# Patient Record
Sex: FEMALE | Race: BLACK OR AFRICAN AMERICAN | NOT HISPANIC OR LATINO | URBAN - METROPOLITAN AREA
[De-identification: names, ages, dates, MRNs, and addresses within clinical notes are randomized per-mention and may not be internally consistent; named-entity substitution may affect disease eponyms.]

---

## 2017-03-19 ENCOUNTER — EMERGENCY (EMERGENCY)
Facility: HOSPITAL | Age: 72
LOS: 1 days | Discharge: HOME | End: 2017-03-19
Attending: EMERGENCY MEDICINE | Admitting: EMERGENCY MEDICINE
Payer: MEDICARE

## 2017-03-19 VITALS
HEART RATE: 77 BPM | SYSTOLIC BLOOD PRESSURE: 145 MMHG | OXYGEN SATURATION: 100 % | DIASTOLIC BLOOD PRESSURE: 66 MMHG | RESPIRATION RATE: 18 BRPM

## 2017-03-19 VITALS
DIASTOLIC BLOOD PRESSURE: 84 MMHG | RESPIRATION RATE: 20 BRPM | OXYGEN SATURATION: 96 % | TEMPERATURE: 98 F | SYSTOLIC BLOOD PRESSURE: 146 MMHG | HEART RATE: 78 BPM

## 2017-03-19 DIAGNOSIS — Z90.89 ACQUIRED ABSENCE OF OTHER ORGANS: Chronic | ICD-10-CM

## 2017-03-19 LAB
ALBUMIN SERPL ELPH-MCNC: 3.2 G/DL — LOW (ref 3.3–5)
ALP SERPL-CCNC: 110 U/L — SIGNIFICANT CHANGE UP (ref 40–120)
ALT FLD-CCNC: 16 U/L — SIGNIFICANT CHANGE UP (ref 4–33)
APTT BLD: 29.5 SEC — SIGNIFICANT CHANGE UP (ref 27.5–37.4)
AST SERPL-CCNC: 21 U/L — SIGNIFICANT CHANGE UP (ref 4–32)
BASE EXCESS BLDV CALC-SCNC: -3.4 MMOL/L — SIGNIFICANT CHANGE UP
BASOPHILS # BLD AUTO: 0.01 K/UL — SIGNIFICANT CHANGE UP (ref 0–0.2)
BASOPHILS NFR BLD AUTO: 0.1 % — SIGNIFICANT CHANGE UP (ref 0–2)
BILIRUB SERPL-MCNC: < 0.2 MG/DL — LOW (ref 0.2–1.2)
BLOOD GAS VENOUS - CREATININE: 2.14 MG/DL — HIGH (ref 0.5–1.3)
BUN SERPL-MCNC: 28 MG/DL — HIGH (ref 7–23)
CALCIUM SERPL-MCNC: 8.3 MG/DL — LOW (ref 8.4–10.5)
CHLORIDE BLDV-SCNC: 109 MMOL/L — HIGH (ref 96–108)
CHLORIDE SERPL-SCNC: 106 MMOL/L — SIGNIFICANT CHANGE UP (ref 98–107)
CK MB BLD-MCNC: 2.1 — SIGNIFICANT CHANGE UP (ref 0–2.5)
CK MB BLD-MCNC: 6.61 NG/ML — HIGH (ref 1–4.7)
CK SERPL-CCNC: 309 U/L — HIGH (ref 25–170)
CO2 SERPL-SCNC: 19 MMOL/L — LOW (ref 22–31)
CREAT SERPL-MCNC: 2.16 MG/DL — HIGH (ref 0.5–1.3)
EOSINOPHIL # BLD AUTO: 0.04 K/UL — SIGNIFICANT CHANGE UP (ref 0–0.5)
EOSINOPHIL NFR BLD AUTO: 0.5 % — SIGNIFICANT CHANGE UP (ref 0–6)
GAS PNL BLDV: 138 MMOL/L — SIGNIFICANT CHANGE UP (ref 136–146)
GLUCOSE BLDV-MCNC: 322 — HIGH (ref 70–99)
GLUCOSE SERPL-MCNC: 333 MG/DL — HIGH (ref 70–99)
HCO3 BLDV-SCNC: 20 MMOL/L — SIGNIFICANT CHANGE UP (ref 20–27)
HCT VFR BLD CALC: 32 % — LOW (ref 34.5–45)
HCT VFR BLDV CALC: 29.6 % — LOW (ref 34.5–45)
HGB BLD-MCNC: 9.9 G/DL — LOW (ref 11.5–15.5)
HGB BLDV-MCNC: 9.6 G/DL — LOW (ref 11.5–15.5)
IMM GRANULOCYTES NFR BLD AUTO: 0.4 % — SIGNIFICANT CHANGE UP (ref 0–1.5)
INR BLD: 0.96 — SIGNIFICANT CHANGE UP (ref 0.88–1.17)
LACTATE BLDV-MCNC: 1 MMOL/L — SIGNIFICANT CHANGE UP (ref 0.5–2)
LYMPHOCYTES # BLD AUTO: 2.93 K/UL — SIGNIFICANT CHANGE UP (ref 1–3.3)
LYMPHOCYTES # BLD AUTO: 40 % — SIGNIFICANT CHANGE UP (ref 13–44)
MCHC RBC-ENTMCNC: 23.7 PG — LOW (ref 27–34)
MCHC RBC-ENTMCNC: 30.9 % — LOW (ref 32–36)
MCV RBC AUTO: 76.6 FL — LOW (ref 80–100)
MONOCYTES # BLD AUTO: 0.56 K/UL — SIGNIFICANT CHANGE UP (ref 0–0.9)
MONOCYTES NFR BLD AUTO: 7.7 % — SIGNIFICANT CHANGE UP (ref 2–14)
NEUTROPHILS # BLD AUTO: 3.75 K/UL — SIGNIFICANT CHANGE UP (ref 1.8–7.4)
NEUTROPHILS NFR BLD AUTO: 51.3 % — SIGNIFICANT CHANGE UP (ref 43–77)
NT-PROBNP SERPL-SCNC: 825.8 PG/ML — SIGNIFICANT CHANGE UP
PCO2 BLDV: 49 MMHG — SIGNIFICANT CHANGE UP (ref 41–51)
PH BLDV: 7.28 PH — LOW (ref 7.32–7.43)
PLATELET # BLD AUTO: 226 K/UL — SIGNIFICANT CHANGE UP (ref 150–400)
PMV BLD: 9.6 FL — SIGNIFICANT CHANGE UP (ref 7–13)
PO2 BLDV: 28 MMHG — LOW (ref 35–40)
POTASSIUM BLDV-SCNC: 4.7 MMOL/L — HIGH (ref 3.4–4.5)
POTASSIUM SERPL-MCNC: 4.9 MMOL/L — SIGNIFICANT CHANGE UP (ref 3.5–5.3)
POTASSIUM SERPL-SCNC: 4.9 MMOL/L — SIGNIFICANT CHANGE UP (ref 3.5–5.3)
PROT SERPL-MCNC: 7 G/DL — SIGNIFICANT CHANGE UP (ref 6–8.3)
PROTHROM AB SERPL-ACNC: 10.8 SEC — SIGNIFICANT CHANGE UP (ref 9.8–13.1)
RBC # BLD: 4.18 M/UL — SIGNIFICANT CHANGE UP (ref 3.8–5.2)
RBC # FLD: 14.7 % — HIGH (ref 10.3–14.5)
SAO2 % BLDV: 44.6 % — LOW (ref 60–85)
SODIUM SERPL-SCNC: 141 MMOL/L — SIGNIFICANT CHANGE UP (ref 135–145)
TROPONIN T SERPL-MCNC: < 0.06 NG/ML — SIGNIFICANT CHANGE UP (ref 0–0.06)
WBC # BLD: 7.32 K/UL — SIGNIFICANT CHANGE UP (ref 3.8–10.5)
WBC # FLD AUTO: 7.32 K/UL — SIGNIFICANT CHANGE UP (ref 3.8–10.5)

## 2017-03-19 PROCEDURE — 71010: CPT | Mod: 26

## 2017-03-19 PROCEDURE — 99284 EMERGENCY DEPT VISIT MOD MDM: CPT | Mod: GC

## 2017-03-19 RX ORDER — ALBUTEROL 90 UG/1
2 AEROSOL, METERED ORAL
Qty: 1 | Refills: 0 | OUTPATIENT
Start: 2017-03-19 | End: 2017-04-18

## 2017-03-19 RX ORDER — FUROSEMIDE 40 MG
20 TABLET ORAL ONCE
Qty: 0 | Refills: 0 | Status: COMPLETED | OUTPATIENT
Start: 2017-03-19 | End: 2017-03-19

## 2017-03-19 RX ORDER — FUROSEMIDE 40 MG
1 TABLET ORAL
Qty: 7 | Refills: 0 | OUTPATIENT
Start: 2017-03-19 | End: 2017-03-26

## 2017-03-19 RX ADMIN — Medication 20 MILLIGRAM(S): at 16:19

## 2017-03-19 NOTE — ED PROVIDER NOTE - PMH
Asthma    CVA (cerebral vascular accident)    DM (diabetes mellitus)    HLD (hyperlipidemia)    HTN (hypertension)    Neuropathy    SLE (systemic lupus erythematosus)

## 2017-03-19 NOTE — ED PROVIDER NOTE - OBJECTIVE STATEMENT
Patient is a 70 y/o F PMH HTN, HLD, DM2, CKD, asthma who presents with shortness of breath. Patient states that yesterday, she developed cough, shortness of breath and wheezing that continued throughout the night. States her albuterol inhaler  so she didn't use it. Reports associated chest tightness/pain. Denies associated fevers, chills. States she was sweating last night. No palpitations, abdominal pain, nausea, vomiting, diarrhea, constipation, or dysuria. States she was recently prescribed lasix, but stopped taking it two weeks ago because she started to have leg swelling. In the last two weeks, leg swelling progressed. Denies sick contacts or recent travel. Found an albuterol inhaler this morning; after using it, wheezing resolved.

## 2017-03-19 NOTE — ED PROVIDER NOTE - MEDICAL DECISION MAKING DETAILS
70 y/o F PMH HTN, HLD, DM2, CKD, asthma presents with SOB and wheezing x 1 day, resolved with albuterol. Will check labs, including pro-BNP, CXR.

## 2017-03-19 NOTE — ED ADULT TRIAGE NOTE - CHIEF COMPLAINT QUOTE
Pt. c/o wheezing , chest pain inspiration  and fluid retention over several days. Pt with b/l lower leg edema noted. Pt was given asa 162mg by EMS.

## 2017-03-19 NOTE — ED PROVIDER NOTE - PROGRESS NOTE DETAILS
Patient clinically improved. Spoke with patient at bedside - will give lasix 20 IV here, will discharge home with a one week supply of lasix 40 qd. Patient agreeable, to follow up with PMD this week.

## 2017-03-19 NOTE — ED PROVIDER NOTE - PHYSICAL EXAMINATION
Attending Note: 72 y/o female presents to the ED with c/o SOB, cough, wheezing x 24 hours (throughout the night). PMH HTN, HLD, DM2, CKD, asthma. Her albuterol inhaler was  so she didn't use it. +Associated chest tightness/pain with cough. Denies fever/chills, N/V. Sweating last PM. No: palpitations, abdominal pain, diarrhea, constipation, or dysuria/hematuria. Recently prescribed Lasix, stopped taking x 2 weeks ago because she started to have leg swelling. Last two weeks, leg swelling progressed. Denies sick contacts or recent travel. Found an albuterol inhaler this morning; after using it, wheezing resolved with improvement.

## 2017-04-11 ENCOUNTER — INPATIENT (INPATIENT)
Facility: HOSPITAL | Age: 72
LOS: 2 days | Discharge: HOME HEALTH | End: 2017-04-14
Attending: INTERNAL MEDICINE | Admitting: INTERNAL MEDICINE
Payer: MEDICARE

## 2017-04-11 VITALS
WEIGHT: 240.08 LBS | TEMPERATURE: 98 F | DIASTOLIC BLOOD PRESSURE: 98 MMHG | HEART RATE: 80 BPM | HEIGHT: 67 IN | SYSTOLIC BLOOD PRESSURE: 168 MMHG | OXYGEN SATURATION: 100 % | RESPIRATION RATE: 17 BRPM

## 2017-04-11 DIAGNOSIS — Z90.89 ACQUIRED ABSENCE OF OTHER ORGANS: Chronic | ICD-10-CM

## 2017-04-11 PROCEDURE — 71010: CPT | Mod: 26

## 2017-04-11 PROCEDURE — 99285 EMERGENCY DEPT VISIT HI MDM: CPT

## 2017-04-11 RX ORDER — IPRATROPIUM/ALBUTEROL SULFATE 18-103MCG
3 AEROSOL WITH ADAPTER (GRAM) INHALATION ONCE
Qty: 0 | Refills: 0 | Status: DISCONTINUED | OUTPATIENT
Start: 2017-04-11 | End: 2017-04-11

## 2017-04-11 RX ORDER — FUROSEMIDE 40 MG
40 TABLET ORAL ONCE
Qty: 0 | Refills: 0 | Status: COMPLETED | OUTPATIENT
Start: 2017-04-11 | End: 2017-04-11

## 2017-04-11 RX ORDER — ALBUTEROL 90 UG/1
2.5 AEROSOL, METERED ORAL ONCE
Qty: 0 | Refills: 0 | Status: COMPLETED | OUTPATIENT
Start: 2017-04-11 | End: 2017-04-11

## 2017-04-11 RX ORDER — IPRATROPIUM/ALBUTEROL SULFATE 18-103MCG
3 AEROSOL WITH ADAPTER (GRAM) INHALATION ONCE
Qty: 0 | Refills: 0 | Status: COMPLETED | OUTPATIENT
Start: 2017-04-11 | End: 2017-04-11

## 2017-04-11 RX ADMIN — Medication 40 MILLIGRAM(S): at 23:14

## 2017-04-11 RX ADMIN — ALBUTEROL 2.5 MILLIGRAM(S): 90 AEROSOL, METERED ORAL at 23:15

## 2017-04-11 NOTE — ED PROVIDER NOTE - OBJECTIVE STATEMENT
Pertinent PMH/PSH/FHx/SHx and Review of Systems contained within:  Patient with history of asthma, CVA, HTN, HLD, DM presents to the ED for shortness of breath, worsening over the last 2-3 days.  Also describes increasing edema in both lower extremities.  Denies any history of CHF.  Denies any chest pain or palpitations.  No recent cough, cold, or sick contacts.  Nonsmoker.  No calf pain or redness.    No fever/chills, No photophobia/eye pain/changes in vision, No ear pain/sore throat/dysphagia, No chest pain/palpitations, no stridor, No abdominal pain, No N/V/D, no dysuria/frequency/discharge, No neck/back pain, no rash, no changes in neurological status/function.

## 2017-04-11 NOTE — ED PROVIDER NOTE - MEDICAL DECISION MAKING DETAILS
Patient with shortness of breath.  VSS.  Labs, CXR reviewed.  Patient given lasix and neb treatments with improvement.  Suspect multiple causes of shortness of breath.  Patient is to be admitted to the hospital and the case was discussed with the admitting physician.  Any changes in plan, additional imaging/labs, and further work up will be at the discretion of the admitting physician.  Patient remained stable in my care. Patient with shortness of breath.  VSS.  Labs, CXR reviewed.  Bedside US without pericardial effusion.  Patient given lasix and neb treatments with improvement.  Suspect multiple causes of shortness of breath.  Patient is to be admitted to the hospital and the case was discussed with the admitting physician.  Any changes in plan, additional imaging/labs, and further work up will be at the discretion of the admitting physician.  Patient remained stable in my care.

## 2017-04-11 NOTE — ED ADULT NURSE NOTE - PMH
<<----- Click to add NO pertinent Past Medical History Asthma    CVA (cerebral vascular accident)    DM (diabetes mellitus)

## 2017-04-11 NOTE — ED ADULT NURSE NOTE - OBJECTIVE STATEMENT
Pt Presents to the ED with c/o SOB and increasing swelling to bilateral lower ext extending up to thighs + 2 pedal edema pt is a poor historian, unsure if she has a cardiac hx

## 2017-04-12 DIAGNOSIS — N18.3 CHRONIC KIDNEY DISEASE, STAGE 3 (MODERATE): ICD-10-CM

## 2017-04-12 DIAGNOSIS — J45.21 MILD INTERMITTENT ASTHMA WITH (ACUTE) EXACERBATION: ICD-10-CM

## 2017-04-12 DIAGNOSIS — E11.22 TYPE 2 DIABETES MELLITUS WITH DIABETIC CHRONIC KIDNEY DISEASE: ICD-10-CM

## 2017-04-12 DIAGNOSIS — Z29.9 ENCOUNTER FOR PROPHYLACTIC MEASURES, UNSPECIFIED: ICD-10-CM

## 2017-04-12 DIAGNOSIS — I50.9 HEART FAILURE, UNSPECIFIED: ICD-10-CM

## 2017-04-12 LAB
ALBUMIN SERPL ELPH-MCNC: 3.1 G/DL — LOW (ref 3.3–5)
ALP SERPL-CCNC: 162 U/L — HIGH (ref 40–120)
ALT FLD-CCNC: 40 U/L — SIGNIFICANT CHANGE UP (ref 12–78)
ANION GAP SERPL CALC-SCNC: 10 MMOL/L — SIGNIFICANT CHANGE UP (ref 5–17)
ANION GAP SERPL CALC-SCNC: 9 MMOL/L — SIGNIFICANT CHANGE UP (ref 5–17)
APTT BLD: 32.7 SEC — SIGNIFICANT CHANGE UP (ref 27.5–37.4)
AST SERPL-CCNC: 43 U/L — HIGH (ref 15–37)
BASOPHILS # BLD AUTO: 0.1 K/UL — SIGNIFICANT CHANGE UP (ref 0–0.2)
BASOPHILS NFR BLD AUTO: 0.9 % — SIGNIFICANT CHANGE UP (ref 0–2)
BILIRUB SERPL-MCNC: 0.2 MG/DL — SIGNIFICANT CHANGE UP (ref 0.2–1.2)
BUN SERPL-MCNC: 26 MG/DL — HIGH (ref 7–23)
BUN SERPL-MCNC: 29 MG/DL — HIGH (ref 7–23)
CALCIUM SERPL-MCNC: 8.3 MG/DL — LOW (ref 8.5–10.1)
CALCIUM SERPL-MCNC: 8.3 MG/DL — LOW (ref 8.5–10.1)
CHLORIDE SERPL-SCNC: 110 MMOL/L — HIGH (ref 96–108)
CHLORIDE SERPL-SCNC: 111 MMOL/L — HIGH (ref 96–108)
CHOLEST SERPL-MCNC: 121 MG/DL — SIGNIFICANT CHANGE UP (ref 10–199)
CK MB BLD-MCNC: 1.2 % — SIGNIFICANT CHANGE UP (ref 0–3.5)
CK MB BLD-MCNC: 1.7 % — SIGNIFICANT CHANGE UP (ref 0–3.5)
CK MB CFR SERPL CALC: 3.4 NG/ML — SIGNIFICANT CHANGE UP (ref 0.5–3.6)
CK MB CFR SERPL CALC: 6.2 NG/ML — HIGH (ref 0.5–3.6)
CK SERPL-CCNC: 274 U/L — HIGH (ref 26–192)
CK SERPL-CCNC: 369 U/L — HIGH (ref 26–192)
CO2 SERPL-SCNC: 21 MMOL/L — LOW (ref 22–31)
CO2 SERPL-SCNC: 22 MMOL/L — SIGNIFICANT CHANGE UP (ref 22–31)
CREAT SERPL-MCNC: 1.97 MG/DL — HIGH (ref 0.5–1.3)
CREAT SERPL-MCNC: 2.09 MG/DL — HIGH (ref 0.5–1.3)
EOSINOPHIL # BLD AUTO: 0.1 K/UL — SIGNIFICANT CHANGE UP (ref 0–0.5)
EOSINOPHIL NFR BLD AUTO: 1.3 % — SIGNIFICANT CHANGE UP (ref 0–6)
GLUCOSE SERPL-MCNC: 75 MG/DL — SIGNIFICANT CHANGE UP (ref 70–99)
GLUCOSE SERPL-MCNC: 86 MG/DL — SIGNIFICANT CHANGE UP (ref 70–99)
HCT VFR BLD CALC: 30.3 % — LOW (ref 34.5–45)
HCT VFR BLD CALC: 33.2 % — LOW (ref 34.5–45)
HDLC SERPL-MCNC: 58 MG/DL — SIGNIFICANT CHANGE UP (ref 40–125)
HGB BLD-MCNC: 10 G/DL — LOW (ref 11.5–15.5)
HGB BLD-MCNC: 10.2 G/DL — LOW (ref 11.5–15.5)
INR BLD: 0.94 RATIO — SIGNIFICANT CHANGE UP (ref 0.88–1.16)
LIPID PNL WITH DIRECT LDL SERPL: 46 MG/DL — SIGNIFICANT CHANGE UP
LYMPHOCYTES # BLD AUTO: 3.6 K/UL — HIGH (ref 1–3.3)
LYMPHOCYTES # BLD AUTO: 38.3 % — SIGNIFICANT CHANGE UP (ref 13–44)
MCHC RBC-ENTMCNC: 22.8 PG — LOW (ref 27–34)
MCHC RBC-ENTMCNC: 24.7 PG — LOW (ref 27–34)
MCHC RBC-ENTMCNC: 30.6 GM/DL — LOW (ref 32–36)
MCHC RBC-ENTMCNC: 33.2 GM/DL — SIGNIFICANT CHANGE UP (ref 32–36)
MCV RBC AUTO: 74.3 FL — LOW (ref 80–100)
MCV RBC AUTO: 74.6 FL — LOW (ref 80–100)
MONOCYTES # BLD AUTO: 0.8 K/UL — SIGNIFICANT CHANGE UP (ref 0–0.9)
MONOCYTES NFR BLD AUTO: 8.9 % — SIGNIFICANT CHANGE UP (ref 2–14)
NEUTROPHILS # BLD AUTO: 4.7 K/UL — SIGNIFICANT CHANGE UP (ref 1.8–7.4)
NEUTROPHILS NFR BLD AUTO: 50.6 % — SIGNIFICANT CHANGE UP (ref 43–77)
NT-PROBNP SERPL-SCNC: 915 PG/ML — HIGH (ref 0–125)
PLATELET # BLD AUTO: 222 K/UL — SIGNIFICANT CHANGE UP (ref 150–400)
PLATELET # BLD AUTO: 236 K/UL — SIGNIFICANT CHANGE UP (ref 150–400)
POTASSIUM SERPL-MCNC: 4.7 MMOL/L — SIGNIFICANT CHANGE UP (ref 3.5–5.3)
POTASSIUM SERPL-MCNC: 5.2 MMOL/L — SIGNIFICANT CHANGE UP (ref 3.5–5.3)
POTASSIUM SERPL-SCNC: 4.7 MMOL/L — SIGNIFICANT CHANGE UP (ref 3.5–5.3)
POTASSIUM SERPL-SCNC: 5.2 MMOL/L — SIGNIFICANT CHANGE UP (ref 3.5–5.3)
PROT SERPL-MCNC: 8.1 GM/DL — SIGNIFICANT CHANGE UP (ref 6–8.3)
PROTHROM AB SERPL-ACNC: 10.2 SEC — SIGNIFICANT CHANGE UP (ref 9.8–12.7)
RBC # BLD: 4.06 M/UL — SIGNIFICANT CHANGE UP (ref 3.8–5.2)
RBC # BLD: 4.47 M/UL — SIGNIFICANT CHANGE UP (ref 3.8–5.2)
RBC # FLD: 13 % — SIGNIFICANT CHANGE UP (ref 11–15)
RBC # FLD: 13.5 % — SIGNIFICANT CHANGE UP (ref 11–15)
SODIUM SERPL-SCNC: 141 MMOL/L — SIGNIFICANT CHANGE UP (ref 135–145)
SODIUM SERPL-SCNC: 142 MMOL/L — SIGNIFICANT CHANGE UP (ref 135–145)
TOTAL CHOLESTEROL/HDL RATIO MEASUREMENT: 2.1 RATIO — LOW (ref 3.3–7.1)
TRIGL SERPL-MCNC: 87 MG/DL — SIGNIFICANT CHANGE UP (ref 10–149)
TROPONIN I SERPL-MCNC: <.015 NG/ML — SIGNIFICANT CHANGE UP (ref 0.01–0.04)
TROPONIN I SERPL-MCNC: <.015 NG/ML — SIGNIFICANT CHANGE UP (ref 0.01–0.04)
WBC # BLD: 8.5 K/UL — SIGNIFICANT CHANGE UP (ref 3.8–10.5)
WBC # BLD: 9.3 K/UL — SIGNIFICANT CHANGE UP (ref 3.8–10.5)
WBC # FLD AUTO: 8.5 K/UL — SIGNIFICANT CHANGE UP (ref 3.8–10.5)
WBC # FLD AUTO: 9.3 K/UL — SIGNIFICANT CHANGE UP (ref 3.8–10.5)

## 2017-04-12 PROCEDURE — 99223 1ST HOSP IP/OBS HIGH 75: CPT | Mod: AI

## 2017-04-12 RX ORDER — DEXTROSE 50 % IN WATER 50 %
25 SYRINGE (ML) INTRAVENOUS ONCE
Qty: 0 | Refills: 0 | Status: DISCONTINUED | OUTPATIENT
Start: 2017-04-12 | End: 2017-04-14

## 2017-04-12 RX ORDER — DEXTROSE 50 % IN WATER 50 %
1 SYRINGE (ML) INTRAVENOUS ONCE
Qty: 0 | Refills: 0 | Status: DISCONTINUED | OUTPATIENT
Start: 2017-04-12 | End: 2017-04-14

## 2017-04-12 RX ORDER — IPRATROPIUM/ALBUTEROL SULFATE 18-103MCG
3 AEROSOL WITH ADAPTER (GRAM) INHALATION EVERY 6 HOURS
Qty: 0 | Refills: 0 | Status: DISCONTINUED | OUTPATIENT
Start: 2017-04-12 | End: 2017-04-14

## 2017-04-12 RX ORDER — INSULIN LISPRO 100/ML
VIAL (ML) SUBCUTANEOUS
Qty: 0 | Refills: 0 | Status: DISCONTINUED | OUTPATIENT
Start: 2017-04-12 | End: 2017-04-14

## 2017-04-12 RX ORDER — DEXTROSE 50 % IN WATER 50 %
12.5 SYRINGE (ML) INTRAVENOUS ONCE
Qty: 0 | Refills: 0 | Status: DISCONTINUED | OUTPATIENT
Start: 2017-04-12 | End: 2017-04-14

## 2017-04-12 RX ORDER — GLUCAGON INJECTION, SOLUTION 0.5 MG/.1ML
1 INJECTION, SOLUTION SUBCUTANEOUS ONCE
Qty: 0 | Refills: 0 | Status: DISCONTINUED | OUTPATIENT
Start: 2017-04-12 | End: 2017-04-14

## 2017-04-12 RX ORDER — HEPARIN SODIUM 5000 [USP'U]/ML
5000 INJECTION INTRAVENOUS; SUBCUTANEOUS EVERY 12 HOURS
Qty: 0 | Refills: 0 | Status: DISCONTINUED | OUTPATIENT
Start: 2017-04-12 | End: 2017-04-14

## 2017-04-12 RX ORDER — HYDRALAZINE HCL 50 MG
25 TABLET ORAL
Qty: 0 | Refills: 0 | Status: DISCONTINUED | OUTPATIENT
Start: 2017-04-12 | End: 2017-04-14

## 2017-04-12 RX ORDER — ATORVASTATIN CALCIUM 80 MG/1
40 TABLET, FILM COATED ORAL AT BEDTIME
Qty: 0 | Refills: 0 | Status: DISCONTINUED | OUTPATIENT
Start: 2017-04-12 | End: 2017-04-14

## 2017-04-12 RX ORDER — FUROSEMIDE 40 MG
40 TABLET ORAL DAILY
Qty: 0 | Refills: 0 | Status: DISCONTINUED | OUTPATIENT
Start: 2017-04-12 | End: 2017-04-14

## 2017-04-12 RX ORDER — ACETAMINOPHEN 500 MG
650 TABLET ORAL ONCE
Qty: 0 | Refills: 0 | Status: COMPLETED | OUTPATIENT
Start: 2017-04-12 | End: 2017-04-12

## 2017-04-12 RX ORDER — AMLODIPINE BESYLATE 2.5 MG/1
10 TABLET ORAL DAILY
Qty: 0 | Refills: 0 | Status: DISCONTINUED | OUTPATIENT
Start: 2017-04-12 | End: 2017-04-14

## 2017-04-12 RX ORDER — MONTELUKAST 4 MG/1
10 TABLET, CHEWABLE ORAL DAILY
Qty: 0 | Refills: 0 | Status: DISCONTINUED | OUTPATIENT
Start: 2017-04-12 | End: 2017-04-14

## 2017-04-12 RX ORDER — SODIUM CHLORIDE 9 MG/ML
1000 INJECTION, SOLUTION INTRAVENOUS
Qty: 0 | Refills: 0 | Status: DISCONTINUED | OUTPATIENT
Start: 2017-04-12 | End: 2017-04-14

## 2017-04-12 RX ADMIN — Medication 25 MILLIGRAM(S): at 18:13

## 2017-04-12 RX ADMIN — Medication 650 MILLIGRAM(S): at 23:50

## 2017-04-12 RX ADMIN — HEPARIN SODIUM 5000 UNIT(S): 5000 INJECTION INTRAVENOUS; SUBCUTANEOUS at 18:13

## 2017-04-12 RX ADMIN — AMLODIPINE BESYLATE 10 MILLIGRAM(S): 2.5 TABLET ORAL at 15:43

## 2017-04-12 RX ADMIN — Medication 3 MILLILITER(S): at 00:10

## 2017-04-12 RX ADMIN — Medication 3 MILLILITER(S): at 11:07

## 2017-04-12 RX ADMIN — ATORVASTATIN CALCIUM 40 MILLIGRAM(S): 80 TABLET, FILM COATED ORAL at 22:59

## 2017-04-12 RX ADMIN — Medication 40 MILLIGRAM(S): at 09:55

## 2017-04-12 RX ADMIN — Medication 3 MILLILITER(S): at 18:00

## 2017-04-12 RX ADMIN — Medication 2: at 15:49

## 2017-04-12 RX ADMIN — MONTELUKAST 10 MILLIGRAM(S): 4 TABLET, CHEWABLE ORAL at 11:39

## 2017-04-12 NOTE — PROGRESS NOTE ADULT - ASSESSMENT
Pt. is a 70y/o female w/pmhx of mild asthma, CVA, HTN, HLD, DM-2 presents to the ED for shortness of breath, worsening over the last 2-3 days.  Also describes increasing edema in both lower extremities over the last month, was seen at [facility] ED and PMD and started on "water pill" states had only 8 pills which finished, pmd also told her to restrict salt which she sates is trying to do.  normally able to walk ~1/2 mile, now about half of that as get sob, requires 3 pillows at night, no cp, palpitations n/v/d/c, no recent travels or sick cotnacts   Pt. here with acute on chronic heart failure (unspecified - awaiting TTE )

## 2017-04-12 NOTE — PROGRESS NOTE ADULT - SUBJECTIVE AND OBJECTIVE BOX
Patient is a 71y old  Female who presents with a chief complaint of sob/leg swelling (12 Apr 2017 07:28)      INTERVAL HPI/OVERNIGHT EVENTS:  feels a little better, sob improved, edema improving    MEDICATIONS  (STANDING):  heparin  Injectable 5000Unit(s) SubCutaneous every 12 hours  insulin lispro (HumaLOG) corrective regimen sliding scale  SubCutaneous three times a day before meals  dextrose 5%. 1000milliLiter(s) IV Continuous <Continuous>  dextrose 50% Injectable 12.5Gram(s) IV Push once  dextrose 50% Injectable 25Gram(s) IV Push once  dextrose 50% Injectable 25Gram(s) IV Push once  atorvastatin 40milliGRAM(s) Oral at bedtime  ALBUTerol/ipratropium for Nebulization 3milliLiter(s) Nebulizer every 6 hours  montelukast 10milliGRAM(s) Oral daily  furosemide   Injectable 40milliGRAM(s) IV Push daily  amLODIPine   Tablet 10milliGRAM(s) Oral daily  hydrALAZINE 25milliGRAM(s) Oral two times a day    MEDICATIONS  (PRN):  dextrose Gel 1Dose(s) Oral once PRN Blood Glucose LESS THAN 70 milliGRAM(s)/deciliter  glucagon  Injectable 1milliGRAM(s) IntraMuscular once PRN Glucose LESS THAN 70 milligrams/deciliter      Allergies    No Known Allergies    Intolerances        REVIEW OF SYSTEMS:  CONSTITUTIONAL: No fever, weight loss, or fatigue  EYES: No eye pain, visual disturbances, or discharge  ENMT:  No difficulty hearing, tinnitus, vertigo; No sinus or throat pain  NECK: No pain or stiffness  BREASTS: No pain, masses, or nipple discharge  RESPIRATORY: +sob but improving  CARDIOVASCULAR: No chest pain, palpitations, dizziness,+leg swelling , pitting b/l  GASTROINTESTINAL: No abdominal or epigastric pain. No nausea, vomiting, or hematemesis; No diarrhea or constipation. No melena or hematochezia.  GENITOURINARY: No dysuria, frequency, hematuria, or incontinence  NEUROLOGICAL: No headaches, memory loss, loss of strength, numbness, or tremors  SKIN: No itching, burning, rashes, or lesions   LYMPH NODES: No enlarged glands  ENDOCRINE: No heat or cold intolerance; No hair loss  MUSCULOSKELETAL: No joint pain or swelling; No muscle, back, or extremity pain  PSYCHIATRIC: No depression, anxiety, mood swings, or difficulty sleeping  HEME/LYMPH: No easy bruising, or bleeding gums  ALLERGY AND IMMUNOLOGIC: No hives or eczema    Vital Signs Last 24 Hrs  T(C): 36.7, Max: 37.2 (04-12 @ 11:00)  T(F): 98, Max: 99 (04-12 @ 11:00)  HR: 95 (76 - 98)  BP: 150/80 (150/80 - 168/98)  BP(mean): --  RR: 17 (16 - 22)  SpO2: 93% (93% - 100%)    PHYSICAL EXAM:  GENERAL: NAD,obese  HEAD:  Atraumatic, Normocephalic  EYES: EOMI, PERRLA, conjunctiva and sclera clear  ENMT: No tonsillar erythema, exudates, or enlargement;   NECK: Supple, No JVD, Normal thyroid  NERVOUS SYSTEM:  Alert & Oriented X3, Good concentration; Motor Strength 5/5 B/L upper and lower extremities; DTRs 2+ intact and symmetric  CHEST/LUNG: Clear to percussion bilaterally; No rales, rhonchi, wheezing, or rubs  HEART: Regular rate and rhythm; No murmurs, rubs, or gallops  ABDOMEN: Soft, Nontender, Nondistended; Bowel sounds present  EXTREMITIES:  2+ Peripheral Pulses, No clubbing, cyanosis, or edema  LYMPH: No lymphadenopathy noted  SKIN: No rashes or lesions    LABS:                        10.0   8.5   )-----------( 222      ( 12 Apr 2017 10:03 )             30.3     04-12    142  |  111<H>  |  26<H>  ----------------------------<  86  4.7   |  22  |  1.97<H>    Ca    8.3<L>      12 Apr 2017 10:03    TPro  8.1  /  Alb  3.1<L>  /  TBili  0.2  /  DBili  x   /  AST  43<H>  /  ALT  40  /  AlkPhos  162<H>  04-12    PT/INR - ( 12 Apr 2017 00:08 )   PT: 10.2 sec;   INR: 0.94 ratio         PTT - ( 12 Apr 2017 00:08 )  PTT:32.7 sec    CAPILLARY BLOOD GLUCOSE  173 (12 Apr 2017 15:49)  81 (12 Apr 2017 08:29)      RADIOLOGY & ADDITIONAL TESTS:    Imaging Personally Reviewed:  [x ] YES  [ ] NO    Consultant(s) Notes Reviewed:  [x ] YES  [ ] NO    Care Discussed with Consultants/Other Providers [x ] YES  [ ] NO

## 2017-04-12 NOTE — H&P ADULT. - HISTORY OF PRESENT ILLNESS
Pt. is a 70y/o female w/pmhx of mild asthma, CVA, HTN, HLD, DM-2 presents to the ED for shortness of breath, worsening over the last 2-3 days.  Also describes increasing edema in both lower extremities over the last month, was seen at [facility] ED and PMD and started on "water pill" states had only 8 pills which finished, pmd also told her to restrict salt which she sates is trying to do.  normally able to walk ~1/2 mile, now about half of that as get sob, requires 3 pillows at night, no cp, palpitations n/v/d/c, no recent travels or sick cotnacts

## 2017-04-12 NOTE — H&P ADULT. - NEUROLOGICAL DETAILS
sensation intact/responds to pain/responds to verbal commands/deep reflexes intact/cranial nerves intact/alert and oriented x 3

## 2017-04-13 LAB
ALBUMIN SERPL ELPH-MCNC: 2.7 G/DL — LOW (ref 3.3–5)
ALP SERPL-CCNC: 125 U/L — HIGH (ref 40–120)
ALT FLD-CCNC: 27 U/L — SIGNIFICANT CHANGE UP (ref 12–78)
ANION GAP SERPL CALC-SCNC: 10 MMOL/L — SIGNIFICANT CHANGE UP (ref 5–17)
ANISOCYTOSIS BLD QL: SLIGHT — SIGNIFICANT CHANGE UP
APPEARANCE UR: CLEAR — SIGNIFICANT CHANGE UP
AST SERPL-CCNC: 29 U/L — SIGNIFICANT CHANGE UP (ref 15–37)
BILIRUB SERPL-MCNC: 0.4 MG/DL — SIGNIFICANT CHANGE UP (ref 0.2–1.2)
BILIRUB UR-MCNC: NEGATIVE — SIGNIFICANT CHANGE UP
BUN SERPL-MCNC: 22 MG/DL — SIGNIFICANT CHANGE UP (ref 7–23)
CALCIUM SERPL-MCNC: 8.2 MG/DL — LOW (ref 8.5–10.1)
CHLORIDE SERPL-SCNC: 108 MMOL/L — SIGNIFICANT CHANGE UP (ref 96–108)
CK SERPL-CCNC: 425 U/L — HIGH (ref 26–192)
CO2 SERPL-SCNC: 23 MMOL/L — SIGNIFICANT CHANGE UP (ref 22–31)
COLOR SPEC: YELLOW — SIGNIFICANT CHANGE UP
CREAT SERPL-MCNC: 2.08 MG/DL — HIGH (ref 0.5–1.3)
DIFF PNL FLD: ABNORMAL
EOSINOPHIL NFR BLD AUTO: 2 % — SIGNIFICANT CHANGE UP (ref 0–6)
GLUCOSE SERPL-MCNC: 153 MG/DL — HIGH (ref 70–99)
GLUCOSE UR QL: NEGATIVE MG/DL — SIGNIFICANT CHANGE UP
HBA1C BLD-MCNC: 11.3 % — HIGH (ref 4–5.6)
HCT VFR BLD CALC: 31.6 % — LOW (ref 34.5–45)
HGB BLD-MCNC: 10.2 G/DL — LOW (ref 11.5–15.5)
KETONES UR-MCNC: NEGATIVE — SIGNIFICANT CHANGE UP
LEUKOCYTE ESTERASE UR-ACNC: ABNORMAL
LYMPHOCYTES # BLD AUTO: 50 % — HIGH (ref 13–44)
MCHC RBC-ENTMCNC: 24 PG — LOW (ref 27–34)
MCHC RBC-ENTMCNC: 32.3 GM/DL — SIGNIFICANT CHANGE UP (ref 32–36)
MCV RBC AUTO: 74.2 FL — LOW (ref 80–100)
MICROCYTES BLD QL: SLIGHT — SIGNIFICANT CHANGE UP
MONOCYTES NFR BLD AUTO: 5 % — SIGNIFICANT CHANGE UP (ref 2–14)
NEUTROPHILS NFR BLD AUTO: 43 % — SIGNIFICANT CHANGE UP (ref 43–77)
NITRITE UR-MCNC: NEGATIVE — SIGNIFICANT CHANGE UP
PH UR: 7 — SIGNIFICANT CHANGE UP (ref 4.8–8)
PLAT MORPH BLD: NORMAL — SIGNIFICANT CHANGE UP
PLATELET # BLD AUTO: 243 K/UL — SIGNIFICANT CHANGE UP (ref 150–400)
POTASSIUM SERPL-MCNC: 4.4 MMOL/L — SIGNIFICANT CHANGE UP (ref 3.5–5.3)
POTASSIUM SERPL-SCNC: 4.4 MMOL/L — SIGNIFICANT CHANGE UP (ref 3.5–5.3)
PROT SERPL-MCNC: 7.2 GM/DL — SIGNIFICANT CHANGE UP (ref 6–8.3)
PROT UR-MCNC: 500 MG/DL
RBC # BLD: 4.26 M/UL — SIGNIFICANT CHANGE UP (ref 3.8–5.2)
RBC # FLD: 13 % — SIGNIFICANT CHANGE UP (ref 11–15)
RBC BLD AUTO: ABNORMAL
SODIUM SERPL-SCNC: 141 MMOL/L — SIGNIFICANT CHANGE UP (ref 135–145)
SP GR SPEC: 1 — LOW (ref 1.01–1.02)
UROBILINOGEN FLD QL: NEGATIVE MG/DL — SIGNIFICANT CHANGE UP
WBC # BLD: 8.1 K/UL — SIGNIFICANT CHANGE UP (ref 3.8–10.5)
WBC # FLD AUTO: 8.1 K/UL — SIGNIFICANT CHANGE UP (ref 3.8–10.5)

## 2017-04-13 PROCEDURE — 93970 EXTREMITY STUDY: CPT | Mod: 26

## 2017-04-13 PROCEDURE — 99233 SBSQ HOSP IP/OBS HIGH 50: CPT

## 2017-04-13 PROCEDURE — 99223 1ST HOSP IP/OBS HIGH 75: CPT

## 2017-04-13 RX ORDER — ACETAMINOPHEN 500 MG
650 TABLET ORAL ONCE
Qty: 0 | Refills: 0 | Status: DISCONTINUED | OUTPATIENT
Start: 2017-04-13 | End: 2017-04-14

## 2017-04-13 RX ORDER — TRAMADOL HYDROCHLORIDE 50 MG/1
25 TABLET ORAL EVERY 6 HOURS
Qty: 0 | Refills: 0 | Status: DISCONTINUED | OUTPATIENT
Start: 2017-04-13 | End: 2017-04-14

## 2017-04-13 RX ADMIN — HEPARIN SODIUM 5000 UNIT(S): 5000 INJECTION INTRAVENOUS; SUBCUTANEOUS at 19:56

## 2017-04-13 RX ADMIN — Medication 25 MILLIGRAM(S): at 19:56

## 2017-04-13 RX ADMIN — Medication 4: at 11:26

## 2017-04-13 RX ADMIN — Medication 25 MILLIGRAM(S): at 05:28

## 2017-04-13 RX ADMIN — Medication 4: at 16:34

## 2017-04-13 RX ADMIN — AMLODIPINE BESYLATE 10 MILLIGRAM(S): 2.5 TABLET ORAL at 05:28

## 2017-04-13 RX ADMIN — ATORVASTATIN CALCIUM 40 MILLIGRAM(S): 80 TABLET, FILM COATED ORAL at 21:48

## 2017-04-13 RX ADMIN — MONTELUKAST 10 MILLIGRAM(S): 4 TABLET, CHEWABLE ORAL at 11:27

## 2017-04-13 RX ADMIN — Medication 650 MILLIGRAM(S): at 00:50

## 2017-04-13 RX ADMIN — Medication 2: at 07:48

## 2017-04-13 RX ADMIN — Medication 40 MILLIGRAM(S): at 05:28

## 2017-04-13 RX ADMIN — Medication 3 MILLILITER(S): at 05:36

## 2017-04-13 RX ADMIN — HEPARIN SODIUM 5000 UNIT(S): 5000 INJECTION INTRAVENOUS; SUBCUTANEOUS at 05:28

## 2017-04-13 RX ADMIN — Medication 3 MILLILITER(S): at 00:11

## 2017-04-13 RX ADMIN — Medication 3 MILLILITER(S): at 11:14

## 2017-04-13 NOTE — DIETITIAN INITIAL EVALUATION ADULT. - ETIOLOGY
excess calorie intake, inconsistent meal patterns not optimal to BS control, questionable diet choices

## 2017-04-13 NOTE — PROGRESS NOTE ADULT - PROBLEM SELECTOR PLAN 2
DOLORES on CKD 3 in the setting of chronic celebrex use at home, and advil pm, pt. advised to stopped these medication
DOLORES vs CKD 3 (speak with pmd re baseline serum cr)

## 2017-04-13 NOTE — PROGRESS NOTE ADULT - ASSESSMENT
Pt. is a 70y/o female w/pmhx of mild asthma, CVA, HTN, HLD, DM-2 presents to the ED for shortness of breath, worsening over the last 2-3 days.  Also describes increasing edema in both lower extremities over the last month, was seen at [facility] ED and PMD and started on "water pill" states had only 8 pills which finished, pmd also told her to restrict salt which she sates is trying to do.  normally able to walk ~1/2 mile, now about half of that as get sob, requires 3 pillows at night, no cp, palpitations n/v/d/c, no recent travels or sick cotnacts   Pt. here with acute on chronic diastolic heart failure

## 2017-04-13 NOTE — PROGRESS NOTE ADULT - PROBLEM SELECTOR PROBLEM 4
Type 2 diabetes mellitus with stage 3 chronic kidney disease, without long-term current use of insulin
Type 2 diabetes mellitus with stage 3 chronic kidney disease, without long-term current use of insulin

## 2017-04-13 NOTE — PHYSICAL THERAPY INITIAL EVALUATION ADULT - GAIT DEVIATIONS NOTED, PT EVAL
decreased weight-shifting ability/decreased heidi/decreased velocity of limb motion/decreased step length

## 2017-04-13 NOTE — PHYSICAL THERAPY INITIAL EVALUATION ADULT - ADDITIONAL COMMENTS
Pt lives c daughter in private home c 4 steps to enter and a flight inside to bedroom (R rail up). Pt is I c straight cane for ambulation and ADL's and has an aide from 10-4 everyday to help c meal prep and household chores. Patient ambulates predominantly c straight cane except uses rollator for recreational walking in neighborhood. Reports she does not use rollator for community ambulation due to difficulty navigating obstacles.

## 2017-04-13 NOTE — CONSULT NOTE ADULT - SUBJECTIVE AND OBJECTIVE BOX
Chief Complaint:  Patient is a 71y old  Female who presents with a chief complaint of sob/leg swelling (2017 07:28)      HPI:   Pt. is a 70y/o female w/pmhx of mild asthma, CVA, HTN, HLD, DM-2 presents to the ED for shortness of breath, worsening over the last 2-3 days.  Also describes increasing edema in both lower extremities over the last month, was seen at [facility] ED and PMD and started on "water pill" states had only 8 pills which finished, pmd also told her to restrict salt which she sates is trying to do.  normally able to walk ~1/2 mile, now about half of that as get sob, requires 3 pillows at night, no cp, palpitations n/v/d/c, no recent travels or sick cotnacts (2017 07:28)    Allergies:        Allergies:  	No Known Allergies:     Home Medications:   * Outpatient Medication Status not yet specified      Past Medical History:  Asthma    CVA (cerebral vascular accident)    DM (diabetes mellitus).    Past Surgical History:  No significant past surgical history.    Family History:  No pertinent family history in first degree relatives.    Social History:  · Marital Status	Single	  · Lives With	children	    Substance Use History:  · Substance Use	never used	    Alcohol Use History:  · Have you ever consumed alcohol	never	    Tobacco Usage:  · Tobacco Usage: Former smoker	  · Tobacco Type: cigarettes	  · Longest Period Tobacco-Free: quite 30 years ago less than 5pack yr	    Review of Systems:  General:  No wt loss, fevers, chills, night sweats  Eyes:  Good vision, no reported pain  ENT:  No sore throat, pain, runny nose, dysphagia  CV:  No pain, palpitations hypo/hypertension  Resp:  No cough, tachypnea, wheezing; Positive SOB  GI:  No pain, nausea, vomiting, diarrhea, constipation  :  No pain, bleeding, incontinence, nocturia  Muscle:  No pain, weakness  Breast:  No pain, abscess, mass, discharge  Neuro:  No weakness, tingling, memory problems  Psych:  No fatigue, insomnia, mood problems, depression  Endocrine:  No polyuria, polydypsia, cold/heat intolerance  Heme:  No petechiae, ecchymosis, easy bruisability  Skin:  No rash; Positive Edema      Physical Exam:  Vital Signs:  Vital Signs Last 24 Hrs  T(C): 37.2, Max: 38.3 (04-12 @ 23:11)  T(F): 98.9, Max: 100.9 ( @ 23:11)  HR: 100 (90 - 102)  BP: 155/87 (150/64 - 171/78)  RR: 18 (17 - 18)  SpO2: 96% (93% - 98%)  I & Os for 24h ending 2017 07:00  =============================================  IN: 720 ml / OUT: 0 ml / NET: 720 ml    I & Os for current day (as of 2017 14:31)  =============================================  IN: 80 ml / OUT: 0 ml / NET: 80 ml      General:  Appears stated age, well-groomed, well-nourished, no distress  HEENT:  NC/AT, patent nares w/ pink mucosa, OP clear w/o lesions, EOMI, conjunctivae clear, no thyromegaly, no JVD  Chest:  Full & symmetric excursion, no increased effort, breath sounds clear  Cardiovascular:  Regular rhythm, S1, S2, no murmur/rub/S3/S4, no carotid/femoral/abdominal bruit, radial/pedal pulses 2+,   Abdomen:  Soft, non-tender, non-distended, normoactive bowel sounds  Extremities: edema  Skin:  No rash/erythema. Skin is warm/dry  Musculoskeletal:  Full ROM in all joints w/o swelling/tenderness/effusion  Neuro/Psych:  Alert, oriented    Laboratory:                            10.2   8.1   )-----------( 243      ( 2017 07:52 )             31.6     04-13    141  |  108  |  22  ----------------------------<  153<H>  4.4   |  23  |  2.08<H>    Ca    8.2<L>      2017 07:52    TPro  7.2  /  Alb  2.7<L>  /  TBili  0.4  /  DBili  x   /  AST  29  /  ALT  27  /  AlkPhos  125<H>  04-13      CARDIAC MARKERS ( 2017 07:52 )  x     / x     / 425 U/L / x     / x      CARDIAC MARKERS ( 2017 10:03 )  <.015 ng/mL / x     / 274 U/L / x     / 3.4 ng/mL  CARDIAC MARKERS ( 2017 00:08 )  <.015 ng/mL / x     / 369 U/L / x     / 6.2 ng/mL      CAPILLARY BLOOD GLUCOSE  207 (2017 11:25)  152 (2017 07:44)  130 (2017 22:58)  173 (2017 15:49)    LIVER FUNCTIONS - ( 2017 07:52 )  Alb: 2.7 g/dL / Pro: 7.2 gm/dL / ALK PHOS: 125 U/L / ALT: 27 U/L / AST: 29 U/L / GGT: x           PT/INR - ( 2017 00:08 )   PT: 10.2 sec;   INR: 0.94 ratio         PTT - ( 2017 00:08 )  PTT:32.7 sec  Urinalysis Basic - ( 2017 04:17 )    Color: Yellow / Appearance: Clear / S.005 / pH: x  Gluc: x / Ketone: Negative  / Bili: Negative / Urobili: Negative mg/dL   Blood: x / Protein: 500 mg/dL / Nitrite: Negative   Leuk Esterase: Trace / RBC: 6-10 /HPF / WBC 0-2   Sq Epi: x / Non Sq Epi: Occasional / Bacteria: Occasional    Imaging:  ECG:    EXAM:  CHEST SINGLE VIEW                            PROCEDURE DATE:  2017        INTERPRETATION:  PROCEDURE: AP view of the chest.    CLINICAL INFORMATION: Shortness of breath    There is no prior radiograph available for comparison at the time of this   report.    FINDINGS:        There are no lung consolidations. The cardiac and mediastinal contours   are prominent, which may be due to magnification from AP technique and   shallow inspiration. The osseous structures are intact.    IMPRESSION:    No lung consolidations.        EXAM:  TTE WO CON COMPLETE W DOPPL    PROCEDURE DATE:  2017     Summary:   1. Left ventricular ejection fraction, by visual estimation, is 55 to   60%.   2. Normal left ventricular size and wall thicknesses, with normal   systolic and diastolic function.   3. Spectral Doppler shows impaired relaxation pattern of left   ventricular myocardial filling (Grade I diastolic dysfunction).   4. Normal right ventricular size and function.   5. The left atrium is normal in size.   6. The right atrium is normal in size.   7. Mild mitral valve regurgitation.   8. Structurally normal mitral valve, with normal leaflet excursion.   9. Structurally normal tricuspid valve, with normal leaflet excursion.  10. Mild aortic valve stenosis.  11. Structurally normal pulmonic valve, with normal leaflet excursion.  12. Peak transaortic gradient equals 19.0 mmHg, mean transaortic gradient   equals 12.0 mmHg, the calculated aortic valve area equals 2.14 cm² by the   continuity equation consistent with mild aortic stenosis.      Assessment:Pt. is a 70y/o female w/pmhx of mild asthma, CVA, HTN, HLD, DM-2 presents to the ED for shortness of breath, worsening over the last 2-3 days.  Also describes increasing edema in both lower extremities over the last month, was seen at [facility] ED and PMD and started on "water pill" states had only 8 pills which finished, pmd also told her to restrict salt which she sates is trying to do.  normally able to walk ~1/2 mile, now about half of that as get sob, requires 3 pillows at night, no cp, palpitations n/v/d/c, no recent travels or sick cotnacts       Plan:   Tele monitoring.    Con't with:  MEDICATIONS  (STANDING):  heparin  Injectable 5000Unit(s) SubCutaneous every 12 hours  insulin lispro (HumaLOG) corrective regimen sliding scale  SubCutaneous three times a day before meals  atorvastatin 40milliGRAM(s) Oral at bedtime  ALBUTerol/ipratropium for Nebulization 3milliLiter(s) Nebulizer every 6 hours  montelukast 10milliGRAM(s) Oral daily  furosemide   Injectable 40milliGRAM(s) IV Push daily  amLODIPine   Tablet 10milliGRAM(s) Oral daily  hydrALAZINE 25milliGRAM(s) Oral two times a day    supportive care.    Chris Valdes MD, FACC, FASGARY, FASNC, FACP  Director, Heart Failure Services  [facility]  , Department of Cardiology  [facility] Chief Complaint:  Patient is a 71y old  Female who presents with a chief complaint of sob/leg swelling (2017 07:28)      HPI:   Pt. is a 72y/o female w/pmhx of mild asthma, CVA, HTN, HLD, DM-2 presents to the ED for shortness of breath, worsening over the last 2-3 days.  Also describes increasing edema in both lower extremities over the last month, was seen at [facility] ED and PMD and started on "water pill" states had only 8 pills which finished, pmd also told her to restrict salt which she sates is trying to do.  normally able to walk ~1/2 mile, now about half of that as get sob, requires 3 pillows at night, no cp, palpitations n/v/d/c, no recent travels or sick cotnacts (2017 07:28)    Allergies:        Allergies:  	No Known Allergies:     Home Medications:   * Outpatient Medication Status not yet specified      Past Medical History:  Asthma    CVA (cerebral vascular accident)    DM (diabetes mellitus).    Past Surgical History:  No significant past surgical history.    Family History:  No pertinent family history in first degree relatives.    Social History:  · Marital Status	Single	  · Lives With	children	    Substance Use History:  · Substance Use	never used	    Alcohol Use History:  · Have you ever consumed alcohol	never	    Tobacco Usage:  · Tobacco Usage: Former smoker	  · Tobacco Type: cigarettes	  · Longest Period Tobacco-Free: quite 30 years ago less than 5pack yr	    Review of Systems:  General:  No wt loss, fevers, chills, night sweats  Eyes:  Good vision, no reported pain  ENT:  No sore throat, pain, runny nose, dysphagia  CV:  No pain, palpitations hypo/hypertension  Resp:  No cough, tachypnea, wheezing; Positive SOB  GI:  No pain, nausea, vomiting, diarrhea, constipation  :  No pain, bleeding, incontinence, nocturia  Muscle:  No pain, weakness  Breast:  No pain, abscess, mass, discharge  Neuro:  No weakness, tingling, memory problems  Psych:  No fatigue, insomnia, mood problems, depression  Endocrine:  No polyuria, polydypsia, cold/heat intolerance  Heme:  No petechiae, ecchymosis, easy bruisability  Skin:  No rash; Positive Edema      Physical Exam:  Vital Signs:  Vital Signs Last 24 Hrs  T(C): 37.2, Max: 38.3 (04-12 @ 23:11)  T(F): 98.9, Max: 100.9 ( @ 23:11)  HR: 100 (90 - 102)  BP: 155/87 (150/64 - 171/78)  RR: 18 (17 - 18)  SpO2: 96% (93% - 98%)  I & Os for 24h ending 2017 07:00  =============================================  IN: 720 ml / OUT: 0 ml / NET: 720 ml    I & Os for current day (as of 2017 14:31)  =============================================  IN: 80 ml / OUT: 0 ml / NET: 80 ml      General:  Appears stated age, well-groomed, well-nourished, no distress  HEENT:  NC/AT, patent nares w/ pink mucosa, OP clear w/o lesions, EOMI, conjunctivae clear, no thyromegaly, no JVD  Chest:  Full & symmetric excursion, no increased effort, breath sounds clear  Cardiovascular:  Regular rhythm, S1, S2, no murmur/rub/S3/S4, no carotid/femoral/abdominal bruit, radial/pedal pulses 2+,   Abdomen:  Soft, non-tender, non-distended, normoactive bowel sounds  Extremities: edema  Skin:  No rash/erythema. Skin is warm/dry  Musculoskeletal:  Full ROM in all joints w/o swelling/tenderness/effusion  Neuro/Psych:  Alert, oriented    Laboratory:                            10.2   8.1   )-----------( 243      ( 2017 07:52 )             31.6     04-13    141  |  108  |  22  ----------------------------<  153<H>  4.4   |  23  |  2.08<H>    Ca    8.2<L>      2017 07:52    TPro  7.2  /  Alb  2.7<L>  /  TBili  0.4  /  DBili  x   /  AST  29  /  ALT  27  /  AlkPhos  125<H>  04-13      CARDIAC MARKERS ( 2017 07:52 )  x     / x     / 425 U/L / x     / x      CARDIAC MARKERS ( 2017 10:03 )  <.015 ng/mL / x     / 274 U/L / x     / 3.4 ng/mL  CARDIAC MARKERS ( 2017 00:08 )  <.015 ng/mL / x     / 369 U/L / x     / 6.2 ng/mL    Serum Pro-Brain Natriuretic Peptide: 915 pg/mL (17 @ 00:08)    CAPILLARY BLOOD GLUCOSE  207 (2017 11:25)  152 (2017 07:44)  130 (2017 22:58)  173 (2017 15:49)    LIVER FUNCTIONS - ( 2017 07:52 )  Alb: 2.7 g/dL / Pro: 7.2 gm/dL / ALK PHOS: 125 U/L / ALT: 27 U/L / AST: 29 U/L / GGT: x           PT/INR - ( 2017 00:08 )   PT: 10.2 sec;   INR: 0.94 ratio         PTT - ( 2017 00:08 )  PTT:32.7 sec  Urinalysis Basic - ( 2017 04:17 )    Color: Yellow / Appearance: Clear / S.005 / pH: x  Gluc: x / Ketone: Negative  / Bili: Negative / Urobili: Negative mg/dL   Blood: x / Protein: 500 mg/dL / Nitrite: Negative   Leuk Esterase: Trace / RBC: 6-10 /HPF / WBC 0-2   Sq Epi: x / Non Sq Epi: Occasional / Bacteria: Occasional    Imaging:  ECG:    EXAM:  CHEST SINGLE VIEW                            PROCEDURE DATE:  2017        INTERPRETATION:  PROCEDURE: AP view of the chest.    CLINICAL INFORMATION: Shortness of breath    There is no prior radiograph available for comparison at the time of this   report.    FINDINGS:        There are no lung consolidations. The cardiac and mediastinal contours   are prominent, which may be due to magnification from AP technique and   shallow inspiration. The osseous structures are intact.    IMPRESSION:    No lung consolidations.        EXAM:  TTE WO CON COMPLETE W DOPPL    PROCEDURE DATE:  2017     Summary:   1. Left ventricular ejection fraction, by visual estimation, is 55 to   60%.   2. Normal left ventricular size and wall thicknesses, with normal   systolic and diastolic function.   3. Spectral Doppler shows impaired relaxation pattern of left   ventricular myocardial filling (Grade I diastolic dysfunction).   4. Normal right ventricular size and function.   5. The left atrium is normal in size.   6. The right atrium is normal in size.   7. Mild mitral valve regurgitation.   8. Structurally normal mitral valve, with normal leaflet excursion.   9. Structurally normal tricuspid valve, with normal leaflet excursion.  10. Mild aortic valve stenosis.  11. Structurally normal pulmonic valve, with normal leaflet excursion.  12. Peak transaortic gradient equals 19.0 mmHg, mean transaortic gradient   equals 12.0 mmHg, the calculated aortic valve area equals 2.14 cm² by the   continuity equation consistent with mild aortic stenosis.      Assessment:Pt. is a 72y/o female w/pmhx of mild asthma, CVA, HTN, HLD, DM-2 presents to the ED for shortness of breath, worsening over the last 2-3 days.  Also describes increasing edema in both lower extremities over the last month, was seen at [facility] ED and PMD and started on "water pill" states had only 8 pills which finished, pmd also told her to restrict salt which she sates is trying to do.  normally able to walk ~1/2 mile, now about half of that as get sob, requires 3 pillows at night, no cp, palpitations n/v/d/c, no recent travels or sick cotnacts       Plan:   Tele monitoring.    Con't with:  MEDICATIONS  (STANDING):  heparin  Injectable 5000Unit(s) SubCutaneous every 12 hours  insulin lispro (HumaLOG) corrective regimen sliding scale  SubCutaneous three times a day before meals  atorvastatin 40milliGRAM(s) Oral at bedtime  ALBUTerol/ipratropium for Nebulization 3milliLiter(s) Nebulizer every 6 hours  montelukast 10milliGRAM(s) Oral daily  furosemide   Injectable 40milliGRAM(s) IV Push daily  amLODIPine   Tablet 10milliGRAM(s) Oral daily  hydrALAZINE 25milliGRAM(s) Oral two times a day    supportive care.    Chris Valdes MD, FACC, FASE, FASNC, FACP  Director, Heart Failure Services  [facility]  , Department of Cardiology  [facility] Chief Complaint:  Patient is a 71y old  Female who presents with a chief complaint of sob/leg swelling (2017 07:28)      HPI:   Pt. is a 72y/o female w/pmhx of mild asthma, CVA, HTN, HLD, DM-2 presents to the ED for shortness of breath, worsening over the last 2-3 days.  Also describes increasing edema in both lower extremities over the last month, was seen at [facility] ED and PMD and started on "water pill" states had only 8 pills which finished, pmd also told her to restrict salt which she sates is trying to do.  normally able to walk ~1/2 mile, now about half of that as get sob, requires 3 pillows at night, no cp, palpitations n/v/d/c, no recent travels or sick cotnacts (2017 07:28)    Allergies:        Allergies:  	No Known Allergies:     Home Medications:   * Outpatient Medication Status not yet specified      Past Medical History:  Asthma    CVA (cerebral vascular accident)    DM (diabetes mellitus).    Past Surgical History:  No significant past surgical history.    Family History:  No pertinent family history in first degree relatives.    Social History:  · Marital Status	Single	  · Lives With	children	    Substance Use History:  · Substance Use	never used	    Alcohol Use History:  · Have you ever consumed alcohol	never	    Tobacco Usage:  · Tobacco Usage: Former smoker	  · Tobacco Type: cigarettes	  · Longest Period Tobacco-Free: quite 30 years ago less than 5pack yr	    Review of Systems:  General:  No wt loss, fevers, chills, night sweats  Eyes:  Good vision, no reported pain  ENT:  No sore throat, pain, runny nose, dysphagia  CV:  No pain, palpitations hypo/hypertension  Resp:  No cough, tachypnea, wheezing; Positive SOB  GI:  No pain, nausea, vomiting, diarrhea, constipation  :  No pain, bleeding, incontinence, nocturia  Muscle:  No pain, weakness  Breast:  No pain, abscess, mass, discharge  Neuro:  No weakness, tingling, memory problems  Psych:  No fatigue, insomnia, mood problems, depression  Endocrine:  No polyuria, polydypsia, cold/heat intolerance  Heme:  No petechiae, ecchymosis, easy bruisability  Skin:  No rash; Positive Edema      Physical Exam:  Vital Signs:  Vital Signs Last 24 Hrs  T(C): 37.2, Max: 38.3 (04-12 @ 23:11)  T(F): 98.9, Max: 100.9 ( @ 23:11)  HR: 100 (90 - 102)  BP: 155/87 (150/64 - 171/78)  RR: 18 (17 - 18)  SpO2: 96% (93% - 98%)  I & Os for 24h ending 2017 07:00  =============================================  IN: 720 ml / OUT: 0 ml / NET: 720 ml    I & Os for current day (as of 2017 14:31)  =============================================  IN: 80 ml / OUT: 0 ml / NET: 80 ml    Tele: SR  General:  Appears stated age, well-groomed, well-nourished, no distress  HEENT:  NC/AT, patent nares w/ pink mucosa, OP clear w/o lesions, EOMI, conjunctivae clear, no thyromegaly, no JVD  Chest:  Full & symmetric excursion, no increased effort, breath sounds clear  Cardiovascular:  Regular rhythm, S1, S2, no murmur/rub/S3/S4, no carotid/femoral/abdominal bruit, radial/pedal pulses 2+,   Abdomen:  Soft, non-tender, non-distended, normoactive bowel sounds  Extremities: edema  Skin:  No rash/erythema. Skin is warm/dry  Musculoskeletal:  Full ROM in all joints w/o swelling/tenderness/effusion  Neuro/Psych:  Alert, oriented    Laboratory:                            10.2   8.1   )-----------( 243      ( 2017 07:52 )             31.6     04-13    141  |  108  |  22  ----------------------------<  153<H>  4.4   |  23  |  2.08<H>    Ca    8.2<L>      2017 07:52    TPro  7.2  /  Alb  2.7<L>  /  TBili  0.4  /  DBili  x   /  AST  29  /  ALT  27  /  AlkPhos  125<H>  04-13      CARDIAC MARKERS ( 2017 07:52 )  x     / x     / 425 U/L / x     / x      CARDIAC MARKERS ( 2017 10:03 )  <.015 ng/mL / x     / 274 U/L / x     / 3.4 ng/mL  CARDIAC MARKERS ( 2017 00:08 )  <.015 ng/mL / x     / 369 U/L / x     / 6.2 ng/mL    Serum Pro-Brain Natriuretic Peptide: 915 pg/mL (17 @ 00:08)    CAPILLARY BLOOD GLUCOSE  207 (2017 11:25)  152 (2017 07:44)  130 (2017 22:58)  173 (2017 15:49)    LIVER FUNCTIONS - ( 2017 07:52 )  Alb: 2.7 g/dL / Pro: 7.2 gm/dL / ALK PHOS: 125 U/L / ALT: 27 U/L / AST: 29 U/L / GGT: x           PT/INR - ( 2017 00:08 )   PT: 10.2 sec;   INR: 0.94 ratio         PTT - ( 2017 00:08 )  PTT:32.7 sec  Urinalysis Basic - ( 2017 04:17 )    Color: Yellow / Appearance: Clear / S.005 / pH: x  Gluc: x / Ketone: Negative  / Bili: Negative / Urobili: Negative mg/dL   Blood: x / Protein: 500 mg/dL / Nitrite: Negative   Leuk Esterase: Trace / RBC: 6-10 /HPF / WBC 0-2   Sq Epi: x / Non Sq Epi: Occasional / Bacteria: Occasional    Imaging:  ECG: SR, old IWMI, age?, nonspecific ST T abnls.    EXAM:  CHEST SINGLE VIEW                            PROCEDURE DATE:  2017        INTERPRETATION:  PROCEDURE: AP view of the chest.    CLINICAL INFORMATION: Shortness of breath    There is no prior radiograph available for comparison at the time of this   report.    FINDINGS:        There are no lung consolidations. The cardiac and mediastinal contours   are prominent, which may be due to magnification from AP technique and   shallow inspiration. The osseous structures are intact.    IMPRESSION:    No lung consolidations.        EXAM:  TTE WO CON COMPLETE W DOPPL    PROCEDURE DATE:  2017     Summary:   1. Left ventricular ejection fraction, by visual estimation, is 55 to   60%.   2. Normal left ventricular size and wall thicknesses, with normal   systolic.   3. Spectral Doppler shows impaired relaxation pattern of left   ventricular myocardial filling (Grade I diastolic dysfunction).   4. Normal right ventricular size and function.   5. The left atrium is normal in size.   6. The right atrium is normal in size.   7. Mild mitral valve regurgitation.   8. Structurally normal mitral valve, with normal leaflet excursion.   9. Structurally normal tricuspid valve, with normal leaflet excursion.  10. Mild aortic valve stenosis.  11. Structurally normal pulmonic valve, with normal leaflet excursion.  12. Peak transaortic gradient equals 19.0 mmHg, mean transaortic gradient   equals 12.0 mmHg, the calculated aortic valve area equals 2.14 cm² by the   continuity equation consistent with mild aortic stenosis.      Assessment:Pt. is a 72y/o female w/pmhx of mild asthma, CVA, HTN, HLD, DM-2 presents to the ED for shortness of breath, worsening over the last 2-3 days.  Also describes increasing edema in both lower extremities over the last month, was seen at [facility] ED and PMD and started on "water pill" states had only 8 pills which finished, pmd also told her to restrict salt which she sates is trying to do.  normally able to walk ~1/2 mile, now about half of that as get sob, requires 3 pillows at night, no cp, palpitations n/v/d/c, no recent travels or sick cotnacts       Plan:   Tele monitoring.    Con't with:  MEDICATIONS  (STANDING):  heparin  Injectable 5000Unit(s) SubCutaneous every 12 hours  insulin lispro (HumaLOG) corrective regimen sliding scale  SubCutaneous three times a day before meals  atorvastatin 40milliGRAM(s) Oral at bedtime  ALBUTerol/ipratropium for Nebulization 3milliLiter(s) Nebulizer every 6 hours  montelukast 10milliGRAM(s) Oral daily  furosemide   Injectable 40milliGRAM(s) IV Push daily  amLODIPine   Tablet 10milliGRAM(s) Oral daily  hydrALAZINE 25milliGRAM(s) Oral two times a day    supportive care.    Chris Valdes MD, FACC, FASE, FASNC, FACP  Director, Heart Failure Services  [facility]  , Department of Cardiology  [facility] Chief Complaint:  Patient is a 71y old  Female who presents with a chief complaint of sob/leg swelling (2017 07:28)      HPI:   Pt. is a 72y/o female w/pmhx of mild asthma, CVA, HTN, HLD, DM-2 presents to the ED for shortness of breath, worsening over the last 2-3 days.  Also describes increasing edema in both lower extremities over the last month, was seen at [facility] ED and PMD and started on "water pill" states had only 8 pills which finished, pmd also told her to restrict salt which she sates is trying to do.  normally able to walk ~1/2 mile, now about half of that as get sob, requires 3 pillows at night, no cp, palpitations n/v/d/c, no recent travels or sick cotnacts (2017 07:28) Diuresing and feeling better now. No prior reported cardiac procedures per the patient.    Allergies:        Allergies:  	No Known Allergies:     Home Medications:   * Outpatient Medication Status not yet specified      Past Medical History:  Asthma    CVA (cerebral vascular accident)    DM (diabetes mellitus).    Past Surgical History:  No significant past surgical history.    Family History:  No pertinent family history in first degree relatives.    Social History:  · Marital Status	Single	  · Lives With	children	    Substance Use History:  · Substance Use	never used	    Alcohol Use History:  · Have you ever consumed alcohol	never	    Tobacco Usage:  · Tobacco Usage: Former smoker	  · Tobacco Type: cigarettes	  · Longest Period Tobacco-Free: quite 30 years ago less than 5pack yr	    Review of Systems:  General:  No wt loss, fevers, chills, night sweats  Eyes:  Good vision, no reported pain  ENT:  No sore throat, pain, runny nose, dysphagia  CV:  No pain, palpitations hypo/hypertension  Resp:  No cough, tachypnea, wheezing; Positive SOB  GI:  No pain, nausea, vomiting, diarrhea, constipation  :  No pain, bleeding, incontinence, nocturia  Muscle:  No pain, weakness  Breast:  No pain, abscess, mass, discharge  Neuro:  No weakness, tingling, memory problems  Psych:  No fatigue, insomnia, mood problems, depression  Endocrine:  No polyuria, polydypsia, cold/heat intolerance  Heme:  No petechiae, ecchymosis, easy bruisability  Skin:  No rash; Positive Edema      Physical Exam:  Vital Signs:  Vital Signs Last 24 Hrs  T(C): 37.2, Max: 38.3 ( @ 23:11)  T(F): 98.9, Max: 100.9 ( @ 23:11)  HR: 100 (90 - 102)  BP: 155/87 (150/64 - 171/78)  RR: 18 (17 - 18)  SpO2: 96% (93% - 98%)  I & Os for 24h ending 2017 07:00  =============================================  IN: 720 ml / OUT: 0 ml / NET: 720 ml    I & Os for current day (as of 2017 14:31)  =============================================  IN: 80 ml / OUT: 0 ml / NET: 80 ml    Tele: SR  General:  Appears stated age, well-groomed, well-nourished, no distress  HEENT:  NC/AT, patent nares w/ pink mucosa, OP clear w/o lesions, EOMI, conjunctivae clear, no thyromegaly, no JVD  Chest:  Full & symmetric excursion, no increased effort, breath sounds clear  Cardiovascular:  Regular rhythm, S1, S2, no murmur/rub/S3/S4, no carotid/femoral/abdominal bruit, radial pulses 1+,   Abdomen:  Soft, non-tender, non-distended, normoactive bowel sounds  Extremities: trace b/l edema  Skin:  No rash/erythema. Skin is warm/dry  Musculoskeletal:  Full ROM in all joints w/o swelling/tenderness/effusion  Neuro/Psych:  Alert, oriented    Laboratory:                            10.2   8.1   )-----------( 243      ( 2017 07:52 )             31.6     04-13    141  |  108  |  22  ----------------------------<  153<H>  4.4   |  23  |  2.08<H>    Ca    8.2<L>      2017 07:52    TPro  7.2  /  Alb  2.7<L>  /  TBili  0.4  /  DBili  x   /  AST  29  /  ALT  27  /  AlkPhos  125<H>  04-13      CARDIAC MARKERS ( 2017 07:52 )  x     / x     / 425 U/L / x     / x      CARDIAC MARKERS ( 2017 10:03 )  <.015 ng/mL / x     / 274 U/L / x     / 3.4 ng/mL  CARDIAC MARKERS ( 2017 00:08 )  <.015 ng/mL / x     / 369 U/L / x     / 6.2 ng/mL    Serum Pro-Brain Natriuretic Peptide: 915 pg/mL (17 @ 00:08)    CAPILLARY BLOOD GLUCOSE  207 (2017 11:25)  152 (2017 07:44)  130 (2017 22:58)  173 (2017 15:49)    LIVER FUNCTIONS - ( 2017 07:52 )  Alb: 2.7 g/dL / Pro: 7.2 gm/dL / ALK PHOS: 125 U/L / ALT: 27 U/L / AST: 29 U/L / GGT: x           PT/INR - ( 2017 00:08 )   PT: 10.2 sec;   INR: 0.94 ratio         PTT - ( 2017 00:08 )  PTT:32.7 sec  Urinalysis Basic - ( 2017 04:17 )    Color: Yellow / Appearance: Clear / S.005 / pH: x  Gluc: x / Ketone: Negative  / Bili: Negative / Urobili: Negative mg/dL   Blood: x / Protein: 500 mg/dL / Nitrite: Negative   Leuk Esterase: Trace / RBC: 6-10 /HPF / WBC 0-2   Sq Epi: x / Non Sq Epi: Occasional / Bacteria: Occasional    Imaging:  ECG: SR, old IWMI, age?, nonspecific ST T abnls.    EXAM:  CHEST SINGLE VIEW                            PROCEDURE DATE:  2017        INTERPRETATION:  PROCEDURE: AP view of the chest.    CLINICAL INFORMATION: Shortness of breath    There is no prior radiograph available for comparison at the time of this   report.    FINDINGS:        There are no lung consolidations. The cardiac and mediastinal contours   are prominent, which may be due to magnification from AP technique and   shallow inspiration. The osseous structures are intact.    IMPRESSION:    No lung consolidations.        EXAM:  TTE WO CON COMPLETE W DOPPL    PROCEDURE DATE:  2017     Summary:   1. Left ventricular ejection fraction, by visual estimation, is 55 to   60%.   2. Normal left ventricular size and wall thicknesses, with normal   systolic.   3. Spectral Doppler shows impaired relaxation pattern of left   ventricular myocardial filling (Grade I diastolic dysfunction).   4. Normal right ventricular size and function.   5. The left atrium is normal in size.   6. The right atrium is normal in size.   7. Mild mitral valve regurgitation.   8. Structurally normal mitral valve, with normal leaflet excursion.   9. Structurally normal tricuspid valve, with normal leaflet excursion.  10. Mild aortic valve stenosis.  11. Structurally normal pulmonic valve, with normal leaflet excursion.  12. Peak transaortic gradient equals 19.0 mmHg, mean transaortic gradient   equals 12.0 mmHg, the calculated aortic valve area equals 2.14 cm² by the   continuity equation consistent with mild aortic stenosis.      Assessment:Pt. is a 72y/o female w/pmhx of mild asthma, CVA, HTN, HLD, DM-2 presents to the ED for shortness of breath, worsening over the last 2-3 days.  Also describes increasing edema in both lower extremities over the last month, was seen at [facility] ED and PMD and started on "water pill" states had only 8 pills which finished, pmd also told her to restrict salt which she sates is trying to do.  normally able to walk ~1/2 mile, now about half of that as get sob, requires 3 pillows at night, no cp, palpitations n/v/d/c, no recent travels or sick cotnacts. Feeling better.       Plan:   Tele monitoring.    Con't with:  MEDICATIONS  (STANDING):  heparin  Injectable 5000Unit(s) SubCutaneous every 12 hours  insulin lispro (HumaLOG) corrective regimen sliding scale  SubCutaneous three times a day before meals  atorvastatin 40milliGRAM(s) Oral at bedtime  ALBUTerol/ipratropium for Nebulization 3milliLiter(s) Nebulizer every 6 hours  montelukast 10milliGRAM(s) Oral daily  furosemide   Injectable 40milliGRAM(s) IV Push daily  amLODIPine   Tablet 10milliGRAM(s) Oral daily  hydrALAZINE 25milliGRAM(s) Oral two times a day    supportive care. fluid/salt restrict.    Chris Valdes MD, FACC, SHUKRI, MARY CARMEN, FACP  Director, Heart Failure Services  [facility]  , Department of Cardiology  [facility]

## 2017-04-13 NOTE — DIETITIAN INITIAL EVALUATION ADULT. - NS AS NUTRI INTERV ED CONTENT
Nutrition relationship to health/disease/Purpose of the nutrition education/Recommended modifications/Priority modifications

## 2017-04-13 NOTE — PROGRESS NOTE ADULT - PROBLEM SELECTOR PLAN 1
continue with diuresis,  appreciate cards input , fluid and salt restriction
continue with diuresis, fu TTE, cards recs

## 2017-04-13 NOTE — PROGRESS NOTE ADULT - SUBJECTIVE AND OBJECTIVE BOX
Patient is a 71y old  Female who presents with a chief complaint of sob/leg swelling (2017 07:28)      INTERVAL HPI/OVERNIGHT EVENTS:  feeling better but some residual leg pain    MEDICATIONS  (STANDING):  heparin  Injectable 5000Unit(s) SubCutaneous every 12 hours  insulin lispro (HumaLOG) corrective regimen sliding scale  SubCutaneous three times a day before meals  dextrose 5%. 1000milliLiter(s) IV Continuous <Continuous>  dextrose 50% Injectable 12.5Gram(s) IV Push once  dextrose 50% Injectable 25Gram(s) IV Push once  dextrose 50% Injectable 25Gram(s) IV Push once  atorvastatin 40milliGRAM(s) Oral at bedtime  ALBUTerol/ipratropium for Nebulization 3milliLiter(s) Nebulizer every 6 hours  montelukast 10milliGRAM(s) Oral daily  furosemide   Injectable 40milliGRAM(s) IV Push daily  amLODIPine   Tablet 10milliGRAM(s) Oral daily  hydrALAZINE 25milliGRAM(s) Oral two times a day    MEDICATIONS  (PRN):  dextrose Gel 1Dose(s) Oral once PRN Blood Glucose LESS THAN 70 milliGRAM(s)/deciliter  glucagon  Injectable 1milliGRAM(s) IntraMuscular once PRN Glucose LESS THAN 70 milligrams/deciliter  traMADol 25milliGRAM(s) Oral every 6 hours PRN Moderate Pain (4 - 6)      Allergies    No Known Allergies    Intolerances        REVIEW OF SYSTEMS:  CONSTITUTIONAL: No fever, weight loss, or fatigue  EYES: No eye pain, visual disturbances, or discharge  ENMT:  No difficulty hearing, tinnitus, vertigo; No sinus or throat pain  NECK: No pain or stiffness  BREASTS: No pain, masses, or nipple discharge  RESPIRATORY: sob improved  CARDIOVASCULAR: No chest pain, palpitations, dizzinessl eg swelling improving  GASTROINTESTINAL: No abdominal or epigastric pain. No nausea, vomiting, or hematemesis; No diarrhea or constipation. No melena or hematochezia.  GENITOURINARY: No dysuria, frequency, hematuria, or incontinence  NEUROLOGICAL: No headaches, memory loss, loss of strength, numbness, or tremors  SKIN: No itching, burning, rashes, or lesions   LYMPH NODES: No enlarged glands  ENDOCRINE: No heat or cold intolerance; No hair loss  MUSCULOSKELETAL: No joint pain or swelling; No muscle, back, still with complaint of leg swelling  PSYCHIATRIC: No depression, anxiety, mood swings, or difficulty sleeping  HEME/LYMPH: No easy bruising, or bleeding gums  ALLERGY AND IMMUNOLOGIC: No hives or eczema    Vital Signs Last 24 Hrs  T(C): 37.1, Max: 38.3 ( @ 23:11)  T(F): 98.8, Max: 100.9 ( @ 23:11)  HR: 94 (90 - 102)  BP: 165/87 (150/64 - 171/78)  BP(mean): --  RR: 18 (18 - 18)  SpO2: 98% (95% - 98%)    PHYSICAL EXAM:  GENERAL: NAD, mildly confused, room was changed , "am i in a different hospital"  HEAD:  Atraumatic, Normocephalic  EYES: EOMI, PERRLA, conjunctiva and sclera clear  ENMT: No tonsillar erythema, exudates, or enlargement; Moist mucous membranes,   NECK: Supple, No JVD, Normal thyroid  NERVOUS SYSTEM:  Alert   Motor Strength 5/5 B/L upper and lower extremities; DTRs 2+ intact and symmetric  CHEST/LUNG: Clear to percussion bilaterally; No rales, rhonchi, wheezing, or rubs  HEART: Regular rate and rhythm; No murmurs, rubs, or gallops  ABDOMEN: Soft, Nontender, Nondistended; Bowel sounds present  EXTREMITIES:  2+ Peripheral Pulses, No clubbing, cyanosis, trace pitting edema  LYMPH: No lymphadenopathy noted  SKIN: No rashes or lesions    LABS:                        10.2   8.1   )-----------( 243      ( 2017 07:52 )             31.6         141  |  108  |  22  ----------------------------<  153<H>  4.4   |  23  |  2.08<H>    Ca    8.2<L>      2017 07:52    TPro  7.2  /  Alb  2.7<L>  /  TBili  0.4  /  DBili  x   /  AST  29  /  ALT  27  /  AlkPhos  125<H>      PT/INR - ( 2017 00:08 )   PT: 10.2 sec;   INR: 0.94 ratio         PTT - ( 2017 00:08 )  PTT:32.7 sec  Urinalysis Basic - ( 2017 04:17 )    Color: Yellow / Appearance: Clear / S.005 / pH: x  Gluc: x / Ketone: Negative  / Bili: Negative / Urobili: Negative mg/dL   Blood: x / Protein: 500 mg/dL / Nitrite: Negative   Leuk Esterase: Trace / RBC: 6-10 /HPF / WBC 0-2   Sq Epi: x / Non Sq Epi: Occasional / Bacteria: Occasional      CAPILLARY BLOOD GLUCOSE  207 (2017 11:25)  152 (2017 07:44)  130 (2017 22:58)      RADIOLOGY & ADDITIONAL TESTS:    Imaging Personally Reviewed:  [x ] YES  [ ] NO    Consultant(s) Notes Reviewed:  [ x] YES  [ ] NO    Care Discussed with Consultants/Other Providers [ x] YES  [ ] NO

## 2017-04-13 NOTE — PROGRESS NOTE ADULT - PROBLEM SELECTOR PROBLEM 1
Acute on chronic congestive heart failure, unspecified congestive heart failure type
Acute on chronic congestive heart failure, unspecified congestive heart failure type

## 2017-04-13 NOTE — PHYSICAL THERAPY INITIAL EVALUATION ADULT - TINETTI BALANCE TEST, REHAB EVAL
Sitting Balance 1/1 , Rises From Chair 1/2, Attempts to rise 2/2 , Immediate Standing Balance 1/2,  Standing Balance 2/2, Nudged 2/2, Eyes Closed 1/1,  Turning 360 Deg 0/2, Sitting down 1/2, Balance Score 11/16

## 2017-04-14 VITALS — WEIGHT: 107.14 LBS

## 2017-04-14 LAB
ANION GAP SERPL CALC-SCNC: 10 MMOL/L — SIGNIFICANT CHANGE UP (ref 5–17)
BASOPHILS # BLD AUTO: 0.1 K/UL — SIGNIFICANT CHANGE UP (ref 0–0.2)
BASOPHILS NFR BLD AUTO: 1.1 % — SIGNIFICANT CHANGE UP (ref 0–2)
BUN SERPL-MCNC: 19 MG/DL — SIGNIFICANT CHANGE UP (ref 7–23)
CALCIUM SERPL-MCNC: 8.2 MG/DL — LOW (ref 8.5–10.1)
CHLORIDE SERPL-SCNC: 108 MMOL/L — SIGNIFICANT CHANGE UP (ref 96–108)
CO2 SERPL-SCNC: 23 MMOL/L — SIGNIFICANT CHANGE UP (ref 22–31)
CREAT SERPL-MCNC: 1.97 MG/DL — HIGH (ref 0.5–1.3)
CULTURE RESULTS: SIGNIFICANT CHANGE UP
EOSINOPHIL # BLD AUTO: 0.1 K/UL — SIGNIFICANT CHANGE UP (ref 0–0.5)
EOSINOPHIL NFR BLD AUTO: 1 % — SIGNIFICANT CHANGE UP (ref 0–6)
FERRITIN SERPL-MCNC: 66 NG/ML — SIGNIFICANT CHANGE UP (ref 15–150)
GLUCOSE SERPL-MCNC: 198 MG/DL — HIGH (ref 70–99)
HCT VFR BLD CALC: 32.7 % — LOW (ref 34.5–45)
HGB BLD-MCNC: 10.6 G/DL — LOW (ref 11.5–15.5)
IRON SATN MFR SERPL: 28 % — SIGNIFICANT CHANGE UP (ref 14–50)
IRON SATN MFR SERPL: 74 UG/DL — SIGNIFICANT CHANGE UP (ref 30–160)
LYMPHOCYTES # BLD AUTO: 3.4 K/UL — HIGH (ref 1–3.3)
LYMPHOCYTES # BLD AUTO: 44.2 % — HIGH (ref 13–44)
MCHC RBC-ENTMCNC: 24.3 PG — LOW (ref 27–34)
MCHC RBC-ENTMCNC: 32.5 GM/DL — SIGNIFICANT CHANGE UP (ref 32–36)
MCV RBC AUTO: 74.8 FL — LOW (ref 80–100)
MONOCYTES # BLD AUTO: 0.8 K/UL — SIGNIFICANT CHANGE UP (ref 0–0.9)
MONOCYTES NFR BLD AUTO: 10.2 % — SIGNIFICANT CHANGE UP (ref 2–14)
NEUTROPHILS # BLD AUTO: 3.4 K/UL — SIGNIFICANT CHANGE UP (ref 1.8–7.4)
NEUTROPHILS NFR BLD AUTO: 43.6 % — SIGNIFICANT CHANGE UP (ref 43–77)
PLATELET # BLD AUTO: 223 K/UL — SIGNIFICANT CHANGE UP (ref 150–400)
POTASSIUM SERPL-MCNC: 4.3 MMOL/L — SIGNIFICANT CHANGE UP (ref 3.5–5.3)
POTASSIUM SERPL-SCNC: 4.3 MMOL/L — SIGNIFICANT CHANGE UP (ref 3.5–5.3)
RBC # BLD: 4.37 M/UL — SIGNIFICANT CHANGE UP (ref 3.8–5.2)
RBC # FLD: 13 % — SIGNIFICANT CHANGE UP (ref 11–15)
SODIUM SERPL-SCNC: 141 MMOL/L — SIGNIFICANT CHANGE UP (ref 135–145)
SPECIMEN SOURCE: SIGNIFICANT CHANGE UP
TIBC SERPL-MCNC: 266 UG/DL — SIGNIFICANT CHANGE UP (ref 220–430)
UIBC SERPL-MCNC: 192 UG/DL — SIGNIFICANT CHANGE UP (ref 110–370)
WBC # BLD: 7.8 K/UL — SIGNIFICANT CHANGE UP (ref 3.8–10.5)
WBC # FLD AUTO: 7.8 K/UL — SIGNIFICANT CHANGE UP (ref 3.8–10.5)

## 2017-04-14 PROCEDURE — 99239 HOSP IP/OBS DSCHRG MGMT >30: CPT

## 2017-04-14 RX ORDER — ACETAMINOPHEN 500 MG
1 TABLET ORAL
Qty: 45 | Refills: 0 | OUTPATIENT
Start: 2017-04-14 | End: 2017-04-29

## 2017-04-14 RX ORDER — AMLODIPINE BESYLATE 2.5 MG/1
1 TABLET ORAL
Qty: 0 | Refills: 0 | COMMUNITY
Start: 2017-04-14

## 2017-04-14 RX ORDER — TRAMADOL HYDROCHLORIDE 50 MG/1
25 TABLET ORAL ONCE
Qty: 0 | Refills: 0 | Status: DISCONTINUED | OUTPATIENT
Start: 2017-04-14 | End: 2017-04-14

## 2017-04-14 RX ORDER — HYDRALAZINE HCL 50 MG
1 TABLET ORAL
Qty: 60 | Refills: 0 | OUTPATIENT
Start: 2017-04-14 | End: 2017-05-14

## 2017-04-14 RX ADMIN — Medication 40 MILLIGRAM(S): at 05:46

## 2017-04-14 RX ADMIN — MONTELUKAST 10 MILLIGRAM(S): 4 TABLET, CHEWABLE ORAL at 11:33

## 2017-04-14 RX ADMIN — TRAMADOL HYDROCHLORIDE 25 MILLIGRAM(S): 50 TABLET ORAL at 11:33

## 2017-04-14 RX ADMIN — TRAMADOL HYDROCHLORIDE 25 MILLIGRAM(S): 50 TABLET ORAL at 13:53

## 2017-04-14 RX ADMIN — Medication 3 MILLILITER(S): at 00:11

## 2017-04-14 RX ADMIN — TRAMADOL HYDROCHLORIDE 25 MILLIGRAM(S): 50 TABLET ORAL at 12:10

## 2017-04-14 RX ADMIN — AMLODIPINE BESYLATE 10 MILLIGRAM(S): 2.5 TABLET ORAL at 05:46

## 2017-04-14 RX ADMIN — Medication 4: at 11:32

## 2017-04-14 RX ADMIN — TRAMADOL HYDROCHLORIDE 25 MILLIGRAM(S): 50 TABLET ORAL at 01:17

## 2017-04-14 RX ADMIN — TRAMADOL HYDROCHLORIDE 25 MILLIGRAM(S): 50 TABLET ORAL at 14:00

## 2017-04-14 RX ADMIN — HEPARIN SODIUM 5000 UNIT(S): 5000 INJECTION INTRAVENOUS; SUBCUTANEOUS at 05:46

## 2017-04-14 RX ADMIN — Medication 3 MILLILITER(S): at 06:45

## 2017-04-14 RX ADMIN — TRAMADOL HYDROCHLORIDE 25 MILLIGRAM(S): 50 TABLET ORAL at 01:45

## 2017-04-14 RX ADMIN — Medication 2: at 08:14

## 2017-04-14 RX ADMIN — Medication 3 MILLILITER(S): at 11:38

## 2017-04-14 RX ADMIN — Medication 25 MILLIGRAM(S): at 05:46

## 2017-04-14 NOTE — DISCHARGE NOTE ADULT - MEDICATION SUMMARY - MEDICATIONS TO STOP TAKING
I will STOP taking the medications listed below when I get home from the hospital:    CeleBREX 200 mg oral capsule  -- 1 cap(s) by mouth 2 times a day

## 2017-04-14 NOTE — DISCHARGE NOTE ADULT - HOSPITAL COURSE
Pt. is a 72y/o female w/pmhx of mild asthma, CVA, HTN, HLD, DM-2 presents to the ED for shortness of breath, worsening over the last 2-3 days.  Also describes increasing edema in both lower extremities over the last month, was seen at [facility] ED and PMD and started on "water pill" states had only 8 pills which finished, pmd also told her to restrict salt which she sates is trying to do.  normally able to walk ~1/2 mile, now about half of that as get sob, requires 3 pillows at night, no cp, palpitations n/v/d/c, no recent travels or sick contacts    Pt. admitted with acute on chronic diastolic heart failure , asthma exacerbation. c/o chronic leg pain even after diuresis. Also found to have uncontrolled diabetes with a hemoglobin A1C of 11. Pt. recently started on insulin.  Shortness of breath improved with treatment. continued to complain of some leg pain but able to ambulate. Describes pain as "sore" . LE dopplers neg for DVT. May have element of PAD and instructed to have outpatient vascular work up.  Had TTE     EXAM:  TTE WO CON COMPLETE W DOPPL         PROCEDURE DATE:  04/12/2017   Summary:   1. Left ventricular ejection fraction, by visual estimation, is 55 to   60%.   2. Normal left ventricular size and wall thicknesses, with normal   systolic and diastolic function.   3. Spectral Doppler shows impaired relaxation pattern of left   ventricular myocardial filling (Grade I diastolic dysfunction).   4. Normal right ventricular size and function.   5. The left atrium is normal in size.   6. The right atrium is normal in size.   7. Mild mitral valve regurgitation.   8. Structurally normal mitral valve, with normal leaflet excursion.   9. Structurally normal tricuspid valve, with normal leaflet excursion.  10. Mild aortic valve stenosis.  11. Structurally normal pulmonic valve, with normal leaflet excursion.  12. Peak transaortic gradient equals 19.0 mmHg, mean transaortic gradient   equals 12.0 mmHg, the calculated aortic valve area equals 2.14 cm² by the   continuity equation consistent with mild aortic stenosis.    Pt. stable for discharge and aid is being reinstated by social work

## 2017-04-14 NOTE — DISCHARGE NOTE ADULT - CARE PLAN
Principal Discharge DX:	Acute on chronic congestive heart failure, unspecified congestive heart failure type  Goal:	avoid fluid overload  Instructions for follow-up, activity and diet:	continue with lasix, low salt diet, follow-up with your primary care doctor and cardiologist, consider adding coreg to medication regimen  Secondary Diagnosis:	Mild intermittent asthma with acute exacerbation  Instructions for follow-up, activity and diet:	continue with singulair, inhaler  Secondary Diagnosis:	Stage 3 chronic kidney disease  Instructions for follow-up, activity and diet:	monitor kidney function with doctor  stop taking celebrex and NSAIDS such as advil, aleve, motrin  take tylenol instead for pain  Secondary Diagnosis:	Type 2 diabetes mellitus with stage 3 chronic kidney disease, without long-term current use of insulin  Instructions for follow-up, activity and diet:	your diabetes is uncontrolled, continue with insulin and finger stick checks 3 x per day  Secondary Diagnosis:	Pain in both lower extremities  Instructions for follow-up, activity and diet:	you had no clots on ultrasound, follow-up with your doctor for further vascular work-up as outpatient to see if you have poor circulation in your legs  consider trial of gabapentin for possible neuropathy - discuss with your primary care doctor  Secondary Diagnosis:	Unsteady gait  Instructions for follow-up, activity and diet:	outpatient PT recommended

## 2017-04-14 NOTE — DISCHARGE NOTE ADULT - SECONDARY DIAGNOSIS.
Mild intermittent asthma with acute exacerbation Stage 3 chronic kidney disease Type 2 diabetes mellitus with stage 3 chronic kidney disease, without long-term current use of insulin Pain in both lower extremities Unsteady gait

## 2017-04-14 NOTE — DISCHARGE NOTE ADULT - MEDICATION SUMMARY - MEDICATIONS TO CHANGE
I will SWITCH the dose or number of times a day I take the medications listed below when I get home from the hospital:    Apresoline  -- 25 milligram(s) by mouth once a day

## 2017-04-14 NOTE — DISCHARGE NOTE ADULT - PATIENT PORTAL LINK FT
“You can access the Follow[facility] Patient Portal, offered by [facility], by registering with the following website: [email or web address]”

## 2017-04-14 NOTE — DISCHARGE NOTE ADULT - PLAN OF CARE
avoid fluid overload continue with lasix, low salt diet, follow-up with your primary care doctor and cardiologist, consider adding coreg to medication regimen continue with singulair, inhaler monitor kidney function with doctor  stop taking celebrex and NSAIDS such as advil, aleve, motrin  take tylenol instead for pain your diabetes is uncontrolled, continue with insulin and finger stick checks 3 x per day you had no clots on ultrasound, follow-up with your doctor for further vascular work-up as outpatient to see if you have poor circulation in your legs  consider trial of gabapentin for possible neuropathy - discuss with your primary care doctor outpatient PT recommended

## 2017-04-14 NOTE — DISCHARGE NOTE ADULT - MEDICATION SUMMARY - MEDICATIONS TO TAKE
I will START or STAY ON the medications listed below when I get home from the hospital:    acetaminophen 325 mg oral capsule  -- 1 cap(s) by mouth 3 times a day, As Needed -for moderate pain  -- Indication: For leg pain    NovoLIN 70/30 subcutaneous suspension  --  40 units subcutaneous in am  -- Indication: For DM (diabetes mellitus)    Lipitor 10 mg oral tablet  -- 1 tab(s) by mouth once a day  -- Indication: For hyperlipidemia    amLODIPine 10 mg oral tablet  -- 1 tab(s) by mouth once a day  -- Indication: For hypertension    Lasix 40 mg oral tablet  -- 1 tab(s) by mouth once a day  -- Indication: For Chronic diastolic heart failure    Singulair 10 mg oral tablet  -- 1 tab(s) by mouth once a day  -- Indication: For Mild intermittent asthma with acute exacerbation    potassium chloride 20 mEq oral tablet, extended release  -- 1 milliequivalent(s) by mouth once a day  -- Indication: For Supplement while on lasix    hydrALAZINE 25 mg oral tablet  -- 1 tab(s) by mouth 2 times a day  -- Indication: For hypertension I will START or STAY ON the medications listed below when I get home from the hospital:    acetaminophen 325 mg oral capsule  -- 1 cap(s) by mouth 3 times a day, As Needed -for moderate pain  -- Indication: For Pain in both lower extremities    NovoLIN 70/30 subcutaneous suspension  --  40 units subcutaneous in am  -- Indication: For DM (diabetes mellitus)    Lipitor 10 mg oral tablet  -- 1 tab(s) by mouth once a day  -- Indication: For hyperlipidemia    amLODIPine 10 mg oral tablet  -- 1 tab(s) by mouth once a day  -- Indication: For hypertension    Lasix 40 mg oral tablet  -- 1 tab(s) by mouth once a day  -- Indication: For Acute on chronic congestive heart failure, unspecified congestive heart failure type    Singulair 10 mg oral tablet  -- 1 tab(s) by mouth once a day  -- Indication: For Asthma    potassium chloride 20 mEq oral tablet, extended release  -- 1 milliequivalent(s) by mouth once a day  -- Indication: For Supplement with lasix    hydrALAZINE 25 mg oral tablet  -- 1 tab(s) by mouth 2 times a day  -- Indication: For hypertension

## 2017-04-18 DIAGNOSIS — I13.0 HYPERTENSIVE HEART AND CHRONIC KIDNEY DISEASE WITH HEART FAILURE AND STAGE 1 THROUGH STAGE 4 CHRONIC KIDNEY DISEASE, OR UNSPECIFIED CHRONIC KIDNEY DISEASE: ICD-10-CM

## 2017-04-18 DIAGNOSIS — I08.0 RHEUMATIC DISORDERS OF BOTH MITRAL AND AORTIC VALVES: ICD-10-CM

## 2017-04-18 DIAGNOSIS — I50.33 ACUTE ON CHRONIC DIASTOLIC (CONGESTIVE) HEART FAILURE: ICD-10-CM

## 2017-04-18 DIAGNOSIS — E11.65 TYPE 2 DIABETES MELLITUS WITH HYPERGLYCEMIA: ICD-10-CM

## 2017-04-18 DIAGNOSIS — J45.21 MILD INTERMITTENT ASTHMA WITH (ACUTE) EXACERBATION: ICD-10-CM

## 2017-04-18 DIAGNOSIS — Z86.73 PERSONAL HISTORY OF TRANSIENT ISCHEMIC ATTACK (TIA), AND CEREBRAL INFARCTION WITHOUT RESIDUAL DEFICITS: ICD-10-CM

## 2017-04-18 DIAGNOSIS — N18.3 CHRONIC KIDNEY DISEASE, STAGE 3 (MODERATE): ICD-10-CM

## 2017-04-18 DIAGNOSIS — R26.81 UNSTEADINESS ON FEET: ICD-10-CM

## 2017-04-18 DIAGNOSIS — E78.5 HYPERLIPIDEMIA, UNSPECIFIED: ICD-10-CM

## 2017-04-18 DIAGNOSIS — Z87.891 PERSONAL HISTORY OF NICOTINE DEPENDENCE: ICD-10-CM

## 2017-04-26 ENCOUNTER — APPOINTMENT (OUTPATIENT)
Dept: HOME HEALTH SERVICES | Facility: HOME HEALTH | Age: 72
End: 2017-04-26

## 2017-04-26 VITALS
WEIGHT: 210 LBS | OXYGEN SATURATION: 96 % | BODY MASS INDEX: 32.96 KG/M2 | SYSTOLIC BLOOD PRESSURE: 130 MMHG | DIASTOLIC BLOOD PRESSURE: 65 MMHG | HEART RATE: 80 BPM | RESPIRATION RATE: 16 BRPM | HEIGHT: 67 IN

## 2017-04-26 DIAGNOSIS — Z86.73 PERSONAL HISTORY OF TRANSIENT ISCHEMIC ATTACK (TIA), AND CEREBRAL INFARCTION W/OUT RESIDUAL DEFICITS: ICD-10-CM

## 2017-04-26 RX ORDER — FUROSEMIDE 40 MG/1
40 TABLET ORAL DAILY
Qty: 30 | Refills: 4 | Status: COMPLETED | COMMUNITY
Start: 2017-04-26 | End: 2017-04-26

## 2017-04-26 RX ORDER — AMLODIPINE BESYLATE 10 MG/1
10 TABLET ORAL DAILY
Qty: 30 | Refills: 5 | Status: COMPLETED | COMMUNITY
Start: 2017-04-26 | End: 2017-04-26

## 2017-04-26 RX ORDER — POTASSIUM CHLORIDE 1500 MG/1
20 TABLET, EXTENDED RELEASE ORAL
Refills: 0 | Status: COMPLETED | COMMUNITY
Start: 2017-04-26 | End: 2017-04-26

## 2017-04-26 RX ORDER — CELECOXIB 200 MG/1
200 CAPSULE ORAL
Refills: 0 | Status: COMPLETED | COMMUNITY
Start: 2017-04-26 | End: 2017-04-26

## 2017-04-26 RX ORDER — FAMOTIDINE 20 MG/1
20 TABLET, FILM COATED ORAL
Qty: 30 | Refills: 5 | Status: COMPLETED | COMMUNITY
Start: 2017-04-26 | End: 2017-04-26

## 2017-05-12 ENCOUNTER — OTHER (OUTPATIENT)
Age: 72
End: 2017-05-12

## 2017-05-12 LAB
25(OH)D3 SERPL-MCNC: 9.8 NG/ML
ALBUMIN SERPL ELPH-MCNC: 3.4 G/DL
ALP BLD-CCNC: 101 U/L
ALT SERPL-CCNC: 22 U/L
ANION GAP SERPL CALC-SCNC: 18 MMOL/L
AST SERPL-CCNC: 28 U/L
BASOPHILS # BLD AUTO: 0 K/UL
BASOPHILS NFR BLD AUTO: 0 %
BILIRUB SERPL-MCNC: 0.2 MG/DL
BUN SERPL-MCNC: 28 MG/DL
CALCIUM SERPL-MCNC: 8.7 MG/DL
CHLORIDE SERPL-SCNC: 102 MMOL/L
CHOLEST SERPL-MCNC: 175 MG/DL
CHOLEST/HDLC SERPL: 3 RATIO
CO2 SERPL-SCNC: 20 MMOL/L
CREAT SERPL-MCNC: 2.09 MG/DL
EOSINOPHIL # BLD AUTO: 0.03 K/UL
EOSINOPHIL NFR BLD AUTO: 0.4 %
HBA1C MFR BLD HPLC: 9.6 %
HCT VFR BLD CALC: 36.4 %
HDLC SERPL-MCNC: 52 MG/DL
HGB BLD-MCNC: 10.8 G/DL
IMM GRANULOCYTES NFR BLD AUTO: 0.1 %
LDLC SERPL CALC-MCNC: 86 MG/DL
LYMPHOCYTES # BLD AUTO: 2.91 K/UL
LYMPHOCYTES NFR BLD AUTO: 41.8 %
MAN DIFF?: NORMAL
MCHC RBC-ENTMCNC: 22.8 PG
MCHC RBC-ENTMCNC: 29.7 GM/DL
MCV RBC AUTO: 76.8 FL
MONOCYTES # BLD AUTO: 0.51 K/UL
MONOCYTES NFR BLD AUTO: 7.3 %
NEUTROPHILS # BLD AUTO: 3.5 K/UL
NEUTROPHILS NFR BLD AUTO: 50.4 %
PLATELET # BLD AUTO: 246 K/UL
POTASSIUM SERPL-SCNC: 5 MMOL/L
PROT SERPL-MCNC: 7.1 G/DL
RBC # BLD: 4.74 M/UL
RBC # FLD: 15.3 %
SODIUM SERPL-SCNC: 140 MMOL/L
TRIGL SERPL-MCNC: 186 MG/DL
TSH SERPL-ACNC: 1.82 UIU/ML
WBC # FLD AUTO: 6.96 K/UL

## 2017-05-16 ENCOUNTER — APPOINTMENT (OUTPATIENT)
Dept: HOME HEALTH SERVICES | Facility: HOME HEALTH | Age: 72
End: 2017-05-16

## 2017-05-23 ENCOUNTER — APPOINTMENT (OUTPATIENT)
Dept: HOME HEALTH SERVICES | Facility: HOME HEALTH | Age: 72
End: 2017-05-23

## 2017-05-24 ENCOUNTER — APPOINTMENT (OUTPATIENT)
Dept: HOME HEALTH SERVICES | Facility: HOME HEALTH | Age: 72
End: 2017-05-24

## 2017-05-24 VITALS
SYSTOLIC BLOOD PRESSURE: 158 MMHG | HEART RATE: 78 BPM | TEMPERATURE: 98.3 F | RESPIRATION RATE: 16 BRPM | OXYGEN SATURATION: 98 % | DIASTOLIC BLOOD PRESSURE: 72 MMHG

## 2017-06-02 ENCOUNTER — APPOINTMENT (OUTPATIENT)
Dept: HOME HEALTH SERVICES | Facility: HOME HEALTH | Age: 72
End: 2017-06-02

## 2017-06-02 ENCOUNTER — CLINICAL ADVICE (OUTPATIENT)
Age: 72
End: 2017-06-02

## 2017-06-02 VITALS
SYSTOLIC BLOOD PRESSURE: 180 MMHG | OXYGEN SATURATION: 98 % | DIASTOLIC BLOOD PRESSURE: 80 MMHG | RESPIRATION RATE: 16 BRPM | HEART RATE: 100 BPM

## 2017-06-02 RX ORDER — BLOOD-GLUCOSE METER
W/DEVICE KIT MISCELLANEOUS
Qty: 1 | Refills: 0 | Status: COMPLETED | COMMUNITY
Start: 2017-05-23

## 2017-06-02 RX ORDER — FUROSEMIDE 20 MG/1
20 TABLET ORAL
Qty: 90 | Refills: 0 | Status: COMPLETED | COMMUNITY
Start: 2016-12-28

## 2017-06-02 RX ORDER — ALBUTEROL SULFATE 90 UG/1
108 (90 BASE) AEROSOL, METERED RESPIRATORY (INHALATION)
Qty: 18 | Refills: 0 | Status: COMPLETED | COMMUNITY
Start: 2017-03-20

## 2017-06-02 RX ORDER — HYDRALAZINE HYDROCHLORIDE 25 MG/1
25 TABLET ORAL
Qty: 180 | Refills: 0 | Status: COMPLETED | COMMUNITY
Start: 2017-03-20

## 2017-06-02 RX ORDER — INSULIN ASPART 100 [IU]/ML
(70-30) 100 INJECTION, SUSPENSION SUBCUTANEOUS
Qty: 10 | Refills: 0 | Status: COMPLETED | COMMUNITY
Start: 2017-01-27

## 2017-06-02 RX ORDER — PEN NEEDLE, DIABETIC 31 G X1/4"
31G X 6 MM NEEDLE, DISPOSABLE MISCELLANEOUS
Qty: 100 | Refills: 0 | Status: COMPLETED | COMMUNITY
Start: 2017-05-24

## 2017-06-02 RX ORDER — PEN NEEDLE, DIABETIC 30 GX3/16"
30G X 5 MM NEEDLE, DISPOSABLE MISCELLANEOUS
Qty: 1 | Refills: 3 | Status: COMPLETED | COMMUNITY
Start: 2017-04-27 | End: 2017-06-02

## 2017-06-06 ENCOUNTER — OTHER (OUTPATIENT)
Age: 72
End: 2017-06-06

## 2017-06-21 DIAGNOSIS — Z87.891 PERSONAL HISTORY OF NICOTINE DEPENDENCE: ICD-10-CM

## 2017-06-23 ENCOUNTER — APPOINTMENT (OUTPATIENT)
Dept: HOME HEALTH SERVICES | Facility: HOME HEALTH | Age: 72
End: 2017-06-23

## 2017-06-23 VITALS
RESPIRATION RATE: 16 BRPM | HEART RATE: 88 BPM | SYSTOLIC BLOOD PRESSURE: 190 MMHG | DIASTOLIC BLOOD PRESSURE: 80 MMHG | OXYGEN SATURATION: 98 %

## 2017-06-23 PROBLEM — Z87.891 FORMER SMOKER: Status: ACTIVE | Noted: 2017-06-23

## 2017-07-06 ENCOUNTER — APPOINTMENT (OUTPATIENT)
Dept: HOME HEALTH SERVICES | Facility: HOME HEALTH | Age: 72
End: 2017-07-06

## 2017-07-06 ENCOUNTER — INPATIENT (INPATIENT)
Facility: HOSPITAL | Age: 72
LOS: 4 days | Discharge: HOME HEALTH | End: 2017-07-11
Attending: INTERNAL MEDICINE | Admitting: INTERNAL MEDICINE
Payer: MEDICARE

## 2017-07-06 VITALS
DIASTOLIC BLOOD PRESSURE: 109 MMHG | WEIGHT: 205.03 LBS | TEMPERATURE: 99 F | HEIGHT: 67 IN | HEART RATE: 98 BPM | RESPIRATION RATE: 18 BRPM | SYSTOLIC BLOOD PRESSURE: 179 MMHG

## 2017-07-06 DIAGNOSIS — Z90.89 ACQUIRED ABSENCE OF OTHER ORGANS: Chronic | ICD-10-CM

## 2017-07-06 DIAGNOSIS — R07.9 CHEST PAIN, UNSPECIFIED: ICD-10-CM

## 2017-07-06 DIAGNOSIS — J45.909 UNSPECIFIED ASTHMA, UNCOMPLICATED: ICD-10-CM

## 2017-07-06 DIAGNOSIS — E11.49 TYPE 2 DIABETES MELLITUS WITH OTHER DIABETIC NEUROLOGICAL COMPLICATION: ICD-10-CM

## 2017-07-06 DIAGNOSIS — G45.9 TRANSIENT CEREBRAL ISCHEMIC ATTACK, UNSPECIFIED: ICD-10-CM

## 2017-07-06 LAB
ALBUMIN SERPL ELPH-MCNC: 2.2 G/DL — LOW (ref 3.3–5)
ALP SERPL-CCNC: 130 U/L — HIGH (ref 40–120)
ALT FLD-CCNC: 21 U/L — SIGNIFICANT CHANGE UP (ref 12–78)
ANION GAP SERPL CALC-SCNC: 8 MMOL/L — SIGNIFICANT CHANGE UP (ref 5–17)
APPEARANCE UR: CLEAR — SIGNIFICANT CHANGE UP
APTT BLD: 26.5 SEC — LOW (ref 27.5–37.4)
AST SERPL-CCNC: 17 U/L — SIGNIFICANT CHANGE UP (ref 15–37)
BASOPHILS # BLD AUTO: 0.1 K/UL — SIGNIFICANT CHANGE UP (ref 0–0.2)
BASOPHILS NFR BLD AUTO: 1 % — SIGNIFICANT CHANGE UP (ref 0–2)
BILIRUB SERPL-MCNC: 0.2 MG/DL — SIGNIFICANT CHANGE UP (ref 0.2–1.2)
BILIRUB UR-MCNC: NEGATIVE — SIGNIFICANT CHANGE UP
BLD GP AB SCN SERPL QL: SIGNIFICANT CHANGE UP
BUN SERPL-MCNC: 18 MG/DL — SIGNIFICANT CHANGE UP (ref 7–23)
CALCIUM SERPL-MCNC: 8.3 MG/DL — LOW (ref 8.5–10.1)
CHLORIDE SERPL-SCNC: 106 MMOL/L — SIGNIFICANT CHANGE UP (ref 96–108)
CK MB BLD-MCNC: 1.1 % — SIGNIFICANT CHANGE UP (ref 0–3.5)
CK MB CFR SERPL CALC: 1.5 NG/ML — SIGNIFICANT CHANGE UP (ref 0.5–3.6)
CK SERPL-CCNC: 142 U/L — SIGNIFICANT CHANGE UP (ref 26–192)
CO2 SERPL-SCNC: 25 MMOL/L — SIGNIFICANT CHANGE UP (ref 22–31)
COLOR SPEC: YELLOW — SIGNIFICANT CHANGE UP
CREAT SERPL-MCNC: 1.88 MG/DL — HIGH (ref 0.5–1.3)
DIFF PNL FLD: ABNORMAL
EOSINOPHIL # BLD AUTO: 0 K/UL — SIGNIFICANT CHANGE UP (ref 0–0.5)
EOSINOPHIL NFR BLD AUTO: 0.4 % — SIGNIFICANT CHANGE UP (ref 0–6)
EPI CELLS # UR: ABNORMAL
GLUCOSE SERPL-MCNC: 266 MG/DL — HIGH (ref 70–99)
GLUCOSE UR QL: 250 MG/DL
HCT VFR BLD CALC: 38.5 % — SIGNIFICANT CHANGE UP (ref 34.5–45)
HGB BLD-MCNC: 12.6 G/DL — SIGNIFICANT CHANGE UP (ref 11.5–15.5)
INR BLD: 0.93 RATIO — SIGNIFICANT CHANGE UP (ref 0.88–1.16)
KETONES UR-MCNC: NEGATIVE — SIGNIFICANT CHANGE UP
LACTATE SERPL-SCNC: 0.9 MMOL/L — SIGNIFICANT CHANGE UP (ref 0.7–2)
LEUKOCYTE ESTERASE UR-ACNC: NEGATIVE — SIGNIFICANT CHANGE UP
LYMPHOCYTES # BLD AUTO: 4.2 K/UL — HIGH (ref 1–3.3)
LYMPHOCYTES # BLD AUTO: 40.6 % — SIGNIFICANT CHANGE UP (ref 13–44)
MCHC RBC-ENTMCNC: 23.9 PG — LOW (ref 27–34)
MCHC RBC-ENTMCNC: 32.8 GM/DL — SIGNIFICANT CHANGE UP (ref 32–36)
MCV RBC AUTO: 72.7 FL — LOW (ref 80–100)
MONOCYTES # BLD AUTO: 0.8 K/UL — SIGNIFICANT CHANGE UP (ref 0–0.9)
MONOCYTES NFR BLD AUTO: 7.3 % — SIGNIFICANT CHANGE UP (ref 2–14)
NEUTROPHILS # BLD AUTO: 5.3 K/UL — SIGNIFICANT CHANGE UP (ref 1.8–7.4)
NEUTROPHILS NFR BLD AUTO: 50.7 % — SIGNIFICANT CHANGE UP (ref 43–77)
NITRITE UR-MCNC: NEGATIVE — SIGNIFICANT CHANGE UP
PH UR: 7 — SIGNIFICANT CHANGE UP (ref 5–8)
PLATELET # BLD AUTO: 211 K/UL — SIGNIFICANT CHANGE UP (ref 150–400)
POTASSIUM SERPL-MCNC: 3.7 MMOL/L — SIGNIFICANT CHANGE UP (ref 3.5–5.3)
POTASSIUM SERPL-SCNC: 3.7 MMOL/L — SIGNIFICANT CHANGE UP (ref 3.5–5.3)
PROT SERPL-MCNC: 6.6 GM/DL — SIGNIFICANT CHANGE UP (ref 6–8.3)
PROT UR-MCNC: 500 MG/DL
PROTHROM AB SERPL-ACNC: 10.1 SEC — SIGNIFICANT CHANGE UP (ref 9.8–12.7)
RBC # BLD: 5.29 M/UL — HIGH (ref 3.8–5.2)
RBC # FLD: 14.4 % — SIGNIFICANT CHANGE UP (ref 11–15)
RBC CASTS # UR COMP ASSIST: ABNORMAL /HPF (ref 0–4)
SODIUM SERPL-SCNC: 139 MMOL/L — SIGNIFICANT CHANGE UP (ref 135–145)
SP GR SPEC: 1.01 — SIGNIFICANT CHANGE UP (ref 1.01–1.02)
TROPONIN I SERPL-MCNC: 0.02 NG/ML — SIGNIFICANT CHANGE UP (ref 0.01–0.04)
TROPONIN I SERPL-MCNC: 0.02 NG/ML — SIGNIFICANT CHANGE UP (ref 0.01–0.04)
UROBILINOGEN FLD QL: NEGATIVE MG/DL — SIGNIFICANT CHANGE UP
WBC # BLD: 10.4 K/UL — SIGNIFICANT CHANGE UP (ref 3.8–10.5)
WBC # FLD AUTO: 10.4 K/UL — SIGNIFICANT CHANGE UP (ref 3.8–10.5)

## 2017-07-06 PROCEDURE — 93010 ELECTROCARDIOGRAM REPORT: CPT

## 2017-07-06 PROCEDURE — 99285 EMERGENCY DEPT VISIT HI MDM: CPT | Mod: 25

## 2017-07-06 PROCEDURE — 70450 CT HEAD/BRAIN W/O DYE: CPT | Mod: 26

## 2017-07-06 PROCEDURE — 71010: CPT | Mod: 26

## 2017-07-06 RX ORDER — DEXTROSE 50 % IN WATER 50 %
1 SYRINGE (ML) INTRAVENOUS ONCE
Qty: 0 | Refills: 0 | Status: DISCONTINUED | OUTPATIENT
Start: 2017-07-06 | End: 2017-07-11

## 2017-07-06 RX ORDER — ASPIRIN/CALCIUM CARB/MAGNESIUM 324 MG
325 TABLET ORAL ONCE
Qty: 0 | Refills: 0 | Status: COMPLETED | OUTPATIENT
Start: 2017-07-06 | End: 2017-07-06

## 2017-07-06 RX ORDER — AMLODIPINE BESYLATE 2.5 MG/1
10 TABLET ORAL DAILY
Qty: 0 | Refills: 0 | Status: DISCONTINUED | OUTPATIENT
Start: 2017-07-06 | End: 2017-07-11

## 2017-07-06 RX ORDER — ASPIRIN/CALCIUM CARB/MAGNESIUM 324 MG
325 TABLET ORAL DAILY
Qty: 0 | Refills: 0 | Status: DISCONTINUED | OUTPATIENT
Start: 2017-07-06 | End: 2017-07-11

## 2017-07-06 RX ORDER — DEXTROSE 50 % IN WATER 50 %
25 SYRINGE (ML) INTRAVENOUS ONCE
Qty: 0 | Refills: 0 | Status: DISCONTINUED | OUTPATIENT
Start: 2017-07-06 | End: 2017-07-11

## 2017-07-06 RX ORDER — HYDRALAZINE HCL 50 MG
25 TABLET ORAL
Qty: 0 | Refills: 0 | Status: DISCONTINUED | OUTPATIENT
Start: 2017-07-06 | End: 2017-07-11

## 2017-07-06 RX ORDER — MONTELUKAST 4 MG/1
10 TABLET, CHEWABLE ORAL DAILY
Qty: 0 | Refills: 0 | Status: DISCONTINUED | OUTPATIENT
Start: 2017-07-06 | End: 2017-07-11

## 2017-07-06 RX ORDER — GLUCAGON INJECTION, SOLUTION 0.5 MG/.1ML
1 INJECTION, SOLUTION SUBCUTANEOUS ONCE
Qty: 0 | Refills: 0 | Status: DISCONTINUED | OUTPATIENT
Start: 2017-07-06 | End: 2017-07-11

## 2017-07-06 RX ORDER — INSULIN LISPRO 100/ML
VIAL (ML) SUBCUTANEOUS
Qty: 0 | Refills: 0 | Status: DISCONTINUED | OUTPATIENT
Start: 2017-07-06 | End: 2017-07-11

## 2017-07-06 RX ORDER — HYDRALAZINE HCL 50 MG
5 TABLET ORAL ONCE
Qty: 0 | Refills: 0 | Status: COMPLETED | OUTPATIENT
Start: 2017-07-06 | End: 2017-07-06

## 2017-07-06 RX ORDER — ATORVASTATIN CALCIUM 80 MG/1
10 TABLET, FILM COATED ORAL AT BEDTIME
Qty: 0 | Refills: 0 | Status: DISCONTINUED | OUTPATIENT
Start: 2017-07-06 | End: 2017-07-11

## 2017-07-06 RX ORDER — INSULIN LISPRO 100/ML
VIAL (ML) SUBCUTANEOUS AT BEDTIME
Qty: 0 | Refills: 0 | Status: DISCONTINUED | OUTPATIENT
Start: 2017-07-06 | End: 2017-07-11

## 2017-07-06 RX ORDER — INSULIN GLARGINE 100 [IU]/ML
10 INJECTION, SOLUTION SUBCUTANEOUS AT BEDTIME
Qty: 0 | Refills: 0 | Status: DISCONTINUED | OUTPATIENT
Start: 2017-07-06 | End: 2017-07-07

## 2017-07-06 RX ORDER — DIPHENHYDRAMINE HCL 50 MG
25 CAPSULE ORAL ONCE
Qty: 0 | Refills: 0 | Status: COMPLETED | OUTPATIENT
Start: 2017-07-06 | End: 2017-07-06

## 2017-07-06 RX ORDER — HYDRALAZINE HCL 50 MG
25 TABLET ORAL ONCE
Qty: 0 | Refills: 0 | Status: COMPLETED | OUTPATIENT
Start: 2017-07-06 | End: 2017-07-06

## 2017-07-06 RX ORDER — SODIUM CHLORIDE 9 MG/ML
1000 INJECTION, SOLUTION INTRAVENOUS
Qty: 0 | Refills: 0 | Status: DISCONTINUED | OUTPATIENT
Start: 2017-07-06 | End: 2017-07-11

## 2017-07-06 RX ORDER — ACETAMINOPHEN 500 MG
650 TABLET ORAL ONCE
Qty: 0 | Refills: 0 | Status: COMPLETED | OUTPATIENT
Start: 2017-07-06 | End: 2017-07-06

## 2017-07-06 RX ORDER — ENOXAPARIN SODIUM 100 MG/ML
40 INJECTION SUBCUTANEOUS DAILY
Qty: 0 | Refills: 0 | Status: DISCONTINUED | OUTPATIENT
Start: 2017-07-06 | End: 2017-07-11

## 2017-07-06 RX ORDER — DEXTROSE 50 % IN WATER 50 %
12.5 SYRINGE (ML) INTRAVENOUS ONCE
Qty: 0 | Refills: 0 | Status: DISCONTINUED | OUTPATIENT
Start: 2017-07-06 | End: 2017-07-11

## 2017-07-06 RX ADMIN — Medication 25 MILLIGRAM(S): at 06:50

## 2017-07-06 RX ADMIN — Medication 650 MILLIGRAM(S): at 11:45

## 2017-07-06 RX ADMIN — Medication 25 MILLIGRAM(S): at 22:49

## 2017-07-06 RX ADMIN — Medication 325 MILLIGRAM(S): at 08:29

## 2017-07-06 RX ADMIN — ATORVASTATIN CALCIUM 10 MILLIGRAM(S): 80 TABLET, FILM COATED ORAL at 22:49

## 2017-07-06 RX ADMIN — INSULIN GLARGINE 10 UNIT(S): 100 INJECTION, SOLUTION SUBCUTANEOUS at 23:01

## 2017-07-06 RX ADMIN — AMLODIPINE BESYLATE 10 MILLIGRAM(S): 2.5 TABLET ORAL at 22:49

## 2017-07-06 RX ADMIN — Medication 25 MILLIGRAM(S): at 23:56

## 2017-07-06 RX ADMIN — Medication 650 MILLIGRAM(S): at 10:35

## 2017-07-06 RX ADMIN — Medication 5 MILLIGRAM(S): at 10:35

## 2017-07-06 NOTE — H&P ADULT - HISTORY OF PRESENT ILLNESS
71yoF; with pmh signif for CVA, HTN, DM; now p/w generalized weakness and confusion. patient is normally AAOx3, but for 24 hours increased confusion and generalized weakness. would not get out of bed all day. c/o headache--frontal headache, non-radiating, not associated with nausea/vomiting.  denies f/c/s. denies neck pain. c/o chest pain--sscp, non-radiating, non-exertional, non-pleuritic. denies associated sob. denies abd pain. denies numbness/tingling. denies focal weakness. Numbness of right hand with weakness and slurring of speech from yesterday witnessed by grandchildren,seen 71yoF; with pmh signif for CVA, HTN, DM; now p/w generalized weakness and confusion. patient is normally AAOx3, but for 24 hours increased confusion and generalized weakness. would not get out of bed all day. c/o headache--frontal headache, non-radiating, not associated with nausea/vomiting.  denies f/c/s. denies neck pain. c/o chest pain--sscp, non-radiating, non-exertional, non-pleuritic. denies associated sob. denies abd pain. denies numbness/tingling. denies focal weakness. Numbness of right hand with weakness and slurring of speech from yesterday witnessed by grandchildren.  Speech is back to baseline

## 2017-07-06 NOTE — H&P ADULT - NSHPPHYSICALEXAM_GEN_ALL_CORE
Constitutional: NAD, well-groomed, well-developed  HEENT: PERRLA, EOMI, Normal Hearing, MMM  Neck: No LAD, No JVD  Back: Normal spine flexure, No CVA tenderness  Respiratory: CTAB/L   Cardiovascular: S1 and S2, RRR, no M/G/R  Gastrointestinal: BS+, soft, NT/ND  Extremities: No peripheral edema  Vascular: 2+ peripheral pulses  Neurological: A/O x 3, no focal deficits  Skin: No rashes

## 2017-07-06 NOTE — H&P ADULT - NSHPLABSRESULTS_GEN_ALL_CORE
12.6   10.4  )-----------( 211      ( 2017 05:10 )             38.5         139  |  106  |  18  ----------------------------<  266<H>  3.7   |  25  |  1.88<H>    Ca    8.3<L>      2017 05:10    TPro  6.6  /  Alb  2.2<L>  /  TBili  0.2  /  DBili  x   /  AST  17  /  ALT  21  /  AlkPhos  130<H>      PT/INR - ( 2017 05:10 )   PT: 10.1 sec;   INR: 0.93 ratio         PTT - ( 2017 05:10 )  PTT:26.5 sec  Urinalysis Basic - ( 2017 10:27 )    Color: Yellow / Appearance: Clear / S.010 / pH: x  Gluc: x / Ketone: Negative  / Bili: Negative / Urobili: Negative mg/dL   Blood: x / Protein: 500 mg/dL / Nitrite: Negative   Leuk Esterase: Negative / RBC: 11-25 /HPF / WBC x   Sq Epi: x / Non Sq Epi: Moderate / Bacteria: x        MICROBIOLOGY:  RECENT CULTURES:

## 2017-07-06 NOTE — ED ADULT NURSE NOTE - OBJECTIVE STATEMENT
numbness of right hand with weakness and slurring of speech from yesterday witnessed by grandchildren,seen and examined by ,moves all extremeties and no slurred speech

## 2017-07-06 NOTE — ED PROVIDER NOTE - PHYSICAL EXAMINATION
Gen: Alert, NAD  Head: NC, AT, PERRL, EOMI, normal lids/conjunctiva  Neck: +supple, no tenderness/meningismus/JVD, +Trachea midline  Pulm: Bilateral BS, normal resp effort, no wheeze/stridor/retractions  CV: RRR, no M/R/G, +dist pulses  Abd: soft, NT/ND, +BS, no hepatosplenomegaly  Mskel: no edema/erythema/cyanosis  Skin: no rash  Neuro: AAOx2, no sensory/motor deficits, CN 2-12 intact

## 2017-07-06 NOTE — CONSULT NOTE ADULT - SUBJECTIVE AND OBJECTIVE BOX
Historian:     Patient.  ER notes also reviewed.  HPI:  DILLON DELANEY.  71yoF; with pmh signif for CVA, HTN, DM; now p/w generalized weakness and confusion. patient is normally AAOx3, but for 24 hours increased confusion and generalized weakness. would not get out of bed all day. c/o headache--frontal headache, non-radiating, not associated with nausea/vomiting.  denies f/c/s. denies neck pain. c/o chest pain--sscp, non-radiating, non-exertional, non-pleuritic. denies associated sob. denies abd pain. denies numbness/tingling. denies focal weakness. Numbness of right hand with weakness and slurring of speech from yesterday witnessed by grandchildren,seen (2017 19:06)    PAST MEDICAL & SURGICAL HISTORY: DM (diabetes mellitus); Asthma; CVA (cerebral vascular accident); No significant past surgical history    MEDICATIONS  (STANDING):  enoxaparin Injectable 40 milliGRAM(s) SubCutaneous daily  atorvastatin 10 milliGRAM(s) Oral at bedtime  amLODIPine   Tablet 10 milliGRAM(s) Oral daily  montelukast 10 milliGRAM(s) Oral daily  hydrALAZINE 25 milliGRAM(s) Oral two times a day  insulin glargine Injectable (LANTUS) 10 Unit(s) SubCutaneous at bedtime  insulin lispro (HumaLOG) corrective regimen sliding scale   SubCutaneous three times a day before meals  insulin lispro (HumaLOG) corrective regimen sliding scale   SubCutaneous at bedtime  dextrose 5%. 1000 milliLiter(s) (50 mL/Hr) IV Continuous <Continuous>  dextrose 50% Injectable 12.5 Gram(s) IV Push once  dextrose 50% Injectable 25 Gram(s) IV Push once  dextrose 50% Injectable 25 Gram(s) IV Push once  aspirin 325 milliGRAM(s) Oral daily    MEDICATIONS  (PRN):  dextrose Gel 1 Dose(s) Oral once PRN Blood Glucose LESS THAN 70 milliGRAM(s)/deciliter  glucagon  Injectable 1 milliGRAM(s) IntraMuscular once PRN Glucose LESS THAN 70 milligrams/deciliter      Allergies: codeine (Swelling); penicillins (Swelling)  Intolerances  FAMILY HISTORY:  No pertinent family history in first degree relatives    Vital Signs Last 24 Hrs  T(C): 36.7 (2017 21:26), Max: 37.4 (2017 03:50)  T(F): 98 (2017 21:26), Max: 99.3 (2017 03:50)  HR: 82 (2017 21:26) (82 - 98)  BP: 189/102 (2017 21:26) (176/95 - 190/98)  BP(mean): --  RR: 17 (2017 21:26) (17 - 19)  SpO2: 97% (2017 21:26) (97% - 98%)    NEUROLOGICAL EXAM:  HENT:  Normocephalic head; atraumatic head.  Neck supple.  ENT: normal looking.  Mental State:    Alert.  Fully oriented to person, place and date.  Coherent.  Speech clear and intact.  Cooperative.  Responds appropriately.    Cranial Nerves:  II-XII:   Pupils round and reactive to light and accommodation.  Extraocular movements full.  Visual fields full (no homonymous hemianopsia).  Visual acuity wnl.  Facial symmetry intact.  Tongue midline.  Motor Functions:  Moves all extremities.  No pronator drift of UE.  Claps hand well.  Hand  intact bilaterally.  Ambulatory.    Sensory Functions:   Intact to touch and pinprick to face and extremities.    Reflexes:  Deep tendon reflexes normoactive to biceps, knees and ankles.  Babinski absent (present).  Cerebellar Testing:    Finger to nose intact.  Nystagmus absent.  Neurovascular: Carotid auscultation full without bruits.      LABS:                        12.6   10.4  )-----------( 211      ( 2017 05:10 )             38.5     -    139  |  106  |  18  ----------------------------<  266<H>  3.7   |  25  |  1.88<H>    Ca    8.3<L>      2017 05:10    TPro  6.6  /  Alb  2.2<L>  /  TBili  0.2  /  DBili  x   /  AST  17  /  ALT  21  /  AlkPhos  130<H>  07-    PT/INR - ( 2017 05:10 )   PT: 10.1 sec;   INR: 0.93 ratio         PTT - ( 2017 05:10 )  PTT:26.5 sec    Urinalysis Basic - ( 2017 10:27 )    Color: Yellow / Appearance: Clear / S.010 / pH: x  Gluc: x / Ketone: Negative  / Bili: Negative / Urobili: Negative mg/dL   Blood: x / Protein: 500 mg/dL / Nitrite: Negative   Leuk Esterase: Negative / RBC: 11-25 /HPF / WBC x   Sq Epi: x / Non Sq Epi: Moderate / Bacteria: x      RADIOLOGY & ADDITIONAL STUDIES:    National Institutes of Health Stroke Scale Score:     Time/Priority:  STAT    Additional Instructions:  Type score here ___ ( @ 04:35)  Comprehensive Metabolic Panel: STAT ( @ 04:35)  Complete Blood Count + Automated Diff: STAT ( @ 04:35)  Troponin I, Serum: STAT ( @ 04:35)  Lactate, Blood: STAT ( @ 04:35)  Creatine Kinase, Serum: STAT ( @ 04:35)  CKMB Mass Assay: STAT ( @ 04:35)  Activated Partial Thromboplastin Time:  Start Date:2017. STAT ( @ 04:35)  Prothrombin Time and INR, Plasma:  Start Date:2017. STAT ( @ 04:35)  Urinalysis: STAT  Cancel Reason: System Cancel ( @ 04:35)  Type + Screen: STAT ( @ 04:35)  ABO Rh Confirmatory Specimen: STAT  Addl Info: Conditional: ABO Rh Confirmatory Specimen ( @ 04:35)  Xray Chest 1 View AP/PA.: Urgent   Indication: r/o infiltrate  Transport: Portable  Exam Completed  Provider's Contact #: [phone] ( @ 04:35)  CT Head No Cont: Urgent   Indication: ams x24hrs  Transport: Stretcher-Crib  Exam Completed  Provider's Contact #: [phone] ( @ 04:35)  Vital Signs:     Frequency:  every 1 hour ( @ 04:35)  Pulse Oximetry:   Frequency: <Continuous>    Additional Instructions:  Notify Provider if oxygen saturation is LESS THAN 92% ( @ 04:35)  Cardiac Monitor Bedside:     Time/Priority:  STAT ( @ 04:35)  12 Lead ECG:   Provider's Contact #: [phone] (07-06 @ 04:35)  Dysphagia Screening:     Time/Priority:  STAT ( @ 04:35)  Assess Neurological Status:     Type of Neuro Check:  Stroke    Frequency:  every 1 hour    Additional Instructions:  Perform stroke neurological check ( 04:35)  Provider to RN:       Assess Level of Consciousness, using Jyothi Coma Scale ( 04:35)  Education:     Other: Stroke education    Additional Instructions: Distribute Stroke education material and provide stroke education ( 04:35)  Blood Glucose Point Of Care Testing:     Frequency:  once ( 04:35)  Activity - Bedrest ( 04:35)  Fall Risk Protocol:     Time/Priority:  STAT    Additional Instructions:  Follow fall risk protocol ( 04:35)  Aspiration Precautions:     Time/Priority:  Routine ( @ 04:35)  Seizure Precautions:     Time/Priority:  Routine ( 04:35)  Diet, NPO ( 04:35)  Urinalysis + Microscopic Examination: STAT ( 04:35)  Antibody Screen: 05:01 ( @ 05:10)  hydrALAZINE  Oral Tab/Cap - Peds: [Known as APRESOLINE Oral Tab/Cap - Peds]  25 milliGRAM(s), Oral, Once, Stop After 1 Doses  Indication: htn  Administration Instructions: This is a Look-alike/Sound-alike Medication  Provider's Contact #: [phone] ( @ 06:14)  (Floorstock):   Qty Removed: 1 each ( @ 06:50)  aspirin:   325 milliGRAM(s), Oral, Once, Stop After 1 Doses  Provider's Contact #: [phone] ( @ 07:22)  hydrALAZINE Injectable: [Known as APRESOLINE Injectable]  5 milliGRAM(s), IV Push, Once, Stop After 1 Doses  Administration Instructions: This is a Look-alike/Sound-alike Medication  Provider's Contact #: [phone] ( @ 10:01)  acetaminophen   Tablet.: [Ordered as TYLENOL.]  650 milliGRAM(s), Oral, once, PRN for Moderate Pain (4 - 6), Stop After 1 Doses  Administration Instructions: MAX DAILY DOSE:  ADULT = 4,000 mG/Day  Provider's Contact #: [phone] ( @ 10:12)  (Floorstock):   Qty Removed: 1 each ( @ 11:36)  (Floorstock):   Qty Removed: 1 each ( @ 14:53)  Admit from ED ( @ 17:22)  Admit to Inpatient Level of Care:     Service:  Telemetry    Physician:  anne ( @ 19:22)  enoxaparin Injectable: [Ordered as LOVENOX]  40 milliGRAM(s), SubCutaneous, daily  Provider's Contact #: [phone] ( @ 19:22)  Troponin I, Serum: Repeat From: 2017 19:19 To: 2017 03:19, Every 8 hr(s) ( @ 19:22)  Troponin I, Serum: 19:19 ( @ 19:22)  TTE Echo Doppler w/o Cont:   Transport: Stretcher-Crib  Monitor: w/o Monitor  Provider's Contact #: [phone] ( @ 19:22)  US Duplex Carotid Arteries Complete, Bilateral: Routine   Indication: tia  Transport: Stretcher-Crib  Provider's Contact #: [phone] ( @ 19:22)  atorvastatin: [Known as LIPITOR]  10 milliGRAM(s), Oral, at bedtime  Provider's Contact #: [phone] ( @ 19:24)  amLODIPine   Tablet: [Known as NORVASC]  10 milliGRAM(s), Oral, daily  Administration Instructions: This is a Look-alike/Sound-alike Medication  Provider's Contact #: [phone] ( @ 19:24)  montelukast: [Known as SINGULAIR]  10 milliGRAM(s), Oral, daily  Provider's Contact #: [phone] ( @ 19:24)  hydrALAZINE: [Known as APRESOLINE]  25 milliGRAM(s), Oral, two times a day  Administration Instructions: This is a Look-alike/Sound-alike Medication  Provider's Contact #: [phone] ( @ 19:24)  Hemoglobin A1C, Whole Blood: AM Sched. Collection: 2017 05:00 ( @ 19:39)  Blood Glucose Point Of Care Testing:     Frequency:  Before meals and at bedtime    Additional Instructions:  Before meals and at bedtime ( @ 19:39)  Blood Glucose Point Of Care Testing:     Frequency:  every 15 minutes    Additional Instructions:  After carbohydrate administration for hypoglycemia, repeat every 15 minute(s) until blood glucose is GREATER THAN or EQUAL  milliGRAM(s)/deciLiter twice consecutively ( @ 19:39)  Notify Provider For:     Additional Instructions:  Blood glucose LESS THAN 70 milliGRAM(s)/deciLiter or GREATER THAN 400 milliGRAM(s)/deciLiter ( @ 19:39)  Education:     Diabetes    Other: Diet, Exercise    Additional Instructions: Diet, Exercise, Diabetes ( @ 19:39)  insulin glargine Injectable (LANTUS):   10 Unit(s), SubCutaneous, at bedtime  Administration Instructions: NOT to be mixed with other insulins  Dispose unused medication in BLACK bin.  Provider's Contact #: [phone] ( @ 19:39)  insulin lispro (HumaLOG) corrective regimen sliding scale:       1 Unit(s) if Glucose 151 - 200      2 Unit(s) if Glucose 201 - 250      3 Unit(s) if Glucose 251 - 300      4 Unit(s) if Glucose 301 - 350      5 Unit(s) if Glucose 351 - 400      6 Unit(s) if Glucose Greater Than 400 + Contact MD  SubCutaneous, three times a day before meals  Special Instructions: Give correctional scale insulin REGARDLESS of PO status NOTIFY Provider for blood glucose LESS THAN 70 milliGRAM(s)/deciLiter or above 400 milliGRAM(s)/deciLiter.  Administration Instructions: *Per Sliding Scale*  Dispose unused medication in BLACK bin.  This is a Look-alike/Sound-alike Medication  Provider's Contact #: [phone] ( @ 19:39)  insulin lispro (HumaLOG) corrective regimen sliding scale:       0 Unit(s) if Glucose 0 - 250      1 Unit(s) if Glucose 251 - 300      2 Unit(s) if Glucose 301 - 350      3 Unit(s) if Glucose 351 - 400      4 Unit(s) if Glucose Greater Than 400 + Contact Provider  SubCutaneous, at bedtime  Special Instructions: Give correctional scale insulin REGARDLESS of PO status NOTIFY Provider for blood glucose LESS THAN 70 milliGRAM(s)/deciLiter or above 400 milliGRAM(s)/deciLiter.  Administration Instructions: Dispose unused medication in BLACK bin.  This is a Look-alike/Sound-alike Medication  Provider's Contact #: [phone] ( @ 19:39)  dextrose 5%.: Solution, 1000 milliLiter(s) infuse at 50 mL/Hr  Special Instructions: Conditional Order: HYPOGLYCEMIA PROTOCOL  Provider's Contact #: [phone] ( @ 19:39)  Administer Carbohydrates:     Additional Instructions:  HYPOGLYCEMIA PROTOCOL ( 19:39)  dextrose Gel: [Known as GLUTOSE]  1 Dose(s), Oral, once, PRN for Blood Glucose LESS THAN 70 milliGRAM(s)/deciliter, Stop After 1 Doses  Special Instructions: Conditional Order: HYPOGLYCEMIA PROTOCOL  Provider's Contact #: [phone] ( @ 19:39)  dextrose 50% Injectable:   12.5 Gram(s), IV Push, once, Stop After 1 Doses  Special Instructions: Conditional Order: HYPOGLYCEMIA PROTOCOL  Provider's Contact #: [phone] ( @ 19:39)  dextrose 50% Injectable:   25 Gram(s), IV Push, once, Stop After 1 Doses  Special Instructions: Conditional Order: HYPOGLYCEMIA PROTOCOL  Provider's Contact #: [phone] ( @ 19:39)  dextrose 50% Injectable:   25 Gram(s), IV Push, once, Stop After 1 Doses  Special Instructions: Conditional Order: HYPOGLYCEMIA PROTOCOL  Provider's Contact #: [phone] ( @ 19:39)  glucagon  Injectable:   1 milliGRAM(s), IntraMuscular, once, PRN for Glucose LESS THAN 70 milligrams/deciliter, Stop After 1 Doses  Special Instructions: Conditional Order: HYPOGLYCEMIA PROTOCOL  Provider's Contact #: [phone] ( @ 19:39)  Provider to RN:       UNRESPONSIVE patient and Blood Glucose LESS THAN 70 milliGRAM(s)/deciLiter call Rapid Response.  HYPOGLYCEMIA PROTOCOL (07-06 @ 19:39)  Diet, Consistent Carbohydrate/No Snacks ( @ 19:39)  aspirin:   325 milliGRAM(s), Oral, daily  Provider's Contact #: [phone] ( @ 19:39)  Consult- PT Evaluate and Treat:   *Reason for Consult - Must select at least one choice*     Short Term Rehab  Weight Bearing Restrictions: No ( @ 19:39)  Admit to Inpatient Level of Care:     Service:  TELEMETRY    Physician:  Hal Smith    Additional Instructions:  Diagnosis: Chest pain; Acute metabolic encephalopathy  Isolation: None  Special Consideration: None ( @ 20:42)  Remote Telemetry/EKG EXCEPT Off Unit Tests:     Time/Priority:  Routine ( @ 20:48)    Assessment & Opinion:  Probable Metabolic Encephalopathy.    Recommendations:  Brain MRI.  Carotid doppler.  Echocardiogram.  EEG.   DVT prophylaxis as ordered.  Medications: Historian:     Patient.  ER notes also reviewed.  HPI:  DILLON DELANEY.  71yoF; with pmh signif for CVA, HTN, DM; now p/w generalized weakness and confusion. patient is normally AAOx3, but for 24 hours increased confusion and generalized weakness. would not get out of bed all day. c/o headache--frontal headache, non-radiating, not associated with nausea/vomiting.  denies f/c/s. denies neck pain. c/o chest pain--sscp, non-radiating, non-exertional, non-pleuritic. denies associated sob. denies abd pain. denies numbness/tingling. denies focal weakness. Numbness of right hand with weakness and slurring of speech from yesterday witnessed by grandchildren,seen (2017 19:06)    PAST MEDICAL & SURGICAL HISTORY: DM (diabetes mellitus); Asthma; CVA (cerebral vascular accident); No significant past surgical history    MEDICATIONS  (STANDING):  enoxaparin Injectable 40 milliGRAM(s) SubCutaneous daily  atorvastatin 10 milliGRAM(s) Oral at bedtime  amLODIPine   Tablet 10 milliGRAM(s) Oral daily  montelukast 10 milliGRAM(s) Oral daily  hydrALAZINE 25 milliGRAM(s) Oral two times a day  insulin glargine Injectable (LANTUS) 10 Unit(s) SubCutaneous at bedtime  insulin lispro (HumaLOG) corrective regimen sliding scale   SubCutaneous three times a day before meals  insulin lispro (HumaLOG) corrective regimen sliding scale   SubCutaneous at bedtime  dextrose 5%. 1000 milliLiter(s) (50 mL/Hr) IV Continuous <Continuous>  dextrose 50% Injectable 12.5 Gram(s) IV Push once  dextrose 50% Injectable 25 Gram(s) IV Push once  dextrose 50% Injectable 25 Gram(s) IV Push once  aspirin 325 milliGRAM(s) Oral daily    MEDICATIONS  (PRN):  dextrose Gel 1 Dose(s) Oral once PRN Blood Glucose LESS THAN 70 milliGRAM(s)/deciliter  glucagon  Injectable 1 milliGRAM(s) IntraMuscular once PRN Glucose LESS THAN 70 milligrams/deciliter      Allergies: codeine (Swelling); penicillins (Swelling)  Intolerances  FAMILY HISTORY:  No pertinent family history in first degree relatives    Vital Signs Last 24 Hrs  T(C): 36.7 (2017 21:26), Max: 37.4 (2017 03:50)  T(F): 98 (2017 21:26), Max: 99.3 (2017 03:50)  HR: 82 (2017 21:26) (82 - 98)  BP: 189/102 (2017 21:26) (176/95 - 190/98)  BP(mean): --  RR: 17 (2017 21:26) (17 - 19)  SpO2: 97% (2017 21:26) (97% - 98%)    NEUROLOGICAL EXAM:  HENT:  Normocephalic head; atraumatic head.  Neck supple.  ENT: normal looking.  Mental State:    Alert.  Fully oriented to person, place and date.  Coherent.  Speech clear and intact.  Cooperative.  Responds appropriately.    Cranial Nerves:  II-XII:   Pupils round and reactive to light and accommodation.  Extraocular movements full.  Visual fields full (no homonymous hemianopsia).  Visual acuity wnl.  Facial symmetry intact.  Tongue midline.  Motor Functions:  Moves all extremities.  No pronator drift of UE.  Claps hand well.  Hand  intact bilaterally.  Ambulatory.    Sensory Functions:   Intact to touch and pinprick to face and extremities.    Reflexes:  Deep tendon reflexes normoactive to biceps, knees and ankles.  Babinski absent (present).  Cerebellar Testing:    Finger to nose intact.  Nystagmus absent.  Neurovascular: Carotid auscultation full without bruits.      LABS:                        12.6   10.4  )-----------( 211      ( 2017 05:10 )             38.5     -    139  |  106  |  18  ----------------------------<  266<H>  3.7   |  25  |  1.88<H>    Ca    8.3<L>      2017 05:10    TPro  6.6  /  Alb  2.2<L>  /  TBili  0.2  /  DBili  x   /  AST  17  /  ALT  21  /  AlkPhos  130<H>  07-    PT/INR - ( 2017 05:10 )   PT: 10.1 sec;   INR: 0.93 ratio         PTT - ( 2017 05:10 )  PTT:26.5 sec    Urinalysis Basic - ( 2017 10:27 )    Color: Yellow / Appearance: Clear / S.010 / pH: x  Gluc: x / Ketone: Negative  / Bili: Negative / Urobili: Negative mg/dL   Blood: x / Protein: 500 mg/dL / Nitrite: Negative   Leuk Esterase: Negative / RBC: 11-25 /HPF / WBC x   Sq Epi: x / Non Sq Epi: Moderate / Bacteria: x      RADIOLOGY & ADDITIONAL STUDIES:    National Institutes of Health Stroke Scale Score:     Time/Priority:  STAT    Additional Instructions:  Type score here ___ ( @ 04:35)  Comprehensive Metabolic Panel: STAT ( @ 04:35)  Complete Blood Count + Automated Diff: STAT ( @ 04:35)  Troponin I, Serum: STAT ( @ 04:35)  Lactate, Blood: STAT ( @ 04:35)  Creatine Kinase, Serum: STAT ( @ 04:35)  CKMB Mass Assay: STAT ( @ 04:35)  Activated Partial Thromboplastin Time:  Start Date:2017. STAT ( @ 04:35)  Prothrombin Time and INR, Plasma:  Start Date:2017. STAT ( @ 04:35)  Urinalysis: STAT  Cancel Reason: System Cancel ( @ 04:35)  Type + Screen: STAT ( @ 04:35)  ABO Rh Confirmatory Specimen: STAT  Addl Info: Conditional: ABO Rh Confirmatory Specimen ( @ 04:35)  Xray Chest 1 View AP/PA.: Urgent   Indication: r/o infiltrate  Transport: Portable  Exam Completed  Provider's Contact #: [phone] ( @ 04:35)  CT Head No Cont: Urgent   Indication: ams x24hrs  Transport: Stretcher-Crib  Exam Completed  Provider's Contact #: [phone] ( @ 04:35)  Vital Signs:     Frequency:  every 1 hour ( @ 04:35)  Pulse Oximetry:   Frequency: <Continuous>    Additional Instructions:  Notify Provider if oxygen saturation is LESS THAN 92% ( @ 04:35)  Cardiac Monitor Bedside:     Time/Priority:  STAT ( @ 04:35)  12 Lead ECG:   Provider's Contact #: [phone] (07-06 @ 04:35)  Dysphagia Screening:     Time/Priority:  STAT ( @ 04:35)  Assess Neurological Status:     Type of Neuro Check:  Stroke    Frequency:  every 1 hour    Additional Instructions:  Perform stroke neurological check ( 04:35)  Provider to RN:       Assess Level of Consciousness, using Jyothi Coma Scale ( 04:35)  Education:     Other: Stroke education    Additional Instructions: Distribute Stroke education material and provide stroke education ( 04:35)  Blood Glucose Point Of Care Testing:     Frequency:  once ( 04:35)  Activity - Bedrest ( 04:35)  Fall Risk Protocol:     Time/Priority:  STAT    Additional Instructions:  Follow fall risk protocol ( 04:35)  Aspiration Precautions:     Time/Priority:  Routine ( @ 04:35)  Seizure Precautions:     Time/Priority:  Routine ( 04:35)  Diet, NPO ( 04:35)  Urinalysis + Microscopic Examination: STAT ( 04:35)  Antibody Screen: 05:01 ( @ 05:10)  hydrALAZINE  Oral Tab/Cap - Peds: [Known as APRESOLINE Oral Tab/Cap - Peds]  25 milliGRAM(s), Oral, Once, Stop After 1 Doses  Indication: htn  Administration Instructions: This is a Look-alike/Sound-alike Medication  Provider's Contact #: [phone] ( @ 06:14)  (Floorstock):   Qty Removed: 1 each ( @ 06:50)  aspirin:   325 milliGRAM(s), Oral, Once, Stop After 1 Doses  Provider's Contact #: [phone] ( @ 07:22)  hydrALAZINE Injectable: [Known as APRESOLINE Injectable]  5 milliGRAM(s), IV Push, Once, Stop After 1 Doses  Administration Instructions: This is a Look-alike/Sound-alike Medication  Provider's Contact #: [phone] ( @ 10:01)  acetaminophen   Tablet.: [Ordered as TYLENOL.]  650 milliGRAM(s), Oral, once, PRN for Moderate Pain (4 - 6), Stop After 1 Doses  Administration Instructions: MAX DAILY DOSE:  ADULT = 4,000 mG/Day  Provider's Contact #: [phone] ( @ 10:12)  (Floorstock):   Qty Removed: 1 each ( @ 11:36)  (Floorstock):   Qty Removed: 1 each ( @ 14:53)  Admit from ED ( @ 17:22)  Admit to Inpatient Level of Care:     Service:  Telemetry    Physician:  anne ( @ 19:22)  enoxaparin Injectable: [Ordered as LOVENOX]  40 milliGRAM(s), SubCutaneous, daily  Provider's Contact #: [phone] ( @ 19:22)  Troponin I, Serum: Repeat From: 2017 19:19 To: 2017 03:19, Every 8 hr(s) ( @ 19:22)  Troponin I, Serum: 19:19 ( @ 19:22)  TTE Echo Doppler w/o Cont:   Transport: Stretcher-Crib  Monitor: w/o Monitor  Provider's Contact #: [phone] ( @ 19:22)  US Duplex Carotid Arteries Complete, Bilateral: Routine   Indication: tia  Transport: Stretcher-Crib  Provider's Contact #: [phone] ( @ 19:22)  atorvastatin: [Known as LIPITOR]  10 milliGRAM(s), Oral, at bedtime  Provider's Contact #: [phone] ( @ 19:24)  amLODIPine   Tablet: [Known as NORVASC]  10 milliGRAM(s), Oral, daily  Administration Instructions: This is a Look-alike/Sound-alike Medication  Provider's Contact #: [phone] ( @ 19:24)  montelukast: [Known as SINGULAIR]  10 milliGRAM(s), Oral, daily  Provider's Contact #: [phone] ( @ 19:24)  hydrALAZINE: [Known as APRESOLINE]  25 milliGRAM(s), Oral, two times a day  Administration Instructions: This is a Look-alike/Sound-alike Medication  Provider's Contact #: [phone] ( @ 19:24)  Hemoglobin A1C, Whole Blood: AM Sched. Collection: 2017 05:00 ( @ 19:39)  Blood Glucose Point Of Care Testing:     Frequency:  Before meals and at bedtime    Additional Instructions:  Before meals and at bedtime ( @ 19:39)  Blood Glucose Point Of Care Testing:     Frequency:  every 15 minutes    Additional Instructions:  After carbohydrate administration for hypoglycemia, repeat every 15 minute(s) until blood glucose is GREATER THAN or EQUAL  milliGRAM(s)/deciLiter twice consecutively ( @ 19:39)  Notify Provider For:     Additional Instructions:  Blood glucose LESS THAN 70 milliGRAM(s)/deciLiter or GREATER THAN 400 milliGRAM(s)/deciLiter ( @ 19:39)  Education:     Diabetes    Other: Diet, Exercise    Additional Instructions: Diet, Exercise, Diabetes ( @ 19:39)  insulin glargine Injectable (LANTUS):   10 Unit(s), SubCutaneous, at bedtime  Administration Instructions: NOT to be mixed with other insulins  Dispose unused medication in BLACK bin.  Provider's Contact #: [phone] ( @ 19:39)  insulin lispro (HumaLOG) corrective regimen sliding scale:       1 Unit(s) if Glucose 151 - 200      2 Unit(s) if Glucose 201 - 250      3 Unit(s) if Glucose 251 - 300      4 Unit(s) if Glucose 301 - 350      5 Unit(s) if Glucose 351 - 400      6 Unit(s) if Glucose Greater Than 400 + Contact MD  SubCutaneous, three times a day before meals  Special Instructions: Give correctional scale insulin REGARDLESS of PO status NOTIFY Provider for blood glucose LESS THAN 70 milliGRAM(s)/deciLiter or above 400 milliGRAM(s)/deciLiter.  Administration Instructions: *Per Sliding Scale*  Dispose unused medication in BLACK bin.  This is a Look-alike/Sound-alike Medication  Provider's Contact #: [phone] ( @ 19:39)  insulin lispro (HumaLOG) corrective regimen sliding scale:       0 Unit(s) if Glucose 0 - 250      1 Unit(s) if Glucose 251 - 300      2 Unit(s) if Glucose 301 - 350      3 Unit(s) if Glucose 351 - 400      4 Unit(s) if Glucose Greater Than 400 + Contact Provider  SubCutaneous, at bedtime  Special Instructions: Give correctional scale insulin REGARDLESS of PO status NOTIFY Provider for blood glucose LESS THAN 70 milliGRAM(s)/deciLiter or above 400 milliGRAM(s)/deciLiter.  Administration Instructions: Dispose unused medication in BLACK bin.  This is a Look-alike/Sound-alike Medication  Provider's Contact #: [phone] ( @ 19:39)  dextrose 5%.: Solution, 1000 milliLiter(s) infuse at 50 mL/Hr  Special Instructions: Conditional Order: HYPOGLYCEMIA PROTOCOL  Provider's Contact #: [phone] ( @ 19:39)  Administer Carbohydrates:     Additional Instructions:  HYPOGLYCEMIA PROTOCOL ( 19:39)  dextrose Gel: [Known as GLUTOSE]  1 Dose(s), Oral, once, PRN for Blood Glucose LESS THAN 70 milliGRAM(s)/deciliter, Stop After 1 Doses  Special Instructions: Conditional Order: HYPOGLYCEMIA PROTOCOL  Provider's Contact #: [phone] ( @ 19:39)  dextrose 50% Injectable:   12.5 Gram(s), IV Push, once, Stop After 1 Doses  Special Instructions: Conditional Order: HYPOGLYCEMIA PROTOCOL  Provider's Contact #: [phone] ( @ 19:39)  dextrose 50% Injectable:   25 Gram(s), IV Push, once, Stop After 1 Doses  Special Instructions: Conditional Order: HYPOGLYCEMIA PROTOCOL  Provider's Contact #: [phone] ( @ 19:39)  dextrose 50% Injectable:   25 Gram(s), IV Push, once, Stop After 1 Doses  Special Instructions: Conditional Order: HYPOGLYCEMIA PROTOCOL  Provider's Contact #: [phone] ( @ 19:39)  glucagon  Injectable:   1 milliGRAM(s), IntraMuscular, once, PRN for Glucose LESS THAN 70 milligrams/deciliter, Stop After 1 Doses  Special Instructions: Conditional Order: HYPOGLYCEMIA PROTOCOL  Provider's Contact #: [phone] ( @ 19:39)  Provider to RN:       UNRESPONSIVE patient and Blood Glucose LESS THAN 70 milliGRAM(s)/deciLiter call Rapid Response.  HYPOGLYCEMIA PROTOCOL (07-06 @ 19:39)  Diet, Consistent Carbohydrate/No Snacks ( @ 19:39)  aspirin:   325 milliGRAM(s), Oral, daily  Provider's Contact #: [phone] ( @ 19:39)  Consult- PT Evaluate and Treat:   *Reason for Consult - Must select at least one choice*     Short Term Rehab  Weight Bearing Restrictions: No ( @ 19:39)  Admit to Inpatient Level of Care:     Service:  TELEMETRY    Physician:  Hal Smith    Additional Instructions:  Diagnosis: Chest pain; Acute metabolic encephalopathy  Isolation: None  Special Consideration: None ( @ 20:42)  Remote Telemetry/EKG EXCEPT Off Unit Tests:     Time/Priority:  Routine ( @ 20:48)    Assessment & Opinion:  Probable Metabolic Encephalopathy.    Recommendations:  Elective Brain MRI.  EEG.   DVT prophylaxis as ordered.  Medications:  Continue all meds as ordered.

## 2017-07-06 NOTE — ED ADULT NURSE REASSESSMENT NOTE - NS ED NURSE REASSESS COMMENT FT1
Pt A&Ox3 c/o slight headache with improvement after Tylenol administration. Pt denies any chest pain, respiration even and unlabored. Pt's BP continue to be monitored. Pt awaiting further evaluation

## 2017-07-06 NOTE — ED PROVIDER NOTE - OBJECTIVE STATEMENT
71yoF; with pmh signif for CVA, HTN, DM; now p/w generalized weakness and confusion. patient is normally AAOx3, but for 24 hours increased confusion and generalized weakness. would not get out of bed all day. c/o headache--frontal headache, non-radiating, not associated with nausea/vomiting.  denies f/c/s. denies neck pain. c/o chest pain--sscp, non-radiating, non-exertional, non-pleuritic. denies associated sob. denies abd pain. denies numbness/tingling. denies focal weakness.

## 2017-07-06 NOTE — ED PROVIDER NOTE - PROGRESS NOTE DETAILS
Pt is alert and oriented x person only Pt's blood pressure 179/98 HR 85 + foul smell of urine. Pt is signed out to me for admission now.

## 2017-07-07 LAB
HBA1C BLD-MCNC: 11.5 % — HIGH (ref 4–5.6)
TROPONIN I SERPL-MCNC: 0.03 NG/ML — SIGNIFICANT CHANGE UP (ref 0.01–0.04)

## 2017-07-07 PROCEDURE — 93880 EXTRACRANIAL BILAT STUDY: CPT | Mod: 26

## 2017-07-07 RX ORDER — INSULIN GLARGINE 100 [IU]/ML
10 INJECTION, SOLUTION SUBCUTANEOUS ONCE
Qty: 0 | Refills: 0 | Status: COMPLETED | OUTPATIENT
Start: 2017-07-07 | End: 2017-07-07

## 2017-07-07 RX ORDER — INSULIN GLARGINE 100 [IU]/ML
20 INJECTION, SOLUTION SUBCUTANEOUS AT BEDTIME
Qty: 0 | Refills: 0 | Status: DISCONTINUED | OUTPATIENT
Start: 2017-07-07 | End: 2017-07-11

## 2017-07-07 RX ADMIN — INSULIN GLARGINE 20 UNIT(S): 100 INJECTION, SOLUTION SUBCUTANEOUS at 23:35

## 2017-07-07 RX ADMIN — Medication 2: at 23:36

## 2017-07-07 RX ADMIN — Medication 3: at 16:59

## 2017-07-07 RX ADMIN — Medication 25 MILLIGRAM(S): at 17:00

## 2017-07-07 RX ADMIN — Medication 6: at 11:27

## 2017-07-07 RX ADMIN — INSULIN GLARGINE 10 UNIT(S): 100 INJECTION, SOLUTION SUBCUTANEOUS at 13:21

## 2017-07-07 RX ADMIN — ATORVASTATIN CALCIUM 10 MILLIGRAM(S): 80 TABLET, FILM COATED ORAL at 22:33

## 2017-07-07 RX ADMIN — Medication 25 MILLIGRAM(S): at 06:15

## 2017-07-07 RX ADMIN — Medication 325 MILLIGRAM(S): at 11:27

## 2017-07-07 RX ADMIN — AMLODIPINE BESYLATE 10 MILLIGRAM(S): 2.5 TABLET ORAL at 06:15

## 2017-07-07 NOTE — PHYSICAL THERAPY INITIAL EVALUATION ADULT - MODIFIED CLINICAL TEST OF SENSORY INTEGRATION IN BALANCE TEST
Barthel Index: Feeding Score _10__, Bathing Score _5__, Grooming Score __5_, Dressing Score __10_, Bowels Score _10_, Bladder Score _10__, Toilet Score _10__, Transfers Score _15__, Mobility Score _15__, Stairs Score _10__,     Total Score 100___

## 2017-07-07 NOTE — PROGRESS NOTE ADULT - SUBJECTIVE AND OBJECTIVE BOX
Patient is a 71y old  Female who presents with a chief complaint of     INTERVAL HPI/OVERNIGHT EVENTS:    MEDICATIONS  (STANDING):  enoxaparin Injectable 40 milliGRAM(s) SubCutaneous daily  atorvastatin 10 milliGRAM(s) Oral at bedtime  amLODIPine   Tablet 10 milliGRAM(s) Oral daily  montelukast 10 milliGRAM(s) Oral daily  hydrALAZINE 25 milliGRAM(s) Oral two times a day  insulin glargine Injectable (LANTUS) 10 Unit(s) SubCutaneous at bedtime  insulin lispro (HumaLOG) corrective regimen sliding scale   SubCutaneous three times a day before meals  insulin lispro (HumaLOG) corrective regimen sliding scale   SubCutaneous at bedtime  dextrose 5%. 1000 milliLiter(s) (50 mL/Hr) IV Continuous <Continuous>  dextrose 50% Injectable 12.5 Gram(s) IV Push once  dextrose 50% Injectable 25 Gram(s) IV Push once  dextrose 50% Injectable 25 Gram(s) IV Push once  aspirin 325 milliGRAM(s) Oral daily    MEDICATIONS  (PRN):  dextrose Gel 1 Dose(s) Oral once PRN Blood Glucose LESS THAN 70 milliGRAM(s)/deciliter  glucagon  Injectable 1 milliGRAM(s) IntraMuscular once PRN Glucose LESS THAN 70 milligrams/deciliter        REVIEW OF SYSTEMS:  CONSTITUTIONAL: No fever, weight loss, or fatigue  EYES: No eye pain, visual disturbances, or discharge  ENMT:  No difficulty hearing, tinnitus, vertigo; No sinus or throat pain  NECK: No pain or stiffness  RESPIRATORY: No cough, wheezing, chills or hemoptysis; No shortness of breath  CARDIOVASCULAR: No chest pain, palpitations, dizziness, or leg swelling  GASTROINTESTINAL: No abdominal or epigastric pain. No nausea, vomiting, or hematemesis; No diarrhea or constipation. No melena or hematochezia.  GENITOURINARY: No dysuria, frequency, hematuria, or incontinence  NEUROLOGICAL: No headaches, memory loss, loss of strength, numbness, or tremors  SKIN: No itching, burning, rashes, or lesions      Vital Signs Last 24 Hrs  T(C): 36.6 (2017 05:48), Max: 37.1 (2017 21:59)  T(F): 97.8 (2017 05:48), Max: 98.8 (2017 21:59)  HR: 129 (2017 10:56) (81 - 129)  BP: 150/70 (2017 10:56) (150/70 - 199/94)  BP(mean): --  RR: 18 (2017 05:48) (16 - 19)  SpO2: 97% (2017 10:56) (97% - 100%)    PHYSICAL EXAM:  GENERAL: NAD, well-groomed, well-developed  HEAD:  Atraumatic, Normocephalic  EYES: EOMI, PERRLA, conjunctiva and sclera clear  ENMT: No tonsillar erythema, exudates, or enlargement; Moist mucous membranes   NECK: Supple, No JVD   NERVOUS SYSTEM:  Alert & Oriented X3, Good concentration; Motor Strength 5/5 B/L upper and lower extremities; DTRs 2+ intact and symmetric  CHEST/LUNG: Clear to percussion bilaterally; No rales, rhonchi, wheezing, or rubs  HEART: Regular rate and rhythm; No murmurs, rubs, or gallops  ABDOMEN: Soft, Nontender, Nondistended; Bowel sounds present  EXTREMITIES:  2+ Peripheral Pulses, No clubbing, cyanosis, or edema  LYMPH: No lymphadenopathy noted  SKIN: No rashes or lesions    LABS:                        12.6   10.4  )-----------( 211      ( 2017 05:10 )             38.5     -    139  |  106  |  18  ----------------------------<  266<H>  3.7   |  25  |  1.88<H>    Ca    8.3<L>      2017 05:10    TPro  6.6  /  Alb  2.2<L>  /  TBili  0.2  /  DBili  x   /  AST  17  /  ALT  21  /  AlkPhos  130<H>  -    PT/INR - ( 2017 05:10 )   PT: 10.1 sec;   INR: 0.93 ratio         PTT - ( 2017 05:10 )  PTT:26.5 sec  Urinalysis Basic - ( 2017 10:27 )    Color: Yellow / Appearance: Clear / S.010 / pH: x  Gluc: x / Ketone: Negative  / Bili: Negative / Urobili: Negative mg/dL   Blood: x / Protein: 500 mg/dL / Nitrite: Negative   Leuk Esterase: Negative / RBC: 11-25 /HPF / WBC x   Sq Epi: x / Non Sq Epi: Moderate / Bacteria: x      CAPILLARY BLOOD GLUCOSE  244 (2017 22:56)          RADIOLOGY & ADDITIONAL TESTS:    Imaging Personally Reviewed:  [ ] YES  [ ] NO    Consultant(s) Notes Reviewed:  [ ] YES  [ ] NO    Care Discussed with Consultants/Other Providers [ ] YES  [ ] NO

## 2017-07-07 NOTE — PROGRESS NOTE ADULT - SUBJECTIVE AND OBJECTIVE BOX
INTERVAL HPI/OVERNIGHT EVENTS:  Subjective Complaints: No complaints.  Patient siting up by bedside chair with no focal neuro deficits.  Speech clear. In no acute distress.     NEUROLOGICAL EXAM (Pertinent):  Vital Signs Last 24 Hrs  T(C): 36.6 (2017 05:48), Max: 37.1 (2017 21:59)  T(F): 97.8 (2017 05:48), Max: 98.8 (2017 21:59)  HR: 129 (2017 10:56) (81 - 129)  BP: 150/70 (2017 10:56) (150/70 - 199/94)  BP(mean): --  RR: 18 (2017 05:48) (16 - 19)  SpO2: 97% (2017 10:56) (97% - 100%)    MEDICATIONS  (STANDING):  enoxaparin Injectable 40 milliGRAM(s) SubCutaneous daily  atorvastatin 10 milliGRAM(s) Oral at bedtime  amLODIPine   Tablet 10 milliGRAM(s) Oral daily  montelukast 10 milliGRAM(s) Oral daily  hydrALAZINE 25 milliGRAM(s) Oral two times a day  insulin lispro (HumaLOG) corrective regimen sliding scale   SubCutaneous three times a day before meals  insulin lispro (HumaLOG) corrective regimen sliding scale   SubCutaneous at bedtime  dextrose 5%. 1000 milliLiter(s) (50 mL/Hr) IV Continuous <Continuous>  dextrose 50% Injectable 12.5 Gram(s) IV Push once  dextrose 50% Injectable 25 Gram(s) IV Push once  dextrose 50% Injectable 25 Gram(s) IV Push once  aspirin 325 milliGRAM(s) Oral daily  insulin glargine Injectable (LANTUS) 10 Unit(s) SubCutaneous once  insulin glargine Injectable (LANTUS) 20 Unit(s) SubCutaneous at bedtime    MEDICATIONS  (PRN):  dextrose Gel 1 Dose(s) Oral once PRN Blood Glucose LESS THAN 70 milliGRAM(s)/deciliter  glucagon  Injectable 1 milliGRAM(s) IntraMuscular once PRN Glucose LESS THAN 70 milligrams/deciliter    LABS:                        12.6   10.4  )-----------( 211      ( 2017 05:10 )             38.5     07-06    139  |  106  |  18  ----------------------------<  266<H>  3.7   |  25  |  1.88<H>    Ca    8.3<L>      2017 05:10    TPro  6.6  /  Alb  2.2<L>  /  TBili  0.2  /  DBili  x   /  AST  17  /  ALT  21  /  AlkPhos  130<H>  -    PT/INR - ( 2017 05:10 )   PT: 10.1 sec;   INR: 0.93 ratio         PTT - ( 2017 05:10 )  PTT:26.5 sec  Urinalysis Basic - ( 2017 10:27 )    Color: Yellow / Appearance: Clear / S.010 / pH: x  Gluc: x / Ketone: Negative  / Bili: Negative / Urobili: Negative mg/dL   Blood: x / Protein: 500 mg/dL / Nitrite: Negative   Leuk Esterase: Negative / RBC: 11-25 /HPF / WBC x   Sq Epi: x / Non Sq Epi: Moderate / Bacteria: x    ASSESSMENT & OPINION:   By history, status post Altered Mental State.  Possible Metabolic Encephalopathy.  CErebral Ischemia.  Non-focal neuro examination.  RECOMMENDATIONS:  Await EEG, carotid and echocardiogram,  pending discharge.

## 2017-07-08 RX ORDER — ACETAMINOPHEN 500 MG
650 TABLET ORAL EVERY 6 HOURS
Qty: 0 | Refills: 0 | Status: DISCONTINUED | OUTPATIENT
Start: 2017-07-08 | End: 2017-07-11

## 2017-07-08 RX ADMIN — Medication 25 MILLIGRAM(S): at 17:11

## 2017-07-08 RX ADMIN — Medication 325 MILLIGRAM(S): at 11:35

## 2017-07-08 RX ADMIN — Medication 2: at 08:55

## 2017-07-08 RX ADMIN — Medication 650 MILLIGRAM(S): at 17:11

## 2017-07-08 RX ADMIN — Medication 650 MILLIGRAM(S): at 22:32

## 2017-07-08 RX ADMIN — Medication 25 MILLIGRAM(S): at 06:02

## 2017-07-08 RX ADMIN — ATORVASTATIN CALCIUM 10 MILLIGRAM(S): 80 TABLET, FILM COATED ORAL at 22:29

## 2017-07-08 RX ADMIN — Medication 3: at 11:34

## 2017-07-08 RX ADMIN — Medication 650 MILLIGRAM(S): at 18:00

## 2017-07-08 RX ADMIN — INSULIN GLARGINE 20 UNIT(S): 100 INJECTION, SOLUTION SUBCUTANEOUS at 23:15

## 2017-07-08 RX ADMIN — Medication 1: at 16:26

## 2017-07-08 RX ADMIN — Medication 650 MILLIGRAM(S): at 12:17

## 2017-07-08 RX ADMIN — Medication 650 MILLIGRAM(S): at 23:15

## 2017-07-08 RX ADMIN — MONTELUKAST 10 MILLIGRAM(S): 4 TABLET, CHEWABLE ORAL at 11:35

## 2017-07-08 RX ADMIN — Medication 650 MILLIGRAM(S): at 11:35

## 2017-07-08 RX ADMIN — AMLODIPINE BESYLATE 10 MILLIGRAM(S): 2.5 TABLET ORAL at 06:02

## 2017-07-08 NOTE — PROGRESS NOTE ADULT - SUBJECTIVE AND OBJECTIVE BOX
Chief Complaints:  Patient is a 71y old  Female who presents with a chief complaint of     Interval History:    ROS: Unchanged    Physical Exam:    In NAD:    Vital Signs Last 24 Hrs  T(C): 36.6 (07-08-17 @ 17:39), Max: 37.1 (07-08-17 @ 05:57)  T(F): 97.8 (07-08-17 @ 17:39), Max: 98.8 (07-08-17 @ 05:57)  HR: 84 (07-08-17 @ 17:39) (84 - 88)  BP: 154/82 (07-08-17 @ 17:39) (154/82 - 179/85)  BP(mean): --  RR: 17 (07-08-17 @ 17:39) (17 - 17)  SpO2: 97% (07-08-17 @ 17:39) (94% - 97%)    Eyes: Anicteric.  Neck: Supple No jugular venous distention  Chest: Good air entry bilaterally  CVS: S1 and S2 regular  Abdomen: Soft BS + in all quadrant, no tenderness, no rebound tenderness and guarding.  Extremity: No edema cyanosis or clubbing.  CNS: awake and alert.    MEDICATIONS  (STANDING):  enoxaparin Injectable 40 milliGRAM(s) SubCutaneous daily  atorvastatin 10 milliGRAM(s) Oral at bedtime  amLODIPine   Tablet 10 milliGRAM(s) Oral daily  montelukast 10 milliGRAM(s) Oral daily  hydrALAZINE 25 milliGRAM(s) Oral two times a day  insulin lispro (HumaLOG) corrective regimen sliding scale   SubCutaneous three times a day before meals  insulin lispro (HumaLOG) corrective regimen sliding scale   SubCutaneous at bedtime  dextrose 5%. 1000 milliLiter(s) IV Continuous <Continuous>  dextrose 50% Injectable 12.5 Gram(s) IV Push once  dextrose 50% Injectable 25 Gram(s) IV Push once  dextrose 50% Injectable 25 Gram(s) IV Push once  aspirin 325 milliGRAM(s) Oral daily  insulin glargine Injectable (LANTUS) 20 Unit(s) SubCutaneous at bedtime            CAPILLARY BLOOD GLUCOSE  164 (08 Jul 2017 16:25)    CARDIAC MARKERS ( 07 Jul 2017 10:02 )  .026 ng/mL / x     / x     / x     / x

## 2017-07-08 NOTE — DIETITIAN INITIAL EVALUATION ADULT. - OTHER INFO
Pt seen today due to HgbA1c = 11.5. Pt lives @ home c her daughter whom does the food shopping/cooking. Takes oral meds and insulin along with diet for diabetes. Pt does not know what a HgbA1c is. Denies food allergies. Last BM on Wednesday, 7/7. Checks FS qod due to increased numbness in her fingers. Consuming 100 % of meal. Pt reports she feels confused.

## 2017-07-08 NOTE — DIETITIAN INITIAL EVALUATION ADULT. - NS AS NUTRI INTERV ED CONTENT
Type 2 BM nutrition Therapy/Purpose of the nutrition education/Survival information Purpose of the nutrition education/Type 2 DM nutrition Therapy: Dash/TLC/Survival information

## 2017-07-09 RX ADMIN — Medication 2: at 11:43

## 2017-07-09 RX ADMIN — Medication 650 MILLIGRAM(S): at 06:29

## 2017-07-09 RX ADMIN — ATORVASTATIN CALCIUM 10 MILLIGRAM(S): 80 TABLET, FILM COATED ORAL at 23:39

## 2017-07-09 RX ADMIN — Medication 325 MILLIGRAM(S): at 11:43

## 2017-07-09 RX ADMIN — INSULIN GLARGINE 20 UNIT(S): 100 INJECTION, SOLUTION SUBCUTANEOUS at 23:39

## 2017-07-09 RX ADMIN — AMLODIPINE BESYLATE 10 MILLIGRAM(S): 2.5 TABLET ORAL at 06:28

## 2017-07-09 RX ADMIN — Medication 650 MILLIGRAM(S): at 08:34

## 2017-07-09 RX ADMIN — Medication 1: at 17:13

## 2017-07-09 RX ADMIN — Medication 650 MILLIGRAM(S): at 23:40

## 2017-07-09 RX ADMIN — Medication 25 MILLIGRAM(S): at 06:29

## 2017-07-09 RX ADMIN — Medication 25 MILLIGRAM(S): at 17:13

## 2017-07-09 NOTE — PROGRESS NOTE ADULT - SUBJECTIVE AND OBJECTIVE BOX
Chief Complaints:  Patient is a 71y old  Female who presents with a chief complaint of     Interval History: Patient continues to improve. No new findings    ROS: Unchanged    Physical Exam:    In NAD:    Vital Signs Last 24 Hrs  T(C): 36.4 (07-09-17 @ 17:09), Max: 37.1 (07-09-17 @ 06:19)  T(F): 97.6 (07-09-17 @ 17:09), Max: 98.8 (07-09-17 @ 06:19)  HR: 96 (07-09-17 @ 17:09) (87 - 96)  BP: 155/88 (07-09-17 @ 17:09) (155/80 - 176/87)  BP(mean): --  RR: 16 (07-09-17 @ 17:09) (16 - 16)  SpO2: 97% (07-09-17 @ 17:09) (97% - 98%)    Eyes: Anicteric.  Neck: Supple No jugular venous distention  Chest: Good air entry bilaterally  CVS: S1 and S2 regular  Abdomen: Soft BS + in all quadrant, no tenderness, no rebound tenderness and guarding.  Extremity: No edema cyanosis or clubbing.  CNS: awake and alert.    MEDICATIONS  (STANDING):  enoxaparin Injectable 40 milliGRAM(s) SubCutaneous daily  atorvastatin 10 milliGRAM(s) Oral at bedtime  amLODIPine   Tablet 10 milliGRAM(s) Oral daily  montelukast 10 milliGRAM(s) Oral daily  hydrALAZINE 25 milliGRAM(s) Oral two times a day  insulin lispro (HumaLOG) corrective regimen sliding scale   SubCutaneous three times a day before meals  insulin lispro (HumaLOG) corrective regimen sliding scale   SubCutaneous at bedtime  dextrose 5%. 1000 milliLiter(s) IV Continuous <Continuous>  dextrose 50% Injectable 12.5 Gram(s) IV Push once  dextrose 50% Injectable 25 Gram(s) IV Push once  dextrose 50% Injectable 25 Gram(s) IV Push once  aspirin 325 milliGRAM(s) Oral daily  insulin glargine Injectable (LANTUS) 20 Unit(s) SubCutaneous at bedtime            CAPILLARY BLOOD GLUCOSE  209 (09 Jul 2017 11:35)

## 2017-07-09 NOTE — PROGRESS NOTE ADULT - SUBJECTIVE AND OBJECTIVE BOX
INTERVAL HPI/OVERNIGHT EVENTS:  Subjective Complaints:  Offers no complaints.  no acute distress.      RECENT RADIOLOGY & ADDITIONAL TESTS:  Echo LVEF of 60-65 percent.  Carotids no stenosis.    NEUROLOGICAL EXAM (Pertinent):  Vital Signs Last 24 Hrs  T(C): 36.4 (09 Jul 2017 17:09), Max: 37.1 (09 Jul 2017 06:19)  T(F): 97.6 (09 Jul 2017 17:09), Max: 98.8 (09 Jul 2017 06:19)  HR: 96 (09 Jul 2017 17:09) (87 - 96)  BP: 155/88 (09 Jul 2017 17:09) (155/80 - 176/87)  BP(mean): --  RR: 16 (09 Jul 2017 17:09) (16 - 16)  SpO2: 97% (09 Jul 2017 17:09) (97% - 98%)    MEDICATIONS  (STANDING):  enoxaparin Injectable 40 milliGRAM(s) SubCutaneous daily  atorvastatin 10 milliGRAM(s) Oral at bedtime  amLODIPine   Tablet 10 milliGRAM(s) Oral daily  montelukast 10 milliGRAM(s) Oral daily  hydrALAZINE 25 milliGRAM(s) Oral two times a day  insulin lispro (HumaLOG) corrective regimen sliding scale   SubCutaneous three times a day before meals  insulin lispro (HumaLOG) corrective regimen sliding scale   SubCutaneous at bedtime  dextrose 5%. 1000 milliLiter(s) (50 mL/Hr) IV Continuous <Continuous>  dextrose 50% Injectable 12.5 Gram(s) IV Push once  dextrose 50% Injectable 25 Gram(s) IV Push once  dextrose 50% Injectable 25 Gram(s) IV Push once  aspirin 325 milliGRAM(s) Oral daily  insulin glargine Injectable (LANTUS) 20 Unit(s) SubCutaneous at bedtime    MEDICATIONS  (PRN):  dextrose Gel 1 Dose(s) Oral once PRN Blood Glucose LESS THAN 70 milliGRAM(s)/deciliter  glucagon  Injectable 1 milliGRAM(s) IntraMuscular once PRN Glucose LESS THAN 70 milligrams/deciliter  acetaminophen   Tablet. 650 milliGRAM(s) Oral every 6 hours PRN Mild Pain (1 - 3)    LABS:    ASSESSMENT & OPINION:   By history, status post Altered Mental State.  Possible Metabolic Encephalopathy.  CErebral Ischemia.  Non-focal neuro examination.  RECOMMENDATIONS:  Awaiting EEG.

## 2017-07-10 RX ADMIN — INSULIN GLARGINE 20 UNIT(S): 100 INJECTION, SOLUTION SUBCUTANEOUS at 22:10

## 2017-07-10 RX ADMIN — Medication 25 MILLIGRAM(S): at 17:47

## 2017-07-10 RX ADMIN — AMLODIPINE BESYLATE 10 MILLIGRAM(S): 2.5 TABLET ORAL at 06:36

## 2017-07-10 RX ADMIN — Medication 650 MILLIGRAM(S): at 22:09

## 2017-07-10 RX ADMIN — Medication 325 MILLIGRAM(S): at 12:27

## 2017-07-10 RX ADMIN — Medication 650 MILLIGRAM(S): at 00:15

## 2017-07-10 RX ADMIN — Medication 2: at 12:26

## 2017-07-10 RX ADMIN — Medication 25 MILLIGRAM(S): at 06:37

## 2017-07-10 NOTE — PROGRESS NOTE ADULT - SUBJECTIVE AND OBJECTIVE BOX
Patient is a 71y old  Female who presents with a chief complaint of     INTERVAL HPI/OVERNIGHT EVENTS:    clinically stable    MEDICATIONS  (STANDING):  enoxaparin Injectable 40 milliGRAM(s) SubCutaneous daily  atorvastatin 10 milliGRAM(s) Oral at bedtime  amLODIPine   Tablet 10 milliGRAM(s) Oral daily  montelukast 10 milliGRAM(s) Oral daily  hydrALAZINE 25 milliGRAM(s) Oral two times a day  insulin lispro (HumaLOG) corrective regimen sliding scale   SubCutaneous three times a day before meals  insulin lispro (HumaLOG) corrective regimen sliding scale   SubCutaneous at bedtime  dextrose 5%. 1000 milliLiter(s) (50 mL/Hr) IV Continuous <Continuous>  dextrose 50% Injectable 12.5 Gram(s) IV Push once  dextrose 50% Injectable 25 Gram(s) IV Push once  dextrose 50% Injectable 25 Gram(s) IV Push once  aspirin 325 milliGRAM(s) Oral daily  insulin glargine Injectable (LANTUS) 20 Unit(s) SubCutaneous at bedtime    MEDICATIONS  (PRN):  dextrose Gel 1 Dose(s) Oral once PRN Blood Glucose LESS THAN 70 milliGRAM(s)/deciliter  glucagon  Injectable 1 milliGRAM(s) IntraMuscular once PRN Glucose LESS THAN 70 milligrams/deciliter  acetaminophen   Tablet. 650 milliGRAM(s) Oral every 6 hours PRN Mild Pain (1 - 3)        REVIEW OF SYSTEMS:  CONSTITUTIONAL: No fever, weight loss, or fatigue  EYES: No eye pain, visual disturbances, or discharge  ENMT:  No difficulty hearing, tinnitus, vertigo; No sinus or throat pain  NECK: No pain or stiffness  RESPIRATORY: No cough, wheezing, chills or hemoptysis; No shortness of breath  CARDIOVASCULAR: No chest pain, palpitations, dizziness, or leg swelling  GASTROINTESTINAL: No abdominal or epigastric pain. No nausea, vomiting, or hematemesis; No diarrhea or constipation. No melena or hematochezia.  GENITOURINARY: No dysuria, frequency, hematuria, or incontinence  NEUROLOGICAL: No headaches, memory loss, loss of strength, numbness, or tremors  SKIN: No itching, burning, rashes, or lesions      Vital Signs Last 24 Hrs  T(C): 37 (10 Jul 2017 23:42), Max: 37.1 (10 Jul 2017 11:42)  T(F): 98.6 (10 Jul 2017 23:42), Max: 98.8 (10 Jul 2017 11:42)  HR: 82 (10 Jul 2017 23:42) (82 - 102)  BP: 151/69 (10 Jul 2017 23:42) (146/82 - 161/78)  BP(mean): --  RR: 17 (10 Jul 2017 23:42) (16 - 17)  SpO2: 96% (10 Jul 2017 23:42) (96% - 98%)    PHYSICAL EXAM:  GENERAL: NAD, well-groomed, well-developed  HEAD:  Atraumatic, Normocephalic  EYES: EOMI, PERRLA, conjunctiva and sclera clear  ENMT: No tonsillar erythema, exudates, or enlargement; Moist mucous membranes   NECK: Supple, No JVD   NERVOUS SYSTEM:  Alert & Oriented X3, Good concentration; Motor Strength 5/5 B/L upper and lower extremities; DTRs 2+ intact and symmetric  CHEST/LUNG: Clear to percussion bilaterally; No rales, rhonchi, wheezing, or rubs  HEART: Regular rate and rhythm; No murmurs, rubs, or gallops  ABDOMEN: Soft, Nontender, Nondistended; Bowel sounds present  EXTREMITIES:  2+ Peripheral Pulses, No clubbing, cyanosis, or edema  LYMPH: No lymphadenopathy noted  SKIN: No rashes or lesions    LABS:              CAPILLARY BLOOD GLUCOSE  247 (10 Jul 2017 22:07)  133 (10 Jul 2017 16:37)  213 (10 Jul 2017 11:08)  109 (10 Jul 2017 08:04)          RADIOLOGY & ADDITIONAL TESTS:    Imaging Personally Reviewed:  [ ] YES  [ ] NO    Consultant(s) Notes Reviewed:  [ ] YES  [ ] NO    Care Discussed with Consultants/Other Providers [ ] YES  [ ] NO

## 2017-07-10 NOTE — PROGRESS NOTE ADULT - SUBJECTIVE AND OBJECTIVE BOX
Patient is a 71y old  Female who presents with a chief complaint of     INTERVAL HPI/OVERNIGHT EVENTS:  no new symptoms  MEDICATIONS  (STANDING):  enoxaparin Injectable 40 milliGRAM(s) SubCutaneous daily  atorvastatin 10 milliGRAM(s) Oral at bedtime  amLODIPine   Tablet 10 milliGRAM(s) Oral daily  montelukast 10 milliGRAM(s) Oral daily  hydrALAZINE 25 milliGRAM(s) Oral two times a day  insulin lispro (HumaLOG) corrective regimen sliding scale   SubCutaneous three times a day before meals  insulin lispro (HumaLOG) corrective regimen sliding scale   SubCutaneous at bedtime  dextrose 5%. 1000 milliLiter(s) (50 mL/Hr) IV Continuous <Continuous>  dextrose 50% Injectable 12.5 Gram(s) IV Push once  dextrose 50% Injectable 25 Gram(s) IV Push once  dextrose 50% Injectable 25 Gram(s) IV Push once  aspirin 325 milliGRAM(s) Oral daily  insulin glargine Injectable (LANTUS) 20 Unit(s) SubCutaneous at bedtime    MEDICATIONS  (PRN):  dextrose Gel 1 Dose(s) Oral once PRN Blood Glucose LESS THAN 70 milliGRAM(s)/deciliter  glucagon  Injectable 1 milliGRAM(s) IntraMuscular once PRN Glucose LESS THAN 70 milligrams/deciliter  acetaminophen   Tablet. 650 milliGRAM(s) Oral every 6 hours PRN Mild Pain (1 - 3)        REVIEW OF SYSTEMS:  CONSTITUTIONAL: No fever, weight loss, or fatigue  EYES: No eye pain, visual disturbances, or discharge  ENMT:  No difficulty hearing, tinnitus, vertigo; No sinus or throat pain  NECK: No pain or stiffness  RESPIRATORY: No cough, wheezing, chills or hemoptysis; No shortness of breath  CARDIOVASCULAR: No chest pain, palpitations, dizziness, or leg swelling  GASTROINTESTINAL: No abdominal or epigastric pain. No nausea, vomiting, or hematemesis; No diarrhea or constipation. No melena or hematochezia.  GENITOURINARY: No dysuria, frequency, hematuria, or incontinence  NEUROLOGICAL: No headaches, memory loss, loss of strength, numbness, or tremors  SKIN: No itching, burning, rashes, or lesions      Vital Signs Last 24 Hrs  T(C): 37 (10 Jul 2017 23:42), Max: 37.1 (10 Jul 2017 11:42)  T(F): 98.6 (10 Jul 2017 23:42), Max: 98.8 (10 Jul 2017 11:42)  HR: 82 (10 Jul 2017 23:42) (82 - 102)  BP: 151/69 (10 Jul 2017 23:42) (146/82 - 161/78)  BP(mean): --  RR: 17 (10 Jul 2017 23:42) (16 - 17)  SpO2: 96% (10 Jul 2017 23:42) (96% - 98%)    PHYSICAL EXAM:  GENERAL: NAD, well-groomed, well-developed  HEAD:  Atraumatic, Normocephalic  EYES: EOMI, PERRLA, conjunctiva and sclera clear  ENMT: No tonsillar erythema, exudates, or enlargement; Moist mucous membranes   NECK: Supple, No JVD   NERVOUS SYSTEM:  Alert & Oriented X3, Good concentration; Motor Strength 5/5 B/L upper and lower extremities; DTRs 2+ intact and symmetric  CHEST/LUNG: Clear to percussion bilaterally; No rales, rhonchi, wheezing, or rubs  HEART: Regular rate and rhythm; No murmurs, rubs, or gallops  ABDOMEN: Soft, Nontender, Nondistended; Bowel sounds present  EXTREMITIES:  2+ Peripheral Pulses, No clubbing, cyanosis, or edema  LYMPH: No lymphadenopathy noted  SKIN: No rashes or lesions    LABS:              CAPILLARY BLOOD GLUCOSE  247 (10 Jul 2017 22:07)  133 (10 Jul 2017 16:37)  213 (10 Jul 2017 11:08)  109 (10 Jul 2017 08:04)          RADIOLOGY & ADDITIONAL TESTS:    Imaging Personally Reviewed:  [ ] YES  [ ] NO    Consultant(s) Notes Reviewed:  [ ] YES  [ ] NO    Care Discussed with Consultants/Other Providers [ ] YES  [ ] NO

## 2017-07-11 ENCOUNTER — APPOINTMENT (OUTPATIENT)
Dept: HOME HEALTH SERVICES | Facility: HOME HEALTH | Age: 72
End: 2017-07-11

## 2017-07-11 ENCOUNTER — OTHER (OUTPATIENT)
Age: 72
End: 2017-07-11

## 2017-07-11 VITALS — WEIGHT: 200.62 LBS

## 2017-07-11 RX ORDER — ASPIRIN/CALCIUM CARB/MAGNESIUM 324 MG
1 TABLET ORAL
Qty: 0 | Refills: 0 | COMMUNITY
Start: 2017-07-11

## 2017-07-11 RX ADMIN — Medication 2: at 12:46

## 2017-07-11 RX ADMIN — ENOXAPARIN SODIUM 40 MILLIGRAM(S): 100 INJECTION SUBCUTANEOUS at 11:39

## 2017-07-11 RX ADMIN — Medication 25 MILLIGRAM(S): at 05:44

## 2017-07-11 RX ADMIN — Medication 1: at 08:00

## 2017-07-11 RX ADMIN — Medication 650 MILLIGRAM(S): at 08:30

## 2017-07-11 RX ADMIN — Medication 650 MILLIGRAM(S): at 09:30

## 2017-07-11 RX ADMIN — Medication 325 MILLIGRAM(S): at 11:38

## 2017-07-11 RX ADMIN — AMLODIPINE BESYLATE 10 MILLIGRAM(S): 2.5 TABLET ORAL at 05:44

## 2017-07-11 RX ADMIN — Medication 650 MILLIGRAM(S): at 00:55

## 2017-07-11 NOTE — DISCHARGE NOTE ADULT - MEDICATION SUMMARY - MEDICATIONS TO TAKE
I will START or STAY ON the medications listed below when I get home from the hospital:    acetaminophen 325 mg oral capsule  -- 1 cap(s) by mouth 3 times a day, As Needed -for moderate pain  -- Indication: For pain    aspirin 325 mg oral tablet  -- 1 tab(s) by mouth once a day  -- Indication: For Transient cerebral ischemia, unspecified type    NovoLIN 70/30 subcutaneous suspension  --  40 units subcutaneous in am  -- Indication: For Type 2 diabetes mellitus with other neurologic complication, with long-term current use of insulin    Lipitor 10 mg oral tablet  -- 1 tab(s) by mouth once a day  -- Indication: For hyperlipidemia    amLODIPine 10 mg oral tablet  -- 1 tab(s) by mouth once a day  -- Indication: For hypertension    Lasix 40 mg oral tablet  -- 1 tab(s) by mouth once a day  -- Indication: For hypertension    Singulair 10 mg oral tablet  -- 1 tab(s) by mouth once a day  -- Indication: For Uncomplicated asthma, unspecified asthma severity    potassium chloride 20 mEq oral tablet, extended release  -- 1 milliequivalent(s) by mouth once a day  -- Indication: For supplement    hydrALAZINE 25 mg oral tablet  -- 1 tab(s) by mouth 2 times a day  -- Indication: For hypertension I will START or STAY ON the medications listed below when I get home from the hospital:    acetaminophen 325 mg oral capsule  -- 1 cap(s) by mouth 3 times a day, As Needed -for moderate pain  -- Indication: For pain    aspirin 325 mg oral tablet  -- 1 tab(s) by mouth once a day  -- Indication: For Cardiac supplement    NovoLIN 70/30 subcutaneous suspension  --  40 units subcutaneous in am  -- Indication: For diabetes    Lipitor 10 mg oral tablet  -- 1 tab(s) by mouth once a day  -- Indication: For Cholesterol    amLODIPine 10 mg oral tablet  -- 1 tab(s) by mouth once a day  -- Indication: For htn    Lasix 40 mg oral tablet  -- 1 tab(s) by mouth once a day  -- Indication: For diuretics    Singulair 10 mg oral tablet  -- 1 tab(s) by mouth once a day  -- Indication: For Asthma    potassium chloride 20 mEq oral tablet, extended release  -- 1 milliequivalent(s) by mouth once a day  -- Indication: For supplement    hydrALAZINE 25 mg oral tablet  -- 1 tab(s) by mouth 2 times a day  -- Indication: For htn

## 2017-07-11 NOTE — DISCHARGE NOTE ADULT - SECONDARY DIAGNOSIS.
Chest pain, unspecified type Uncomplicated asthma, unspecified asthma severity Type 2 diabetes mellitus with other neurologic complication, with long-term current use of insulin

## 2017-07-11 NOTE — DISCHARGE NOTE ADULT - CARE PLAN
Principal Discharge DX:	Transient cerebral ischemia, unspecified type  Goal:	resolved  Instructions for follow-up, activity and diet:	continue therapy for secondary prevention  Secondary Diagnosis:	Chest pain, unspecified type  Instructions for follow-up, activity and diet:	resolved  Secondary Diagnosis:	Uncomplicated asthma, unspecified asthma severity  Instructions for follow-up, activity and diet:	stable  Secondary Diagnosis:	Type 2 diabetes mellitus with other neurologic complication, with long-term current use of insulin  Instructions for follow-up, activity and diet:	stable, continue meds

## 2017-07-11 NOTE — DISCHARGE NOTE ADULT - HOSPITAL COURSE
71yoF; with pmh signif for CVA, HTN, DM; now presented with generalized weakness and confusion. patient is normally AAOx3, but for 24 hours increased confusion and generalized weakness. would not get out of bed all day. c/o headache--frontal headache, non-radiating, not associated with nausea/vomiting.  denies f/c/s. denies neck pain. c/o chest pain--sscp, non-radiating, non-exertional, non-pleuritic. denies associated sob. denies abd pain. denies numbness/tingling. denies focal weakness. Numbness of right hand with weakness and slurring of speech from yesterday witnessed by grandchildren.  Speech is back to baseline  Work up negative  clinically stable for dc home with home care

## 2017-07-11 NOTE — DISCHARGE NOTE ADULT - PATIENT PORTAL LINK FT
“You can access the [facility] Patient Portal, offered by [facility] [place], by registering with the following website: [email or web address]”

## 2017-07-11 NOTE — DISCHARGE NOTE ADULT - CARE PROVIDER_API CALL
Hal Smith (GENNA), Internal Medicine  [address] [place] [address]  Phone: [phone]  Fax: [phone]    Renaldo Erickson), Neurology  [address] [place] [address]  Phone: [phone]  Fax: [phone]

## 2017-07-11 NOTE — DISCHARGE NOTE ADULT - NS AS DC STROKE ED MATERIALS
Stroke Education Booklet/Need for Followup After Discharge/Stroke Warning Signs and Symptoms/Prescribed Medications/Risk Factors for Stroke/Call 911 for Stroke

## 2017-07-12 ENCOUNTER — APPOINTMENT (OUTPATIENT)
Dept: HOME HEALTH SERVICES | Facility: HOME HEALTH | Age: 72
End: 2017-07-12

## 2017-07-12 VITALS
SYSTOLIC BLOOD PRESSURE: 120 MMHG | TEMPERATURE: 98.2 F | RESPIRATION RATE: 18 BRPM | OXYGEN SATURATION: 99 % | DIASTOLIC BLOOD PRESSURE: 70 MMHG | HEART RATE: 90 BPM

## 2017-07-12 RX ORDER — LABETALOL HYDROCHLORIDE 200 MG/1
200 TABLET, FILM COATED ORAL TWICE DAILY
Qty: 60 | Refills: 5 | Status: DISCONTINUED | COMMUNITY
Start: 2017-04-26 | End: 2017-07-12

## 2017-07-13 DIAGNOSIS — G45.9 TRANSIENT CEREBRAL ISCHEMIC ATTACK, UNSPECIFIED: ICD-10-CM

## 2017-07-13 DIAGNOSIS — G93.41 METABOLIC ENCEPHALOPATHY: ICD-10-CM

## 2017-07-13 DIAGNOSIS — E11.49 TYPE 2 DIABETES MELLITUS WITH OTHER DIABETIC NEUROLOGICAL COMPLICATION: ICD-10-CM

## 2017-07-13 DIAGNOSIS — I50.9 HEART FAILURE, UNSPECIFIED: ICD-10-CM

## 2017-07-13 DIAGNOSIS — Z88.0 ALLERGY STATUS TO PENICILLIN: ICD-10-CM

## 2017-07-13 DIAGNOSIS — Z86.73 PERSONAL HISTORY OF TRANSIENT ISCHEMIC ATTACK (TIA), AND CEREBRAL INFARCTION WITHOUT RESIDUAL DEFICITS: ICD-10-CM

## 2017-07-13 DIAGNOSIS — R51 HEADACHE: ICD-10-CM

## 2017-07-13 DIAGNOSIS — R07.9 CHEST PAIN, UNSPECIFIED: ICD-10-CM

## 2017-07-13 DIAGNOSIS — J45.909 UNSPECIFIED ASTHMA, UNCOMPLICATED: ICD-10-CM

## 2017-07-13 DIAGNOSIS — I11.0 HYPERTENSIVE HEART DISEASE WITH HEART FAILURE: ICD-10-CM

## 2017-07-13 DIAGNOSIS — Z88.5 ALLERGY STATUS TO NARCOTIC AGENT: ICD-10-CM

## 2017-07-13 RX ORDER — ACETAMINOPHEN 500 MG/1
500 TABLET, COATED ORAL
Qty: 60 | Refills: 0 | Status: ACTIVE | COMMUNITY
Start: 2017-07-12

## 2017-07-19 ENCOUNTER — APPOINTMENT (OUTPATIENT)
Dept: HOME HEALTH SERVICES | Facility: HOME HEALTH | Age: 72
End: 2017-07-19

## 2017-07-19 VITALS
OXYGEN SATURATION: 98 % | HEART RATE: 98 BPM | DIASTOLIC BLOOD PRESSURE: 80 MMHG | SYSTOLIC BLOOD PRESSURE: 170 MMHG | RESPIRATION RATE: 16 BRPM

## 2017-07-19 DIAGNOSIS — E55.9 VITAMIN D DEFICIENCY, UNSPECIFIED: ICD-10-CM

## 2017-07-19 RX ORDER — POTASSIUM CHLORIDE 1500 MG/1
20 TABLET, FILM COATED, EXTENDED RELEASE ORAL DAILY
Qty: 30 | Refills: 0 | Status: DISCONTINUED | COMMUNITY
Start: 2017-07-12 | End: 2017-07-19

## 2017-07-19 RX ORDER — MONTELUKAST 10 MG/1
10 TABLET, FILM COATED ORAL
Refills: 0 | Status: DISCONTINUED | COMMUNITY
Start: 2017-04-26 | End: 2017-07-19

## 2017-07-19 RX ORDER — HYDRALAZINE HYDROCHLORIDE 25 MG/1
25 TABLET ORAL TWICE DAILY
Qty: 60 | Refills: 0 | Status: COMPLETED | COMMUNITY
Start: 2017-07-12 | End: 2017-07-19

## 2017-08-23 ENCOUNTER — APPOINTMENT (OUTPATIENT)
Dept: HOME HEALTH SERVICES | Facility: HOME HEALTH | Age: 72
End: 2017-08-23

## 2017-09-06 RX ORDER — HYDRALAZINE HCL 50 MG
25 TABLET ORAL
Qty: 0 | Refills: 0 | COMMUNITY

## 2017-09-06 RX ORDER — CELECOXIB 200 MG/1
1 CAPSULE ORAL
Qty: 0 | Refills: 0 | COMMUNITY

## 2017-09-06 RX ORDER — AMLODIPINE BESYLATE 2.5 MG/1
1 TABLET ORAL
Qty: 0 | Refills: 0 | COMMUNITY

## 2017-09-13 ENCOUNTER — APPOINTMENT (OUTPATIENT)
Dept: HOME HEALTH SERVICES | Facility: HOME HEALTH | Age: 72
End: 2017-09-13

## 2017-09-19 PROBLEM — J45.909 UNSPECIFIED ASTHMA, UNCOMPLICATED: Chronic | Status: ACTIVE | Noted: 2017-04-11

## 2017-09-19 PROBLEM — E11.9 TYPE 2 DIABETES MELLITUS WITHOUT COMPLICATIONS: Chronic | Status: ACTIVE | Noted: 2017-04-11

## 2017-09-19 PROBLEM — I63.9 CEREBRAL INFARCTION, UNSPECIFIED: Chronic | Status: ACTIVE | Noted: 2017-04-11

## 2017-09-22 ENCOUNTER — OTHER (OUTPATIENT)
Age: 72
End: 2017-09-22

## 2017-09-25 ENCOUNTER — APPOINTMENT (OUTPATIENT)
Dept: HOME HEALTH SERVICES | Facility: HOME HEALTH | Age: 72
End: 2017-09-25
Payer: MEDICARE

## 2017-09-25 VITALS
HEART RATE: 91 BPM | OXYGEN SATURATION: 98 % | RESPIRATION RATE: 16 BRPM | DIASTOLIC BLOOD PRESSURE: 80 MMHG | SYSTOLIC BLOOD PRESSURE: 160 MMHG

## 2017-09-25 DIAGNOSIS — R39.81 FUNCTIONAL URINARY INCONTINENCE: ICD-10-CM

## 2017-09-25 PROCEDURE — 90686 IIV4 VACC NO PRSV 0.5 ML IM: CPT

## 2017-09-25 PROCEDURE — G0008: CPT

## 2017-09-25 PROCEDURE — G0009: CPT | Mod: 59

## 2017-09-25 PROCEDURE — 90670 PCV13 VACCINE IM: CPT

## 2017-09-25 PROCEDURE — 99350 HOME/RES VST EST HIGH MDM 60: CPT | Mod: 25

## 2017-09-26 PROBLEM — R39.81 URINARY INCONTINENCE, FUNCTIONAL: Status: ACTIVE | Noted: 2017-09-25

## 2017-09-28 ENCOUNTER — OTHER (OUTPATIENT)
Age: 72
End: 2017-09-28

## 2017-11-11 ENCOUNTER — OTHER (OUTPATIENT)
Age: 72
End: 2017-11-11

## 2017-12-11 ENCOUNTER — APPOINTMENT (OUTPATIENT)
Dept: HOME HEALTH SERVICES | Facility: HOME HEALTH | Age: 72
End: 2017-12-11
Payer: MEDICARE

## 2017-12-11 VITALS
DIASTOLIC BLOOD PRESSURE: 100 MMHG | OXYGEN SATURATION: 97 % | RESPIRATION RATE: 16 BRPM | HEART RATE: 105 BPM | SYSTOLIC BLOOD PRESSURE: 160 MMHG

## 2017-12-11 DIAGNOSIS — M99.81 OTHER BIOMECHANICAL LESIONS OF CERVICAL REGION: ICD-10-CM

## 2017-12-11 DIAGNOSIS — E11.9 TYPE 2 DIABETES MELLITUS W/OUT COMPLICATIONS: ICD-10-CM

## 2017-12-11 PROCEDURE — 99349 HOME/RES VST EST MOD MDM 40: CPT

## 2017-12-12 PROBLEM — M99.81 NEURAL FORAMINAL STENOSIS OF CERVICAL SPINE: Status: ACTIVE | Noted: 2017-12-12

## 2017-12-14 ENCOUNTER — CLINICAL ADVICE (OUTPATIENT)
Age: 72
End: 2017-12-14

## 2017-12-14 LAB
ALBUMIN SERPL ELPH-MCNC: 2.5 G/DL
ALP BLD-CCNC: 108 U/L
ALT SERPL-CCNC: 9 U/L
ANION GAP SERPL CALC-SCNC: 15 MMOL/L
AST SERPL-CCNC: 14 U/L
BASOPHILS # BLD AUTO: 0.02 K/UL
BASOPHILS NFR BLD AUTO: 0.2 %
BILIRUB SERPL-MCNC: 0.2 MG/DL
BUN SERPL-MCNC: 16 MG/DL
CALCIUM SERPL-MCNC: 8.6 MG/DL
CHLORIDE SERPL-SCNC: 102 MMOL/L
CHOLEST SERPL-MCNC: 350 MG/DL
CHOLEST/HDLC SERPL: 7.4 RATIO
CO2 SERPL-SCNC: 21 MMOL/L
CREAT SERPL-MCNC: 2.4 MG/DL
EOSINOPHIL # BLD AUTO: 0.07 K/UL
EOSINOPHIL NFR BLD AUTO: 0.9 %
HBA1C MFR BLD HPLC: 13.2 %
HCT VFR BLD CALC: 36 %
HDLC SERPL-MCNC: 47 MG/DL
HGB BLD-MCNC: 11.1 G/DL
IMM GRANULOCYTES NFR BLD AUTO: 0.1 %
LDLC SERPL CALC-MCNC: 240 MG/DL
LYMPHOCYTES # BLD AUTO: 3.54 K/UL
LYMPHOCYTES NFR BLD AUTO: 43.9 %
MAN DIFF?: NORMAL
MCHC RBC-ENTMCNC: 23.6 PG
MCHC RBC-ENTMCNC: 30.8 GM/DL
MCV RBC AUTO: 76.4 FL
MONOCYTES # BLD AUTO: 0.74 K/UL
MONOCYTES NFR BLD AUTO: 9.2 %
NEUTROPHILS # BLD AUTO: 3.69 K/UL
NEUTROPHILS NFR BLD AUTO: 45.7 %
PLATELET # BLD AUTO: 269 K/UL
POTASSIUM SERPL-SCNC: 3.8 MMOL/L
PROT SERPL-MCNC: 6.1 G/DL
RBC # BLD: 4.71 M/UL
RBC # FLD: 14.3 %
SODIUM SERPL-SCNC: 138 MMOL/L
TRIGL SERPL-MCNC: 317 MG/DL
WBC # FLD AUTO: 8.07 K/UL

## 2018-01-22 ENCOUNTER — INPATIENT (INPATIENT)
Facility: HOSPITAL | Age: 73
LOS: 8 days | Discharge: HOME HEALTH | End: 2018-01-31
Attending: INTERNAL MEDICINE | Admitting: INTERNAL MEDICINE
Payer: MEDICARE

## 2018-01-22 ENCOUNTER — APPOINTMENT (OUTPATIENT)
Dept: HOME HEALTH SERVICES | Facility: HOME HEALTH | Age: 73
End: 2018-01-22

## 2018-01-22 VITALS
DIASTOLIC BLOOD PRESSURE: 86 MMHG | SYSTOLIC BLOOD PRESSURE: 165 MMHG | TEMPERATURE: 98 F | HEART RATE: 83 BPM | WEIGHT: 197.09 LBS | HEIGHT: 67 IN

## 2018-01-22 DIAGNOSIS — G62.9 POLYNEUROPATHY, UNSPECIFIED: ICD-10-CM

## 2018-01-22 DIAGNOSIS — Z90.89 ACQUIRED ABSENCE OF OTHER ORGANS: Chronic | ICD-10-CM

## 2018-01-22 DIAGNOSIS — I10 ESSENTIAL (PRIMARY) HYPERTENSION: ICD-10-CM

## 2018-01-22 DIAGNOSIS — J45.41 MODERATE PERSISTENT ASTHMA WITH (ACUTE) EXACERBATION: ICD-10-CM

## 2018-01-22 DIAGNOSIS — E11.9 TYPE 2 DIABETES MELLITUS WITHOUT COMPLICATIONS: ICD-10-CM

## 2018-01-22 DIAGNOSIS — R73.9 HYPERGLYCEMIA, UNSPECIFIED: ICD-10-CM

## 2018-01-22 LAB
ALBUMIN SERPL ELPH-MCNC: 2.1 G/DL — LOW (ref 3.3–5)
ALP SERPL-CCNC: 131 U/L — HIGH (ref 40–120)
ALT FLD-CCNC: 14 U/L — SIGNIFICANT CHANGE UP (ref 12–78)
AMYLASE P1 CFR SERPL: 70 U/L — SIGNIFICANT CHANGE UP (ref 25–115)
ANION GAP SERPL CALC-SCNC: 10 MMOL/L — SIGNIFICANT CHANGE UP (ref 5–17)
AST SERPL-CCNC: 11 U/L — LOW (ref 15–37)
BASOPHILS # BLD AUTO: 0.1 K/UL — SIGNIFICANT CHANGE UP (ref 0–0.2)
BASOPHILS NFR BLD AUTO: 0.6 % — SIGNIFICANT CHANGE UP (ref 0–2)
BILIRUB SERPL-MCNC: 0.2 MG/DL — SIGNIFICANT CHANGE UP (ref 0.2–1.2)
BUN SERPL-MCNC: 17 MG/DL — SIGNIFICANT CHANGE UP (ref 7–23)
CALCIUM SERPL-MCNC: 8.3 MG/DL — LOW (ref 8.5–10.1)
CHLORIDE SERPL-SCNC: 101 MMOL/L — SIGNIFICANT CHANGE UP (ref 96–108)
CK MB BLD-MCNC: 1.5 % — SIGNIFICANT CHANGE UP (ref 0–3.5)
CK MB CFR SERPL CALC: 2.3 NG/ML — SIGNIFICANT CHANGE UP (ref 0.5–3.6)
CK SERPL-CCNC: 152 U/L — SIGNIFICANT CHANGE UP (ref 26–192)
CO2 SERPL-SCNC: 25 MMOL/L — SIGNIFICANT CHANGE UP (ref 22–31)
CREAT SERPL-MCNC: 2.74 MG/DL — HIGH (ref 0.5–1.3)
EOSINOPHIL # BLD AUTO: 0 K/UL — SIGNIFICANT CHANGE UP (ref 0–0.5)
EOSINOPHIL NFR BLD AUTO: 0.5 % — SIGNIFICANT CHANGE UP (ref 0–6)
FLUAV SPEC QL CULT: NEGATIVE — SIGNIFICANT CHANGE UP
FLUBV AG SPEC QL IA: NEGATIVE — SIGNIFICANT CHANGE UP
GLUCOSE BLDC GLUCOMTR-MCNC: 411 MG/DL — HIGH (ref 70–99)
GLUCOSE BLDC GLUCOMTR-MCNC: 439 MG/DL — HIGH (ref 70–99)
GLUCOSE BLDC GLUCOMTR-MCNC: 447 MG/DL — HIGH (ref 70–99)
GLUCOSE BLDC GLUCOMTR-MCNC: 502 MG/DL — CRITICAL HIGH (ref 70–99)
GLUCOSE BLDC GLUCOMTR-MCNC: 506 MG/DL — CRITICAL HIGH (ref 70–99)
GLUCOSE BLDC GLUCOMTR-MCNC: 513 MG/DL — CRITICAL HIGH (ref 70–99)
GLUCOSE BLDC GLUCOMTR-MCNC: 518 MG/DL — CRITICAL HIGH (ref 70–99)
GLUCOSE BLDC GLUCOMTR-MCNC: 530 MG/DL — CRITICAL HIGH (ref 70–99)
GLUCOSE SERPL-MCNC: 429 MG/DL — HIGH (ref 70–99)
HCT VFR BLD CALC: 36.7 % — SIGNIFICANT CHANGE UP (ref 34.5–45)
HGB BLD-MCNC: 11.6 G/DL — SIGNIFICANT CHANGE UP (ref 11.5–15.5)
LACTATE SERPL-SCNC: 1.3 MMOL/L — SIGNIFICANT CHANGE UP (ref 0.7–2)
LIDOCAIN IGE QN: 235 U/L — SIGNIFICANT CHANGE UP (ref 73–393)
LYMPHOCYTES # BLD AUTO: 2.6 K/UL — SIGNIFICANT CHANGE UP (ref 1–3.3)
LYMPHOCYTES # BLD AUTO: 26 % — SIGNIFICANT CHANGE UP (ref 13–44)
MCHC RBC-ENTMCNC: 24.5 PG — LOW (ref 27–34)
MCHC RBC-ENTMCNC: 31.8 GM/DL — LOW (ref 32–36)
MCV RBC AUTO: 77.2 FL — LOW (ref 80–100)
MONOCYTES # BLD AUTO: 0.6 K/UL — SIGNIFICANT CHANGE UP (ref 0–0.9)
MONOCYTES NFR BLD AUTO: 6.2 % — SIGNIFICANT CHANGE UP (ref 2–14)
NEUTROPHILS # BLD AUTO: 6.8 K/UL — SIGNIFICANT CHANGE UP (ref 1.8–7.4)
NEUTROPHILS NFR BLD AUTO: 66.8 % — SIGNIFICANT CHANGE UP (ref 43–77)
PLATELET # BLD AUTO: 270 K/UL — SIGNIFICANT CHANGE UP (ref 150–400)
POTASSIUM SERPL-MCNC: 4.7 MMOL/L — SIGNIFICANT CHANGE UP (ref 3.5–5.3)
POTASSIUM SERPL-SCNC: 4.7 MMOL/L — SIGNIFICANT CHANGE UP (ref 3.5–5.3)
PROT SERPL-MCNC: 7.4 GM/DL — SIGNIFICANT CHANGE UP (ref 6–8.3)
RBC # BLD: 4.75 M/UL — SIGNIFICANT CHANGE UP (ref 3.8–5.2)
RBC # FLD: 12.7 % — SIGNIFICANT CHANGE UP (ref 11–15)
SODIUM SERPL-SCNC: 136 MMOL/L — SIGNIFICANT CHANGE UP (ref 135–145)
TROPONIN I SERPL-MCNC: 0.06 NG/ML — HIGH (ref 0.01–0.04)
WBC # BLD: 10.1 K/UL — SIGNIFICANT CHANGE UP (ref 3.8–10.5)
WBC # FLD AUTO: 10.1 K/UL — SIGNIFICANT CHANGE UP (ref 3.8–10.5)

## 2018-01-22 PROCEDURE — 99223 1ST HOSP IP/OBS HIGH 75: CPT

## 2018-01-22 PROCEDURE — 71045 X-RAY EXAM CHEST 1 VIEW: CPT | Mod: 26

## 2018-01-22 PROCEDURE — 93010 ELECTROCARDIOGRAM REPORT: CPT

## 2018-01-22 PROCEDURE — 99285 EMERGENCY DEPT VISIT HI MDM: CPT

## 2018-01-22 RX ORDER — ASPIRIN/CALCIUM CARB/MAGNESIUM 324 MG
325 TABLET ORAL DAILY
Qty: 0 | Refills: 0 | Status: DISCONTINUED | OUTPATIENT
Start: 2018-01-22 | End: 2018-01-31

## 2018-01-22 RX ORDER — ASPIRIN/CALCIUM CARB/MAGNESIUM 324 MG
325 TABLET ORAL ONCE
Qty: 0 | Refills: 0 | Status: COMPLETED | OUTPATIENT
Start: 2018-01-22 | End: 2018-01-22

## 2018-01-22 RX ORDER — DEXTROSE 50 % IN WATER 50 %
1 SYRINGE (ML) INTRAVENOUS ONCE
Qty: 0 | Refills: 0 | Status: DISCONTINUED | OUTPATIENT
Start: 2018-01-22 | End: 2018-01-31

## 2018-01-22 RX ORDER — GABAPENTIN 400 MG/1
200 CAPSULE ORAL
Qty: 0 | Refills: 0 | Status: DISCONTINUED | OUTPATIENT
Start: 2018-01-22 | End: 2018-01-25

## 2018-01-22 RX ORDER — DEXTROSE 50 % IN WATER 50 %
25 SYRINGE (ML) INTRAVENOUS ONCE
Qty: 0 | Refills: 0 | Status: DISCONTINUED | OUTPATIENT
Start: 2018-01-22 | End: 2018-01-31

## 2018-01-22 RX ORDER — ALBUTEROL 90 UG/1
1 AEROSOL, METERED ORAL EVERY 4 HOURS
Qty: 0 | Refills: 0 | Status: COMPLETED | OUTPATIENT
Start: 2018-01-22 | End: 2018-12-21

## 2018-01-22 RX ORDER — INSULIN LISPRO 100/ML
VIAL (ML) SUBCUTANEOUS AT BEDTIME
Qty: 0 | Refills: 0 | Status: DISCONTINUED | OUTPATIENT
Start: 2018-01-22 | End: 2018-01-31

## 2018-01-22 RX ORDER — SODIUM CHLORIDE 9 MG/ML
1000 INJECTION, SOLUTION INTRAVENOUS
Qty: 0 | Refills: 0 | Status: DISCONTINUED | OUTPATIENT
Start: 2018-01-22 | End: 2018-01-31

## 2018-01-22 RX ORDER — GLUCAGON INJECTION, SOLUTION 0.5 MG/.1ML
1 INJECTION, SOLUTION SUBCUTANEOUS ONCE
Qty: 0 | Refills: 0 | Status: DISCONTINUED | OUTPATIENT
Start: 2018-01-22 | End: 2018-01-31

## 2018-01-22 RX ORDER — LABETALOL HCL 100 MG
200 TABLET ORAL
Qty: 0 | Refills: 0 | Status: DISCONTINUED | OUTPATIENT
Start: 2018-01-22 | End: 2018-01-31

## 2018-01-22 RX ORDER — INSULIN HUMAN 100 [IU]/ML
6 INJECTION, SOLUTION SUBCUTANEOUS ONCE
Qty: 0 | Refills: 0 | Status: COMPLETED | OUTPATIENT
Start: 2018-01-22 | End: 2018-01-22

## 2018-01-22 RX ORDER — IPRATROPIUM/ALBUTEROL SULFATE 18-103MCG
3 AEROSOL WITH ADAPTER (GRAM) INHALATION ONCE
Qty: 0 | Refills: 0 | Status: COMPLETED | OUTPATIENT
Start: 2018-01-22 | End: 2018-01-22

## 2018-01-22 RX ORDER — INSULIN HUMAN 100 [IU]/ML
10 INJECTION, SOLUTION SUBCUTANEOUS ONCE
Qty: 0 | Refills: 0 | Status: COMPLETED | OUTPATIENT
Start: 2018-01-22 | End: 2018-01-22

## 2018-01-22 RX ORDER — SENNA PLUS 8.6 MG/1
2 TABLET ORAL AT BEDTIME
Qty: 0 | Refills: 0 | Status: DISCONTINUED | OUTPATIENT
Start: 2018-01-22 | End: 2018-01-31

## 2018-01-22 RX ORDER — IPRATROPIUM/ALBUTEROL SULFATE 18-103MCG
3 AEROSOL WITH ADAPTER (GRAM) INHALATION EVERY 6 HOURS
Qty: 0 | Refills: 0 | Status: DISCONTINUED | OUTPATIENT
Start: 2018-01-22 | End: 2018-01-28

## 2018-01-22 RX ORDER — INSULIN LISPRO 100/ML
VIAL (ML) SUBCUTANEOUS
Qty: 0 | Refills: 0 | Status: DISCONTINUED | OUTPATIENT
Start: 2018-01-22 | End: 2018-01-31

## 2018-01-22 RX ORDER — TIOTROPIUM BROMIDE 18 UG/1
1 CAPSULE ORAL; RESPIRATORY (INHALATION) DAILY
Qty: 0 | Refills: 0 | Status: COMPLETED | OUTPATIENT
Start: 2018-01-22 | End: 2018-12-21

## 2018-01-22 RX ORDER — POLYETHYLENE GLYCOL 3350 17 G/17G
17 POWDER, FOR SOLUTION ORAL DAILY
Qty: 0 | Refills: 0 | Status: DISCONTINUED | OUTPATIENT
Start: 2018-01-22 | End: 2018-01-31

## 2018-01-22 RX ORDER — IPRATROPIUM/ALBUTEROL SULFATE 18-103MCG
3 AEROSOL WITH ADAPTER (GRAM) INHALATION
Qty: 0 | Refills: 0 | Status: COMPLETED | OUTPATIENT
Start: 2018-01-22 | End: 2018-01-22

## 2018-01-22 RX ORDER — INSULIN GLARGINE 100 [IU]/ML
20 INJECTION, SOLUTION SUBCUTANEOUS AT BEDTIME
Qty: 0 | Refills: 0 | Status: DISCONTINUED | OUTPATIENT
Start: 2018-01-22 | End: 2018-01-24

## 2018-01-22 RX ORDER — HYDRALAZINE HCL 50 MG
25 TABLET ORAL
Qty: 0 | Refills: 0 | Status: DISCONTINUED | OUTPATIENT
Start: 2018-01-22 | End: 2018-01-24

## 2018-01-22 RX ORDER — LIDOCAINE 4 G/100G
1 CREAM TOPICAL DAILY
Qty: 0 | Refills: 0 | Status: DISCONTINUED | OUTPATIENT
Start: 2018-01-22 | End: 2018-01-31

## 2018-01-22 RX ORDER — AMLODIPINE BESYLATE 2.5 MG/1
10 TABLET ORAL DAILY
Qty: 0 | Refills: 0 | Status: DISCONTINUED | OUTPATIENT
Start: 2018-01-22 | End: 2018-01-31

## 2018-01-22 RX ORDER — LANOLIN ALCOHOL/MO/W.PET/CERES
3 CREAM (GRAM) TOPICAL ONCE
Qty: 0 | Refills: 0 | Status: COMPLETED | OUTPATIENT
Start: 2018-01-22 | End: 2018-01-22

## 2018-01-22 RX ORDER — MONTELUKAST 4 MG/1
10 TABLET, CHEWABLE ORAL DAILY
Qty: 0 | Refills: 0 | Status: DISCONTINUED | OUTPATIENT
Start: 2018-01-22 | End: 2018-01-31

## 2018-01-22 RX ORDER — DOCUSATE SODIUM 100 MG
100 CAPSULE ORAL THREE TIMES A DAY
Qty: 0 | Refills: 0 | Status: DISCONTINUED | OUTPATIENT
Start: 2018-01-22 | End: 2018-01-31

## 2018-01-22 RX ORDER — HEPARIN SODIUM 5000 [USP'U]/ML
5000 INJECTION INTRAVENOUS; SUBCUTANEOUS EVERY 12 HOURS
Qty: 0 | Refills: 0 | Status: DISCONTINUED | OUTPATIENT
Start: 2018-01-22 | End: 2018-01-31

## 2018-01-22 RX ORDER — DEXTROSE 50 % IN WATER 50 %
12.5 SYRINGE (ML) INTRAVENOUS ONCE
Qty: 0 | Refills: 0 | Status: DISCONTINUED | OUTPATIENT
Start: 2018-01-22 | End: 2018-01-31

## 2018-01-22 RX ORDER — ATORVASTATIN CALCIUM 80 MG/1
10 TABLET, FILM COATED ORAL AT BEDTIME
Qty: 0 | Refills: 0 | Status: DISCONTINUED | OUTPATIENT
Start: 2018-01-22 | End: 2018-01-31

## 2018-01-22 RX ORDER — SODIUM CHLORIDE 9 MG/ML
500 INJECTION INTRAMUSCULAR; INTRAVENOUS; SUBCUTANEOUS ONCE
Qty: 0 | Refills: 0 | Status: COMPLETED | OUTPATIENT
Start: 2018-01-22 | End: 2018-01-22

## 2018-01-22 RX ORDER — FUROSEMIDE 40 MG
40 TABLET ORAL DAILY
Qty: 0 | Refills: 0 | Status: DISCONTINUED | OUTPATIENT
Start: 2018-01-22 | End: 2018-01-23

## 2018-01-22 RX ORDER — ACETAMINOPHEN 500 MG
650 TABLET ORAL EVERY 6 HOURS
Qty: 0 | Refills: 0 | Status: DISCONTINUED | OUTPATIENT
Start: 2018-01-22 | End: 2018-01-31

## 2018-01-22 RX ADMIN — INSULIN HUMAN 6 UNIT(S): 100 INJECTION, SOLUTION SUBCUTANEOUS at 17:27

## 2018-01-22 RX ADMIN — INSULIN HUMAN 10 UNIT(S): 100 INJECTION, SOLUTION SUBCUTANEOUS at 20:50

## 2018-01-22 RX ADMIN — Medication 325 MILLIGRAM(S): at 17:23

## 2018-01-22 RX ADMIN — Medication 3 MILLILITER(S): at 13:40

## 2018-01-22 RX ADMIN — ATORVASTATIN CALCIUM 10 MILLIGRAM(S): 80 TABLET, FILM COATED ORAL at 23:24

## 2018-01-22 RX ADMIN — Medication 8: at 23:28

## 2018-01-22 RX ADMIN — INSULIN HUMAN 6 UNIT(S): 100 INJECTION, SOLUTION SUBCUTANEOUS at 18:44

## 2018-01-22 RX ADMIN — Medication 125 MILLIGRAM(S): at 14:29

## 2018-01-22 RX ADMIN — Medication 40 MILLIGRAM(S): at 23:24

## 2018-01-22 RX ADMIN — Medication 3 MILLILITER(S): at 14:50

## 2018-01-22 RX ADMIN — SODIUM CHLORIDE 1000 MILLILITER(S): 9 INJECTION INTRAMUSCULAR; INTRAVENOUS; SUBCUTANEOUS at 14:28

## 2018-01-22 RX ADMIN — Medication 3 MILLILITER(S): at 14:18

## 2018-01-22 RX ADMIN — Medication 3 MILLILITER(S): at 22:00

## 2018-01-22 NOTE — ED PROVIDER NOTE - PMH
Asthma    Asthma    CVA (cerebral vascular accident)    CVA (cerebral vascular accident)    Dementia    DM (diabetes mellitus)    DM (diabetes mellitus)    HLD (hyperlipidemia)    HTN (hypertension)    Neuropathy    SLE (systemic lupus erythematosus)

## 2018-01-22 NOTE — H&P ADULT - ASSESSMENT
72F WITH C/P     IMPROVE VTE Individual Risk Assessment          RISK                                                          Points  [  ] Previous VTE                                                3  [  ] Thrombophilia                                             2  [  ] Lower limb paralysis                                   2        (unable to hold up >15 seconds)    [  ] Current Cancer                                             2         (within 6 months)  [  X] Immobilization > 24 hrs                              1  [  ] ICU/CCU stay > 24 hours                             1  [  X] Age > 60                                                         1    IMPROVE VTE Score: 2

## 2018-01-22 NOTE — H&P ADULT - HISTORY OF PRESENT ILLNESS
71 yo female with history of cad, former smoker, lupus presents with intermittent chest pain for three days. +cough, productive. Patient denies recent travel, abdominal pain, nausea, vomiting. Unable to provide further information. 71 yo female with history of cad, former smoker, lupus presents with intermittent chest pain for three days. +cough, productive. Patient denies recent travel, abdominal pain, nausea, vomiting. Unable to provide further information. patient complain of short of breath with wheezing .patien t cough is productive of clear sputum

## 2018-01-22 NOTE — H&P ADULT - NSHPPHYSICALEXAM_GEN_ALL_CORE
GENERAL: NAD well-developed  HEAD:  Atraumatic, Normocephalic  EYES: EOMI, PERRLA, conjunctiva and sclera clear  ENMT: No tonsillar erythema, exudates, or enlargement; Moist mucous membranes, Good dentition, No lesions  NECK: Supple, No JVD, Normal thyroid  NERVOUS SYSTEM:  Alert & Oriented X3, Good concentration; Motor Strength 5/5 B/L upper and lower extremities; DTRs 2+ intact and symmetric  CHEST/LUNG: Clear to percussion bilaterally; No rales, rhonchi, wheezing, or rubs  HEART: Regular rate and rhythm; No murmurs, rubs, or gallops  ABDOMEN: Soft, Nontender, Nondistended; Bowel sounds present  EXTREMITIES:  2+ Peripheral Pulses, No clubbing, cyanosis, or edema  LYMPH: No lymphadenopathy   SKIN: No rashes or lesions GENERAL: NAD well-developed  HEAD:  Atraumatic, Normocephalic  EYES: EOMI, PERRLA, conjunctiva and sclera clear  ENMT: No tonsillar erythema, exudates, or enlargement; Moist mucous membranes, Good dentition, No lesions  NECK: Supple, No JVD, Normal thyroid  NERVOUS SYSTEM:  Alert & Oriented X3, Good concentration; Motor Strength 5/5 B/L upper and lower extremities; DTRs 2+ intact and symmetric  CHEST/LUNG: diffuse expiratory wheezing,   HEART: Regular rate and rhythm; No murmurs, rubs, or gallops  ABDOMEN: Soft, Nontender, Nondistended; Bowel sounds present  EXTREMITIES:  2+ Peripheral Pulses, No clubbing, cyanosis, or edema  LYMPH: No lymphadenopathy   SKIN: No rashes or lesions

## 2018-01-22 NOTE — ED PROVIDER NOTE - OBJECTIVE STATEMENT
71 yo female with history of cad, former smoker, lupus presents with intermittent chest pain for three days. +cough, productive. Patient denies recent travel, abdominal pain, nausea, vomiting. Unable to provide further information.

## 2018-01-22 NOTE — H&P ADULT - NSHPLABSRESULTS_GEN_ALL_CORE
11.6   10.1  )-----------( 270      ( 22 Jan 2018 14:49 )             36.7   01-22    136  |  101  |  17  ----------------------------<  429<H>  4.7   |  25  |  2.74<H>    Ca    8.3<L>      22 Jan 2018 14:49    TPro  7.4  /  Alb  2.1<L>  /  TBili  0.2  /  DBili  x   /  AST  11<L>  /  ALT  14  /  AlkPhos  131<H>  01-22  < from: Xray Chest 1 View AP/PA. (01.22.18 @ 13:49) >    IMPRESSION: No evidence for focal infiltrate or lobar consolidation.

## 2018-01-22 NOTE — ED PROVIDER NOTE - CARE PLAN
Principal Discharge DX:	Chest pain  Secondary Diagnosis:	Wheezing Principal Discharge DX:	Chest pain  Secondary Diagnosis:	Wheezing  Secondary Diagnosis:	Hyperglycemia

## 2018-01-22 NOTE — ED PROVIDER NOTE - MEDICAL DECISION MAKING DETAILS
Patient with chest pain for three days, wheezing Patient with chest pain for three days, wheezing, hyperglycemia

## 2018-01-23 DIAGNOSIS — N17.9 ACUTE KIDNEY FAILURE, UNSPECIFIED: ICD-10-CM

## 2018-01-23 LAB
GLUCOSE BLDC GLUCOMTR-MCNC: 390 MG/DL — HIGH (ref 70–99)
HBA1C BLD-MCNC: 13.6 % — HIGH (ref 4–5.6)
TROPONIN I SERPL-MCNC: 0.02 NG/ML — SIGNIFICANT CHANGE UP (ref 0.01–0.04)

## 2018-01-23 PROCEDURE — 76775 US EXAM ABDO BACK WALL LIM: CPT | Mod: 26

## 2018-01-23 PROCEDURE — 99233 SBSQ HOSP IP/OBS HIGH 50: CPT

## 2018-01-23 RX ORDER — INSULIN LISPRO 100/ML
7 VIAL (ML) SUBCUTANEOUS
Qty: 0 | Refills: 0 | Status: DISCONTINUED | OUTPATIENT
Start: 2018-01-23 | End: 2018-01-27

## 2018-01-23 RX ORDER — BUDESONIDE AND FORMOTEROL FUMARATE DIHYDRATE 160; 4.5 UG/1; UG/1
2 AEROSOL RESPIRATORY (INHALATION)
Qty: 0 | Refills: 0 | Status: DISCONTINUED | OUTPATIENT
Start: 2018-01-23 | End: 2018-01-31

## 2018-01-23 RX ORDER — INSULIN LISPRO 100/ML
7 VIAL (ML) SUBCUTANEOUS
Qty: 0 | Refills: 0 | Status: DISCONTINUED | OUTPATIENT
Start: 2018-01-23 | End: 2018-01-31

## 2018-01-23 RX ORDER — SODIUM CHLORIDE 9 MG/ML
1000 INJECTION, SOLUTION INTRAVENOUS
Qty: 0 | Refills: 0 | Status: COMPLETED | OUTPATIENT
Start: 2018-01-23 | End: 2018-01-23

## 2018-01-23 RX ADMIN — Medication 4: at 21:23

## 2018-01-23 RX ADMIN — AMLODIPINE BESYLATE 10 MILLIGRAM(S): 2.5 TABLET ORAL at 05:46

## 2018-01-23 RX ADMIN — INSULIN GLARGINE 20 UNIT(S): 100 INJECTION, SOLUTION SUBCUTANEOUS at 21:23

## 2018-01-23 RX ADMIN — Medication 325 MILLIGRAM(S): at 14:49

## 2018-01-23 RX ADMIN — BUDESONIDE AND FORMOTEROL FUMARATE DIHYDRATE 2 PUFF(S): 160; 4.5 AEROSOL RESPIRATORY (INHALATION) at 19:07

## 2018-01-23 RX ADMIN — Medication 40 MILLIGRAM(S): at 05:46

## 2018-01-23 RX ADMIN — Medication 3 MILLIGRAM(S): at 00:05

## 2018-01-23 RX ADMIN — Medication 7 UNIT(S): at 19:08

## 2018-01-23 RX ADMIN — Medication 100 MILLIGRAM(S): at 13:12

## 2018-01-23 RX ADMIN — Medication 3 MILLILITER(S): at 17:28

## 2018-01-23 RX ADMIN — Medication: at 13:15

## 2018-01-23 RX ADMIN — Medication 10: at 19:08

## 2018-01-23 RX ADMIN — GABAPENTIN 200 MILLIGRAM(S): 400 CAPSULE ORAL at 19:05

## 2018-01-23 RX ADMIN — Medication 650 MILLIGRAM(S): at 10:01

## 2018-01-23 RX ADMIN — Medication 25 MILLIGRAM(S): at 19:05

## 2018-01-23 RX ADMIN — Medication 3 MILLILITER(S): at 06:01

## 2018-01-23 RX ADMIN — HEPARIN SODIUM 5000 UNIT(S): 5000 INJECTION INTRAVENOUS; SUBCUTANEOUS at 05:55

## 2018-01-23 RX ADMIN — Medication 40 MILLIGRAM(S): at 19:09

## 2018-01-23 RX ADMIN — Medication 40 MILLIGRAM(S): at 21:22

## 2018-01-23 RX ADMIN — Medication 7 UNIT(S): at 19:07

## 2018-01-23 RX ADMIN — HEPARIN SODIUM 5000 UNIT(S): 5000 INJECTION INTRAVENOUS; SUBCUTANEOUS at 19:06

## 2018-01-23 RX ADMIN — Medication 200 MILLIGRAM(S): at 05:47

## 2018-01-23 RX ADMIN — GABAPENTIN 200 MILLIGRAM(S): 400 CAPSULE ORAL at 05:46

## 2018-01-23 RX ADMIN — Medication 10: at 09:53

## 2018-01-23 RX ADMIN — Medication 3 MILLILITER(S): at 11:28

## 2018-01-23 RX ADMIN — Medication 3 MILLILITER(S): at 00:56

## 2018-01-23 RX ADMIN — MONTELUKAST 10 MILLIGRAM(S): 4 TABLET, CHEWABLE ORAL at 21:22

## 2018-01-23 RX ADMIN — Medication 200 MILLIGRAM(S): at 19:56

## 2018-01-23 RX ADMIN — INSULIN GLARGINE 20 UNIT(S): 100 INJECTION, SOLUTION SUBCUTANEOUS at 00:05

## 2018-01-23 RX ADMIN — SODIUM CHLORIDE 75 MILLILITER(S): 9 INJECTION, SOLUTION INTRAVENOUS at 21:00

## 2018-01-23 RX ADMIN — Medication 25 MILLIGRAM(S): at 05:46

## 2018-01-23 RX ADMIN — ATORVASTATIN CALCIUM 10 MILLIGRAM(S): 80 TABLET, FILM COATED ORAL at 21:22

## 2018-01-23 NOTE — PROGRESS NOTE ADULT - SUBJECTIVE AND OBJECTIVE BOX
Patient is a 72y old  Female who presents with a chief complaint of chest pain (22 Jan 2018 18:02)      INTERVAL HPI/OVERNIGHT EVENTS: no acute events pt reports breathing improved. tolerating po     MEDICATIONS  (STANDING):  ALBUTerol    90 MICROgram(s) HFA Inhaler 1 Puff(s) Inhalation every 4 hours  ALBUTerol/ipratropium for Nebulization 3 milliLiter(s) Nebulizer every 6 hours  amLODIPine   Tablet 10 milliGRAM(s) Oral daily  aspirin 325 milliGRAM(s) Oral daily  atorvastatin 10 milliGRAM(s) Oral at bedtime  buDESOnide 160 MICROgram(s)/formoterol 4.5 MICROgram(s) Inhaler 2 Puff(s) Inhalation two times a day  dextrose 5%. 1000 milliLiter(s) (50 mL/Hr) IV Continuous <Continuous>  dextrose 50% Injectable 12.5 Gram(s) IV Push once  dextrose 50% Injectable 25 Gram(s) IV Push once  dextrose 50% Injectable 25 Gram(s) IV Push once  docusate sodium 100 milliGRAM(s) Oral three times a day  gabapentin 200 milliGRAM(s) Oral two times a day  heparin  Injectable 5000 Unit(s) SubCutaneous every 12 hours  hydrALAZINE 25 milliGRAM(s) Oral two times a day  insulin glargine Injectable (LANTUS) 20 Unit(s) SubCutaneous at bedtime  insulin lispro (HumaLOG) corrective regimen sliding scale   SubCutaneous three times a day before meals  insulin lispro (HumaLOG) corrective regimen sliding scale   SubCutaneous at bedtime  insulin lispro Injectable (HumaLOG) 7 Unit(s) SubCutaneous before breakfast  insulin lispro Injectable (HumaLOG) 7 Unit(s) SubCutaneous before lunch  insulin lispro Injectable (HumaLOG) 7 Unit(s) SubCutaneous before dinner  labetalol 200 milliGRAM(s) Oral two times a day  levoFLOXacin IVPB 500 milliGRAM(s) IV Intermittent every 24 hours  lidocaine   Patch 1 Patch Transdermal daily  methylPREDNISolone sodium succinate Injectable 40 milliGRAM(s) IV Push every 8 hours  montelukast 10 milliGRAM(s) Oral daily  polyethylene glycol 3350 17 Gram(s) Oral daily  senna 2 Tablet(s) Oral at bedtime  sodium chloride 0.45%. 1000 milliLiter(s) (75 mL/Hr) IV Continuous <Continuous>  tiotropium 18 MICROgram(s) Capsule 1 Capsule(s) Inhalation daily    MEDICATIONS  (PRN):  acetaminophen   Tablet 650 milliGRAM(s) Oral every 6 hours PRN mild pain  dextrose Gel 1 Dose(s) Oral once PRN Blood Glucose LESS THAN 70 milliGRAM(s)/deciliter  glucagon  Injectable 1 milliGRAM(s) IntraMuscular once PRN Glucose LESS THAN 70 milligrams/deciliter      Allergies    codeine (Swelling)  penicillin (Anaphylaxis)  penicillins (Swelling)    Intolerances        REVIEW OF SYSTEMS:  no fever, cp, n/v/d    Vital Signs Last 24 Hrs  T(C): 36.5 (23 Jan 2018 10:40), Max: 37 (22 Jan 2018 22:48)  T(F): 97.7 (23 Jan 2018 10:40), Max: 98.6 (22 Jan 2018 22:48)  HR: 83 (23 Jan 2018 10:40) (74 - 89)  BP: 152/80 (23 Jan 2018 10:40) (152/80 - 175/89)  BP(mean): --  RR: 20 (23 Jan 2018 10:40) (20 - 189)  SpO2: 96% (23 Jan 2018 10:40) (95% - 97%)    PHYSICAL EXAM:  GENERAL: NAD, well-groomed, well-developed  HEAD:  Atraumatic, Normocephalic  EYES: EOMI, PERRLA, conjunctiva and sclera clear  ENMT: No tonsillar erythema, exudates, or enlargement; Moist mucous membranes, Good dentition, No lesions  NECK: Supple, No JVD, Normal thyroid  NERVOUS SYSTEM:  Alert & Oriented X3, Good concentration; Motor Strength 5/5 B/L upper and lower extremities; DTRs 2+ intact and symmetric  CHEST/LUNG: Clear to percussion bilaterally; No rales, rhonchi, wheezing, or rubs  HEART: Regular rate and rhythm; No murmurs, rubs, or gallops  ABDOMEN: Soft, Nontender, Nondistended; Bowel sounds present  EXTREMITIES:  2+ Peripheral Pulses, No clubbing, cyanosis, or edema  LYMPH: No lymphadenopathy noted  SKIN: No rashes or lesions    LABS:                        11.6   10.1  )-----------( 270      ( 22 Jan 2018 14:49 )             36.7     01-22    136  |  101  |  17  ----------------------------<  429<H>  4.7   |  25  |  2.74<H>    Ca    8.3<L>      22 Jan 2018 14:49    TPro  7.4  /  Alb  2.1<L>  /  TBili  0.2  /  DBili  x   /  AST  11<L>  /  ALT  14  /  AlkPhos  131<H>  01-22        CAPILLARY BLOOD GLUCOSE      POCT Blood Glucose.: 310 mg/dL (23 Jan 2018 13:08)  POCT Blood Glucose.: 390 mg/dL (23 Jan 2018 08:53)  POCT Blood Glucose.: 411 mg/dL (22 Jan 2018 23:19)  POCT Blood Glucose.: 439 mg/dL (22 Jan 2018 21:48)  POCT Blood Glucose.: 506 mg/dL (22 Jan 2018 20:16)  POCT Blood Glucose.: 530 mg/dL (22 Jan 2018 20:15)  POCT Blood Glucose.: 518 mg/dL (22 Jan 2018 18:17)  POCT Blood Glucose.: 513 mg/dL (22 Jan 2018 18:15)  POCT Blood Glucose.: 447 mg/dL (22 Jan 2018 16:58)  POCT Blood Glucose.: 502 mg/dL (22 Jan 2018 16:54)      RADIOLOGY & ADDITIONAL TESTS:      Consultant(s) Notes Reviewed:  [ x] YES  [ ] NO

## 2018-01-24 DIAGNOSIS — E09.9 DRUG OR CHEMICAL INDUCED DIABETES MELLITUS WITHOUT COMPLICATIONS: ICD-10-CM

## 2018-01-24 DIAGNOSIS — J44.1 CHRONIC OBSTRUCTIVE PULMONARY DISEASE WITH (ACUTE) EXACERBATION: ICD-10-CM

## 2018-01-24 LAB
ANION GAP SERPL CALC-SCNC: 11 MMOL/L — SIGNIFICANT CHANGE UP (ref 5–17)
BUN SERPL-MCNC: 33 MG/DL — HIGH (ref 7–23)
C3 SERPL-MCNC: 134 MG/DL — SIGNIFICANT CHANGE UP (ref 80–180)
C4 SERPL-MCNC: 43 MG/DL — SIGNIFICANT CHANGE UP (ref 10–45)
CALCIUM SERPL-MCNC: 8 MG/DL — LOW (ref 8.5–10.1)
CHLORIDE SERPL-SCNC: 100 MMOL/L — SIGNIFICANT CHANGE UP (ref 96–108)
CO2 SERPL-SCNC: 21 MMOL/L — LOW (ref 22–31)
CREAT SERPL-MCNC: 3.14 MG/DL — HIGH (ref 0.5–1.3)
GLUCOSE SERPL-MCNC: 399 MG/DL — HIGH (ref 70–99)
HCT VFR BLD CALC: 33.3 % — LOW (ref 34.5–45)
HGB BLD-MCNC: 10.7 G/DL — LOW (ref 11.5–15.5)
MCHC RBC-ENTMCNC: 23.5 PG — LOW (ref 27–34)
MCHC RBC-ENTMCNC: 32.1 GM/DL — SIGNIFICANT CHANGE UP (ref 32–36)
MCV RBC AUTO: 73 FL — LOW (ref 80–100)
NRBC # BLD: 0 /100 WBCS — SIGNIFICANT CHANGE UP (ref 0–0)
PLATELET # BLD AUTO: 291 K/UL — SIGNIFICANT CHANGE UP (ref 150–400)
POTASSIUM SERPL-MCNC: 4.6 MMOL/L — SIGNIFICANT CHANGE UP (ref 3.5–5.3)
POTASSIUM SERPL-SCNC: 4.6 MMOL/L — SIGNIFICANT CHANGE UP (ref 3.5–5.3)
RBC # BLD: 4.56 M/UL — SIGNIFICANT CHANGE UP (ref 3.8–5.2)
RBC # FLD: 14.2 % — SIGNIFICANT CHANGE UP (ref 10.3–14.5)
RHEUMATOID FACT SERPL-ACNC: <7 IU/ML — SIGNIFICANT CHANGE UP (ref 0–13.9)
SODIUM SERPL-SCNC: 132 MMOL/L — LOW (ref 135–145)
WBC # BLD: 14.77 K/UL — HIGH (ref 3.8–10.5)
WBC # FLD AUTO: 14.77 K/UL — HIGH (ref 3.8–10.5)

## 2018-01-24 PROCEDURE — 99233 SBSQ HOSP IP/OBS HIGH 50: CPT

## 2018-01-24 RX ORDER — HYDRALAZINE HCL 50 MG
25 TABLET ORAL THREE TIMES A DAY
Qty: 0 | Refills: 0 | Status: DISCONTINUED | OUTPATIENT
Start: 2018-01-24 | End: 2018-01-31

## 2018-01-24 RX ORDER — HUMAN INSULIN 100 [IU]/ML
5 INJECTION, SUSPENSION SUBCUTANEOUS AT BEDTIME
Qty: 0 | Refills: 0 | Status: DISCONTINUED | OUTPATIENT
Start: 2018-01-24 | End: 2018-01-31

## 2018-01-24 RX ORDER — HUMAN INSULIN 100 [IU]/ML
12 INJECTION, SUSPENSION SUBCUTANEOUS
Qty: 0 | Refills: 0 | Status: DISCONTINUED | OUTPATIENT
Start: 2018-01-24 | End: 2018-01-31

## 2018-01-24 RX ORDER — INSULIN GLARGINE 100 [IU]/ML
25 INJECTION, SOLUTION SUBCUTANEOUS AT BEDTIME
Qty: 0 | Refills: 0 | Status: DISCONTINUED | OUTPATIENT
Start: 2018-01-24 | End: 2018-01-31

## 2018-01-24 RX ORDER — TAMSULOSIN HYDROCHLORIDE 0.4 MG/1
0.4 CAPSULE ORAL AT BEDTIME
Qty: 0 | Refills: 0 | Status: DISCONTINUED | OUTPATIENT
Start: 2018-01-24 | End: 2018-01-31

## 2018-01-24 RX ADMIN — Medication 3 MILLILITER(S): at 00:13

## 2018-01-24 RX ADMIN — GABAPENTIN 200 MILLIGRAM(S): 400 CAPSULE ORAL at 05:29

## 2018-01-24 RX ADMIN — Medication 10: at 09:09

## 2018-01-24 RX ADMIN — Medication 325 MILLIGRAM(S): at 12:51

## 2018-01-24 RX ADMIN — Medication 7 UNIT(S): at 17:29

## 2018-01-24 RX ADMIN — Medication 200 MILLIGRAM(S): at 05:30

## 2018-01-24 RX ADMIN — HUMAN INSULIN 5 UNIT(S): 100 INJECTION, SUSPENSION SUBCUTANEOUS at 23:27

## 2018-01-24 RX ADMIN — Medication 7 UNIT(S): at 09:10

## 2018-01-24 RX ADMIN — AMLODIPINE BESYLATE 10 MILLIGRAM(S): 2.5 TABLET ORAL at 05:29

## 2018-01-24 RX ADMIN — BUDESONIDE AND FORMOTEROL FUMARATE DIHYDRATE 2 PUFF(S): 160; 4.5 AEROSOL RESPIRATORY (INHALATION) at 07:16

## 2018-01-24 RX ADMIN — MONTELUKAST 10 MILLIGRAM(S): 4 TABLET, CHEWABLE ORAL at 22:40

## 2018-01-24 RX ADMIN — Medication 650 MILLIGRAM(S): at 07:11

## 2018-01-24 RX ADMIN — Medication 25 MILLIGRAM(S): at 12:52

## 2018-01-24 RX ADMIN — GABAPENTIN 200 MILLIGRAM(S): 400 CAPSULE ORAL at 17:28

## 2018-01-24 RX ADMIN — HEPARIN SODIUM 5000 UNIT(S): 5000 INJECTION INTRAVENOUS; SUBCUTANEOUS at 05:30

## 2018-01-24 RX ADMIN — Medication 40 MILLIGRAM(S): at 05:30

## 2018-01-24 RX ADMIN — HEPARIN SODIUM 5000 UNIT(S): 5000 INJECTION INTRAVENOUS; SUBCUTANEOUS at 17:29

## 2018-01-24 RX ADMIN — Medication 7 UNIT(S): at 12:52

## 2018-01-24 RX ADMIN — Medication 8: at 23:28

## 2018-01-24 RX ADMIN — Medication 650 MILLIGRAM(S): at 00:00

## 2018-01-24 RX ADMIN — Medication 100 MILLIGRAM(S): at 12:51

## 2018-01-24 RX ADMIN — Medication 3 MILLILITER(S): at 17:00

## 2018-01-24 RX ADMIN — ATORVASTATIN CALCIUM 10 MILLIGRAM(S): 80 TABLET, FILM COATED ORAL at 22:39

## 2018-01-24 RX ADMIN — Medication 40 MILLIGRAM(S): at 12:54

## 2018-01-24 RX ADMIN — Medication 25 MILLIGRAM(S): at 05:29

## 2018-01-24 RX ADMIN — Medication 10: at 17:28

## 2018-01-24 RX ADMIN — TAMSULOSIN HYDROCHLORIDE 0.4 MILLIGRAM(S): 0.4 CAPSULE ORAL at 22:40

## 2018-01-24 RX ADMIN — Medication 25 MILLIGRAM(S): at 22:40

## 2018-01-24 RX ADMIN — Medication 3 MILLILITER(S): at 06:12

## 2018-01-24 RX ADMIN — INSULIN GLARGINE 25 UNIT(S): 100 INJECTION, SOLUTION SUBCUTANEOUS at 22:58

## 2018-01-24 RX ADMIN — Medication 200 MILLIGRAM(S): at 17:30

## 2018-01-24 RX ADMIN — Medication 3 MILLILITER(S): at 11:14

## 2018-01-24 RX ADMIN — Medication 10: at 12:53

## 2018-01-24 NOTE — PROGRESS NOTE ADULT - SUBJECTIVE AND OBJECTIVE BOX
Patient is a 72y old  Female who presents with a chief complaint of chest pain (22 Jan 2018 18:02)      Interval History: finger sticks are increased   steroid induced hyperglycemia   on Lantus 30 units and prandial lispro 7 units and Lispro sliding scale coverage with meals   on no Metformin - contraindicated as Creatinine is increased   on Prednisone 40 mg and PO intake fair     MEDICATIONS  (STANDING):  ALBUTerol    90 MICROgram(s) HFA Inhaler 1 Puff(s) Inhalation every 4 hours  ALBUTerol/ipratropium for Nebulization 3 milliLiter(s) Nebulizer every 6 hours  amLODIPine   Tablet 10 milliGRAM(s) Oral daily  aspirin 325 milliGRAM(s) Oral daily  atorvastatin 10 milliGRAM(s) Oral at bedtime  buDESOnide 160 MICROgram(s)/formoterol 4.5 MICROgram(s) Inhaler 2 Puff(s) Inhalation two times a day  dextrose 5%. 1000 milliLiter(s) (50 mL/Hr) IV Continuous <Continuous>  dextrose 50% Injectable 12.5 Gram(s) IV Push once  dextrose 50% Injectable 25 Gram(s) IV Push once  dextrose 50% Injectable 25 Gram(s) IV Push once  docusate sodium 100 milliGRAM(s) Oral three times a day  gabapentin 200 milliGRAM(s) Oral two times a day  heparin  Injectable 5000 Unit(s) SubCutaneous every 12 hours  hydrALAZINE 25 milliGRAM(s) Oral three times a day  insulin glargine Injectable (LANTUS) 25 Unit(s) SubCutaneous at bedtime  insulin lispro (HumaLOG) corrective regimen sliding scale   SubCutaneous three times a day before meals  insulin lispro (HumaLOG) corrective regimen sliding scale   SubCutaneous at bedtime  insulin lispro Injectable (HumaLOG) 7 Unit(s) SubCutaneous before breakfast  insulin lispro Injectable (HumaLOG) 7 Unit(s) SubCutaneous before lunch  insulin lispro Injectable (HumaLOG) 7 Unit(s) SubCutaneous before dinner  insulin NPH human recombinant 12 Unit(s) SubCutaneous before breakfast  insulin NPH human recombinant 5 Unit(s) SubCutaneous at bedtime  labetalol 200 milliGRAM(s) Oral two times a day  levoFLOXacin IVPB 500 milliGRAM(s) IV Intermittent every 24 hours  lidocaine   Patch 1 Patch Transdermal daily  montelukast 10 milliGRAM(s) Oral daily  polyethylene glycol 3350 17 Gram(s) Oral daily  predniSONE   Tablet 40 milliGRAM(s) Oral daily  senna 2 Tablet(s) Oral at bedtime  tamsulosin 0.4 milliGRAM(s) Oral at bedtime  tiotropium 18 MICROgram(s) Capsule 1 Capsule(s) Inhalation daily    MEDICATIONS  (PRN):  acetaminophen   Tablet 650 milliGRAM(s) Oral every 6 hours PRN mild pain  dextrose Gel 1 Dose(s) Oral once PRN Blood Glucose LESS THAN 70 milliGRAM(s)/deciliter  glucagon  Injectable 1 milliGRAM(s) IntraMuscular once PRN Glucose LESS THAN 70 milligrams/deciliter      Allergies    codeine (Swelling)  penicillin (Anaphylaxis)  penicillins (Swelling)    Intolerances        REVIEW OF SYSTEMS:  CONSTITUTIONAL:   EYES: No eye pain, visual disturbances, or discharge  ENMT:  No difficulty hearing, tinnitus, vertigo; No sinus or throat pain  NECK: No pain or stiffness  RESPIRATORY: No cough, wheezing, chills or hemoptysis; No shortness of breath  CARDIOVASCULAR: No chest pain, palpitations, dizziness, or leg swelling  GASTROINTESTINAL: No abdominal or epigastric pain. No nausea, vomiting, or hematemesis; No diarrhea or constipation. No melena or hematochezia.  GENITOURINARY: No dysuria, frequency, hematuria, or incontinence  NEUROLOGICAL: No headaches, memory loss, loss of strength, numbness, or tremors  SKIN: No itching, burning, rashes, or lesions   LYMPH NODES: No enlarged glands  ENDOCRINE: No heat or cold intolerance; No hair loss  MUSCULOSKELETAL: No joint pain or swelling; No muscle, back, or extremity pain  PSYCHIATRIC: No depression, anxiety, mood swings, or difficulty sleeping  HEME/LYMPH: No easy bruising, or bleeding gums  ALLERY AND IMMUNOLOGIC: No hives or eczema    Vital Signs Last 24 Hrs  T(C): 36.2 (24 Jan 2018 17:16), Max: 36.9 (24 Jan 2018 00:08)  T(F): 97.2 (24 Jan 2018 17:16), Max: 98.5 (24 Jan 2018 00:08)  HR: 74 (24 Jan 2018 17:16) (74 - 99)  BP: 152/74 (24 Jan 2018 17:16) (140/68 - 167/84)  BP(mean): --  RR: 18 (24 Jan 2018 17:16) (18 - 18)  SpO2: 96% (24 Jan 2018 17:16) (94% - 97%)    PHYSICAL EXAM:  GENERAL:   HEAD:  Atraumatic, Normocephalic  EYES: EOMI, PERRLA, conjunctiva and sclera clear  ENMT: No tonsillar erythema, exudates, or enlargement; Moist mucous membranes, Good dentition, No lesions  NECK: Supple, No JVD, Normal thyroid  NERVOUS SYSTEM:  Alert & Oriented X3, Good concentration; Motor Strength 5/5 B/L upper and lower extremities; DTRs 2+ intact and symmetric  CHEST/LUNG: Clear to percussion bilaterally; No rales, rhonchi, wheezing, or rubs  HEART: Regular rate and rhythm; No murmurs, rubs, or gallops  ABDOMEN: Soft, Nontender, Nondistended; Bowel sounds present  EXTREMITIES:  2+ Peripheral Pulses, No clubbing, cyanosis, or edema  SKIN: No rashes or lesions    LABS:        CAPILLARY BLOOD GLUCOSE      POCT Blood Glucose.: 351 mg/dL (24 Jan 2018 17:23)  POCT Blood Glucose.: 393 mg/dL (24 Jan 2018 12:50)  POCT Blood Glucose.: 399 mg/dL (24 Jan 2018 07:46)  POCT Blood Glucose.: 404 mg/dL (24 Jan 2018 07:43)  POCT Blood Glucose.: 358 mg/dL (23 Jan 2018 21:20)    Lipid panel:   CARDIAC MARKERS ( 23 Jan 2018 09:44 )  .022 ng/mL / x     / x     / x     / x              Thyroid:  Diabetes Tests:  Parathyroid Panel:  Adrenals:  RADIOLOGY & ADDITIONAL TESTS:    Imaging Personally Reviewed:  [ ] YES  [ ] NO    Consultant(s) Notes Reviewed:  [ ] YES  [ ] NO    Care Discussed with Consultants/Other Providers [ ] YES  [ ] NO

## 2018-01-24 NOTE — PROGRESS NOTE ADULT - SUBJECTIVE AND OBJECTIVE BOX
Patient feels well no complaints today.    MEDICATIONS  (STANDING):  ALBUTerol    90 MICROgram(s) HFA Inhaler 1 Puff(s) Inhalation every 4 hours  ALBUTerol/ipratropium for Nebulization 3 milliLiter(s) Nebulizer every 6 hours  amLODIPine   Tablet 10 milliGRAM(s) Oral daily  aspirin 325 milliGRAM(s) Oral daily  atorvastatin 10 milliGRAM(s) Oral at bedtime  buDESOnide 160 MICROgram(s)/formoterol 4.5 MICROgram(s) Inhaler 2 Puff(s) Inhalation two times a day  dextrose 5%. 1000 milliLiter(s) (50 mL/Hr) IV Continuous <Continuous>  dextrose 50% Injectable 12.5 Gram(s) IV Push once  dextrose 50% Injectable 25 Gram(s) IV Push once  dextrose 50% Injectable 25 Gram(s) IV Push once  docusate sodium 100 milliGRAM(s) Oral three times a day  gabapentin 200 milliGRAM(s) Oral two times a day  heparin  Injectable 5000 Unit(s) SubCutaneous every 12 hours  hydrALAZINE 25 milliGRAM(s) Oral three times a day  insulin glargine Injectable (LANTUS) 25 Unit(s) SubCutaneous at bedtime  insulin lispro (HumaLOG) corrective regimen sliding scale   SubCutaneous three times a day before meals  insulin lispro (HumaLOG) corrective regimen sliding scale   SubCutaneous at bedtime  insulin lispro Injectable (HumaLOG) 7 Unit(s) SubCutaneous before breakfast  insulin lispro Injectable (HumaLOG) 7 Unit(s) SubCutaneous before lunch  insulin lispro Injectable (HumaLOG) 7 Unit(s) SubCutaneous before dinner  labetalol 200 milliGRAM(s) Oral two times a day  levoFLOXacin IVPB 500 milliGRAM(s) IV Intermittent every 24 hours  lidocaine   Patch 1 Patch Transdermal daily  methylPREDNISolone sodium succinate Injectable 40 milliGRAM(s) IV Push every 8 hours  montelukast 10 milliGRAM(s) Oral daily  polyethylene glycol 3350 17 Gram(s) Oral daily  senna 2 Tablet(s) Oral at bedtime  tiotropium 18 MICROgram(s) Capsule 1 Capsule(s) Inhalation daily    MEDICATIONS  (PRN):  acetaminophen   Tablet 650 milliGRAM(s) Oral every 6 hours PRN mild pain  dextrose Gel 1 Dose(s) Oral once PRN Blood Glucose LESS THAN 70 milliGRAM(s)/deciliter  glucagon  Injectable 1 milliGRAM(s) IntraMuscular once PRN Glucose LESS THAN 70 milligrams/deciliter      01-23-18 @ 07:01  -  01-24-18 @ 07:00  --------------------------------------------------------  IN: 0 mL / OUT: 550 mL / NET: -550 mL      PHYSICAL EXAM:      T(C): 36.4 (01-24-18 @ 11:19), Max: 36.9 (01-23-18 @ 17:40)  HR: 79 (01-24-18 @ 11:19) (79 - 99)  BP: 140/68 (01-24-18 @ 11:19) (140/68 - 167/84)  RR: 18 (01-24-18 @ 05:30) (18 - 18)  SpO2: 97% (01-24-18 @ 11:19) (94% - 97%)  Wt(kg): --  Respiratory: clear anteriorly, decreased BS at bases  Cardiovascular: S1 S2  Gastrointestinal: soft NT ND +BS  Extremities:  1 edema                                    10.7   14.77 )-----------( 291      ( 24 Jan 2018 07:11 )             33.3     01-24    132<L>  |  100  |  33<H>  ----------------------------<  399<H>  4.6   |  21<L>  |  3.14<H>    Ca    8.0<L>      24 Jan 2018 07:11            Assessment and Plan:  DOLORES, CKD 3; nx SLE; pre renal element with lasix rx;  SARAH dsDNA c3, c4; RF to exclude CTD element,   Bladder scan; high bladder volume,   Will follow course.

## 2018-01-24 NOTE — DIETITIAN INITIAL EVALUATION ADULT. - OTHER INFO
Pt seen for consult - A1c > 7%. Pt lives c daughter; limited cognitively due to prior CVA; has HHA (5-6 hours x 7 days) to assist c ADL. Denies any N/V/C/D or chew/swallowing difficulty. Last BM day of admission (1/24); advised pt to let RN know if stool softener needed. Pt reports she takes Novolog 70/30 (14 units x 2/day) to manage BG levels at home. Discussed elevated A1c (13.6); per endo note, steroid induced hyperglycemia reason for high f/s presently; explained A1c covers 3 months so diet is also a factor. Reviewed macronutrients how CHO foods effect BG, importance of portion control; and how DM Iis related to kidney & heart disease.  Pt appetite appears to be good; RD observed breakfast tray;  100% consumed.

## 2018-01-24 NOTE — DIETITIAN INITIAL EVALUATION ADULT. - NS AS NUTRI INTERV ED CONTENT
Provided nutrition counseling/educational material/Recommended modifications/Nutrition relationship to health/disease

## 2018-01-24 NOTE — PROGRESS NOTE ADULT - SUBJECTIVE AND OBJECTIVE BOX
patient without any complaints  Vital Signs Last 24 Hrs  T(C): 36.2 (24 Jan 2018 17:16), Max: 36.9 (24 Jan 2018 00:08)  T(F): 97.2 (24 Jan 2018 17:16), Max: 98.5 (24 Jan 2018 00:08)  HR: 74 (24 Jan 2018 17:16) (74 - 99)  BP: 152/74 (24 Jan 2018 17:16) (140/68 - 167/84)  BP(mean): --  RR: 18 (24 Jan 2018 17:16) (18 - 18)  SpO2: 96% (24 Jan 2018 17:16) (94% - 97%)  Physical Exam  patient lying in chair in no respiratory distress  HEENT - EOMI, PERRLA ,neck supple , No JVD  lungs - BS inproved , no wheezing , ronchi or rales   COR- [id number] , no murmer  Abd- soft, BS+, no tenderness, no guarding   ext- ecc neg, good pulses  Neuro - Alert , oriented X3   Skin- No rashes   MEDICATIONS  (STANDING):  ALBUTerol    90 MICROgram(s) HFA Inhaler 1 Puff(s) Inhalation every 4 hours  ALBUTerol/ipratropium for Nebulization 3 milliLiter(s) Nebulizer every 6 hours  amLODIPine   Tablet 10 milliGRAM(s) Oral daily  aspirin 325 milliGRAM(s) Oral daily  atorvastatin 10 milliGRAM(s) Oral at bedtime  buDESOnide 160 MICROgram(s)/formoterol 4.5 MICROgram(s) Inhaler 2 Puff(s) Inhalation two times a day  dextrose 5%. 1000 milliLiter(s) (50 mL/Hr) IV Continuous <Continuous>  dextrose 50% Injectable 12.5 Gram(s) IV Push once  dextrose 50% Injectable 25 Gram(s) IV Push once  dextrose 50% Injectable 25 Gram(s) IV Push once  docusate sodium 100 milliGRAM(s) Oral three times a day  gabapentin 200 milliGRAM(s) Oral two times a day  heparin  Injectable 5000 Unit(s) SubCutaneous every 12 hours  hydrALAZINE 25 milliGRAM(s) Oral three times a day  insulin glargine Injectable (LANTUS) 25 Unit(s) SubCutaneous at bedtime  insulin lispro (HumaLOG) corrective regimen sliding scale   SubCutaneous three times a day before meals  insulin lispro (HumaLOG) corrective regimen sliding scale   SubCutaneous at bedtime  insulin lispro Injectable (HumaLOG) 7 Unit(s) SubCutaneous before breakfast  insulin lispro Injectable (HumaLOG) 7 Unit(s) SubCutaneous before lunch  insulin lispro Injectable (HumaLOG) 7 Unit(s) SubCutaneous before dinner  labetalol 200 milliGRAM(s) Oral two times a day  levoFLOXacin IVPB 500 milliGRAM(s) IV Intermittent every 24 hours  lidocaine   Patch 1 Patch Transdermal daily  montelukast 10 milliGRAM(s) Oral daily  polyethylene glycol 3350 17 Gram(s) Oral daily  predniSONE   Tablet 40 milliGRAM(s) Oral daily  senna 2 Tablet(s) Oral at bedtime  tamsulosin 0.4 milliGRAM(s) Oral at bedtime  tiotropium 18 MICROgram(s) Capsule 1 Capsule(s) Inhalation daily    MEDICATIONS  (PRN):  acetaminophen   Tablet 650 milliGRAM(s) Oral every 6 hours PRN mild pain  dextrose Gel 1 Dose(s) Oral once PRN Blood Glucose LESS THAN 70 milliGRAM(s)/deciliter  glucagon  Injectable 1 milliGRAM(s) IntraMuscular once PRN Glucose LESS THAN 70 milligrams/deciliter                        10.7   14.77 )-----------( 291      ( 24 Jan 2018 07:11 )             33.3   01-24    132<L>  |  100  |  33<H>  ----------------------------<  399<H>  4.6   |  21<L>  |  3.14<H>    Ca    8.0<L>      24 Jan 2018 07:11    CARDIAC MARKERS ( 23 Jan 2018 09:44 )  .022 ng/mL / x     / x     / x     / x

## 2018-01-24 NOTE — PROGRESS NOTE ADULT - SUBJECTIVE AND OBJECTIVE BOX
CHIEF COMPLAINT/INTERVAL HISTORY:    Patient is a 72y old  Female who presents with a chief complaint of chest pain (22 Jan 2018 18:02)      HPI:  73 yo female with history of cad, former smoker, lupus presents with intermittent chest pain for three days. +cough, productive. Patient denies recent travel, abdominal pain, nausea, vomiting. Unable to provide further information. patient complain of short of breath with wheezing .patien t cough is productive of clear sputum (22 Jan 2018 18:02)      SUBJECTIVE & OBJECTIVE: Pt seen and examined at bedside.   c/o cough, sob better  Denies nausea/vomiting/diarrhea, No dizziness, HA or blurry vision, all other ROS negative.      Vital Signs Last 24 Hrs  T(C): 36.4 (24 Jan 2018 11:19), Max: 36.9 (23 Jan 2018 17:40)  T(F): 97.6 (24 Jan 2018 11:19), Max: 98.5 (24 Jan 2018 00:08)  HR: 79 (24 Jan 2018 11:19) (79 - 99)  BP: 140/68 (24 Jan 2018 11:19) (140/68 - 167/84)  BP(mean): --  ABP: --  ABP(mean): --  RR: 18 (24 Jan 2018 05:30) (18 - 18)  SpO2: 97% (24 Jan 2018 11:19) (94% - 97%)        MEDICATIONS  (STANDING):  ALBUTerol    90 MICROgram(s) HFA Inhaler 1 Puff(s) Inhalation every 4 hours  ALBUTerol/ipratropium for Nebulization 3 milliLiter(s) Nebulizer every 6 hours  amLODIPine   Tablet 10 milliGRAM(s) Oral daily  aspirin 325 milliGRAM(s) Oral daily  atorvastatin 10 milliGRAM(s) Oral at bedtime  buDESOnide 160 MICROgram(s)/formoterol 4.5 MICROgram(s) Inhaler 2 Puff(s) Inhalation two times a day  dextrose 5%. 1000 milliLiter(s) (50 mL/Hr) IV Continuous <Continuous>  dextrose 50% Injectable 12.5 Gram(s) IV Push once  dextrose 50% Injectable 25 Gram(s) IV Push once  dextrose 50% Injectable 25 Gram(s) IV Push once  docusate sodium 100 milliGRAM(s) Oral three times a day  gabapentin 200 milliGRAM(s) Oral two times a day  heparin  Injectable 5000 Unit(s) SubCutaneous every 12 hours  hydrALAZINE 25 milliGRAM(s) Oral two times a day  insulin glargine Injectable (LANTUS) 20 Unit(s) SubCutaneous at bedtime  insulin lispro (HumaLOG) corrective regimen sliding scale   SubCutaneous three times a day before meals  insulin lispro (HumaLOG) corrective regimen sliding scale   SubCutaneous at bedtime  insulin lispro Injectable (HumaLOG) 7 Unit(s) SubCutaneous before breakfast  insulin lispro Injectable (HumaLOG) 7 Unit(s) SubCutaneous before lunch  insulin lispro Injectable (HumaLOG) 7 Unit(s) SubCutaneous before dinner  labetalol 200 milliGRAM(s) Oral two times a day  levoFLOXacin IVPB 500 milliGRAM(s) IV Intermittent every 24 hours  lidocaine   Patch 1 Patch Transdermal daily  methylPREDNISolone sodium succinate Injectable 40 milliGRAM(s) IV Push every 8 hours  montelukast 10 milliGRAM(s) Oral daily  polyethylene glycol 3350 17 Gram(s) Oral daily  senna 2 Tablet(s) Oral at bedtime  tiotropium 18 MICROgram(s) Capsule 1 Capsule(s) Inhalation daily    MEDICATIONS  (PRN):  acetaminophen   Tablet 650 milliGRAM(s) Oral every 6 hours PRN mild pain  dextrose Gel 1 Dose(s) Oral once PRN Blood Glucose LESS THAN 70 milliGRAM(s)/deciliter  glucagon  Injectable 1 milliGRAM(s) IntraMuscular once PRN Glucose LESS THAN 70 milligrams/deciliter        PHYSICAL EXAM:    GENERAL: NAD,  well-developed  HEAD:  Atraumatic, Normocephalic  EYES: EOMI, PERRLA, conjunctiva and sclera clear  ENMT: Moist mucous membranes  NECK: Supple, No JVD  NERVOUS SYSTEM:  Alert & Oriented X3, Motor Strength 5/5 B/L upper and lower extremities;  CHEST/LUNG: generalized decreased bs b/l  HEART: Regular rate and rhythm;   ABDOMEN: Soft, Nontender, Nondistended; Bowel sounds present  EXTREMITIES: no cyanosis, or edema    LABS:                        10.7   14.77 )-----------( 291      ( 24 Jan 2018 07:11 )             33.3     01-24    132<L>  |  100  |  33<H>  ----------------------------<  399<H>  4.6   |  21<L>  |  3.14<H>    Ca    8.0<L>      24 Jan 2018 07:11    TPro  7.4  /  Alb  2.1<L>  /  TBili  0.2  /  DBili  x   /  AST  11<L>  /  ALT  14  /  AlkPhos  131<H>  01-22    CAPILLARY BLOOD GLUCOSE      POCT Blood Glucose.: 399 mg/dL (24 Jan 2018 07:46)  POCT Blood Glucose.: 404 mg/dL (24 Jan 2018 07:43)  POCT Blood Glucose.: 358 mg/dL (23 Jan 2018 21:20)  POCT Blood Glucose.: 374 mg/dL (23 Jan 2018 19:04)  POCT Blood Glucose.: 310 mg/dL (23 Jan 2018 13:08)      < from: Xray Chest 1 View AP/PA. (01.22.18 @ 13:49) >    IMPRESSION: No evidence for focal infiltrate or lobar consolidation.

## 2018-01-24 NOTE — PHARMACY COMMUNICATION NOTE - COMMENTS
Spoke with MD about different formulations of insulin. Patient has steroid induced hyperglycemia which needs lantus, meal time pre-prandial insulin lispro, sliding scale insulin lispro coverage, and insulin NPH twice daily. Glucose levels came be reassessed after patient is tapered off steroids, as the steroids are exacerbating the glucose levels.

## 2018-01-24 NOTE — DIETITIAN INITIAL EVALUATION ADULT. - PERTINENT MEDS FT
Levaquin, Humalog, Solu-medrol, Norvasc, Lipitor, Colace, Senna, Neurontin, Hydralazine, Lantus, Miralax

## 2018-01-24 NOTE — DIETITIAN INITIAL EVALUATION ADULT. - PERTINENT LABORATORY DATA
01-24 Na132 mmol/L<L> Glu 399 mg/dL<H> K+ 4.6 mmol/L Cr  3.14 mg/dL<H> BUN 33 mg/dL<H> Phos n/a   Alb n/a   PAB n/a , GFR 14L; (1/23) POCT: 390, 310, 374, 358; A1c 13.6H

## 2018-01-24 NOTE — DIETITIAN INITIAL EVALUATION ADULT. - ENERGY NEEDS
Height (cm): 170.18 (01-22)  Weight (kg): 92.6 (01-24)  BMI (kg/m2): 31.9 (01-24)  IBW:   61.4 kg +/- 10%   % IBW:  150%    UBW: stable   %UBW: 100%

## 2018-01-25 LAB
ANA PAT FLD IF-IMP: ABNORMAL
ANA TITR SER: ABNORMAL
ANION GAP SERPL CALC-SCNC: 12 MMOL/L — SIGNIFICANT CHANGE UP (ref 5–17)
APPEARANCE UR: ABNORMAL
BACTERIA # UR AUTO: ABNORMAL
BILIRUB UR-MCNC: NEGATIVE — SIGNIFICANT CHANGE UP
BUN SERPL-MCNC: 56 MG/DL — HIGH (ref 7–23)
CALCIUM SERPL-MCNC: 7.7 MG/DL — LOW (ref 8.5–10.1)
CHLORIDE SERPL-SCNC: 99 MMOL/L — SIGNIFICANT CHANGE UP (ref 96–108)
CO2 SERPL-SCNC: 21 MMOL/L — LOW (ref 22–31)
COLOR SPEC: YELLOW — SIGNIFICANT CHANGE UP
COMMENT - URINE: SIGNIFICANT CHANGE UP
CREAT ?TM UR-MCNC: 54 MG/DL — SIGNIFICANT CHANGE UP
CREAT SERPL-MCNC: 3.92 MG/DL — HIGH (ref 0.5–1.3)
DIFF PNL FLD: ABNORMAL
EPI CELLS # UR: SIGNIFICANT CHANGE UP
GLUCOSE SERPL-MCNC: 421 MG/DL — HIGH (ref 70–99)
GLUCOSE UR QL: 1000 MG/DL
GRAN CASTS # UR COMP ASSIST: ABNORMAL /LPF
HCT VFR BLD CALC: 31.7 % — LOW (ref 34.5–45)
HGB BLD-MCNC: 10 G/DL — LOW (ref 11.5–15.5)
KETONES UR-MCNC: NEGATIVE — SIGNIFICANT CHANGE UP
LEUKOCYTE ESTERASE UR-ACNC: NEGATIVE — SIGNIFICANT CHANGE UP
MCHC RBC-ENTMCNC: 23 PG — LOW (ref 27–34)
MCHC RBC-ENTMCNC: 31.5 GM/DL — LOW (ref 32–36)
MCV RBC AUTO: 73 FL — LOW (ref 80–100)
NITRITE UR-MCNC: NEGATIVE — SIGNIFICANT CHANGE UP
NRBC # BLD: 0 /100 WBCS — SIGNIFICANT CHANGE UP (ref 0–0)
PH UR: 5 — SIGNIFICANT CHANGE UP (ref 5–8)
PLATELET # BLD AUTO: 281 K/UL — SIGNIFICANT CHANGE UP (ref 150–400)
POTASSIUM SERPL-MCNC: 4.9 MMOL/L — SIGNIFICANT CHANGE UP (ref 3.5–5.3)
POTASSIUM SERPL-SCNC: 4.9 MMOL/L — SIGNIFICANT CHANGE UP (ref 3.5–5.3)
PROT ?TM UR-MCNC: 368 MG/DL — HIGH (ref 0–12)
PROT UR-MCNC: 500 MG/DL
PROT/CREAT UR-RTO: 6.8 RATIO — HIGH (ref 0–0.2)
RBC # BLD: 4.34 M/UL — SIGNIFICANT CHANGE UP (ref 3.8–5.2)
RBC # FLD: 14.3 % — SIGNIFICANT CHANGE UP (ref 10.3–14.5)
RBC CASTS # UR COMP ASSIST: ABNORMAL /HPF (ref 0–4)
SODIUM SERPL-SCNC: 132 MMOL/L — LOW (ref 135–145)
SP GR SPEC: 1.01 — SIGNIFICANT CHANGE UP (ref 1.01–1.02)
UROBILINOGEN FLD QL: NEGATIVE MG/DL — SIGNIFICANT CHANGE UP
WBC # BLD: 13.61 K/UL — HIGH (ref 3.8–10.5)
WBC # FLD AUTO: 13.61 K/UL — HIGH (ref 3.8–10.5)
WBC UR QL: SIGNIFICANT CHANGE UP

## 2018-01-25 PROCEDURE — 99233 SBSQ HOSP IP/OBS HIGH 50: CPT

## 2018-01-25 RX ADMIN — Medication 10: at 11:44

## 2018-01-25 RX ADMIN — Medication 325 MILLIGRAM(S): at 12:28

## 2018-01-25 RX ADMIN — Medication 200 MILLIGRAM(S): at 19:07

## 2018-01-25 RX ADMIN — Medication 100 MILLIGRAM(S): at 21:15

## 2018-01-25 RX ADMIN — GABAPENTIN 200 MILLIGRAM(S): 400 CAPSULE ORAL at 05:38

## 2018-01-25 RX ADMIN — Medication 7 UNIT(S): at 11:45

## 2018-01-25 RX ADMIN — Medication 3 MILLILITER(S): at 17:17

## 2018-01-25 RX ADMIN — Medication 4: at 23:48

## 2018-01-25 RX ADMIN — BUDESONIDE AND FORMOTEROL FUMARATE DIHYDRATE 2 PUFF(S): 160; 4.5 AEROSOL RESPIRATORY (INHALATION) at 06:45

## 2018-01-25 RX ADMIN — Medication 10: at 16:59

## 2018-01-25 RX ADMIN — Medication 3 MILLILITER(S): at 00:14

## 2018-01-25 RX ADMIN — Medication 40 MILLIGRAM(S): at 05:38

## 2018-01-25 RX ADMIN — Medication 3 MILLILITER(S): at 23:49

## 2018-01-25 RX ADMIN — Medication 25 MILLIGRAM(S): at 21:15

## 2018-01-25 RX ADMIN — SENNA PLUS 2 TABLET(S): 8.6 TABLET ORAL at 21:15

## 2018-01-25 RX ADMIN — TAMSULOSIN HYDROCHLORIDE 0.4 MILLIGRAM(S): 0.4 CAPSULE ORAL at 21:15

## 2018-01-25 RX ADMIN — POLYETHYLENE GLYCOL 3350 17 GRAM(S): 17 POWDER, FOR SOLUTION ORAL at 21:15

## 2018-01-25 RX ADMIN — Medication 25 MILLIGRAM(S): at 05:38

## 2018-01-25 RX ADMIN — HUMAN INSULIN 12 UNIT(S): 100 INJECTION, SUSPENSION SUBCUTANEOUS at 08:59

## 2018-01-25 RX ADMIN — ATORVASTATIN CALCIUM 10 MILLIGRAM(S): 80 TABLET, FILM COATED ORAL at 21:15

## 2018-01-25 RX ADMIN — Medication 100 MILLIGRAM(S): at 16:57

## 2018-01-25 RX ADMIN — Medication 100 MILLIGRAM(S): at 05:38

## 2018-01-25 RX ADMIN — Medication 7 UNIT(S): at 08:28

## 2018-01-25 RX ADMIN — Medication 3 MILLILITER(S): at 05:17

## 2018-01-25 RX ADMIN — INSULIN GLARGINE 25 UNIT(S): 100 INJECTION, SOLUTION SUBCUTANEOUS at 23:48

## 2018-01-25 RX ADMIN — HUMAN INSULIN 5 UNIT(S): 100 INJECTION, SUSPENSION SUBCUTANEOUS at 23:47

## 2018-01-25 RX ADMIN — Medication 200 MILLIGRAM(S): at 05:38

## 2018-01-25 RX ADMIN — Medication 25 MILLIGRAM(S): at 16:56

## 2018-01-25 RX ADMIN — MONTELUKAST 10 MILLIGRAM(S): 4 TABLET, CHEWABLE ORAL at 21:15

## 2018-01-25 RX ADMIN — HEPARIN SODIUM 5000 UNIT(S): 5000 INJECTION INTRAVENOUS; SUBCUTANEOUS at 19:06

## 2018-01-25 RX ADMIN — HEPARIN SODIUM 5000 UNIT(S): 5000 INJECTION INTRAVENOUS; SUBCUTANEOUS at 05:39

## 2018-01-25 RX ADMIN — Medication 3 MILLILITER(S): at 11:03

## 2018-01-25 RX ADMIN — BUDESONIDE AND FORMOTEROL FUMARATE DIHYDRATE 2 PUFF(S): 160; 4.5 AEROSOL RESPIRATORY (INHALATION) at 19:06

## 2018-01-25 RX ADMIN — Medication 7 UNIT(S): at 17:00

## 2018-01-25 RX ADMIN — Medication 10: at 08:27

## 2018-01-25 RX ADMIN — AMLODIPINE BESYLATE 10 MILLIGRAM(S): 2.5 TABLET ORAL at 05:38

## 2018-01-25 NOTE — PROGRESS NOTE ADULT - SUBJECTIVE AND OBJECTIVE BOX
CHIEF COMPLAINT/INTERVAL HISTORY:    Patient is a 72y old  Female who presents with a chief complaint of chest pain (2018 18:02)      HPI:  73 yo female with history of cad, former smoker, lupus presents with intermittent chest pain for three days. +cough, productive. Patient denies recent travel, abdominal pain, nausea, vomiting. Unable to provide further information. patient complain of short of breath with wheezing .patien t cough is productive of clear sputum (2018 18:02)      SUBJECTIVE & OBJECTIVE: Pt seen and examined at bedside.   c/o feeling lethargic/tiredness/sleepy.  Denies chest pain/SOB, nausea/vomiting/diarrhea, No cough, dizziness, HA or blurry vision, all other ROS negative.      Vital Signs Last 24 Hrs  T(C): 36.4 (2018 12:30), Max: 36.4 (2018 05:32)  T(F): 97.6 (2018 12:30), Max: 97.6 (2018 05:32)  HR: 79 (2018 12:30) (74 - 92)  BP: 136/68 (2018 12:30) (136/68 - 160/77)  BP(mean): --  ABP: --  ABP(mean): --  RR: 16 (2018 12:30) (16 - 18)  SpO2: 99% (2018 12:30) (96% - 99%)        MEDICATIONS  (STANDING):  ALBUTerol    90 MICROgram(s) HFA Inhaler 1 Puff(s) Inhalation every 4 hours  ALBUTerol/ipratropium for Nebulization 3 milliLiter(s) Nebulizer every 6 hours  amLODIPine   Tablet 10 milliGRAM(s) Oral daily  aspirin 325 milliGRAM(s) Oral daily  atorvastatin 10 milliGRAM(s) Oral at bedtime  buDESOnide 160 MICROgram(s)/formoterol 4.5 MICROgram(s) Inhaler 2 Puff(s) Inhalation two times a day  dextrose 5%. 1000 milliLiter(s) (50 mL/Hr) IV Continuous <Continuous>  dextrose 50% Injectable 12.5 Gram(s) IV Push once  dextrose 50% Injectable 25 Gram(s) IV Push once  dextrose 50% Injectable 25 Gram(s) IV Push once  docusate sodium 100 milliGRAM(s) Oral three times a day  gabapentin 200 milliGRAM(s) Oral two times a day  heparin  Injectable 5000 Unit(s) SubCutaneous every 12 hours  hydrALAZINE 25 milliGRAM(s) Oral three times a day  insulin glargine Injectable (LANTUS) 25 Unit(s) SubCutaneous at bedtime  insulin lispro (HumaLOG) corrective regimen sliding scale   SubCutaneous three times a day before meals  insulin lispro (HumaLOG) corrective regimen sliding scale   SubCutaneous at bedtime  insulin lispro Injectable (HumaLOG) 7 Unit(s) SubCutaneous before breakfast  insulin lispro Injectable (HumaLOG) 7 Unit(s) SubCutaneous before lunch  insulin lispro Injectable (HumaLOG) 7 Unit(s) SubCutaneous before dinner  insulin NPH human recombinant 12 Unit(s) SubCutaneous before breakfast  insulin NPH human recombinant 5 Unit(s) SubCutaneous at bedtime  labetalol 200 milliGRAM(s) Oral two times a day  levoFLOXacin IVPB 500 milliGRAM(s) IV Intermittent every 24 hours  lidocaine   Patch 1 Patch Transdermal daily  montelukast 10 milliGRAM(s) Oral daily  polyethylene glycol 3350 17 Gram(s) Oral daily  predniSONE   Tablet 40 milliGRAM(s) Oral daily  senna 2 Tablet(s) Oral at bedtime  tamsulosin 0.4 milliGRAM(s) Oral at bedtime  tiotropium 18 MICROgram(s) Capsule 1 Capsule(s) Inhalation daily    MEDICATIONS  (PRN):  acetaminophen   Tablet 650 milliGRAM(s) Oral every 6 hours PRN mild pain  dextrose Gel 1 Dose(s) Oral once PRN Blood Glucose LESS THAN 70 milliGRAM(s)/deciliter  glucagon  Injectable 1 milliGRAM(s) IntraMuscular once PRN Glucose LESS THAN 70 milligrams/deciliter        PHYSICAL EXAM:  GENERAL: NAD,  well-developed  HEAD:  Atraumatic, Normocephalic  EYES: EOMI, PERRLA, conjunctiva and sclera clear  ENMT: Moist mucous membranes  NECK: Supple, No JVD  NERVOUS SYSTEM:  Alert & Oriented X3,with drowsiness/sleepy, but easily arousable on verbal stimulus. Motor Strength 4/5 B/L upper and lower extremities;  CHEST/LUNG: generalized decreased bs b/l  HEART: Regular rate and rhythm;   ABDOMEN: Soft, Nontender, Nondistended; Bowel sounds present  EXTREMITIES: no cyanosis, or edema    LABS:                        10.0   13.61 )-----------( 281      ( 2018 07:22 )             31.7         132<L>  |  99  |  56<H>  ----------------------------<  421<H>  4.9   |  21<L>  |  3.92<H>    Ca    7.7<L>      2018 07:25        Urinalysis Basic - ( 2018 02:25 )    Color: Yellow / Appearance: Slightly Turbid / S.015 / pH: x  Gluc: x / Ketone: Negative  / Bili: Negative / Urobili: Negative mg/dL   Blood: x / Protein: 500 mg/dL / Nitrite: Negative   Leuk Esterase: Negative / RBC: 3-5 /HPF / WBC 0-2   Sq Epi: x / Non Sq Epi: Few / Bacteria: Moderate      CAPILLARY BLOOD GLUCOSE      POCT Blood Glucose.: 359 mg/dL (2018 11:42)  POCT Blood Glucose.: 392 mg/dL (2018 08:25)  POCT Blood Glucose.: 439 mg/dL (2018 22:53)  POCT Blood Glucose.: 424 mg/dL (2018 22:50)  POCT Blood Glucose.: 351 mg/dL (2018 17:23)

## 2018-01-25 NOTE — PROGRESS NOTE ADULT - SUBJECTIVE AND OBJECTIVE BOX
Subjective: c/o fatigue. Somnolent. No dyspnea      MEDICATIONS  (STANDING):  ALBUTerol    90 MICROgram(s) HFA Inhaler 1 Puff(s) Inhalation every 4 hours  ALBUTerol/ipratropium for Nebulization 3 milliLiter(s) Nebulizer every 6 hours  amLODIPine   Tablet 10 milliGRAM(s) Oral daily  aspirin 325 milliGRAM(s) Oral daily  atorvastatin 10 milliGRAM(s) Oral at bedtime  buDESOnide 160 MICROgram(s)/formoterol 4.5 MICROgram(s) Inhaler 2 Puff(s) Inhalation two times a day  dextrose 5%. 1000 milliLiter(s) (50 mL/Hr) IV Continuous <Continuous>  dextrose 50% Injectable 12.5 Gram(s) IV Push once  dextrose 50% Injectable 25 Gram(s) IV Push once  dextrose 50% Injectable 25 Gram(s) IV Push once  docusate sodium 100 milliGRAM(s) Oral three times a day  gabapentin 200 milliGRAM(s) Oral two times a day  heparin  Injectable 5000 Unit(s) SubCutaneous every 12 hours  hydrALAZINE 25 milliGRAM(s) Oral three times a day  insulin glargine Injectable (LANTUS) 25 Unit(s) SubCutaneous at bedtime  insulin lispro (HumaLOG) corrective regimen sliding scale   SubCutaneous three times a day before meals  insulin lispro (HumaLOG) corrective regimen sliding scale   SubCutaneous at bedtime  insulin lispro Injectable (HumaLOG) 7 Unit(s) SubCutaneous before breakfast  insulin lispro Injectable (HumaLOG) 7 Unit(s) SubCutaneous before lunch  insulin lispro Injectable (HumaLOG) 7 Unit(s) SubCutaneous before dinner  insulin NPH human recombinant 12 Unit(s) SubCutaneous before breakfast  insulin NPH human recombinant 5 Unit(s) SubCutaneous at bedtime  labetalol 200 milliGRAM(s) Oral two times a day  levoFLOXacin IVPB 500 milliGRAM(s) IV Intermittent every 24 hours  lidocaine   Patch 1 Patch Transdermal daily  montelukast 10 milliGRAM(s) Oral daily  polyethylene glycol 3350 17 Gram(s) Oral daily  predniSONE   Tablet 40 milliGRAM(s) Oral daily  senna 2 Tablet(s) Oral at bedtime  tamsulosin 0.4 milliGRAM(s) Oral at bedtime  tiotropium 18 MICROgram(s) Capsule 1 Capsule(s) Inhalation daily    MEDICATIONS  (PRN):  acetaminophen   Tablet 650 milliGRAM(s) Oral every 6 hours PRN mild pain  dextrose Gel 1 Dose(s) Oral once PRN Blood Glucose LESS THAN 70 milliGRAM(s)/deciliter  glucagon  Injectable 1 milliGRAM(s) IntraMuscular once PRN Glucose LESS THAN 70 milligrams/deciliter          T(C): 36.4 (18 @ 05:32), Max: 36.4 (18 @ 11:19)  HR: 80 (18 @ 05:32) (74 - 93)  BP: 160/77 (18 @ 05:32) (140/68 - 160/77)  RR: 18 (18 @ 05:32) (18 - 18)  SpO2: 97% (18 @ 05:32) (96% - 97%)  Wt(kg): --        I&O's Detail    2018 07:01  -  2018 07:00  --------------------------------------------------------  IN:    Oral Fluid: 720 mL  Total IN: 720 mL    OUT:  Total OUT: 0 mL    Total NET: 720 mL               PHYSICAL EXAM:    GENERAL: comfortable  EYES: EOMI, PERRLA, conjunctiva and sclera clear  NECK: Supple, no inc in JVP  CHEST/LUNG: Clear  HEART: S1S2  ABDOMEN: Soft, Nontender, Nondistended; Bowel sounds present  EXTREMITIES:  no edema  NEURO: no asterixis      LABS:  CBC Full  -  ( 2018 07:22 )  WBC Count : 13.61 K/uL  Hemoglobin : 10.0 g/dL  Hematocrit : 31.7 %  Platelet Count - Automated : 281 K/uL  Mean Cell Volume : 73.0 fl  Mean Cell Hemoglobin : 23.0 pg  Mean Cell Hemoglobin Concentration : 31.5 gm/dL  Auto Neutrophil # : x  Auto Lymphocyte # : x  Auto Monocyte # : x  Auto Eosinophil # : x  Auto Basophil # : x  Auto Neutrophil % : x  Auto Lymphocyte % : x  Auto Monocyte % : x  Auto Eosinophil % : x  Auto Basophil % : x        132<L>  |  99  |  56<H>  ----------------------------<  421<H>  4.9   |  21<L>  |  3.92<H>    Ca    7.7<L>      2018 07:25        Urinalysis Basic - ( 2018 02:25 )    Color: Yellow / Appearance: Slightly Turbid / S.015 / pH: x  Gluc: x / Ketone: Negative  / Bili: Negative / Urobili: Negative mg/dL   Blood: x / Protein: 500 mg/dL / Nitrite: Negative   Leuk Esterase: Negative / RBC: 3-5 /HPF / WBC 0-2   Sq Epi: x / Non Sq Epi: Few / Bacteria: Moderate      Culture Results:   No growth to date. ( @ 17:20)  Culture Results:   No growth to date. ( @ 17:20)        Impression:  * DOLORES -- nephrotic proteinuria, vague hx of lupus. NL complements.  Hgb A1C 13.4.   * Asthma exacerbation  * Hyperglycemia exacerbated by steroids  Recommendations:  * Cr trend noted, meds reviewed. No hypotensive insults over 24h.  Check bladder scan. Repeat BMP in 24h  * Follow GN w/u  * Hold Gabapentin if lethargy persists

## 2018-01-25 NOTE — PROGRESS NOTE ADULT - SUBJECTIVE AND OBJECTIVE BOX
Patient is a 72y old  Female who presents with a chief complaint of chest pain (2018 18:02)      Interval History: on Prednisone 40 mg , Prednisone driven hyperglycemia   on Lantus 25 units and prandial lispro 7 units and NPH 18 units in divided doses and Lispro sliding scale coverage with meals   finger sticks are in mid 300's     MEDICATIONS  (STANDING):  ALBUTerol    90 MICROgram(s) HFA Inhaler 1 Puff(s) Inhalation every 4 hours  ALBUTerol/ipratropium for Nebulization 3 milliLiter(s) Nebulizer every 6 hours  amLODIPine   Tablet 10 milliGRAM(s) Oral daily  aspirin 325 milliGRAM(s) Oral daily  atorvastatin 10 milliGRAM(s) Oral at bedtime  buDESOnide 160 MICROgram(s)/formoterol 4.5 MICROgram(s) Inhaler 2 Puff(s) Inhalation two times a day  dextrose 5%. 1000 milliLiter(s) (50 mL/Hr) IV Continuous <Continuous>  dextrose 50% Injectable 12.5 Gram(s) IV Push once  dextrose 50% Injectable 25 Gram(s) IV Push once  dextrose 50% Injectable 25 Gram(s) IV Push once  docusate sodium 100 milliGRAM(s) Oral three times a day  heparin  Injectable 5000 Unit(s) SubCutaneous every 12 hours  hydrALAZINE 25 milliGRAM(s) Oral three times a day  insulin glargine Injectable (LANTUS) 25 Unit(s) SubCutaneous at bedtime  insulin lispro (HumaLOG) corrective regimen sliding scale   SubCutaneous three times a day before meals  insulin lispro (HumaLOG) corrective regimen sliding scale   SubCutaneous at bedtime  insulin lispro Injectable (HumaLOG) 7 Unit(s) SubCutaneous before breakfast  insulin lispro Injectable (HumaLOG) 7 Unit(s) SubCutaneous before lunch  insulin lispro Injectable (HumaLOG) 7 Unit(s) SubCutaneous before dinner  insulin NPH human recombinant 12 Unit(s) SubCutaneous before breakfast  insulin NPH human recombinant 5 Unit(s) SubCutaneous at bedtime  labetalol 200 milliGRAM(s) Oral two times a day  levoFLOXacin IVPB 500 milliGRAM(s) IV Intermittent every 24 hours  lidocaine   Patch 1 Patch Transdermal daily  montelukast 10 milliGRAM(s) Oral daily  polyethylene glycol 3350 17 Gram(s) Oral daily  predniSONE   Tablet 40 milliGRAM(s) Oral daily  senna 2 Tablet(s) Oral at bedtime  tamsulosin 0.4 milliGRAM(s) Oral at bedtime  tiotropium 18 MICROgram(s) Capsule 1 Capsule(s) Inhalation daily    MEDICATIONS  (PRN):  acetaminophen   Tablet 650 milliGRAM(s) Oral every 6 hours PRN mild pain  dextrose Gel 1 Dose(s) Oral once PRN Blood Glucose LESS THAN 70 milliGRAM(s)/deciliter  glucagon  Injectable 1 milliGRAM(s) IntraMuscular once PRN Glucose LESS THAN 70 milligrams/deciliter      Allergies    codeine (Swelling)  penicillin (Anaphylaxis)  penicillins (Swelling)    Intolerances        REVIEW OF SYSTEMS:  CONSTITUTIONAL:   EYES: No eye pain, visual disturbances, or discharge  ENMT:  No difficulty hearing, tinnitus, vertigo; No sinus or throat pain  NECK: No pain or stiffness  RESPIRATORY: No cough, wheezing, chills or hemoptysis; No shortness of breath  CARDIOVASCULAR: No chest pain, palpitations, dizziness, or leg swelling  GASTROINTESTINAL: No abdominal or epigastric pain. No nausea, vomiting, or hematemesis; No diarrhea or constipation. No melena or hematochezia.  GENITOURINARY: No dysuria, frequency, hematuria, or incontinence  NEUROLOGICAL: No headaches, memory loss, loss of strength, numbness, or tremors  SKIN: No itching, burning, rashes, or lesions   LYMPH NODES: No enlarged glands  ENDOCRINE: No heat or cold intolerance; No hair loss  MUSCULOSKELETAL: No joint pain or swelling; No muscle, back, or extremity pain  PSYCHIATRIC: No depression, anxiety, mood swings, or difficulty sleeping  HEME/LYMPH: No easy bruising, or bleeding gums  ALLERY AND IMMUNOLOGIC: No hives or eczema    Vital Signs Last 24 Hrs  T(C): 36.7 (2018 17:06), Max: 36.7 (2018 17:06)  T(F): 98.1 (2018 17:06), Max: 98.1 (2018 17:06)  HR: 81 (2018 17:17) (78 - 85)  BP: 140/68 (2018 17:06) (136/68 - 160/77)  BP(mean): --  RR: 17 (2018 17:06) (16 - 18)  SpO2: 97% (2018 17:17) (96% - 99%)    PHYSICAL EXAM:  GENERAL:   HEAD:  Atraumatic, Normocephalic  EYES: EOMI, PERRLA, conjunctiva and sclera clear  ENMT: No tonsillar erythema, exudates, or enlargement; Moist mucous membranes, Good dentition, No lesions  NECK: Supple, No JVD, Normal thyroid  NERVOUS SYSTEM:  Alert & Oriented X3, Good concentration; Motor Strength 5/5 B/L upper and lower extremities; DTRs 2+ intact and symmetric  CHEST/LUNG: Clear to percussion bilaterally; No rales, rhonchi, wheezing, or rubs  HEART: Regular rate and rhythm; No murmurs, rubs, or gallops  ABDOMEN: Soft, Nontender, Nondistended; Bowel sounds present  EXTREMITIES:  2+ Peripheral Pulses, No clubbing, cyanosis, or edema  SKIN: No rashes or lesions    LABS:      Urinalysis Basic - ( 2018 02:25 )    Color: Yellow / Appearance: Slightly Turbid / S.015 / pH: x  Gluc: x / Ketone: Negative  / Bili: Negative / Urobili: Negative mg/dL   Blood: x / Protein: 500 mg/dL / Nitrite: Negative   Leuk Esterase: Negative / RBC: 3-5 /HPF / WBC 0-2   Sq Epi: x / Non Sq Epi: Few / Bacteria: Moderate      CAPILLARY BLOOD GLUCOSE      POCT Blood Glucose.: 384 mg/dL (2018 16:20)  POCT Blood Glucose.: 359 mg/dL (2018 11:42)  POCT Blood Glucose.: 392 mg/dL (2018 08:25)  POCT Blood Glucose.: 439 mg/dL (2018 22:53)  POCT Blood Glucose.: 424 mg/dL (2018 22:50)    Lipid panel:           Thyroid:  Diabetes Tests:  Parathyroid Panel:  Adrenals:  RADIOLOGY & ADDITIONAL TESTS:    Imaging Personally Reviewed:  [ ] YES  [ ] NO    Consultant(s) Notes Reviewed:  [ ] YES  [ ] NO    Care Discussed with Consultants/Other Providers [ ] YES  [ ] NO

## 2018-01-26 DIAGNOSIS — R33.8 OTHER RETENTION OF URINE: ICD-10-CM

## 2018-01-26 DIAGNOSIS — E11.65 TYPE 2 DIABETES MELLITUS WITH HYPERGLYCEMIA: ICD-10-CM

## 2018-01-26 LAB
ANION GAP SERPL CALC-SCNC: 11 MMOL/L — SIGNIFICANT CHANGE UP (ref 5–17)
BUN SERPL-MCNC: 62 MG/DL — HIGH (ref 7–23)
CALCIUM SERPL-MCNC: 7.7 MG/DL — LOW (ref 8.5–10.1)
CHLORIDE SERPL-SCNC: 101 MMOL/L — SIGNIFICANT CHANGE UP (ref 96–108)
CO2 SERPL-SCNC: 22 MMOL/L — SIGNIFICANT CHANGE UP (ref 22–31)
CREAT SERPL-MCNC: 3.72 MG/DL — HIGH (ref 0.5–1.3)
CULTURE RESULTS: NO GROWTH — SIGNIFICANT CHANGE UP
GLUCOSE BLDC GLUCOMTR-MCNC: 196 MG/DL — HIGH (ref 70–99)
GLUCOSE BLDC GLUCOMTR-MCNC: 297 MG/DL — HIGH (ref 70–99)
GLUCOSE BLDC GLUCOMTR-MCNC: 338 MG/DL — HIGH (ref 70–99)
GLUCOSE SERPL-MCNC: 223 MG/DL — HIGH (ref 70–99)
LEGIONELLA AG UR QL: NEGATIVE — SIGNIFICANT CHANGE UP
POTASSIUM SERPL-MCNC: 4.5 MMOL/L — SIGNIFICANT CHANGE UP (ref 3.5–5.3)
POTASSIUM SERPL-SCNC: 4.5 MMOL/L — SIGNIFICANT CHANGE UP (ref 3.5–5.3)
SODIUM SERPL-SCNC: 134 MMOL/L — LOW (ref 135–145)
SPECIMEN SOURCE: SIGNIFICANT CHANGE UP

## 2018-01-26 PROCEDURE — 99233 SBSQ HOSP IP/OBS HIGH 50: CPT

## 2018-01-26 RX ADMIN — BUDESONIDE AND FORMOTEROL FUMARATE DIHYDRATE 2 PUFF(S): 160; 4.5 AEROSOL RESPIRATORY (INHALATION) at 17:16

## 2018-01-26 RX ADMIN — Medication 7 UNIT(S): at 17:12

## 2018-01-26 RX ADMIN — HEPARIN SODIUM 5000 UNIT(S): 5000 INJECTION INTRAVENOUS; SUBCUTANEOUS at 05:23

## 2018-01-26 RX ADMIN — Medication 3 MILLILITER(S): at 06:16

## 2018-01-26 RX ADMIN — Medication 3 MILLILITER(S): at 17:12

## 2018-01-26 RX ADMIN — Medication 8: at 17:13

## 2018-01-26 RX ADMIN — Medication 25 MILLIGRAM(S): at 22:12

## 2018-01-26 RX ADMIN — HUMAN INSULIN 12 UNIT(S): 100 INJECTION, SUSPENSION SUBCUTANEOUS at 08:37

## 2018-01-26 RX ADMIN — POLYETHYLENE GLYCOL 3350 17 GRAM(S): 17 POWDER, FOR SOLUTION ORAL at 22:12

## 2018-01-26 RX ADMIN — Medication 100 MILLIGRAM(S): at 05:23

## 2018-01-26 RX ADMIN — Medication 325 MILLIGRAM(S): at 12:15

## 2018-01-26 RX ADMIN — Medication 6: at 12:15

## 2018-01-26 RX ADMIN — Medication 25 MILLIGRAM(S): at 15:07

## 2018-01-26 RX ADMIN — AMLODIPINE BESYLATE 10 MILLIGRAM(S): 2.5 TABLET ORAL at 05:22

## 2018-01-26 RX ADMIN — Medication 3 MILLILITER(S): at 23:45

## 2018-01-26 RX ADMIN — INSULIN GLARGINE 25 UNIT(S): 100 INJECTION, SOLUTION SUBCUTANEOUS at 22:14

## 2018-01-26 RX ADMIN — Medication 100 MILLIGRAM(S): at 15:06

## 2018-01-26 RX ADMIN — Medication 200 MILLIGRAM(S): at 17:16

## 2018-01-26 RX ADMIN — HEPARIN SODIUM 5000 UNIT(S): 5000 INJECTION INTRAVENOUS; SUBCUTANEOUS at 17:16

## 2018-01-26 RX ADMIN — BUDESONIDE AND FORMOTEROL FUMARATE DIHYDRATE 2 PUFF(S): 160; 4.5 AEROSOL RESPIRATORY (INHALATION) at 05:23

## 2018-01-26 RX ADMIN — Medication 25 MILLIGRAM(S): at 05:22

## 2018-01-26 RX ADMIN — Medication 7 UNIT(S): at 08:39

## 2018-01-26 RX ADMIN — Medication 200 MILLIGRAM(S): at 05:22

## 2018-01-26 RX ADMIN — Medication 3 MILLILITER(S): at 11:03

## 2018-01-26 RX ADMIN — Medication 100 MILLIGRAM(S): at 22:12

## 2018-01-26 RX ADMIN — ATORVASTATIN CALCIUM 10 MILLIGRAM(S): 80 TABLET, FILM COATED ORAL at 22:12

## 2018-01-26 RX ADMIN — Medication 4: at 08:38

## 2018-01-26 RX ADMIN — SENNA PLUS 2 TABLET(S): 8.6 TABLET ORAL at 22:12

## 2018-01-26 RX ADMIN — HUMAN INSULIN 5 UNIT(S): 100 INJECTION, SUSPENSION SUBCUTANEOUS at 22:12

## 2018-01-26 RX ADMIN — Medication 7 UNIT(S): at 12:15

## 2018-01-26 RX ADMIN — Medication 40 MILLIGRAM(S): at 05:22

## 2018-01-26 RX ADMIN — TAMSULOSIN HYDROCHLORIDE 0.4 MILLIGRAM(S): 0.4 CAPSULE ORAL at 22:12

## 2018-01-26 NOTE — PROGRESS NOTE ADULT - SUBJECTIVE AND OBJECTIVE BOX
Patient is a 72y old  Female who presents with a chief complaint of chest pain (2018 18:02)      Interval History: finger sticks are slightly better as the patient is on Prednisone instead of IV Solumedrol   on Lantus 25 units and prandial lispro and Lispro sliding scale coverage with meals and also NPH to counter hyperglycemic effect of Prednisone     MEDICATIONS  (STANDING):  ALBUTerol    90 MICROgram(s) HFA Inhaler 1 Puff(s) Inhalation every 4 hours  ALBUTerol/ipratropium for Nebulization 3 milliLiter(s) Nebulizer every 6 hours  amLODIPine   Tablet 10 milliGRAM(s) Oral daily  aspirin 325 milliGRAM(s) Oral daily  atorvastatin 10 milliGRAM(s) Oral at bedtime  buDESOnide 160 MICROgram(s)/formoterol 4.5 MICROgram(s) Inhaler 2 Puff(s) Inhalation two times a day  dextrose 5%. 1000 milliLiter(s) (50 mL/Hr) IV Continuous <Continuous>  dextrose 50% Injectable 12.5 Gram(s) IV Push once  dextrose 50% Injectable 25 Gram(s) IV Push once  dextrose 50% Injectable 25 Gram(s) IV Push once  docusate sodium 100 milliGRAM(s) Oral three times a day  heparin  Injectable 5000 Unit(s) SubCutaneous every 12 hours  hydrALAZINE 25 milliGRAM(s) Oral three times a day  insulin glargine Injectable (LANTUS) 25 Unit(s) SubCutaneous at bedtime  insulin lispro (HumaLOG) corrective regimen sliding scale   SubCutaneous three times a day before meals  insulin lispro (HumaLOG) corrective regimen sliding scale   SubCutaneous at bedtime  insulin lispro Injectable (HumaLOG) 7 Unit(s) SubCutaneous before breakfast  insulin lispro Injectable (HumaLOG) 7 Unit(s) SubCutaneous before lunch  insulin lispro Injectable (HumaLOG) 7 Unit(s) SubCutaneous before dinner  insulin NPH human recombinant 12 Unit(s) SubCutaneous before breakfast  insulin NPH human recombinant 5 Unit(s) SubCutaneous at bedtime  labetalol 200 milliGRAM(s) Oral two times a day  levoFLOXacin IVPB 500 milliGRAM(s) IV Intermittent every 24 hours  lidocaine   Patch 1 Patch Transdermal daily  montelukast 10 milliGRAM(s) Oral daily  polyethylene glycol 3350 17 Gram(s) Oral daily  predniSONE   Tablet 40 milliGRAM(s) Oral daily  senna 2 Tablet(s) Oral at bedtime  tamsulosin 0.4 milliGRAM(s) Oral at bedtime  tiotropium 18 MICROgram(s) Capsule 1 Capsule(s) Inhalation daily    MEDICATIONS  (PRN):  acetaminophen   Tablet 650 milliGRAM(s) Oral every 6 hours PRN mild pain  dextrose Gel 1 Dose(s) Oral once PRN Blood Glucose LESS THAN 70 milliGRAM(s)/deciliter  glucagon  Injectable 1 milliGRAM(s) IntraMuscular once PRN Glucose LESS THAN 70 milligrams/deciliter      Allergies    codeine (Swelling)  penicillin (Anaphylaxis)  penicillins (Swelling)    Intolerances        REVIEW OF SYSTEMS:  CONSTITUTIONAL:   EYES: No eye pain, visual disturbances, or discharge  ENMT:  No difficulty hearing, tinnitus, vertigo; No sinus or throat pain  NECK: No pain or stiffness  RESPIRATORY: wheezing,  shortness of breath  CARDIOVASCULAR: No chest pain, palpitations, dizziness, or leg swelling  GASTROINTESTINAL: No abdominal or epigastric pain. No nausea, vomiting, or hematemesis; No diarrhea or constipation. No melena or hematochezia.  GENITOURINARY: No dysuria, frequency, hematuria, or incontinence  NEUROLOGICAL: No headaches, memory loss, loss of strength, numbness, or tremors  SKIN: No itching, burning, rashes, or lesions   LYMPH NODES: No enlarged glands  ENDOCRINE: No heat or cold intolerance; No hair loss  MUSCULOSKELETAL: No joint pain or swelling; No muscle, back, or extremity pain  PSYCHIATRIC: No depression, anxiety, mood swings, or difficulty sleeping  HEME/LYMPH: No easy bruising, or bleeding gums  ALLERY AND IMMUNOLOGIC: No hives or eczema    Vital Signs Last 24 Hrs  T(C): 37.1 (2018 05:55), Max: 37.1 (2018 05:55)  T(F): 98.7 (2018 05:55), Max: 98.7 (2018 05:55)  HR: 75 (2018 06:16) (75 - 85)  BP: 149/76 (2018 05:55) (136/68 - 157/76)  BP(mean): --  RR: 17 (2018 05:55) (16 - 17)  SpO2: 95% (2018 06:16) (95% - 99%)    PHYSICAL EXAM:  GENERAL:   HEAD:  Atraumatic, Normocephalic  EYES: EOMI, PERRLA, conjunctiva and sclera clear  ENMT: No tonsillar erythema, exudates, or enlargement; Moist mucous membranes, Good dentition, No lesions  NECK: Supple, No JVD, Normal thyroid  NERVOUS SYSTEM:  Alert & Oriented X3, Good concentration; Motor Strength 5/5 B/L upper and lower extremities; DTRs 2+ intact and symmetric  CHEST/LUNG:  rhonchi  HEART: Regular rate and rhythm; No murmurs, rubs, or gallops  ABDOMEN: Soft, Nontender, Nondistended; Bowel sounds present  EXTREMITIES:  2+ Peripheral Pulses, No clubbing, cyanosis, or edema  SKIN: No rashes or lesions    LABS:      Urinalysis Basic - ( 2018 02:25 )    Color: Yellow / Appearance: Slightly Turbid / S.015 / pH: x  Gluc: x / Ketone: Negative  / Bili: Negative / Urobili: Negative mg/dL   Blood: x / Protein: 500 mg/dL / Nitrite: Negative   Leuk Esterase: Negative / RBC: 3-5 /HPF / WBC 0-2   Sq Epi: x / Non Sq Epi: Few / Bacteria: Moderate      CAPILLARY BLOOD GLUCOSE      POCT Blood Glucose.: 250 mg/dL (2018 08:34)  POCT Blood Glucose.: 321 mg/dL (2018 21:43)  POCT Blood Glucose.: 384 mg/dL (2018 16:20)  POCT Blood Glucose.: 359 mg/dL (2018 11:42)    Lipid panel:           Thyroid:  Diabetes Tests:  Parathyroid Panel:  Adrenals:  RADIOLOGY & ADDITIONAL TESTS:    Imaging Personally Reviewed:  [ ] YES  [ ] NO    Consultant(s) Notes Reviewed:  [ ] YES  [ ] NO    Care Discussed with Consultants/Other Providers [ ] YES  [ ] NO

## 2018-01-26 NOTE — CONSULT NOTE ADULT - SUBJECTIVE AND OBJECTIVE BOX
HPI:  71 yo female with history of cad, former smoker, lupus presents with intermittent chest pain for three days. +cough, productive. Patient denies recent travel, abdominal pain, nausea, vomiting. Unable to provide further information. patient complain of short of breath with wheezing .patien t cough is productive of clear sputum (2018 18:02)    Patient seen and examined at bedside with Dr. Lentz. Consult was called due to Urinary Retention. Patient admits to urinary frequency at home and nocturia. Per patient she usually voids every two hours at night and goes more frequently during the day. Denies dysuria. Denies history of urinary problems.     PAST MEDICAL & SURGICAL HISTORY:  Dementia  DM (diabetes mellitus)  Asthma  CVA (cerebral vascular accident)  HLD (hyperlipidemia)  Asthma  Neuropathy  SLE (systemic lupus erythematosus)  HTN (hypertension)  S/P tonsillectomy    REVIEW OF SYSTEMS: Contained within HPI.     MEDICATIONS  (STANDING):  ALBUTerol    90 MICROgram(s) HFA Inhaler 1 Puff(s) Inhalation every 4 hours  ALBUTerol/ipratropium for Nebulization 3 milliLiter(s) Nebulizer every 6 hours  amLODIPine   Tablet 10 milliGRAM(s) Oral daily  aspirin 325 milliGRAM(s) Oral daily  atorvastatin 10 milliGRAM(s) Oral at bedtime  buDESOnide 160 MICROgram(s)/formoterol 4.5 MICROgram(s) Inhaler 2 Puff(s) Inhalation two times a day  dextrose 5%. 1000 milliLiter(s) (50 mL/Hr) IV Continuous <Continuous>  dextrose 50% Injectable 12.5 Gram(s) IV Push once  dextrose 50% Injectable 25 Gram(s) IV Push once  dextrose 50% Injectable 25 Gram(s) IV Push once  docusate sodium 100 milliGRAM(s) Oral three times a day  heparin  Injectable 5000 Unit(s) SubCutaneous every 12 hours  hydrALAZINE 25 milliGRAM(s) Oral three times a day  insulin glargine Injectable (LANTUS) 25 Unit(s) SubCutaneous at bedtime  insulin lispro (HumaLOG) corrective regimen sliding scale   SubCutaneous three times a day before meals  insulin lispro (HumaLOG) corrective regimen sliding scale   SubCutaneous at bedtime  insulin lispro Injectable (HumaLOG) 7 Unit(s) SubCutaneous before breakfast  insulin lispro Injectable (HumaLOG) 7 Unit(s) SubCutaneous before lunch  insulin lispro Injectable (HumaLOG) 7 Unit(s) SubCutaneous before dinner  insulin NPH human recombinant 12 Unit(s) SubCutaneous before breakfast  insulin NPH human recombinant 5 Unit(s) SubCutaneous at bedtime  labetalol 200 milliGRAM(s) Oral two times a day  lidocaine   Patch 1 Patch Transdermal daily  montelukast 10 milliGRAM(s) Oral daily  polyethylene glycol 3350 17 Gram(s) Oral daily  predniSONE   Tablet 40 milliGRAM(s) Oral daily  senna 2 Tablet(s) Oral at bedtime  tamsulosin 0.4 milliGRAM(s) Oral at bedtime  tiotropium 18 MICROgram(s) Capsule 1 Capsule(s) Inhalation daily    MEDICATIONS  (PRN):  acetaminophen   Tablet 650 milliGRAM(s) Oral every 6 hours PRN mild pain  dextrose Gel 1 Dose(s) Oral once PRN Blood Glucose LESS THAN 70 milliGRAM(s)/deciliter  glucagon  Injectable 1 milliGRAM(s) IntraMuscular once PRN Glucose LESS THAN 70 milligrams/deciliter    Allergies    codeine (Swelling)  penicillin (Anaphylaxis)  penicillins (Swelling)    FAMILY HISTORY:  No pertinent family history in first degree relatives    Vital Signs Last 24 Hrs  T(C): 36.5 (2018 12:30), Max: 37.1 (2018 05:55)  T(F): 97.7 (2018 12:30), Max: 98.7 (2018 05:55)  HR: 80 (2018 12:30) (75 - 80)  BP: 137/61 (2018 12:30) (137/61 - 157/76)  RR: 16 (2018 12:30) (16 - 17)  SpO2: 95% (2018 12:30) (95% - 98%)    PHYSICAL EXAM:  Constitutional: Alert, NAD  Respiratory: respirations nonlabored, good inspiratory effort  Gastrointestinal: Obese abdomen, no scarring, soft, NT/ND  Genitourinary: Barksdale catheter in place draining clear yellow urine: 2000ml/24hours recorded.   Extremities: No calf tenderness, no edema  Vascular: 2+ DP pulse bilaterally, 2+ radial pulse bilaterally  Skin: warm    LABS:                        10.0   13.61 )-----------( 281      ( 2018 07:22 )             31.7     01-26    134<L>  |  101  |  62<H>  ----------------------------<  223<H>  4.5   |  22  |  3.72<H>    Ca    7.7<L>      2018 06:29    Urinalysis Basic - ( 2018 02:25 )    Color: Yellow / Appearance: Slightly Turbid / S.015 / pH: x  Gluc: x / Ketone: Negative  / Bili: Negative / Urobili: Negative mg/dL   Blood: x / Protein: 500 mg/dL / Nitrite: Negative   Leuk Esterase: Negative / RBC: 3-5 /HPF / WBC 0-2   Sq Epi: x / Non Sq Epi: Few / Bacteria: Moderate    RADIOLOGY & ADDITIONAL STUDIES:  from: US Renal (18 @ 17:48): Possible non-obstructing left renal calculus. Distended bladder. Patient was unable to void at time of exam. No hydronephrosis bilaterally.
Information from chart:  "Patient is a 72y old  Female who presents with a chief complaint of chest pain (2018 18:02)    HPI:  71 yo female with history of cad, former smoker, lupus presents with intermittent chest pain for three days. +cough, productive. Patient denies recent travel, abdominal pain, nausea, vomiting. Unable to provide further information. patient complain of short of breath with wheezing .patien t cough is productive of clear sputum (2018 18:02)   "  Patient with hx of discoid lupus and renal insufficiency but not told due to lupus.  No constitutional sx      PAST MEDICAL & SURGICAL HISTORY:  Dementia  DM (diabetes mellitus)  Asthma  CVA (cerebral vascular accident)  CVA (cerebral vascular accident)  HLD (hyperlipidemia)  Asthma  Neuropathy  SLE (systemic lupus erythematosus)  DM (diabetes mellitus)  HTN (hypertension)  No significant past surgical history  S/P tonsillectomy    FAMILY HISTORY:  No pertinent family history in first degree relatives  No pertinent family history in first degree relatives    Allergies    codeine (Swelling)  penicillin (Anaphylaxis)  penicillins (Swelling)    Intolerances      Home Medications:  acetaminophen 325 mg oral tablet: 2 tab(s) orally every 8 hours, As Needed mild pain (2018 14:09)  amLODIPine 10 mg oral tablet: 1 tab(s) orally once a day (2018 14:09)  amLODIPine 10 mg oral tablet: 1 tab(s) orally once a day (2018 14:09)  aspirin 325 mg oral tablet: 1 tab(s) orally once a day (2018 14:09)  aspirin 81 mg oral delayed release tablet: 1 tab(s) orally once a day (2016 10:27)  atorvastatin 80 mg oral tablet: 1 tab(s) orally once a day (at bedtime) (2016 10:27)  budesonide-formoterol 160 mcg-4.5 mcg/inh inhalation aerosol: 2 puff(s) inhaled 2 times a day (2016 10:27)  cyanocobalamin 1000 mcg oral tablet: 1 tab(s) orally once a day (2016 10:27)  docusate sodium 100 mg oral capsule: 1 cap(s) orally 3 times a day (2016 10:27)  ergocalciferol 50,000 intl units (1.25 mg) oral capsule: 1 cap(s) orally once a week (2016 10:27)  gabapentin 100 mg oral capsule: 2 cap(s) orally 2 times a day (2016 10:27)  labetalol 200 mg oral tablet: 1 tab(s) orally 2 times a day (2016 10:27)  Lasix 40 mg oral tablet: 1 tab(s) orally once a day (06 Sep 2017 17:39)  lidocaine 5% topical film: Apply topically to affected area once a day (2016 10:27)  Lipitor 10 mg oral tablet: 1 tab(s) orally once a day (06 Sep 2017 17:39)  montelukast 10 mg oral tablet: 1 tab(s) orally once a day (2016 10:27)  NovoLIN 70/30 subcutaneous suspension:  40 units subcutaneous in am (06 Sep 2017 17:39)  NovoLOG Mix 70/30 FlexPen subcutaneous suspension: 28 unit(s) subcutaneous once a day (2016 17:34)  NovoLOG Mix 70/30 FlexPen subcutaneous suspension: 14 unit(s) subcutaneous once a day (2016 17:34)  oxyCODONE 5 mg oral tablet: 1 tab(s) orally every 6 hours, As needed, moderate to severe pain (2016 10:30)  polyethylene glycol 3350 oral powder for reconstitution: 17 gram(s) orally once a day (2016 10:27)  potassium chloride 20 mEq oral tablet, extended release: 1 milliequivalent(s) orally once a day (06 Sep 2017 17:39)  senna oral tablet: 2 tab(s) orally once a day (at bedtime) (2016 10:27)  Singulair 10 mg oral tablet: 1 tab(s) orally once a day (06 Sep 2017 17:39)    MEDICATIONS  (STANDING):  ALBUTerol    90 MICROgram(s) HFA Inhaler 1 Puff(s) Inhalation every 4 hours  ALBUTerol/ipratropium for Nebulization 3 milliLiter(s) Nebulizer every 6 hours  amLODIPine   Tablet 10 milliGRAM(s) Oral daily  aspirin 325 milliGRAM(s) Oral daily  atorvastatin 10 milliGRAM(s) Oral at bedtime  buDESOnide 160 MICROgram(s)/formoterol 4.5 MICROgram(s) Inhaler 2 Puff(s) Inhalation two times a day  dextrose 5%. 1000 milliLiter(s) (50 mL/Hr) IV Continuous <Continuous>  dextrose 50% Injectable 12.5 Gram(s) IV Push once  dextrose 50% Injectable 25 Gram(s) IV Push once  dextrose 50% Injectable 25 Gram(s) IV Push once  docusate sodium 100 milliGRAM(s) Oral three times a day  furosemide    Tablet 40 milliGRAM(s) Oral daily  gabapentin 200 milliGRAM(s) Oral two times a day  heparin  Injectable 5000 Unit(s) SubCutaneous every 12 hours  hydrALAZINE 25 milliGRAM(s) Oral two times a day  insulin glargine Injectable (LANTUS) 20 Unit(s) SubCutaneous at bedtime  insulin lispro (HumaLOG) corrective regimen sliding scale   SubCutaneous three times a day before meals  insulin lispro (HumaLOG) corrective regimen sliding scale   SubCutaneous at bedtime  insulin lispro Injectable (HumaLOG) 7 Unit(s) SubCutaneous before breakfast  insulin lispro Injectable (HumaLOG) 7 Unit(s) SubCutaneous before lunch  insulin lispro Injectable (HumaLOG) 7 Unit(s) SubCutaneous before dinner  labetalol 200 milliGRAM(s) Oral two times a day  levoFLOXacin IVPB 500 milliGRAM(s) IV Intermittent every 24 hours  lidocaine   Patch 1 Patch Transdermal daily  methylPREDNISolone sodium succinate Injectable 40 milliGRAM(s) IV Push every 8 hours  montelukast 10 milliGRAM(s) Oral daily  polyethylene glycol 3350 17 Gram(s) Oral daily  senna 2 Tablet(s) Oral at bedtime  tiotropium 18 MICROgram(s) Capsule 1 Capsule(s) Inhalation daily    MEDICATIONS  (PRN):  acetaminophen   Tablet 650 milliGRAM(s) Oral every 6 hours PRN mild pain  dextrose Gel 1 Dose(s) Oral once PRN Blood Glucose LESS THAN 70 milliGRAM(s)/deciliter  glucagon  Injectable 1 milliGRAM(s) IntraMuscular once PRN Glucose LESS THAN 70 milligrams/deciliter    Vital Signs Last 24 Hrs  T(C): 36.5 (2018 10:40), Max: 37 (2018 22:48)  T(F): 97.7 (2018 10:40), Max: 98.6 (2018 22:48)  HR: 83 (2018 10:40) (74 - 89)  BP: 152/80 (2018 10:40) (152/80 - 175/89)  BP(mean): --  RR: 20 (2018 10:40) (20 - 189)  SpO2: 96% (2018 10:40) (95% - 97%)    Daily Height in cm: 170.18 (2018 23:00)    Daily Weight in k.7 (2018 05:07)  CAPILLARY BLOOD GLUCOSE      POCT Blood Glucose.: 310 mg/dL (2018 13:08)  POCT Blood Glucose.: 390 mg/dL (2018 08:53)  POCT Blood Glucose.: 411 mg/dL (2018 23:19)  POCT Blood Glucose.: 439 mg/dL (2018 21:48)  POCT Blood Glucose.: 506 mg/dL (2018 20:16)  POCT Blood Glucose.: 530 mg/dL (2018 20:15)  POCT Blood Glucose.: 518 mg/dL (2018 18:17)  POCT Blood Glucose.: 513 mg/dL (2018 18:15)  POCT Blood Glucose.: 447 mg/dL (2018 16:58)  POCT Blood Glucose.: 502 mg/dL (2018 16:54)    PHYSICAL EXAM:      T(C): 36.5 (18 @ 10:40), Max: 37 (18 @ 22:48)  HR: 83 (18 @ 10:40) (74 - 89)  BP: 152/80 (18 @ 10:40) (152/80 - 175/89)  RR: 20 (18 @ 10:40) (20 - 189)  SpO2: 96% (18 @ 10:40) (95% - 97%)  Wt(kg): --  Respiratory: clear anteriorly, decreased BS at bases  Cardiovascular: S1 S2  Gastrointestinal: soft NT ND +BS  Extremities:   edema                  136  |  101  |  17  ----------------------------<  429<H>  4.7   |  25  |  2.74<H>    Ca    8.3<L>      2018 14:49    TPro  7.4  /  Alb  2.1<L>  /  TBili  0.2  /  DBili  x   /  AST  11<L>  /  ALT  14  /  AlkPhos  131<H>                            11.6   10.1  )-----------( 270      ( 2018 14:49 )             36.7         Assessment and Plan  DOLORES, CKD 3; nx SLE; pre renal element with lasix rx;  D/C Lasix  SARAH dsDNA c3, c4; RF to exclude CTD element,   Renal US; will follow.
Patient is a 72y old  Female who presents with a chief complaint of chest pain (22 Jan 2018 18:02)      Reason For Consult:     HPI:  71 yo female with history of cad, former smoker, lupus presents with intermittent chest pain for three days. +cough, productive. Patient denies recent travel, abdominal pain, nausea, vomiting. Unable to provide further information. patient complain of short of breath with wheezing .patien t cough is productive of clear sputum (22 Jan 2018 18:02)  patient has steroid induced hyperglycemia   as the steroids doses are tapered expect finger sticks to improve . Currently finger sticks are increased and > 350   on Lantus 20 units and Lispro sliding scale coverage with meals     PAST MEDICAL & SURGICAL HISTORY:  Dementia  DM (diabetes mellitus)  Asthma  CVA (cerebral vascular accident)  CVA (cerebral vascular accident)  HLD (hyperlipidemia)  Asthma  Neuropathy  SLE (systemic lupus erythematosus)  DM (diabetes mellitus)  HTN (hypertension)  No significant past surgical history  S/P tonsillectomy      FAMILY HISTORY:  No pertinent family history in first degree relatives  No pertinent family history in first degree relatives        Social History:    MEDICATIONS  (STANDING):  ALBUTerol    90 MICROgram(s) HFA Inhaler 1 Puff(s) Inhalation every 4 hours  ALBUTerol/ipratropium for Nebulization 3 milliLiter(s) Nebulizer every 6 hours  amLODIPine   Tablet 10 milliGRAM(s) Oral daily  aspirin 325 milliGRAM(s) Oral daily  atorvastatin 10 milliGRAM(s) Oral at bedtime  buDESOnide 160 MICROgram(s)/formoterol 4.5 MICROgram(s) Inhaler 2 Puff(s) Inhalation two times a day  dextrose 5%. 1000 milliLiter(s) (50 mL/Hr) IV Continuous <Continuous>  dextrose 50% Injectable 12.5 Gram(s) IV Push once  dextrose 50% Injectable 25 Gram(s) IV Push once  dextrose 50% Injectable 25 Gram(s) IV Push once  docusate sodium 100 milliGRAM(s) Oral three times a day  furosemide    Tablet 40 milliGRAM(s) Oral daily  gabapentin 200 milliGRAM(s) Oral two times a day  heparin  Injectable 5000 Unit(s) SubCutaneous every 12 hours  hydrALAZINE 25 milliGRAM(s) Oral two times a day  insulin glargine Injectable (LANTUS) 20 Unit(s) SubCutaneous at bedtime  insulin lispro (HumaLOG) corrective regimen sliding scale   SubCutaneous three times a day before meals  insulin lispro (HumaLOG) corrective regimen sliding scale   SubCutaneous at bedtime  insulin lispro Injectable (HumaLOG) 7 Unit(s) SubCutaneous before breakfast  insulin lispro Injectable (HumaLOG) 7 Unit(s) SubCutaneous before lunch  insulin lispro Injectable (HumaLOG) 7 Unit(s) SubCutaneous before dinner  labetalol 200 milliGRAM(s) Oral two times a day  levoFLOXacin IVPB 500 milliGRAM(s) IV Intermittent every 24 hours  lidocaine   Patch 1 Patch Transdermal daily  methylPREDNISolone sodium succinate Injectable 40 milliGRAM(s) IV Push every 8 hours  montelukast 10 milliGRAM(s) Oral daily  polyethylene glycol 3350 17 Gram(s) Oral daily  senna 2 Tablet(s) Oral at bedtime  tiotropium 18 MICROgram(s) Capsule 1 Capsule(s) Inhalation daily    MEDICATIONS  (PRN):  acetaminophen   Tablet 650 milliGRAM(s) Oral every 6 hours PRN mild pain  dextrose Gel 1 Dose(s) Oral once PRN Blood Glucose LESS THAN 70 milliGRAM(s)/deciliter  glucagon  Injectable 1 milliGRAM(s) IntraMuscular once PRN Glucose LESS THAN 70 milligrams/deciliter      REVIEW OF SYSTEMS:  CONSTITUTIONAL:  as per HPI  HEENT:  Eyes:  No diplopia or blurred vision. ENT:  No earache, sore throat or runny nose.  CARDIOVASCULAR:  No pressure, squeezing, strangling, tightness, heaviness or aching about the chest, neck, axilla or epigastrium.  RESPIRATORY:   cough, shortness of breath,  GASTROINTESTINAL:  No nausea, vomiting or diarrhea.  GENITOURINARY:  No dysuria, frequency or urgency. No Blood in urine  MUSCULOSKELETAL:  no joint aches, no muscle pain, myalgia  SKIN:  No change in skin, hair or nails.  NEUROLOGIC:  No paresthesias, fasciculations, seizures or weakness.  PSYCHIATRIC:  No disorder of thought or mood.  ENDOCRINE:  No heat or cold intolerance, polyuria or polydipsia. abnormal weight gain or loss, oral thrush  HEMATOLOGICAL:  No easy bruising or bleeding.     T(C): 36.5 (01-23-18 @ 10:40), Max: 37 (01-22-18 @ 22:48)  HR: 83 (01-23-18 @ 10:40) (74 - 90)  BP: 152/80 (01-23-18 @ 10:40) (152/80 - 175/89)  RR: 20 (01-23-18 @ 10:40) (20 - 189)  SpO2: 96% (01-23-18 @ 10:40) (95% - 97%)  Wt(kg): --    PHYSICAL EXAM:  GENERAL: NAD, well-groomed, well-developed  HEAD:  Atraumatic, Normocephalic  EYES: EOMI, PERRLA, conjunctiva and sclera clear  ENMT: No tonsillar erythema, exudates, or enlargement; Moist mucous membranes, Good dentition, No lesions  NECK: Supple, No JVD, Normal thyroid  NERVOUS SYSTEM:  Alert & Oriented X3, Good concentration; Motor Strength 5/5 B/L upper and lower extremities; DTRs 2+ intact and symmetric  CHEST/LUNG:  rhonchi, wheezing,  HEART: Regular rate and rhythm; No murmurs, rubs, or gallops  ABDOMEN: Soft, Nontender, Nondistended; Bowel sounds present  EXTREMITIES:  2+ Peripheral Pulses, No clubbing, cyanosis, or edema  LYMPH: No lymphadenopathy noted  SKIN: No rashes or lesions    CAPILLARY BLOOD GLUCOSE      POCT Blood Glucose.: 310 mg/dL (23 Jan 2018 13:08)  POCT Blood Glucose.: 390 mg/dL (23 Jan 2018 08:53)  POCT Blood Glucose.: 411 mg/dL (22 Jan 2018 23:19)  POCT Blood Glucose.: 439 mg/dL (22 Jan 2018 21:48)  POCT Blood Glucose.: 506 mg/dL (22 Jan 2018 20:16)  POCT Blood Glucose.: 530 mg/dL (22 Jan 2018 20:15)  POCT Blood Glucose.: 518 mg/dL (22 Jan 2018 18:17)  POCT Blood Glucose.: 513 mg/dL (22 Jan 2018 18:15)  POCT Blood Glucose.: 447 mg/dL (22 Jan 2018 16:58)  POCT Blood Glucose.: 502 mg/dL (22 Jan 2018 16:54)                            11.6   10.1  )-----------( 270      ( 22 Jan 2018 14:49 )             36.7       CMP:  01-22 @ 14:49  SGPT 14  Albumin 2.1   Alk Phos 131   Anion Gap 10   SGOT 11   Total Bili 0.2   BUN 17   Calcium Total 8.3   CO2 25   Chloride 101   Creatinine 2.74   eGFR if AA 19   eGFR if non AA 17   Glucose 429   Potassium 4.7   Protein 7.4   Sodium 136      Thyroid Function Tests:      Diabetes Tests: 01-23 @ 09:55 HbA1c 13.6 C-Peptide --       Parathyroids:     Adrenals:       Radiology:
Patient is a 72y old  Female who presents with a chief complaint of chest pain (22 Jan 2018 18:02)  HPI:  73 yo female with history of cad, former smoker,asthma since age 19  lupus presents with intermittent chest pain for three days. +cough, productive. Patient denies recent travel, abdominal pain, nausea, vomiting. Unable to provide further information. patient complain of short of breath with wheezing .patien t cough is productive of clear sputum (22 Jan 2018 18:02). last hossital admission for Exacerbation Of Asthma was in July 2017 . Never intubaated  PAST MEDICAL & SURGICAL HISTORY:  Dementia  DM (diabetes mellitus)  Asthma  CVA (cerebral vascular accident)  CVA (cerebral vascular accident)  HLD (hyperlipidemia)  Asthma  Neuropathy  SLE (systemic lupus erythematosus)  DM (diabetes mellitus)  HTN (hypertension)  No significant past surgical history  S/P tonsillectomy  FAMILY HISTORY:  No pertinent family history in first degree relatives  No pertinent family history in first degree relatives  Allergies    codeine (Swelling)  penicillin (Anaphylaxis)  penicillins (Swelling)    Intolerances    MEDICATIONS  (STANDING):  ALBUTerol    90 MICROgram(s) HFA Inhaler 1 Puff(s) Inhalation every 4 hours  ALBUTerol/ipratropium for Nebulization 3 milliLiter(s) Nebulizer every 6 hours  amLODIPine   Tablet 10 milliGRAM(s) Oral daily  aspirin 325 milliGRAM(s) Oral daily  atorvastatin 10 milliGRAM(s) Oral at bedtime  dextrose 5%. 1000 milliLiter(s) (50 mL/Hr) IV Continuous <Continuous>  dextrose 50% Injectable 12.5 Gram(s) IV Push once  dextrose 50% Injectable 25 Gram(s) IV Push once  dextrose 50% Injectable 25 Gram(s) IV Push once  docusate sodium 100 milliGRAM(s) Oral three times a day  furosemide    Tablet 40 milliGRAM(s) Oral daily  gabapentin 200 milliGRAM(s) Oral two times a day  heparin  Injectable 5000 Unit(s) SubCutaneous every 12 hours  hydrALAZINE 25 milliGRAM(s) Oral two times a day  insulin glargine Injectable (LANTUS) 20 Unit(s) SubCutaneous at bedtime  insulin lispro (HumaLOG) corrective regimen sliding scale   SubCutaneous three times a day before meals  insulin lispro (HumaLOG) corrective regimen sliding scale   SubCutaneous at bedtime  labetalol 200 milliGRAM(s) Oral two times a day  levoFLOXacin IVPB 500 milliGRAM(s) IV Intermittent every 24 hours  lidocaine   Patch 1 Patch Transdermal daily  methylPREDNISolone sodium succinate Injectable 40 milliGRAM(s) IV Push every 8 hours  montelukast 10 milliGRAM(s) Oral daily  polyethylene glycol 3350 17 Gram(s) Oral daily  senna 2 Tablet(s) Oral at bedtime  tiotropium 18 MICROgram(s) Capsule 1 Capsule(s) Inhalation daily    MEDICATIONS  (PRN):  acetaminophen   Tablet 650 milliGRAM(s) Oral every 6 hours PRN mild pain  dextrose Gel 1 Dose(s) Oral once PRN Blood Glucose LESS THAN 70 milliGRAM(s)/deciliter  glucagon  Injectable 1 milliGRAM(s) IntraMuscular once PRN Glucose LESS THAN 70 milligrams/deciliter    ROS  constitutional - denies any fever, weight loss or weight gain , normal appetite   pulmonary- c/o cough , productive , shortness of breath   cardiovascular- denies any chest pain  GI- denies any nausea , vomiting or diarrhea  Neuro- denies any dizziness or weakness  Vital Signs Last 24 Hrs  T(C): 36.5 (23 Jan 2018 10:40), Max: 37 (22 Jan 2018 22:48)  T(F): 97.7 (23 Jan 2018 10:40), Max: 98.6 (22 Jan 2018 22:48)  HR: 83 (23 Jan 2018 10:40) (74 - 90)  BP: 152/80 (23 Jan 2018 10:40) (152/80 - 175/89)  BP(mean): --  RR: 20 (23 Jan 2018 10:40) (20 - 189)  SpO2: 96% (23 Jan 2018 10:40) (95% - 97%)  Physical Exam  patient lying in bed in no respiratory distress  HEENT - EOMI, PERRLA ,neck supple , No JVD  lungs - decreased BS, no wheezing , ronchi or rales   COR- [id number] , no murmer  Abd- soft, BS+, no tenderness, no guarding   ext- ecc neg, good pulses  Neuro - Alert , oriented X3   Skin- No rashes     CARDIAC MARKERS ( 23 Jan 2018 09:44 )  .022 ng/mL / x     / x     / x     / x      CARDIAC MARKERS ( 22 Jan 2018 14:49 )  .060 ng/mL / x     / 152 U/L / x     / 2.3 ng/mL                          11.6   10.1  )-----------( 270      ( 22 Jan 2018 14:49 )             36.7   01-22    136  |  101  |  17  ----------------------------<  429<H>  4.7   |  25  |  2.74<H>    Ca    8.3<L>      22 Jan 2018 14:49    TPro  7.4  /  Alb  2.1<L>  /  TBili  0.2  /  DBili  x   /  AST  11<L>  /  ALT  14  /  AlkPhos  131<H>  01-22        CXR- No infiltrtes

## 2018-01-26 NOTE — CONSULT NOTE ADULT - ASSESSMENT
72 year old female with PMH of Dementia, DM, Asthma, CVA, HLD, Neuropathy, SLE and HTN admitted chest pain and wheezing secondary to COPD exacerbation. Found to have urinary retention requiring Hsieh catheter likely due to diabetic neurogenic bladder. Elevated creatinine likely acute on chronic renal failure. Slight leukocytosis likely due to steroid use    - Continue hsieh catheter and monitor urine output  - Continue to trend BUN/Cr, improved slightly today   - continue medical management  - continue renal management  - discussed with Dr. Lentz
Exacerbation Of Asthma   Bronchiti    Recommendations    patient feeling better   decrease Solumedrol   bronchodilators   add symbicort   continue levaquin   Thank you for this consult  will f/u the patient with you
steroid induced hyperglycemia

## 2018-01-26 NOTE — PROGRESS NOTE ADULT - SUBJECTIVE AND OBJECTIVE BOX
Patient is a 72y old  Female who presents with a chief complaint of chest pain (2018 18:02)       OVERNIGHT EVENTS:    MEDICATIONS  (STANDING):  ALBUTerol    90 MICROgram(s) HFA Inhaler 1 Puff(s) Inhalation every 4 hours  ALBUTerol/ipratropium for Nebulization 3 milliLiter(s) Nebulizer every 6 hours  amLODIPine   Tablet 10 milliGRAM(s) Oral daily  aspirin 325 milliGRAM(s) Oral daily  atorvastatin 10 milliGRAM(s) Oral at bedtime  buDESOnide 160 MICROgram(s)/formoterol 4.5 MICROgram(s) Inhaler 2 Puff(s) Inhalation two times a day  dextrose 5%. 1000 milliLiter(s) (50 mL/Hr) IV Continuous <Continuous>  dextrose 50% Injectable 12.5 Gram(s) IV Push once  dextrose 50% Injectable 25 Gram(s) IV Push once  dextrose 50% Injectable 25 Gram(s) IV Push once  docusate sodium 100 milliGRAM(s) Oral three times a day  heparin  Injectable 5000 Unit(s) SubCutaneous every 12 hours  hydrALAZINE 25 milliGRAM(s) Oral three times a day  insulin glargine Injectable (LANTUS) 25 Unit(s) SubCutaneous at bedtime  insulin lispro (HumaLOG) corrective regimen sliding scale   SubCutaneous three times a day before meals  insulin lispro (HumaLOG) corrective regimen sliding scale   SubCutaneous at bedtime  insulin lispro Injectable (HumaLOG) 7 Unit(s) SubCutaneous before breakfast  insulin lispro Injectable (HumaLOG) 7 Unit(s) SubCutaneous before lunch  insulin lispro Injectable (HumaLOG) 7 Unit(s) SubCutaneous before dinner  insulin NPH human recombinant 12 Unit(s) SubCutaneous before breakfast  insulin NPH human recombinant 5 Unit(s) SubCutaneous at bedtime  labetalol 200 milliGRAM(s) Oral two times a day  levoFLOXacin IVPB 500 milliGRAM(s) IV Intermittent every 24 hours  lidocaine   Patch 1 Patch Transdermal daily  montelukast 10 milliGRAM(s) Oral daily  polyethylene glycol 3350 17 Gram(s) Oral daily  predniSONE   Tablet 40 milliGRAM(s) Oral daily  senna 2 Tablet(s) Oral at bedtime  tamsulosin 0.4 milliGRAM(s) Oral at bedtime  tiotropium 18 MICROgram(s) Capsule 1 Capsule(s) Inhalation daily    MEDICATIONS  (PRN):  acetaminophen   Tablet 650 milliGRAM(s) Oral every 6 hours PRN mild pain  dextrose Gel 1 Dose(s) Oral once PRN Blood Glucose LESS THAN 70 milliGRAM(s)/deciliter  glucagon  Injectable 1 milliGRAM(s) IntraMuscular once PRN Glucose LESS THAN 70 milligrams/deciliter         Vital Signs Last 24 Hrs  T(C): 36.5 (2018 12:30), Max: 37.1 (2018 05:55)  T(F): 97.7 (2018 12:30), Max: 98.7 (2018 05:55)  HR: 80 (2018 12:30) (75 - 82)  BP: 137/61 (2018 12:30) (137/61 - 157/76)  BP(mean): --  RR: 16 (2018 12:30) (16 - 17)  SpO2: 95% (2018 12:30) (95% - 98%)    PHYSICAL EXAM:  GENERAL: NAD, well-groomed, well-developed  HEAD:  Atraumatic, Normocephalic  EYES: EOMI, PERRLA, conjunctiva and sclera clear  ENMT: No tonsillar erythema, exudates, or enlargement; Moist mucous membranes   NECK: Supple, No JVD   NERVOUS SYSTEM:  Alert & Oriented X3, moving all extremities well   CHEST/LUNG: Decreased BS bibasilar  HEART: Regular rate and rhythm; No murmurs, rubs, or gallops  ABDOMEN: Soft, Nontender, Nondistended; Bowel sounds present  EXTREMITIES:  2+ Peripheral Pulses, No clubbing, cyanosis, or edema  LYMPH: No lymphadenopathy noted  SKIN: No rashes or lesions    LABS:                        10.0   13.61 )-----------( 281      ( 2018 07:22 )             31.7     01-    134<L>  |  101  |  62<H>  ----------------------------<  223<H>  4.5   |  22  |  3.72<H>    Ca    7.7<L>      2018 06:29         cardiac markers   Urinalysis Basic - ( 2018 02:25 )    Color: Yellow / Appearance: Slightly Turbid / S.015 / pH: x  Gluc: x / Ketone: Negative  / Bili: Negative / Urobili: Negative mg/dL   Blood: x / Protein: 500 mg/dL / Nitrite: Negative   Leuk Esterase: Negative / RBC: 3-5 /HPF / WBC 0-2   Sq Epi: x / Non Sq Epi: Few / Bacteria: Moderate      CAPILLARY BLOOD GLUCOSE      POCT Blood Glucose.: 297 mg/dL (2018 12:13)  POCT Blood Glucose.: 250 mg/dL (2018 08:34)  POCT Blood Glucose.: 321 mg/dL (2018 21:43)  POCT Blood Glucose.: 384 mg/dL (2018 16:20)    Cultures    RADIOLOGY & ADDITIONAL TESTS:    Imaging Personally Reviewed:  [ x] YES  [ ] NO    Consultant(s) Notes Reviewed:  [x ] YES  [ ] NO    Care Discussed with Consultants/Other Providers [x ] YES  [ ] NO

## 2018-01-26 NOTE — PROGRESS NOTE ADULT - SUBJECTIVE AND OBJECTIVE BOX
Patient feels well no complaints today.    MEDICATIONS  (STANDING):  ALBUTerol    90 MICROgram(s) HFA Inhaler 1 Puff(s) Inhalation every 4 hours  ALBUTerol/ipratropium for Nebulization 3 milliLiter(s) Nebulizer every 6 hours  amLODIPine   Tablet 10 milliGRAM(s) Oral daily  aspirin 325 milliGRAM(s) Oral daily  atorvastatin 10 milliGRAM(s) Oral at bedtime  buDESOnide 160 MICROgram(s)/formoterol 4.5 MICROgram(s) Inhaler 2 Puff(s) Inhalation two times a day  dextrose 5%. 1000 milliLiter(s) (50 mL/Hr) IV Continuous <Continuous>  dextrose 50% Injectable 12.5 Gram(s) IV Push once  dextrose 50% Injectable 25 Gram(s) IV Push once  dextrose 50% Injectable 25 Gram(s) IV Push once  docusate sodium 100 milliGRAM(s) Oral three times a day  heparin  Injectable 5000 Unit(s) SubCutaneous every 12 hours  hydrALAZINE 25 milliGRAM(s) Oral three times a day  insulin glargine Injectable (LANTUS) 25 Unit(s) SubCutaneous at bedtime  insulin lispro (HumaLOG) corrective regimen sliding scale   SubCutaneous three times a day before meals  insulin lispro (HumaLOG) corrective regimen sliding scale   SubCutaneous at bedtime  insulin lispro Injectable (HumaLOG) 7 Unit(s) SubCutaneous before breakfast  insulin lispro Injectable (HumaLOG) 7 Unit(s) SubCutaneous before lunch  insulin lispro Injectable (HumaLOG) 7 Unit(s) SubCutaneous before dinner  insulin NPH human recombinant 12 Unit(s) SubCutaneous before breakfast  insulin NPH human recombinant 5 Unit(s) SubCutaneous at bedtime  labetalol 200 milliGRAM(s) Oral two times a day  lidocaine   Patch 1 Patch Transdermal daily  montelukast 10 milliGRAM(s) Oral daily  polyethylene glycol 3350 17 Gram(s) Oral daily  predniSONE   Tablet 40 milliGRAM(s) Oral daily  senna 2 Tablet(s) Oral at bedtime  tamsulosin 0.4 milliGRAM(s) Oral at bedtime  tiotropium 18 MICROgram(s) Capsule 1 Capsule(s) Inhalation daily    MEDICATIONS  (PRN):  acetaminophen   Tablet 650 milliGRAM(s) Oral every 6 hours PRN mild pain  dextrose Gel 1 Dose(s) Oral once PRN Blood Glucose LESS THAN 70 milliGRAM(s)/deciliter  glucagon  Injectable 1 milliGRAM(s) IntraMuscular once PRN Glucose LESS THAN 70 milligrams/deciliter      01-25-18 @ 07:01  -  01-26-18 @ 07:00  --------------------------------------------------------  IN: 240 mL / OUT: 1700 mL / NET: -1460 mL    01-26-18 @ 07:01  -  01-26-18 @ 16:56  --------------------------------------------------------  IN: 0 mL / OUT: 2000 mL / NET: -2000 mL      PHYSICAL EXAM:      T(C): 36.5 (01-26-18 @ 12:30), Max: 37.1 (01-26-18 @ 05:55)  HR: 80 (01-26-18 @ 12:30) (75 - 82)  BP: 137/61 (01-26-18 @ 12:30) (137/61 - 157/76)  RR: 16 (01-26-18 @ 12:30) (16 - 17)  SpO2: 95% (01-26-18 @ 12:30) (95% - 98%)  Wt(kg): --  Respiratory: clear anteriorly, decreased BS at bases  Cardiovascular: S1 S2  Gastrointestinal: soft NT ND +BS  Extremities:   1 edema                                    10.0   13.61 )-----------( 281      ( 25 Jan 2018 07:22 )             31.7     01-26    134<L>  |  101  |  62<H>  ----------------------------<  223<H>  4.5   |  22  |  3.72<H>    Ca    7.7<L>      26 Jan 2018 06:29            Assessment and Plan:    DOLORES, CKD 3 with proteinuria, hx SLE; DM; HTN; pre renal azotemia;   Indices improving off lasix;   Can continue work up as outpatient, risk for diabetic and HTN nephrosclerosis; longstanding proteinuria;   Will follow.

## 2018-01-27 LAB
ANION GAP SERPL CALC-SCNC: 9 MMOL/L — SIGNIFICANT CHANGE UP (ref 5–17)
BUN SERPL-MCNC: 67 MG/DL — HIGH (ref 7–23)
CALCIUM SERPL-MCNC: 7.5 MG/DL — LOW (ref 8.5–10.1)
CHLORIDE SERPL-SCNC: 105 MMOL/L — SIGNIFICANT CHANGE UP (ref 96–108)
CO2 SERPL-SCNC: 24 MMOL/L — SIGNIFICANT CHANGE UP (ref 22–31)
CREAT SERPL-MCNC: 4.09 MG/DL — HIGH (ref 0.5–1.3)
CULTURE RESULTS: SIGNIFICANT CHANGE UP
CULTURE RESULTS: SIGNIFICANT CHANGE UP
GLUCOSE BLDC GLUCOMTR-MCNC: 149 MG/DL — HIGH (ref 70–99)
GLUCOSE BLDC GLUCOMTR-MCNC: 204 MG/DL — HIGH (ref 70–99)
GLUCOSE BLDC GLUCOMTR-MCNC: 230 MG/DL — HIGH (ref 70–99)
GLUCOSE BLDC GLUCOMTR-MCNC: 254 MG/DL — HIGH (ref 70–99)
GLUCOSE SERPL-MCNC: 145 MG/DL — HIGH (ref 70–99)
HCT VFR BLD CALC: 32 % — LOW (ref 34.5–45)
HGB BLD-MCNC: 10.3 G/DL — LOW (ref 11.5–15.5)
MCHC RBC-ENTMCNC: 23.6 PG — LOW (ref 27–34)
MCHC RBC-ENTMCNC: 32.2 GM/DL — SIGNIFICANT CHANGE UP (ref 32–36)
MCV RBC AUTO: 73.2 FL — LOW (ref 80–100)
NRBC # BLD: 0 /100 WBCS — SIGNIFICANT CHANGE UP (ref 0–0)
PLATELET # BLD AUTO: 296 K/UL — SIGNIFICANT CHANGE UP (ref 150–400)
POTASSIUM SERPL-MCNC: 4.3 MMOL/L — SIGNIFICANT CHANGE UP (ref 3.5–5.3)
POTASSIUM SERPL-SCNC: 4.3 MMOL/L — SIGNIFICANT CHANGE UP (ref 3.5–5.3)
RBC # BLD: 4.37 M/UL — SIGNIFICANT CHANGE UP (ref 3.8–5.2)
RBC # FLD: 14.6 % — HIGH (ref 10.3–14.5)
SODIUM SERPL-SCNC: 138 MMOL/L — SIGNIFICANT CHANGE UP (ref 135–145)
SPECIMEN SOURCE: SIGNIFICANT CHANGE UP
SPECIMEN SOURCE: SIGNIFICANT CHANGE UP
WBC # BLD: 13.36 K/UL — HIGH (ref 3.8–10.5)
WBC # FLD AUTO: 13.36 K/UL — HIGH (ref 3.8–10.5)

## 2018-01-27 PROCEDURE — 99233 SBSQ HOSP IP/OBS HIGH 50: CPT

## 2018-01-27 RX ORDER — INSULIN LISPRO 100/ML
9 VIAL (ML) SUBCUTANEOUS
Qty: 0 | Refills: 0 | Status: DISCONTINUED | OUTPATIENT
Start: 2018-01-27 | End: 2018-01-31

## 2018-01-27 RX ADMIN — BUDESONIDE AND FORMOTEROL FUMARATE DIHYDRATE 2 PUFF(S): 160; 4.5 AEROSOL RESPIRATORY (INHALATION) at 18:51

## 2018-01-27 RX ADMIN — MONTELUKAST 10 MILLIGRAM(S): 4 TABLET, CHEWABLE ORAL at 21:46

## 2018-01-27 RX ADMIN — Medication 25 MILLIGRAM(S): at 05:19

## 2018-01-27 RX ADMIN — SENNA PLUS 2 TABLET(S): 8.6 TABLET ORAL at 21:44

## 2018-01-27 RX ADMIN — AMLODIPINE BESYLATE 10 MILLIGRAM(S): 2.5 TABLET ORAL at 05:19

## 2018-01-27 RX ADMIN — Medication 200 MILLIGRAM(S): at 05:19

## 2018-01-27 RX ADMIN — Medication 100 MILLIGRAM(S): at 13:30

## 2018-01-27 RX ADMIN — POLYETHYLENE GLYCOL 3350 17 GRAM(S): 17 POWDER, FOR SOLUTION ORAL at 21:46

## 2018-01-27 RX ADMIN — Medication 100 MILLIGRAM(S): at 21:44

## 2018-01-27 RX ADMIN — Medication 200 MILLIGRAM(S): at 18:51

## 2018-01-27 RX ADMIN — BUDESONIDE AND FORMOTEROL FUMARATE DIHYDRATE 2 PUFF(S): 160; 4.5 AEROSOL RESPIRATORY (INHALATION) at 05:19

## 2018-01-27 RX ADMIN — Medication 40 MILLIGRAM(S): at 05:18

## 2018-01-27 RX ADMIN — INSULIN GLARGINE 25 UNIT(S): 100 INJECTION, SOLUTION SUBCUTANEOUS at 23:00

## 2018-01-27 RX ADMIN — TAMSULOSIN HYDROCHLORIDE 0.4 MILLIGRAM(S): 0.4 CAPSULE ORAL at 21:45

## 2018-01-27 RX ADMIN — Medication 650 MILLIGRAM(S): at 10:36

## 2018-01-27 RX ADMIN — Medication 7 UNIT(S): at 07:46

## 2018-01-27 RX ADMIN — Medication 100 MILLIGRAM(S): at 05:19

## 2018-01-27 RX ADMIN — Medication 3 MILLILITER(S): at 23:45

## 2018-01-27 RX ADMIN — HEPARIN SODIUM 5000 UNIT(S): 5000 INJECTION INTRAVENOUS; SUBCUTANEOUS at 18:52

## 2018-01-27 RX ADMIN — Medication 25 MILLIGRAM(S): at 23:01

## 2018-01-27 RX ADMIN — Medication 3 MILLILITER(S): at 05:59

## 2018-01-27 RX ADMIN — HEPARIN SODIUM 5000 UNIT(S): 5000 INJECTION INTRAVENOUS; SUBCUTANEOUS at 05:19

## 2018-01-27 RX ADMIN — Medication 4: at 16:56

## 2018-01-27 RX ADMIN — Medication 3 MILLILITER(S): at 11:29

## 2018-01-27 RX ADMIN — Medication 3 MILLILITER(S): at 18:07

## 2018-01-27 RX ADMIN — HUMAN INSULIN 5 UNIT(S): 100 INJECTION, SUSPENSION SUBCUTANEOUS at 23:00

## 2018-01-27 RX ADMIN — HUMAN INSULIN 12 UNIT(S): 100 INJECTION, SUSPENSION SUBCUTANEOUS at 07:46

## 2018-01-27 RX ADMIN — ATORVASTATIN CALCIUM 10 MILLIGRAM(S): 80 TABLET, FILM COATED ORAL at 21:44

## 2018-01-27 RX ADMIN — Medication 7 UNIT(S): at 11:12

## 2018-01-27 RX ADMIN — Medication 25 MILLIGRAM(S): at 13:30

## 2018-01-27 RX ADMIN — Medication 6: at 11:14

## 2018-01-27 RX ADMIN — Medication 7 UNIT(S): at 16:56

## 2018-01-27 RX ADMIN — Medication 325 MILLIGRAM(S): at 11:13

## 2018-01-27 NOTE — PROGRESS NOTE ADULT - SUBJECTIVE AND OBJECTIVE BOX
Patient is a 72y old  Female who presents with a chief complaint of chest pain (22 Jan 2018 18:02)      INTERVAL HPI/OVERNIGHT EVENTS:no complaints    MEDICATIONS  (STANDING):  ALBUTerol    90 MICROgram(s) HFA Inhaler 1 Puff(s) Inhalation every 4 hours  ALBUTerol/ipratropium for Nebulization 3 milliLiter(s) Nebulizer every 6 hours  amLODIPine   Tablet 10 milliGRAM(s) Oral daily  aspirin 325 milliGRAM(s) Oral daily  atorvastatin 10 milliGRAM(s) Oral at bedtime  buDESOnide 160 MICROgram(s)/formoterol 4.5 MICROgram(s) Inhaler 2 Puff(s) Inhalation two times a day  dextrose 5%. 1000 milliLiter(s) (50 mL/Hr) IV Continuous <Continuous>  dextrose 50% Injectable 12.5 Gram(s) IV Push once  dextrose 50% Injectable 25 Gram(s) IV Push once  dextrose 50% Injectable 25 Gram(s) IV Push once  docusate sodium 100 milliGRAM(s) Oral three times a day  heparin  Injectable 5000 Unit(s) SubCutaneous every 12 hours  hydrALAZINE 25 milliGRAM(s) Oral three times a day  insulin glargine Injectable (LANTUS) 25 Unit(s) SubCutaneous at bedtime  insulin lispro (HumaLOG) corrective regimen sliding scale   SubCutaneous three times a day before meals  insulin lispro (HumaLOG) corrective regimen sliding scale   SubCutaneous at bedtime  insulin lispro Injectable (HumaLOG) 9 Unit(s) SubCutaneous before breakfast  insulin lispro Injectable (HumaLOG) 9 Unit(s) SubCutaneous before lunch  insulin lispro Injectable (HumaLOG) 7 Unit(s) SubCutaneous before dinner  insulin NPH human recombinant 12 Unit(s) SubCutaneous before breakfast  insulin NPH human recombinant 5 Unit(s) SubCutaneous at bedtime  labetalol 200 milliGRAM(s) Oral two times a day  lidocaine   Patch 1 Patch Transdermal daily  montelukast 10 milliGRAM(s) Oral daily  polyethylene glycol 3350 17 Gram(s) Oral daily  predniSONE   Tablet 40 milliGRAM(s) Oral daily  senna 2 Tablet(s) Oral at bedtime  tamsulosin 0.4 milliGRAM(s) Oral at bedtime  tiotropium 18 MICROgram(s) Capsule 1 Capsule(s) Inhalation daily    MEDICATIONS  (PRN):  acetaminophen   Tablet 650 milliGRAM(s) Oral every 6 hours PRN mild pain  dextrose Gel 1 Dose(s) Oral once PRN Blood Glucose LESS THAN 70 milliGRAM(s)/deciliter  glucagon  Injectable 1 milliGRAM(s) IntraMuscular once PRN Glucose LESS THAN 70 milligrams/deciliter      REVIEW OF SYSTEMS:  CONSTITUTIONAL: No fever, weight loss, or fatigue  RESPIRATORY: No cough, wheezing, chills or hemoptysis; No shortness of breath  CARDIOVASCULAR: No chest pain, palpitations, dizziness, or leg swelling  GASTROINTESTINAL: No abdominal or epigastric pain. No nausea, vomiting, or hematemesis; No diarrhea or constipation. No melena or hematochezia.  ENDOCRINE: No heat or cold intolerance; No hair loss      Vital Signs Last 24 Hrs  T(C): 36.4 (27 Jan 2018 12:19), Max: 36.9 (27 Jan 2018 00:18)  T(F): 97.6 (27 Jan 2018 12:19), Max: 98.4 (27 Jan 2018 00:18)  HR: 74 (27 Jan 2018 13:31) (71 - 86)  BP: 127/72 (27 Jan 2018 13:31) (119/56 - 154/79)  BP(mean): --  RR: 18 (27 Jan 2018 12:19) (17 - 18)  SpO2: 95% (27 Jan 2018 12:19) (95% - 97%)    PHYSICAL EXAM:  GENERAL: NAD, well-groomed, well-developed  CHEST/LUNG: Clear to percussion bilaterally; No rales, rhonchi, wheezing, or rubs  HEART: Regular rate and rhythm; No murmurs, rubs, or gallops      LABS:                        10.3   13.36 )-----------( 296      ( 27 Jan 2018 07:41 )             32.0     01-27    138  |  105  |  67<H>  ----------------------------<  145<H>  4.3   |  24  |  4.09<H>    Ca    7.5<L>      27 Jan 2018 07:41          CAPILLARY BLOOD GLUCOSE      POCT Blood Glucose.: 254 mg/dL (27 Jan 2018 11:11)  POCT Blood Glucose.: 149 mg/dL (27 Jan 2018 07:44)  POCT Blood Glucose.: 196 mg/dL (26 Jan 2018 22:10)  POCT Blood Glucose.: 338 mg/dL (26 Jan 2018 17:11)    Lipid panel:               RADIOLOGY & ADDITIONAL TESTS:

## 2018-01-27 NOTE — PROGRESS NOTE ADULT - SUBJECTIVE AND OBJECTIVE BOX
Patient seen and examined bedside resting comfortably. No complaints offered.     T(F): 97.6 (01-27-18 @ 12:19), Max: 98.4 (01-27-18 @ 00:18)  HR: 71 (01-27-18 @ 12:19) (71 - 86)  BP: 119/56 (01-27-18 @ 12:19) (119/56 - 154/79)  RR: 18 (01-27-18 @ 12:19) (17 - 18)  SpO2: 95% (01-27-18 @ 12:19) (95% - 97%)  POCT Blood Glucose.: 254 mg/dL (27 Jan 2018 11:11)  POCT Blood Glucose.: 149 mg/dL (27 Jan 2018 07:44)  POCT Blood Glucose.: 196 mg/dL (26 Jan 2018 22:10)  POCT Blood Glucose.: 338 mg/dL (26 Jan 2018 17:11)    PHYSICAL EXAM:  General: NAD, WDWN  Neuro:  Alert  HEENT: NCAT, EOMI, conjunctiva clear  CV: +S1S2 regular rate and rhythm  Lung: clear to ausculation bilaterally, respirations nonlabored, good inspiratory effort  Abdomen: soft, NT/ND.  : Hsieh catheter in place draining clear yellow urine: 2300ml/24hours recorded.     LABS:                        10.3   13.36 )-----------( 296      ( 27 Jan 2018 07:41 )             32.0     01-27    138  |  105  |  67<H>  ----------------------------<  145<H>  4.3   |  24  |  4.09<H>    Ca    7.5<L>      27 Jan 2018 07:41    I&O's Detail    26 Jan 2018 07:01  -  27 Jan 2018 07:00  --------------------------------------------------------  IN:    Oral Fluid: 550 mL  Total IN: 550 mL    OUT:    Indwelling Catheter - Urethral: 2300 mL    Voided: 2000 mL  Total OUT: 4300 mL    Total NET: -3750 mL    72 year old female with PMH of Dementia, DM, Asthma, CVA, HLD, Neuropathy, SLE and HTN admitted chest pain and wheezing secondary to COPD exacerbation. Found to have urinary retention requiring Hsieh catheter likely due to diabetic neurogenic bladder. Elevated creatinine likely acute on chronic renal failure. Slight leukocytosis likely due to steroid use. Uncontrolled DM.     - Continue hsieh catheter and monitor urine output  - Continue to trend BUN/Cr  - continue medical management  - continue renal management  - will discuss with Dr. Lentz

## 2018-01-27 NOTE — PROGRESS NOTE ADULT - SUBJECTIVE AND OBJECTIVE BOX
Subjective: no complaints. Had 1L PVR on 1/25. Barksdale inserted and maintained since      MEDICATIONS  (STANDING):  ALBUTerol    90 MICROgram(s) HFA Inhaler 1 Puff(s) Inhalation every 4 hours  ALBUTerol/ipratropium for Nebulization 3 milliLiter(s) Nebulizer every 6 hours  amLODIPine   Tablet 10 milliGRAM(s) Oral daily  aspirin 325 milliGRAM(s) Oral daily  atorvastatin 10 milliGRAM(s) Oral at bedtime  buDESOnide 160 MICROgram(s)/formoterol 4.5 MICROgram(s) Inhaler 2 Puff(s) Inhalation two times a day  dextrose 5%. 1000 milliLiter(s) (50 mL/Hr) IV Continuous <Continuous>  dextrose 50% Injectable 12.5 Gram(s) IV Push once  dextrose 50% Injectable 25 Gram(s) IV Push once  dextrose 50% Injectable 25 Gram(s) IV Push once  docusate sodium 100 milliGRAM(s) Oral three times a day  heparin  Injectable 5000 Unit(s) SubCutaneous every 12 hours  hydrALAZINE 25 milliGRAM(s) Oral three times a day  insulin glargine Injectable (LANTUS) 25 Unit(s) SubCutaneous at bedtime  insulin lispro (HumaLOG) corrective regimen sliding scale   SubCutaneous three times a day before meals  insulin lispro (HumaLOG) corrective regimen sliding scale   SubCutaneous at bedtime  insulin lispro Injectable (HumaLOG) 7 Unit(s) SubCutaneous before breakfast  insulin lispro Injectable (HumaLOG) 7 Unit(s) SubCutaneous before lunch  insulin lispro Injectable (HumaLOG) 7 Unit(s) SubCutaneous before dinner  insulin NPH human recombinant 12 Unit(s) SubCutaneous before breakfast  insulin NPH human recombinant 5 Unit(s) SubCutaneous at bedtime  labetalol 200 milliGRAM(s) Oral two times a day  lidocaine   Patch 1 Patch Transdermal daily  montelukast 10 milliGRAM(s) Oral daily  polyethylene glycol 3350 17 Gram(s) Oral daily  predniSONE   Tablet 40 milliGRAM(s) Oral daily  senna 2 Tablet(s) Oral at bedtime  tamsulosin 0.4 milliGRAM(s) Oral at bedtime  tiotropium 18 MICROgram(s) Capsule 1 Capsule(s) Inhalation daily    MEDICATIONS  (PRN):  acetaminophen   Tablet 650 milliGRAM(s) Oral every 6 hours PRN mild pain  dextrose Gel 1 Dose(s) Oral once PRN Blood Glucose LESS THAN 70 milliGRAM(s)/deciliter  glucagon  Injectable 1 milliGRAM(s) IntraMuscular once PRN Glucose LESS THAN 70 milligrams/deciliter          T(C): 36.4 (01-27-18 @ 05:16), Max: 36.9 (01-27-18 @ 00:18)  HR: 75 (01-27-18 @ 06:01) (75 - 86)  BP: 154/79 (01-27-18 @ 05:16) (137/61 - 154/79)  RR: 18 (01-27-18 @ 05:16) (16 - 18)  SpO2: 97% (01-27-18 @ 06:01) (95% - 97%)  Wt(kg): --        I&O's Detail    26 Jan 2018 07:01  -  27 Jan 2018 07:00  --------------------------------------------------------  IN:    Oral Fluid: 550 mL  Total IN: 550 mL    OUT:    Indwelling Catheter - Urethral: 2300 mL    Voided: 2000 mL  Total OUT: 4300 mL    Total NET: -3750 mL               PHYSICAL EXAM:    GENERAL: comfortable  EYES: EOMI, PERRLA, conjunctiva and sclera clear  NECK: Supple, no inc in JVP  CHEST/LUNG: Clear  HEART: S1S2  ABDOMEN: Soft, Nontender, Nondistended; Bowel sounds present  EXTREMITIES:  no edema  NEURO: no asterixis        LABS:    01-26    134<L>  |  101  |  62<H>  ----------------------------<  223<H>  4.5   |  22  |  3.72<H>    Ca    7.7<L>      26 Jan 2018 06:29          Culture Results:   No growth (01-25 @ 08:44)        Impression:  * DOLORES -- nephrotic proteinuria, vague hx of lupus. NL complements.  Hgb A1C 13.4.   * RIVERA  * Asthma exacerbation  * Hyperglycemia exacerbated by steroids  Recommendations:  * Maintain Barksdale  * Daily BMP  * Follow GN w/u  * Hold Gabapentin if lethargy persists

## 2018-01-27 NOTE — PROGRESS NOTE ADULT - SUBJECTIVE AND OBJECTIVE BOX
INTERVAL HPI/OVERNIGHT EVENTS:  Pt seen and examined at bedside.     Allergies/Intolerance: codeine (Swelling)  penicillin (Anaphylaxis)  penicillins (Swelling)      MEDICATIONS  (STANDING):  ALBUTerol    90 MICROgram(s) HFA Inhaler 1 Puff(s) Inhalation every 4 hours  ALBUTerol/ipratropium for Nebulization 3 milliLiter(s) Nebulizer every 6 hours  amLODIPine   Tablet 10 milliGRAM(s) Oral daily  aspirin 325 milliGRAM(s) Oral daily  atorvastatin 10 milliGRAM(s) Oral at bedtime  buDESOnide 160 MICROgram(s)/formoterol 4.5 MICROgram(s) Inhaler 2 Puff(s) Inhalation two times a day  dextrose 5%. 1000 milliLiter(s) (50 mL/Hr) IV Continuous <Continuous>  dextrose 50% Injectable 12.5 Gram(s) IV Push once  dextrose 50% Injectable 25 Gram(s) IV Push once  dextrose 50% Injectable 25 Gram(s) IV Push once  docusate sodium 100 milliGRAM(s) Oral three times a day  heparin  Injectable 5000 Unit(s) SubCutaneous every 12 hours  hydrALAZINE 25 milliGRAM(s) Oral three times a day  insulin glargine Injectable (LANTUS) 25 Unit(s) SubCutaneous at bedtime  insulin lispro (HumaLOG) corrective regimen sliding scale   SubCutaneous three times a day before meals  insulin lispro (HumaLOG) corrective regimen sliding scale   SubCutaneous at bedtime  insulin lispro Injectable (HumaLOG) 7 Unit(s) SubCutaneous before breakfast  insulin lispro Injectable (HumaLOG) 7 Unit(s) SubCutaneous before lunch  insulin lispro Injectable (HumaLOG) 7 Unit(s) SubCutaneous before dinner  insulin NPH human recombinant 12 Unit(s) SubCutaneous before breakfast  insulin NPH human recombinant 5 Unit(s) SubCutaneous at bedtime  labetalol 200 milliGRAM(s) Oral two times a day  lidocaine   Patch 1 Patch Transdermal daily  montelukast 10 milliGRAM(s) Oral daily  polyethylene glycol 3350 17 Gram(s) Oral daily  predniSONE   Tablet 40 milliGRAM(s) Oral daily  senna 2 Tablet(s) Oral at bedtime  tamsulosin 0.4 milliGRAM(s) Oral at bedtime  tiotropium 18 MICROgram(s) Capsule 1 Capsule(s) Inhalation daily    MEDICATIONS  (PRN):  acetaminophen   Tablet 650 milliGRAM(s) Oral every 6 hours PRN mild pain  dextrose Gel 1 Dose(s) Oral once PRN Blood Glucose LESS THAN 70 milliGRAM(s)/deciliter  glucagon  Injectable 1 milliGRAM(s) IntraMuscular once PRN Glucose LESS THAN 70 milligrams/deciliter        ROS: all systems reviewed and wnl      PHYSICAL EXAMINATION:  Vital Signs Last 24 Hrs  T(C): 36.4 (27 Jan 2018 05:16), Max: 36.9 (27 Jan 2018 00:18)  T(F): 97.6 (27 Jan 2018 05:16), Max: 98.4 (27 Jan 2018 00:18)  HR: 75 (27 Jan 2018 11:29) (75 - 86)  BP: 126/55 (27 Jan 2018 11:16) (126/55 - 154/79)  BP(mean): --  RR: 18 (27 Jan 2018 05:16) (16 - 18)  SpO2: 97% (27 Jan 2018 11:29) (95% - 97%)  CAPILLARY BLOOD GLUCOSE      POCT Blood Glucose.: 254 mg/dL (27 Jan 2018 11:11)  POCT Blood Glucose.: 149 mg/dL (27 Jan 2018 07:44)  POCT Blood Glucose.: 196 mg/dL (26 Jan 2018 22:10)  POCT Blood Glucose.: 338 mg/dL (26 Jan 2018 17:11)  POCT Blood Glucose.: 297 mg/dL (26 Jan 2018 12:13)      01-26 @ 07:01  -  01-27 @ 07:00  --------------------------------------------------------  IN: 550 mL / OUT: 4300 mL / NET: -3750 mL        GENERAL:   NECK: supple, No JVD  CHEST/LUNG: clear to auscultation bilaterally; no rales, rhonchi, or wheezing b/l  HEART: normal S1, S2  ABDOMEN: BS+, soft, ND, NT   EXTREMITIES:  pulses palpable; no clubbing, cyanosis, or edema b/l LEs  SKIN: no rashes or lesions      LABS:                        10.3   13.36 )-----------( 296      ( 27 Jan 2018 07:41 )             32.0     01-27    138  |  105  |  67<H>  ----------------------------<  145<H>  4.3   |  24  |  4.09<H>    Ca    7.5<L>      27 Jan 2018 07:41 INTERVAL HPI/OVERNIGHT EVENTS:  Pt seen and examined at bedside.     Allergies/Intolerance: codeine (Swelling)  penicillin (Anaphylaxis)  penicillins (Swelling)      MEDICATIONS  (STANDING):  ALBUTerol    90 MICROgram(s) HFA Inhaler 1 Puff(s) Inhalation every 4 hours  ALBUTerol/ipratropium for Nebulization 3 milliLiter(s) Nebulizer every 6 hours  amLODIPine   Tablet 10 milliGRAM(s) Oral daily  aspirin 325 milliGRAM(s) Oral daily  atorvastatin 10 milliGRAM(s) Oral at bedtime  buDESOnide 160 MICROgram(s)/formoterol 4.5 MICROgram(s) Inhaler 2 Puff(s) Inhalation two times a day  dextrose 5%. 1000 milliLiter(s) (50 mL/Hr) IV Continuous <Continuous>  dextrose 50% Injectable 12.5 Gram(s) IV Push once  dextrose 50% Injectable 25 Gram(s) IV Push once  dextrose 50% Injectable 25 Gram(s) IV Push once  docusate sodium 100 milliGRAM(s) Oral three times a day  heparin  Injectable 5000 Unit(s) SubCutaneous every 12 hours  hydrALAZINE 25 milliGRAM(s) Oral three times a day  insulin glargine Injectable (LANTUS) 25 Unit(s) SubCutaneous at bedtime  insulin lispro (HumaLOG) corrective regimen sliding scale   SubCutaneous three times a day before meals  insulin lispro (HumaLOG) corrective regimen sliding scale   SubCutaneous at bedtime  insulin lispro Injectable (HumaLOG) 7 Unit(s) SubCutaneous before breakfast  insulin lispro Injectable (HumaLOG) 7 Unit(s) SubCutaneous before lunch  insulin lispro Injectable (HumaLOG) 7 Unit(s) SubCutaneous before dinner  insulin NPH human recombinant 12 Unit(s) SubCutaneous before breakfast  insulin NPH human recombinant 5 Unit(s) SubCutaneous at bedtime  labetalol 200 milliGRAM(s) Oral two times a day  lidocaine   Patch 1 Patch Transdermal daily  montelukast 10 milliGRAM(s) Oral daily  polyethylene glycol 3350 17 Gram(s) Oral daily  predniSONE   Tablet 40 milliGRAM(s) Oral daily  senna 2 Tablet(s) Oral at bedtime  tamsulosin 0.4 milliGRAM(s) Oral at bedtime  tiotropium 18 MICROgram(s) Capsule 1 Capsule(s) Inhalation daily    MEDICATIONS  (PRN):  acetaminophen   Tablet 650 milliGRAM(s) Oral every 6 hours PRN mild pain  dextrose Gel 1 Dose(s) Oral once PRN Blood Glucose LESS THAN 70 milliGRAM(s)/deciliter  glucagon  Injectable 1 milliGRAM(s) IntraMuscular once PRN Glucose LESS THAN 70 milligrams/deciliter        ROS: all systems reviewed and wnl      PHYSICAL EXAMINATION:  Vital Signs Last 24 Hrs  T(C): 36.4 (27 Jan 2018 05:16), Max: 36.9 (27 Jan 2018 00:18)  T(F): 97.6 (27 Jan 2018 05:16), Max: 98.4 (27 Jan 2018 00:18)  HR: 75 (27 Jan 2018 11:29) (75 - 86)  BP: 126/55 (27 Jan 2018 11:16) (126/55 - 154/79)  BP(mean): --  RR: 18 (27 Jan 2018 05:16) (16 - 18)  SpO2: 97% (27 Jan 2018 11:29) (95% - 97%)  CAPILLARY BLOOD GLUCOSE      POCT Blood Glucose.: 254 mg/dL (27 Jan 2018 11:11)  POCT Blood Glucose.: 149 mg/dL (27 Jan 2018 07:44)  POCT Blood Glucose.: 196 mg/dL (26 Jan 2018 22:10)  POCT Blood Glucose.: 338 mg/dL (26 Jan 2018 17:11)  POCT Blood Glucose.: 297 mg/dL (26 Jan 2018 12:13)      01-26 @ 07:01  -  01-27 @ 07:00  --------------------------------------------------------  IN: 550 mL / OUT: 4300 mL / NET: -3750 mL        GENERAL: asleep in bed, NAD, comfortable, hsieh in place, yellow urine   NECK: supple, No JVD  CHEST/LUNG: clear to auscultation bilaterally; no rales, rhonchi, or wheezing b/l  HEART: normal S1, S2  ABDOMEN: BS+, soft, ND, NT   EXTREMITIES:  pulses palpable; no clubbing, cyanosis, or edema b/l LEs  SKIN: no rashes or lesions      LABS:                        10.3   13.36 )-----------( 296      ( 27 Jan 2018 07:41 )             32.0     01-27    138  |  105  |  67<H>  ----------------------------<  145<H>  4.3   |  24  |  4.09<H>    Ca    7.5<L>      27 Jan 2018 07:41

## 2018-01-28 LAB
ANION GAP SERPL CALC-SCNC: 10 MMOL/L — SIGNIFICANT CHANGE UP (ref 5–17)
APPEARANCE UR: CLEAR — SIGNIFICANT CHANGE UP
BACTERIA # UR AUTO: ABNORMAL
BILIRUB UR-MCNC: NEGATIVE — SIGNIFICANT CHANGE UP
BUN SERPL-MCNC: 69 MG/DL — HIGH (ref 7–23)
CALCIUM SERPL-MCNC: 7.5 MG/DL — LOW (ref 8.5–10.1)
CHLORIDE SERPL-SCNC: 106 MMOL/L — SIGNIFICANT CHANGE UP (ref 96–108)
CO2 SERPL-SCNC: 21 MMOL/L — LOW (ref 22–31)
COLOR SPEC: YELLOW — SIGNIFICANT CHANGE UP
CREAT SERPL-MCNC: 3.88 MG/DL — HIGH (ref 0.5–1.3)
DIFF PNL FLD: ABNORMAL
EPI CELLS # UR: SIGNIFICANT CHANGE UP
GLUCOSE BLDC GLUCOMTR-MCNC: 166 MG/DL — HIGH (ref 70–99)
GLUCOSE BLDC GLUCOMTR-MCNC: 206 MG/DL — HIGH (ref 70–99)
GLUCOSE BLDC GLUCOMTR-MCNC: 221 MG/DL — HIGH (ref 70–99)
GLUCOSE BLDC GLUCOMTR-MCNC: 297 MG/DL — HIGH (ref 70–99)
GLUCOSE SERPL-MCNC: 181 MG/DL — HIGH (ref 70–99)
GLUCOSE UR QL: 100 MG/DL
GRAN CASTS # UR COMP ASSIST: ABNORMAL /LPF
HCT VFR BLD CALC: 31.8 % — LOW (ref 34.5–45)
HGB BLD-MCNC: 10.3 G/DL — LOW (ref 11.5–15.5)
KETONES UR-MCNC: NEGATIVE — SIGNIFICANT CHANGE UP
LEUKOCYTE ESTERASE UR-ACNC: NEGATIVE — SIGNIFICANT CHANGE UP
MCHC RBC-ENTMCNC: 23.5 PG — LOW (ref 27–34)
MCHC RBC-ENTMCNC: 32.4 GM/DL — SIGNIFICANT CHANGE UP (ref 32–36)
MCV RBC AUTO: 72.6 FL — LOW (ref 80–100)
NITRITE UR-MCNC: NEGATIVE — SIGNIFICANT CHANGE UP
NRBC # BLD: 0 /100 WBCS — SIGNIFICANT CHANGE UP (ref 0–0)
PH UR: 5 — SIGNIFICANT CHANGE UP (ref 5–8)
PLATELET # BLD AUTO: 300 K/UL — SIGNIFICANT CHANGE UP (ref 150–400)
POTASSIUM SERPL-MCNC: 4.7 MMOL/L — SIGNIFICANT CHANGE UP (ref 3.5–5.3)
POTASSIUM SERPL-SCNC: 4.7 MMOL/L — SIGNIFICANT CHANGE UP (ref 3.5–5.3)
PROT UR-MCNC: 500 MG/DL
RBC # BLD: 4.38 M/UL — SIGNIFICANT CHANGE UP (ref 3.8–5.2)
RBC # FLD: 14.6 % — HIGH (ref 10.3–14.5)
RBC CASTS # UR COMP ASSIST: SIGNIFICANT CHANGE UP /HPF (ref 0–4)
SODIUM SERPL-SCNC: 137 MMOL/L — SIGNIFICANT CHANGE UP (ref 135–145)
SP GR SPEC: 1.01 — SIGNIFICANT CHANGE UP (ref 1.01–1.02)
UROBILINOGEN FLD QL: NEGATIVE MG/DL — SIGNIFICANT CHANGE UP
WBC # BLD: 13.53 K/UL — HIGH (ref 3.8–10.5)
WBC # FLD AUTO: 13.53 K/UL — HIGH (ref 3.8–10.5)

## 2018-01-28 PROCEDURE — 99232 SBSQ HOSP IP/OBS MODERATE 35: CPT

## 2018-01-28 RX ORDER — TIOTROPIUM BROMIDE 18 UG/1
1 CAPSULE ORAL; RESPIRATORY (INHALATION) DAILY
Qty: 0 | Refills: 0 | Status: DISCONTINUED | OUTPATIENT
Start: 2018-01-28 | End: 2018-01-31

## 2018-01-28 RX ORDER — ALBUTEROL 90 UG/1
1 AEROSOL, METERED ORAL EVERY 4 HOURS
Qty: 0 | Refills: 0 | Status: DISCONTINUED | OUTPATIENT
Start: 2018-01-28 | End: 2018-01-31

## 2018-01-28 RX ADMIN — Medication 3 MILLILITER(S): at 12:00

## 2018-01-28 RX ADMIN — Medication 200 MILLIGRAM(S): at 05:40

## 2018-01-28 RX ADMIN — Medication 4: at 07:56

## 2018-01-28 RX ADMIN — Medication 100 MILLIGRAM(S): at 05:40

## 2018-01-28 RX ADMIN — INSULIN GLARGINE 25 UNIT(S): 100 INJECTION, SOLUTION SUBCUTANEOUS at 23:09

## 2018-01-28 RX ADMIN — Medication 25 MILLIGRAM(S): at 05:39

## 2018-01-28 RX ADMIN — HUMAN INSULIN 12 UNIT(S): 100 INJECTION, SUSPENSION SUBCUTANEOUS at 07:57

## 2018-01-28 RX ADMIN — MONTELUKAST 10 MILLIGRAM(S): 4 TABLET, CHEWABLE ORAL at 22:14

## 2018-01-28 RX ADMIN — HEPARIN SODIUM 5000 UNIT(S): 5000 INJECTION INTRAVENOUS; SUBCUTANEOUS at 05:41

## 2018-01-28 RX ADMIN — Medication 325 MILLIGRAM(S): at 11:53

## 2018-01-28 RX ADMIN — Medication 6: at 16:33

## 2018-01-28 RX ADMIN — Medication 7 UNIT(S): at 16:33

## 2018-01-28 RX ADMIN — Medication 3 MILLILITER(S): at 06:28

## 2018-01-28 RX ADMIN — BUDESONIDE AND FORMOTEROL FUMARATE DIHYDRATE 2 PUFF(S): 160; 4.5 AEROSOL RESPIRATORY (INHALATION) at 05:41

## 2018-01-28 RX ADMIN — SENNA PLUS 2 TABLET(S): 8.6 TABLET ORAL at 22:14

## 2018-01-28 RX ADMIN — ATORVASTATIN CALCIUM 10 MILLIGRAM(S): 80 TABLET, FILM COATED ORAL at 22:14

## 2018-01-28 RX ADMIN — Medication 40 MILLIGRAM(S): at 05:40

## 2018-01-28 RX ADMIN — HUMAN INSULIN 5 UNIT(S): 100 INJECTION, SUSPENSION SUBCUTANEOUS at 23:08

## 2018-01-28 RX ADMIN — Medication 9 UNIT(S): at 07:56

## 2018-01-28 RX ADMIN — Medication 25 MILLIGRAM(S): at 22:14

## 2018-01-28 RX ADMIN — Medication 4: at 11:52

## 2018-01-28 RX ADMIN — Medication 100 MILLIGRAM(S): at 16:34

## 2018-01-28 RX ADMIN — POLYETHYLENE GLYCOL 3350 17 GRAM(S): 17 POWDER, FOR SOLUTION ORAL at 22:16

## 2018-01-28 RX ADMIN — Medication 650 MILLIGRAM(S): at 01:46

## 2018-01-28 RX ADMIN — Medication 25 MILLIGRAM(S): at 16:34

## 2018-01-28 RX ADMIN — Medication 200 MILLIGRAM(S): at 17:54

## 2018-01-28 RX ADMIN — TAMSULOSIN HYDROCHLORIDE 0.4 MILLIGRAM(S): 0.4 CAPSULE ORAL at 22:14

## 2018-01-28 RX ADMIN — ALBUTEROL 1 PUFF(S): 90 AEROSOL, METERED ORAL at 22:16

## 2018-01-28 RX ADMIN — Medication 100 MILLIGRAM(S): at 22:14

## 2018-01-28 RX ADMIN — AMLODIPINE BESYLATE 10 MILLIGRAM(S): 2.5 TABLET ORAL at 05:40

## 2018-01-28 RX ADMIN — HEPARIN SODIUM 5000 UNIT(S): 5000 INJECTION INTRAVENOUS; SUBCUTANEOUS at 17:55

## 2018-01-28 RX ADMIN — TIOTROPIUM BROMIDE 1 CAPSULE(S): 18 CAPSULE ORAL; RESPIRATORY (INHALATION) at 17:54

## 2018-01-28 RX ADMIN — BUDESONIDE AND FORMOTEROL FUMARATE DIHYDRATE 2 PUFF(S): 160; 4.5 AEROSOL RESPIRATORY (INHALATION) at 17:55

## 2018-01-28 RX ADMIN — Medication 9 UNIT(S): at 11:52

## 2018-01-28 RX ADMIN — ALBUTEROL 1 PUFF(S): 90 AEROSOL, METERED ORAL at 17:55

## 2018-01-28 NOTE — PROGRESS NOTE ADULT - SUBJECTIVE AND OBJECTIVE BOX
Subjective: c/o foul smell of urine      MEDICATIONS  (STANDING):  ALBUTerol    90 MICROgram(s) HFA Inhaler 1 Puff(s) Inhalation every 4 hours  ALBUTerol/ipratropium for Nebulization 3 milliLiter(s) Nebulizer every 6 hours  amLODIPine   Tablet 10 milliGRAM(s) Oral daily  aspirin 325 milliGRAM(s) Oral daily  atorvastatin 10 milliGRAM(s) Oral at bedtime  buDESOnide 160 MICROgram(s)/formoterol 4.5 MICROgram(s) Inhaler 2 Puff(s) Inhalation two times a day  dextrose 5%. 1000 milliLiter(s) (50 mL/Hr) IV Continuous <Continuous>  dextrose 50% Injectable 12.5 Gram(s) IV Push once  dextrose 50% Injectable 25 Gram(s) IV Push once  dextrose 50% Injectable 25 Gram(s) IV Push once  docusate sodium 100 milliGRAM(s) Oral three times a day  heparin  Injectable 5000 Unit(s) SubCutaneous every 12 hours  hydrALAZINE 25 milliGRAM(s) Oral three times a day  insulin glargine Injectable (LANTUS) 25 Unit(s) SubCutaneous at bedtime  insulin lispro (HumaLOG) corrective regimen sliding scale   SubCutaneous three times a day before meals  insulin lispro (HumaLOG) corrective regimen sliding scale   SubCutaneous at bedtime  insulin lispro Injectable (HumaLOG) 9 Unit(s) SubCutaneous before breakfast  insulin lispro Injectable (HumaLOG) 9 Unit(s) SubCutaneous before lunch  insulin lispro Injectable (HumaLOG) 7 Unit(s) SubCutaneous before dinner  insulin NPH human recombinant 12 Unit(s) SubCutaneous before breakfast  insulin NPH human recombinant 5 Unit(s) SubCutaneous at bedtime  labetalol 200 milliGRAM(s) Oral two times a day  lidocaine   Patch 1 Patch Transdermal daily  montelukast 10 milliGRAM(s) Oral daily  polyethylene glycol 3350 17 Gram(s) Oral daily  predniSONE   Tablet 40 milliGRAM(s) Oral daily  senna 2 Tablet(s) Oral at bedtime  tamsulosin 0.4 milliGRAM(s) Oral at bedtime  tiotropium 18 MICROgram(s) Capsule 1 Capsule(s) Inhalation daily    MEDICATIONS  (PRN):  acetaminophen   Tablet 650 milliGRAM(s) Oral every 6 hours PRN mild pain  dextrose Gel 1 Dose(s) Oral once PRN Blood Glucose LESS THAN 70 milliGRAM(s)/deciliter  glucagon  Injectable 1 milliGRAM(s) IntraMuscular once PRN Glucose LESS THAN 70 milligrams/deciliter          T(C): 36.7 (01-28-18 @ 05:02), Max: 36.8 (01-27-18 @ 17:57)  HR: 75 (01-28-18 @ 07:00) (71 - 82)  BP: 160/74 (01-28-18 @ 05:02) (119/56 - 169/82)  RR: 18 (01-28-18 @ 05:02) (18 - 18)  SpO2: 95% (01-28-18 @ 07:00) (93% - 97%)  Wt(kg): --        I&O's Detail    27 Jan 2018 07:01  -  28 Jan 2018 07:00  --------------------------------------------------------  IN:    Oral Fluid: 100 mL  Total IN: 100 mL    OUT:    Indwelling Catheter - Urethral: 2900 mL  Total OUT: 2900 mL    Total NET: -2800 mL               PHYSICAL EXAM:    GENERAL: comfortable  EYES: EOMI, PERRLA, conjunctiva and sclera clear  NECK: Supple, no inc in JVP  CHEST/LUNG: Clear  HEART: S1S2  ABDOMEN: Soft, Nontender, Nondistended; Bowel sounds present  EXTREMITIES:  no edema  NEURO: no asterixis        LABS:  CBC Full  -  ( 28 Jan 2018 07:59 )  WBC Count : 13.53 K/uL  Hemoglobin : 10.3 g/dL  Hematocrit : 31.8 %  Platelet Count - Automated : 300 K/uL  Mean Cell Volume : 72.6 fl  Mean Cell Hemoglobin : 23.5 pg  Mean Cell Hemoglobin Concentration : 32.4 gm/dL  Auto Neutrophil # : x  Auto Lymphocyte # : x  Auto Monocyte # : x  Auto Eosinophil # : x  Auto Basophil # : x  Auto Neutrophil % : x  Auto Lymphocyte % : x  Auto Monocyte % : x  Auto Eosinophil % : x  Auto Basophil % : x    01-28    137  |  106  |  69<H>  ----------------------------<  181<H>  4.7   |  21<L>  |  3.88<H>    Ca    7.5<L>      28 Jan 2018 07:59          Impression:  * DOLORES -- nephrotic proteinuria, vague hx of lupus. NL complements.  Hgb A1C 13.4. Cr stable  * RIVERA  * Asthma exacerbation  * Hyperglycemia exacerbated by steroids  Recommendations:  * Maintain [place]  * Check UA, Urine Cx  * Daily BMP  * Follow GN w/u

## 2018-01-28 NOTE — PROGRESS NOTE ADULT - SUBJECTIVE AND OBJECTIVE BOX
Patient is a 72y old  Female who presents with a chief complaint of chest pain (2018 18:02)      INTERVAL HPI/OVERNIGHT EVENTS:  pt  seen   at   bed   side   MEDICATIONS  (STANDING):  ALBUTerol    90 MICROgram(s) HFA Inhaler 1 Puff(s) Inhalation every 4 hours  amLODIPine   Tablet 10 milliGRAM(s) Oral daily  aspirin 325 milliGRAM(s) Oral daily  atorvastatin 10 milliGRAM(s) Oral at bedtime  buDESOnide 160 MICROgram(s)/formoterol 4.5 MICROgram(s) Inhaler 2 Puff(s) Inhalation two times a day  dextrose 5%. 1000 milliLiter(s) (50 mL/Hr) IV Continuous <Continuous>  dextrose 50% Injectable 12.5 Gram(s) IV Push once  dextrose 50% Injectable 25 Gram(s) IV Push once  dextrose 50% Injectable 25 Gram(s) IV Push once  docusate sodium 100 milliGRAM(s) Oral three times a day  heparin  Injectable 5000 Unit(s) SubCutaneous every 12 hours  hydrALAZINE 25 milliGRAM(s) Oral three times a day  insulin glargine Injectable (LANTUS) 25 Unit(s) SubCutaneous at bedtime  insulin lispro (HumaLOG) corrective regimen sliding scale   SubCutaneous three times a day before meals  insulin lispro (HumaLOG) corrective regimen sliding scale   SubCutaneous at bedtime  insulin lispro Injectable (HumaLOG) 9 Unit(s) SubCutaneous before breakfast  insulin lispro Injectable (HumaLOG) 9 Unit(s) SubCutaneous before lunch  insulin lispro Injectable (HumaLOG) 7 Unit(s) SubCutaneous before dinner  insulin NPH human recombinant 12 Unit(s) SubCutaneous before breakfast  insulin NPH human recombinant 5 Unit(s) SubCutaneous at bedtime  labetalol 200 milliGRAM(s) Oral two times a day  lidocaine   Patch 1 Patch Transdermal daily  montelukast 10 milliGRAM(s) Oral daily  polyethylene glycol 3350 17 Gram(s) Oral daily  predniSONE   Tablet 40 milliGRAM(s) Oral daily  senna 2 Tablet(s) Oral at bedtime  tamsulosin 0.4 milliGRAM(s) Oral at bedtime  tiotropium 18 MICROgram(s) Capsule 1 Capsule(s) Inhalation daily    MEDICATIONS  (PRN):  acetaminophen   Tablet 650 milliGRAM(s) Oral every 6 hours PRN mild pain  dextrose Gel 1 Dose(s) Oral once PRN Blood Glucose LESS THAN 70 milliGRAM(s)/deciliter  glucagon  Injectable 1 milliGRAM(s) IntraMuscular once PRN Glucose LESS THAN 70 milligrams/deciliter      Allergies    codeine (Swelling)  penicillin (Anaphylaxis)  penicillins (Swelling)    Intolerances          Vital Signs Last 24 Hrs  T(C): 36.6 (2018 10:52), Max: 36.8 (2018 17:57)  T(F): 97.8 (2018 10:52), Max: 98.3 (2018 17:57)  HR: 72 (2018 12:02) (70 - 82)  BP: 141/71 (2018 10:52) (141/71 - 169/82)  BP(mean): --  RR: 16 (2018 10:52) (16 - 18)  SpO2: 92% (2018 12:02) (92% - 97%)    PHYSICAL EXAM:  pt  is   AX O X 3   Heart - S1 , S2  +  Lung- B/L  air  entry   ABD- Obese, soft , BS+   EXT- No edema   [place]   catheter  in place   LABS:                        10.3   13.53 )-----------( 300      ( 2018 07:59 )             31.8         137  |  106  |  69<H>  ----------------------------<  181<H>  4.7   |  21<L>  |  3.88<H>    Ca    7.5<L>      2018 07:59        Urinalysis Basic - ( 2018 13:42 )    Color: Yellow / Appearance: Clear / S.015 / pH: x  Gluc: x / Ketone: Negative  / Bili: Negative / Urobili: Negative mg/dL   Blood: x / Protein: 500 mg/dL / Nitrite: Negative   Leuk Esterase: Negative / RBC: 0-2 /HPF / WBC x   Sq Epi: x / Non Sq Epi: Few / Bacteria: Few      CAPILLARY BLOOD GLUCOSE      POCT Blood Glucose.: 221 mg/dL (2018 11:52)  POCT Blood Glucose.: 206 mg/dL (2018 07:55)  POCT Blood Glucose.: 204 mg/dL (2018 22:33)  POCT Blood Glucose.: 230 mg/dL (2018 16:55)    Lipid panel:           TSH     RADIOLOGY & ADDITIONAL TESTS:    Imaging Personally Reviewed:  [ ] YES  [ ] NO    Consultant(s) Notes Reviewed:  [ ] YES  [ ] NO    Care Discussed with Consultants/Other Providers [ ] YES  [ ] NO

## 2018-01-28 NOTE — PROGRESS NOTE ADULT - SUBJECTIVE AND OBJECTIVE BOX
Patient is a 72y old  Female who presents with a chief complaint of chest pain (22 Jan 2018 18:02)      INTERVAL HPI/OVERNIGHT EVENTS:no complaints  eating well    MEDICATIONS  (STANDING):  ALBUTerol    90 MICROgram(s) HFA Inhaler 1 Puff(s) Inhalation every 4 hours  ALBUTerol/ipratropium for Nebulization 3 milliLiter(s) Nebulizer every 6 hours  amLODIPine   Tablet 10 milliGRAM(s) Oral daily  aspirin 325 milliGRAM(s) Oral daily  atorvastatin 10 milliGRAM(s) Oral at bedtime  buDESOnide 160 MICROgram(s)/formoterol 4.5 MICROgram(s) Inhaler 2 Puff(s) Inhalation two times a day  dextrose 5%. 1000 milliLiter(s) (50 mL/Hr) IV Continuous <Continuous>  dextrose 50% Injectable 12.5 Gram(s) IV Push once  dextrose 50% Injectable 25 Gram(s) IV Push once  dextrose 50% Injectable 25 Gram(s) IV Push once  docusate sodium 100 milliGRAM(s) Oral three times a day  heparin  Injectable 5000 Unit(s) SubCutaneous every 12 hours  hydrALAZINE 25 milliGRAM(s) Oral three times a day  insulin glargine Injectable (LANTUS) 25 Unit(s) SubCutaneous at bedtime  insulin lispro (HumaLOG) corrective regimen sliding scale   SubCutaneous three times a day before meals  insulin lispro (HumaLOG) corrective regimen sliding scale   SubCutaneous at bedtime  insulin lispro Injectable (HumaLOG) 9 Unit(s) SubCutaneous before breakfast  insulin lispro Injectable (HumaLOG) 9 Unit(s) SubCutaneous before lunch  insulin lispro Injectable (HumaLOG) 7 Unit(s) SubCutaneous before dinner  insulin NPH human recombinant 12 Unit(s) SubCutaneous before breakfast  insulin NPH human recombinant 5 Unit(s) SubCutaneous at bedtime  labetalol 200 milliGRAM(s) Oral two times a day  lidocaine   Patch 1 Patch Transdermal daily  montelukast 10 milliGRAM(s) Oral daily  polyethylene glycol 3350 17 Gram(s) Oral daily  predniSONE   Tablet 40 milliGRAM(s) Oral daily  senna 2 Tablet(s) Oral at bedtime  tamsulosin 0.4 milliGRAM(s) Oral at bedtime  tiotropium 18 MICROgram(s) Capsule 1 Capsule(s) Inhalation daily    MEDICATIONS  (PRN):  acetaminophen   Tablet 650 milliGRAM(s) Oral every 6 hours PRN mild pain  dextrose Gel 1 Dose(s) Oral once PRN Blood Glucose LESS THAN 70 milliGRAM(s)/deciliter  glucagon  Injectable 1 milliGRAM(s) IntraMuscular once PRN Glucose LESS THAN 70 milligrams/deciliter      REVIEW OF SYSTEMS:  CONSTITUTIONAL: No fever, weight loss, or fatigue  RESPIRATORY: No cough, wheezing, chills or hemoptysis; No shortness of breath  CARDIOVASCULAR: No chest pain, palpitations, dizziness, or leg swelling  GASTROINTESTINAL: No abdominal or epigastric pain. No nausea, vomiting, or hematemesis; No diarrhea or constipation. No melena or hematochezia.  ENDOCRINE: No heat or cold intolerance; No hair loss      Vital Signs Last 24 Hrs  T(C): 36.4 (27 Jan 2018 12:19), Max: 36.9 (27 Jan 2018 00:18)  T(F): 97.6 (27 Jan 2018 12:19), Max: 98.4 (27 Jan 2018 00:18)  HR: 74 (27 Jan 2018 13:31) (71 - 86)  BP: 127/72 (27 Jan 2018 13:31) (119/56 - 154/79)  BP(mean): --  RR: 18 (27 Jan 2018 12:19) (17 - 18)  SpO2: 95% (27 Jan 2018 12:19) (95% - 97%)    PHYSICAL EXAM:  GENERAL: NAD, well-groomed, well-developed  CHEST/LUNG: Clear to percussion bilaterally; No rales, rhonchi, wheezing, or rubs  HEART: Regular rate and rhythm; No murmurs, rubs, or gallops      LABS:                        10.3   13.36 )-----------( 296      ( 27 Jan 2018 07:41 )             32.0     01-27    138  |  105  |  67<H>  ----------------------------<  145<H>  4.3   |  24  |  4.09<H>    Ca    7.5<L>      27 Jan 2018 07:41          CAPILLARY BLOOD GLUCOSE      POCT Blood Glucose.: 254 mg/dL (27 Jan 2018 11:11)  POCT Blood Glucose.: 149 mg/dL (27 Jan 2018 07:44)  POCT Blood Glucose.: 196 mg/dL (26 Jan 2018 22:10)  POCT Blood Glucose.: 338 mg/dL (26 Jan 2018 17:11)    Lipid panel:               RADIOLOGY & ADDITIONAL TESTS:

## 2018-01-28 NOTE — PROGRESS NOTE ADULT - SUBJECTIVE AND OBJECTIVE BOX
Patient seen and examined bedside resting comfortably.  No fevers but +productive cough.    T(F): 97.8 (01-28-18 @ 10:52), Max: 98.3 (01-27-18 @ 17:57)  HR: 72 (01-28-18 @ 12:02) (70 - 82)  BP: 141/71 (01-28-18 @ 10:52) (141/71 - 169/82)  RR: 16 (01-28-18 @ 10:52) (16 - 18)  SpO2: 92% (01-28-18 @ 12:02) (92% - 97%)  Wt(kg): --  CAPILLARY BLOOD GLUCOSE      POCT Blood Glucose.: 221 mg/dL (28 Jan 2018 11:52)  POCT Blood Glucose.: 206 mg/dL (28 Jan 2018 07:55)  POCT Blood Glucose.: 204 mg/dL (27 Jan 2018 22:33)  POCT Blood Glucose.: 230 mg/dL (27 Jan 2018 16:55)      PHYSICAL EXAM:  General: NAD, alert and awake  HEENT: NCAT, EOMI, conjunctiva clear  Chest: nonlabored respirations  Abdomen: soft, NTND.   Extremities: no pedal edema or calf tenderness noted   : hsieh catheter indwelling draining clear urine     LABS:                        10.3   13.53 )-----------( 300      ( 28 Jan 2018 07:59 )             31.8   01-28    137  |  106  |  69<H>  ----------------------------<  181<H>  4.7   |  21<L>  |  3.88<H>    Ca    7.5<L>      28 Jan 2018 07:59      I&O's Detail    27 Jan 2018 07:01  -  28 Jan 2018 07:00  --------------------------------------------------------  IN:    Oral Fluid: 100 mL  Total IN: 100 mL    OUT:    Indwelling Catheter - Urethral: 2900 mL  Total OUT: 2900 mL    Total NET: -2800 mL      28 Jan 2018 07:01  -  28 Jan 2018 15:59  --------------------------------------------------------  IN:    Oral Fluid: 350 mL  Total IN: 350 mL    OUT:  Total OUT: 0 mL    Total NET: 350 mL        72 year old female with PMH of Dementia, uncontrolled DM, Asthma, CVA, HLD, Neuropathy, SLE and HTN admitted chest pain and wheezing secondary to COPD exacerbation. Found to have urinary retention requiring Hsieh catheter likely due to diabetic neurogenic bladder, with acute on chronic renal failure    - Continue hsieh catheter and monitor urine output and renal function. Nephrology Follow up noted.  - continue medical management and endocrine Follow up for hyperglycemia due to steroids

## 2018-01-29 DIAGNOSIS — R53.1 WEAKNESS: ICD-10-CM

## 2018-01-29 DIAGNOSIS — Z29.9 ENCOUNTER FOR PROPHYLACTIC MEASURES, UNSPECIFIED: ICD-10-CM

## 2018-01-29 DIAGNOSIS — K59.01 SLOW TRANSIT CONSTIPATION: ICD-10-CM

## 2018-01-29 LAB
ANION GAP SERPL CALC-SCNC: 10 MMOL/L — SIGNIFICANT CHANGE UP (ref 5–17)
BUN SERPL-MCNC: 77 MG/DL — HIGH (ref 7–23)
CALCIUM SERPL-MCNC: 7.6 MG/DL — LOW (ref 8.5–10.1)
CHLORIDE SERPL-SCNC: 107 MMOL/L — SIGNIFICANT CHANGE UP (ref 96–108)
CO2 SERPL-SCNC: 22 MMOL/L — SIGNIFICANT CHANGE UP (ref 22–31)
CREAT SERPL-MCNC: 3.67 MG/DL — HIGH (ref 0.5–1.3)
CULTURE RESULTS: NO GROWTH — SIGNIFICANT CHANGE UP
GLUCOSE BLDC GLUCOMTR-MCNC: 158 MG/DL — HIGH (ref 70–99)
GLUCOSE BLDC GLUCOMTR-MCNC: 165 MG/DL — HIGH (ref 70–99)
GLUCOSE BLDC GLUCOMTR-MCNC: 212 MG/DL — HIGH (ref 70–99)
GLUCOSE BLDC GLUCOMTR-MCNC: 232 MG/DL — HIGH (ref 70–99)
GLUCOSE SERPL-MCNC: 155 MG/DL — HIGH (ref 70–99)
HCT VFR BLD CALC: 32.1 % — LOW (ref 34.5–45)
HGB BLD-MCNC: 10.4 G/DL — LOW (ref 11.5–15.5)
MCHC RBC-ENTMCNC: 23.6 PG — LOW (ref 27–34)
MCHC RBC-ENTMCNC: 32.4 GM/DL — SIGNIFICANT CHANGE UP (ref 32–36)
MCV RBC AUTO: 72.8 FL — LOW (ref 80–100)
NRBC # BLD: 0 /100 WBCS — SIGNIFICANT CHANGE UP (ref 0–0)
PLATELET # BLD AUTO: 300 K/UL — SIGNIFICANT CHANGE UP (ref 150–400)
POTASSIUM SERPL-MCNC: 4.7 MMOL/L — SIGNIFICANT CHANGE UP (ref 3.5–5.3)
POTASSIUM SERPL-SCNC: 4.7 MMOL/L — SIGNIFICANT CHANGE UP (ref 3.5–5.3)
RBC # BLD: 4.41 M/UL — SIGNIFICANT CHANGE UP (ref 3.8–5.2)
RBC # FLD: 14.8 % — HIGH (ref 10.3–14.5)
SODIUM SERPL-SCNC: 139 MMOL/L — SIGNIFICANT CHANGE UP (ref 135–145)
SPECIMEN SOURCE: SIGNIFICANT CHANGE UP
WBC # BLD: 15.21 K/UL — HIGH (ref 3.8–10.5)
WBC # FLD AUTO: 15.21 K/UL — HIGH (ref 3.8–10.5)

## 2018-01-29 PROCEDURE — 99233 SBSQ HOSP IP/OBS HIGH 50: CPT

## 2018-01-29 RX ORDER — BENZOCAINE AND MENTHOL 5; 1 G/100ML; G/100ML
1 LIQUID ORAL EVERY 4 HOURS
Qty: 0 | Refills: 0 | Status: DISCONTINUED | OUTPATIENT
Start: 2018-01-29 | End: 2018-01-31

## 2018-01-29 RX ORDER — LACTULOSE 10 G/15ML
20 SOLUTION ORAL ONCE
Qty: 0 | Refills: 0 | Status: COMPLETED | OUTPATIENT
Start: 2018-01-29 | End: 2018-01-29

## 2018-01-29 RX ADMIN — Medication 100 MILLIGRAM(S): at 22:24

## 2018-01-29 RX ADMIN — Medication 9 UNIT(S): at 12:52

## 2018-01-29 RX ADMIN — INSULIN GLARGINE 25 UNIT(S): 100 INJECTION, SOLUTION SUBCUTANEOUS at 23:14

## 2018-01-29 RX ADMIN — ALBUTEROL 1 PUFF(S): 90 AEROSOL, METERED ORAL at 14:24

## 2018-01-29 RX ADMIN — Medication 4: at 16:59

## 2018-01-29 RX ADMIN — Medication 25 MILLIGRAM(S): at 14:21

## 2018-01-29 RX ADMIN — Medication 200 MILLIGRAM(S): at 06:40

## 2018-01-29 RX ADMIN — ALBUTEROL 1 PUFF(S): 90 AEROSOL, METERED ORAL at 06:42

## 2018-01-29 RX ADMIN — BUDESONIDE AND FORMOTEROL FUMARATE DIHYDRATE 2 PUFF(S): 160; 4.5 AEROSOL RESPIRATORY (INHALATION) at 17:03

## 2018-01-29 RX ADMIN — Medication 100 MILLIGRAM(S): at 06:40

## 2018-01-29 RX ADMIN — Medication 2: at 08:05

## 2018-01-29 RX ADMIN — Medication 200 MILLIGRAM(S): at 17:57

## 2018-01-29 RX ADMIN — Medication 25 MILLIGRAM(S): at 06:40

## 2018-01-29 RX ADMIN — ALBUTEROL 1 PUFF(S): 90 AEROSOL, METERED ORAL at 12:12

## 2018-01-29 RX ADMIN — Medication 7 UNIT(S): at 16:59

## 2018-01-29 RX ADMIN — LACTULOSE 20 GRAM(S): 10 SOLUTION ORAL at 12:15

## 2018-01-29 RX ADMIN — HEPARIN SODIUM 5000 UNIT(S): 5000 INJECTION INTRAVENOUS; SUBCUTANEOUS at 06:42

## 2018-01-29 RX ADMIN — Medication 325 MILLIGRAM(S): at 12:13

## 2018-01-29 RX ADMIN — Medication 100 MILLIGRAM(S): at 14:21

## 2018-01-29 RX ADMIN — TIOTROPIUM BROMIDE 1 CAPSULE(S): 18 CAPSULE ORAL; RESPIRATORY (INHALATION) at 23:00

## 2018-01-29 RX ADMIN — Medication 100 MILLIGRAM(S): at 22:23

## 2018-01-29 RX ADMIN — BENZOCAINE AND MENTHOL 1 LOZENGE: 5; 1 LIQUID ORAL at 08:12

## 2018-01-29 RX ADMIN — BUDESONIDE AND FORMOTEROL FUMARATE DIHYDRATE 2 PUFF(S): 160; 4.5 AEROSOL RESPIRATORY (INHALATION) at 06:41

## 2018-01-29 RX ADMIN — ALBUTEROL 1 PUFF(S): 90 AEROSOL, METERED ORAL at 17:03

## 2018-01-29 RX ADMIN — BENZOCAINE AND MENTHOL 1 LOZENGE: 5; 1 LIQUID ORAL at 22:25

## 2018-01-29 RX ADMIN — Medication 4: at 12:50

## 2018-01-29 RX ADMIN — Medication 25 MILLIGRAM(S): at 22:22

## 2018-01-29 RX ADMIN — MONTELUKAST 10 MILLIGRAM(S): 4 TABLET, CHEWABLE ORAL at 22:22

## 2018-01-29 RX ADMIN — TAMSULOSIN HYDROCHLORIDE 0.4 MILLIGRAM(S): 0.4 CAPSULE ORAL at 22:22

## 2018-01-29 RX ADMIN — ALBUTEROL 1 PUFF(S): 90 AEROSOL, METERED ORAL at 22:23

## 2018-01-29 RX ADMIN — Medication 40 MILLIGRAM(S): at 06:40

## 2018-01-29 RX ADMIN — HEPARIN SODIUM 5000 UNIT(S): 5000 INJECTION INTRAVENOUS; SUBCUTANEOUS at 17:04

## 2018-01-29 RX ADMIN — HUMAN INSULIN 12 UNIT(S): 100 INJECTION, SUSPENSION SUBCUTANEOUS at 08:05

## 2018-01-29 RX ADMIN — AMLODIPINE BESYLATE 10 MILLIGRAM(S): 2.5 TABLET ORAL at 06:40

## 2018-01-29 RX ADMIN — ATORVASTATIN CALCIUM 10 MILLIGRAM(S): 80 TABLET, FILM COATED ORAL at 22:22

## 2018-01-29 RX ADMIN — Medication 100 MILLIGRAM(S): at 12:15

## 2018-01-29 RX ADMIN — POLYETHYLENE GLYCOL 3350 17 GRAM(S): 17 POWDER, FOR SOLUTION ORAL at 22:23

## 2018-01-29 RX ADMIN — Medication 9 UNIT(S): at 08:05

## 2018-01-29 RX ADMIN — HUMAN INSULIN 5 UNIT(S): 100 INJECTION, SUSPENSION SUBCUTANEOUS at 23:14

## 2018-01-29 RX ADMIN — SENNA PLUS 2 TABLET(S): 8.6 TABLET ORAL at 22:22

## 2018-01-29 RX ADMIN — LIDOCAINE 1 PATCH: 4 CREAM TOPICAL at 12:16

## 2018-01-29 NOTE — PROGRESS NOTE ADULT - SUBJECTIVE AND OBJECTIVE BOX
Patient is a 72y old  Female who presents with a chief complaint of chest pain (2018 18:02)      INTERVAL HPI/ OVERNIGHT EVENTS: Pt was seen and examined at bedside today, No significant overnight events, pt states that her cough with sputum has not significantly improved, although her breathing is has improved since admission. she admits to feeling weak and constipation; she declines laxative.      MEDICATIONS  (STANDING):  ALBUTerol    90 MICROgram(s) HFA Inhaler 1 Puff(s) Inhalation every 4 hours  amLODIPine   Tablet 10 milliGRAM(s) Oral daily  aspirin 325 milliGRAM(s) Oral daily  atorvastatin 10 milliGRAM(s) Oral at bedtime  buDESOnide 160 MICROgram(s)/formoterol 4.5 MICROgram(s) Inhaler 2 Puff(s) Inhalation two times a day  dextrose 5%. 1000 milliLiter(s) (50 mL/Hr) IV Continuous <Continuous>  dextrose 50% Injectable 12.5 Gram(s) IV Push once  dextrose 50% Injectable 25 Gram(s) IV Push once  dextrose 50% Injectable 25 Gram(s) IV Push once  docusate sodium 100 milliGRAM(s) Oral three times a day  heparin  Injectable 5000 Unit(s) SubCutaneous every 12 hours  hydrALAZINE 25 milliGRAM(s) Oral three times a day  insulin glargine Injectable (LANTUS) 25 Unit(s) SubCutaneous at bedtime  insulin lispro (HumaLOG) corrective regimen sliding scale   SubCutaneous three times a day before meals  insulin lispro (HumaLOG) corrective regimen sliding scale   SubCutaneous at bedtime  insulin lispro Injectable (HumaLOG) 9 Unit(s) SubCutaneous before breakfast  insulin lispro Injectable (HumaLOG) 9 Unit(s) SubCutaneous before lunch  insulin lispro Injectable (HumaLOG) 7 Unit(s) SubCutaneous before dinner  insulin NPH human recombinant 12 Unit(s) SubCutaneous before breakfast  insulin NPH human recombinant 5 Unit(s) SubCutaneous at bedtime  labetalol 200 milliGRAM(s) Oral two times a day  lidocaine   Patch 1 Patch Transdermal daily  montelukast 10 milliGRAM(s) Oral daily  polyethylene glycol 3350 17 Gram(s) Oral daily  predniSONE   Tablet 40 milliGRAM(s) Oral daily  senna 2 Tablet(s) Oral at bedtime  tamsulosin 0.4 milliGRAM(s) Oral at bedtime  tiotropium 18 MICROgram(s) Capsule 1 Capsule(s) Inhalation daily    MEDICATIONS  (PRN):  acetaminophen   Tablet 650 milliGRAM(s) Oral every 6 hours PRN mild pain  benzocaine 15 mG/menthol 3.6 mG Lozenge 1 Lozenge Oral every 4 hours PRN Sore Throat  dextrose Gel 1 Dose(s) Oral once PRN Blood Glucose LESS THAN 70 milliGRAM(s)/deciliter  glucagon  Injectable 1 milliGRAM(s) IntraMuscular once PRN Glucose LESS THAN 70 milligrams/deciliter  guaiFENesin    Syrup 100 milliGRAM(s) Oral every 6 hours PRN Cough      Allergies    codeine (Swelling)  penicillin (Anaphylaxis)  penicillins (Swelling)    Intolerances        REVIEW OF SYSTEMS:    Unable to examine due to [ ] Altered Mental Status [ ] Advanced Dementia [ ] Expressive Aphasia [ ] Non-verbal patient    CONSTITUTIONAL: No fever, + generalized weakness/Fatigue, No weight loss  EYES: No eye pain, visual disturbances, or discharge  ENMT:  No difficulty hearing, tinnitus, vertigo; No sinus or throat pain  NECK: No pain or stiffness  RESPIRATORY: + shortness of breath,  +cough, + wheezing   CARDIOVASCULAR: No chest pain, palpitations, or leg swelling  GASTROINTESTINAL: No abdominal pain. No nausea, vomiting, diarrhea or constipation. No melena or hematochezia.  GENITOURINARY: No dysuria, frequency, hematuria, or incontinence  NEUROLOGICAL: No headaches, Dizziness, memory loss, loss of strength, numbness, or tremors  SKIN: No itching, burning, rashes, or lesions   MUSCULOSKELETAL: No joint pain or swelling; No muscle, back, or extremity pain  PSYCHIATRIC: No depression, anxiety, mood swings, or difficulty sleeping  HEME/LYMPH: No easy bruising, or bleeding gums  ALLERGY AND IMMUNOLOGIC: No hives or eczema    Vital Signs Last 24 Hrs  T(C): 36.1 (2018 17:07), Max: 37.1 (2018 23:55)  T(F): 97 (2018 17:07), Max: 98.7 (2018 23:55)  HR: 63 (2018 17:07) (63 - 78)  BP: 135/68 (2018 17:07) (135/68 - 148/75)  BP(mean): --  RR: 18 (2018 17:07) (18 - 18)  SpO2: 99% (2018 17:07) (96% - 100%)    PHYSICAL EXAM:  GENERAL: NAD, well-groomed, well-developed  HEAD:  Atraumatic, Normocephalic  EYES: EOMI, PERRLA, conjunctiva and sclera clear  ENMT: Moist mucous membranes  NECK: Supple, No JVD, Normal thyroid  CHEST/LUNG: decreased breath sounds, Clear to Auscultation bilaterally; No rales, rhonchi, wheezing, or rubs  HEART: Regular rate and rhythm; No murmurs, rubs, or gallops  ABDOMEN: Soft, Nontender, Nondistended; Bowel sounds present  EXTREMITIES:  2+ Peripheral Pulses, No clubbing, cyanosis, or edema  LYMPH: No lymphadenopathy noted  SKIN: No rashes or lesions  NERVOUS SYSTEM:  Alert & Oriented X3, Good concentration; Motor Strength 4/5 throughout     LABS:                        10.4   15.21 )-----------( 300      ( 2018 07:30 )             32.1         139  |  107  |  77<H>  ----------------------------<  155<H>  4.7   |  22  |  3.67<H>    Ca    7.6<L>      2018 07:30        Urinalysis Basic - ( 2018 13:42 )    Color: Yellow / Appearance: Clear / S.015 / pH: x  Gluc: x / Ketone: Negative  / Bili: Negative / Urobili: Negative mg/dL   Blood: x / Protein: 500 mg/dL / Nitrite: Negative   Leuk Esterase: Negative / RBC: 0-2 /HPF / WBC x   Sq Epi: x / Non Sq Epi: Few / Bacteria: Few      CAPILLARY BLOOD GLUCOSE      POCT Blood Glucose.: 212 mg/dL (2018 16:44)  POCT Blood Glucose.: 232 mg/dL (2018 12:06)  POCT Blood Glucose.: 165 mg/dL (2018 07:49)  POCT Blood Glucose.: 166 mg/dL (2018 23:00)        Culture - Urine (collected 18)  Source: .Urine Catheterized  Final Report (18):    No growth    Culture - Urine (collected 18)  Source: .Urine Clean Catch (Midstream)  Final Report (01-26-18):    No growth        RADIOLOGY & ADDITIONAL TESTS:          Imaging Personally Reviewed:  [ ] YES  [ ] NO    Consultant(s) Notes Reviewed:  [x ] YES  [ ] NO    Care Discussed with Consultants/Other Providers [x ] YES  [ ] NO

## 2018-01-29 NOTE — PROGRESS NOTE ADULT - SUBJECTIVE AND OBJECTIVE BOX
Patient is a 72y old  Female who presents with a chief complaint of chest pain (2018 18:02)      Interval History: finger sticks are stable  and in low 200's   on Prednisone 40 mg   also on Lantus 25 units and prandial lispro 9-9-7 units with breakfast, lunch, and dinner respectively and also NPH BID to counter hyperglycemic effects of Prednisone     MEDICATIONS  (STANDING):  ALBUTerol    90 MICROgram(s) HFA Inhaler 1 Puff(s) Inhalation every 4 hours  amLODIPine   Tablet 10 milliGRAM(s) Oral daily  aspirin 325 milliGRAM(s) Oral daily  atorvastatin 10 milliGRAM(s) Oral at bedtime  buDESOnide 160 MICROgram(s)/formoterol 4.5 MICROgram(s) Inhaler 2 Puff(s) Inhalation two times a day  dextrose 5%. 1000 milliLiter(s) (50 mL/Hr) IV Continuous <Continuous>  dextrose 50% Injectable 12.5 Gram(s) IV Push once  dextrose 50% Injectable 25 Gram(s) IV Push once  dextrose 50% Injectable 25 Gram(s) IV Push once  docusate sodium 100 milliGRAM(s) Oral three times a day  heparin  Injectable 5000 Unit(s) SubCutaneous every 12 hours  hydrALAZINE 25 milliGRAM(s) Oral three times a day  insulin glargine Injectable (LANTUS) 25 Unit(s) SubCutaneous at bedtime  insulin lispro (HumaLOG) corrective regimen sliding scale   SubCutaneous three times a day before meals  insulin lispro (HumaLOG) corrective regimen sliding scale   SubCutaneous at bedtime  insulin lispro Injectable (HumaLOG) 9 Unit(s) SubCutaneous before breakfast  insulin lispro Injectable (HumaLOG) 9 Unit(s) SubCutaneous before lunch  insulin lispro Injectable (HumaLOG) 7 Unit(s) SubCutaneous before dinner  insulin NPH human recombinant 12 Unit(s) SubCutaneous before breakfast  insulin NPH human recombinant 5 Unit(s) SubCutaneous at bedtime  labetalol 200 milliGRAM(s) Oral two times a day  lidocaine   Patch 1 Patch Transdermal daily  montelukast 10 milliGRAM(s) Oral daily  polyethylene glycol 3350 17 Gram(s) Oral daily  predniSONE   Tablet 40 milliGRAM(s) Oral daily  senna 2 Tablet(s) Oral at bedtime  tamsulosin 0.4 milliGRAM(s) Oral at bedtime  tiotropium 18 MICROgram(s) Capsule 1 Capsule(s) Inhalation daily    MEDICATIONS  (PRN):  acetaminophen   Tablet 650 milliGRAM(s) Oral every 6 hours PRN mild pain  benzocaine 15 mG/menthol 3.6 mG Lozenge 1 Lozenge Oral every 4 hours PRN Sore Throat  dextrose Gel 1 Dose(s) Oral once PRN Blood Glucose LESS THAN 70 milliGRAM(s)/deciliter  glucagon  Injectable 1 milliGRAM(s) IntraMuscular once PRN Glucose LESS THAN 70 milligrams/deciliter  guaiFENesin    Syrup 100 milliGRAM(s) Oral every 6 hours PRN Cough      Allergies    codeine (Swelling)  penicillin (Anaphylaxis)  penicillins (Swelling)    Intolerances        REVIEW OF SYSTEMS:  CONSTITUTIONAL: shortness of breath better   EYES: No eye pain, visual disturbances, or discharge  ENMT:  No difficulty hearing, tinnitus, vertigo; No sinus or throat pain  NECK: No pain or stiffness  RESPIRATORY: No cough, wheezing, chills or hemoptysis; No shortness of breath  CARDIOVASCULAR: No chest pain, palpitations, dizziness, or leg swelling  GASTROINTESTINAL: No abdominal or epigastric pain. No nausea, vomiting, or hematemesis; No diarrhea or constipation. No melena or hematochezia.  GENITOURINARY: No dysuria, frequency, hematuria, or incontinence  NEUROLOGICAL: No headaches, memory loss, loss of strength, numbness, or tremors  SKIN: No itching, burning, rashes, or lesions   ENDOCRINE: No heat or cold intolerance; No hair loss  MUSCULOSKELETAL: No joint pain or swelling; No muscle, back, or extremity pain    Vital Signs Last 24 Hrs  T(C): 36.4 (2018 11:00), Max: 37.1 (2018 23:55)  T(F): 97.5 (2018 11:00), Max: 98.7 (2018 23:55)  HR: 68 (2018 11:00) (68 - 80)  BP: 136/72 (2018 11:00) (136/72 - 148/75)  BP(mean): --  RR: 18 (2018 11:00) (18 - 18)  SpO2: 100% (2018 11:00) (96% - 100%)    PHYSICAL EXAM:  GENERAL: shortness of breath   HEAD:  Atraumatic, Normocephalic  EYES: EOMI, PERRLA, conjunctiva and sclera clear  ENMT: No tonsillar erythema, exudates, or enlargement; Moist mucous membranes, Good dentition, No lesions  NECK: Supple, No JVD, Normal thyroid  NERVOUS SYSTEM:  Alert & Oriented X3, Good concentration; Motor Strength 5/5 B/L upper and lower extremities; DTRs 2+ intact and symmetric  CHEST/LUNG:wheezing  HEART: Regular rate and rhythm; No murmurs, rubs, or gallops  ABDOMEN: Soft, Nontender, Nondistended; Bowel sounds present  EXTREMITIES:  2+ Peripheral Pulses, No clubbing, cyanosis, or edema  SKIN: No rashes or lesions    LABS:      Urinalysis Basic - ( 2018 13:42 )    Color: Yellow / Appearance: Clear / S.015 / pH: x  Gluc: x / Ketone: Negative  / Bili: Negative / Urobili: Negative mg/dL   Blood: x / Protein: 500 mg/dL / Nitrite: Negative   Leuk Esterase: Negative / RBC: 0-2 /HPF / WBC x   Sq Epi: x / Non Sq Epi: Few / Bacteria: Few      CAPILLARY BLOOD GLUCOSE      POCT Blood Glucose.: 232 mg/dL (2018 12:06)  POCT Blood Glucose.: 165 mg/dL (2018 07:49)  POCT Blood Glucose.: 166 mg/dL (2018 23:00)  POCT Blood Glucose.: 297 mg/dL (2018 16:30)    Lipid panel:           Thyroid:  Diabetes Tests:  Parathyroid Panel:  Adrenals:  RADIOLOGY & ADDITIONAL TESTS:    Imaging Personally Reviewed:  [ ] YES  [ ] NO    Consultant(s) Notes Reviewed:  [ ] YES  [ ] NO    Care Discussed with Consultants/Other Providers [ ] YES  [ ] NO

## 2018-01-29 NOTE — PROGRESS NOTE ADULT - SUBJECTIVE AND OBJECTIVE BOX
Patient seen and examined bedside resting comfortably. Complaining of abdominal pain which radiates across her upper abdomen.  Denies nausea and vomiting. Last BM one week ago. +Flatus.     T(F): 97.5 (01-29-18 @ 11:00), Max: 98.7 (01-28-18 @ 23:55)  HR: 68 (01-29-18 @ 11:00) (68 - 80)  BP: 136/72 (01-29-18 @ 11:00) (136/72 - 148/75)  RR: 18 (01-29-18 @ 11:00) (18 - 18)  SpO2: 100% (01-29-18 @ 11:00) (96% - 100%)  POCT Blood Glucose.: 165 mg/dL (29 Jan 2018 07:49)  POCT Blood Glucose.: 166 mg/dL (28 Jan 2018 23:00)  POCT Blood Glucose.: 297 mg/dL (28 Jan 2018 16:30)    PHYSICAL EXAM:  General: NAD, WDWN, Alert  CV: +S1S2 regular rate and rhythm  Lung: clear to ausculation bilaterally, respirations nonlabored, good inspiratory effort  Abdomen: soft, obses, ?mild epigastric and RUQ tenderness  : Hsieh catheter in place draining clear yellow urine: 700ml/24hours recorded     LABS:                        10.4   15.21 )-----------( 300      ( 29 Jan 2018 07:30 )             32.1     01-29    139  |  107  |  77<H>  ----------------------------<  155<H>  4.7   |  22  |  3.67<H>    Ca    7.6<L>      29 Jan 2018 07:30    I&O's Detail    28 Jan 2018 07:01  -  29 Jan 2018 07:00  --------------------------------------------------------  IN:    Oral Fluid: 450 mL  Total IN: 450 mL    OUT:    Indwelling Catheter - Urethral: 700 mL  Total OUT: 700 mL    Total NET: -250 mL    72 year old female with PMH of Dementia, DM, Asthma, CVA, HLD, Neuropathy, SLE and HTN admitted chest pain and wheezing secondary to COPD exacerbation. Found to have urinary retention requiring Hsieh catheter likely due to diabetic neurogenic bladder. Elevated creatinine likely acute on chronic renal failure. Slight leukocytosis likely due to steroid use. Uncontrolled DM. Now complaining of abdominal pain.     - Continue hsieh catheter and monitor urine output  - Continue to trend BUN/Cr  - check hepatic panel and consider abdominal US  - continue colace/senna/ miralax  - continue medical management  - continue renal management  - will discuss with Dr. Lentz

## 2018-01-29 NOTE — PROGRESS NOTE ADULT - SUBJECTIVE AND OBJECTIVE BOX
Patient feels well no complaints today.  Discussed with patient diabetic hx + neuropathy; ? retinopathy to have laser treatment next week.      MEDICATIONS  (STANDING):  ALBUTerol    90 MICROgram(s) HFA Inhaler 1 Puff(s) Inhalation every 4 hours  amLODIPine   Tablet 10 milliGRAM(s) Oral daily  aspirin 325 milliGRAM(s) Oral daily  atorvastatin 10 milliGRAM(s) Oral at bedtime  buDESOnide 160 MICROgram(s)/formoterol 4.5 MICROgram(s) Inhaler 2 Puff(s) Inhalation two times a day  dextrose 5%. 1000 milliLiter(s) (50 mL/Hr) IV Continuous <Continuous>  dextrose 50% Injectable 12.5 Gram(s) IV Push once  dextrose 50% Injectable 25 Gram(s) IV Push once  dextrose 50% Injectable 25 Gram(s) IV Push once  docusate sodium 100 milliGRAM(s) Oral three times a day  heparin  Injectable 5000 Unit(s) SubCutaneous every 12 hours  hydrALAZINE 25 milliGRAM(s) Oral three times a day  insulin glargine Injectable (LANTUS) 25 Unit(s) SubCutaneous at bedtime  insulin lispro (HumaLOG) corrective regimen sliding scale   SubCutaneous three times a day before meals  insulin lispro (HumaLOG) corrective regimen sliding scale   SubCutaneous at bedtime  insulin lispro Injectable (HumaLOG) 9 Unit(s) SubCutaneous before breakfast  insulin lispro Injectable (HumaLOG) 9 Unit(s) SubCutaneous before lunch  insulin lispro Injectable (HumaLOG) 7 Unit(s) SubCutaneous before dinner  insulin NPH human recombinant 12 Unit(s) SubCutaneous before breakfast  insulin NPH human recombinant 5 Unit(s) SubCutaneous at bedtime  labetalol 200 milliGRAM(s) Oral two times a day  lidocaine   Patch 1 Patch Transdermal daily  montelukast 10 milliGRAM(s) Oral daily  polyethylene glycol 3350 17 Gram(s) Oral daily  predniSONE   Tablet 40 milliGRAM(s) Oral daily  senna 2 Tablet(s) Oral at bedtime  tamsulosin 0.4 milliGRAM(s) Oral at bedtime  tiotropium 18 MICROgram(s) Capsule 1 Capsule(s) Inhalation daily    MEDICATIONS  (PRN):  acetaminophen   Tablet 650 milliGRAM(s) Oral every 6 hours PRN mild pain  benzocaine 15 mG/menthol 3.6 mG Lozenge 1 Lozenge Oral every 4 hours PRN Sore Throat  dextrose Gel 1 Dose(s) Oral once PRN Blood Glucose LESS THAN 70 milliGRAM(s)/deciliter  glucagon  Injectable 1 milliGRAM(s) IntraMuscular once PRN Glucose LESS THAN 70 milligrams/deciliter  guaiFENesin    Syrup 100 milliGRAM(s) Oral every 6 hours PRN Cough      01-28-18 @ 07:01  -  01-29-18 @ 07:00  --------------------------------------------------------  IN: 450 mL / OUT: 700 mL / NET: -250 mL    01-29-18 @ 07:01  -  01-29-18 @ 14:51  --------------------------------------------------------  IN: 750 mL / OUT: 0 mL / NET: 750 mL      PHYSICAL EXAM:      T(C): 36.4 (01-29-18 @ 11:00), Max: 37.1 (01-28-18 @ 23:55)  HR: 68 (01-29-18 @ 11:00) (68 - 80)  BP: 136/72 (01-29-18 @ 11:00) (136/72 - 148/75)  RR: 18 (01-29-18 @ 11:00) (18 - 18)  SpO2: 100% (01-29-18 @ 11:00) (96% - 100%)  Wt(kg): --  Respiratory: clear anteriorly, decreased BS at bases  Cardiovascular: S1 S2  Gastrointestinal: soft NT ND +BS  Extremities:   tr edema                                    10.4   15.21 )-----------( 300      ( 29 Jan 2018 07:30 )             32.1     01-29    139  |  107  |  77<H>  ----------------------------<  155<H>  4.7   |  22  |  3.67<H>    Ca    7.6<L>      29 Jan 2018 07:30            Assessment and Plan:  DOLORES improving; CKD 3; HgbA1C >12 over 2 years; suspected HTN; diabetic nephrosclerosis;   Complete serological testing;   Stable from renal view, can follow as outpatient.

## 2018-01-30 ENCOUNTER — TRANSCRIPTION ENCOUNTER (OUTPATIENT)
Age: 73
End: 2018-01-30

## 2018-01-30 LAB
ALBUMIN SERPL ELPH-MCNC: 1.9 G/DL — LOW (ref 3.3–5)
ALP SERPL-CCNC: 85 U/L — SIGNIFICANT CHANGE UP (ref 40–120)
ALT FLD-CCNC: 19 U/L — SIGNIFICANT CHANGE UP (ref 12–78)
ANION GAP SERPL CALC-SCNC: 11 MMOL/L — SIGNIFICANT CHANGE UP (ref 5–17)
AST SERPL-CCNC: 18 U/L — SIGNIFICANT CHANGE UP (ref 15–37)
BILIRUB SERPL-MCNC: 0.2 MG/DL — SIGNIFICANT CHANGE UP (ref 0.2–1.2)
BUN SERPL-MCNC: 85 MG/DL — HIGH (ref 7–23)
CALCIUM SERPL-MCNC: 7.6 MG/DL — LOW (ref 8.5–10.1)
CHLORIDE SERPL-SCNC: 105 MMOL/L — SIGNIFICANT CHANGE UP (ref 96–108)
CO2 SERPL-SCNC: 21 MMOL/L — LOW (ref 22–31)
CREAT SERPL-MCNC: 3.46 MG/DL — HIGH (ref 0.5–1.3)
GLUCOSE BLDC GLUCOMTR-MCNC: 127 MG/DL — HIGH (ref 70–99)
GLUCOSE BLDC GLUCOMTR-MCNC: 214 MG/DL — HIGH (ref 70–99)
GLUCOSE BLDC GLUCOMTR-MCNC: 233 MG/DL — HIGH (ref 70–99)
GLUCOSE BLDC GLUCOMTR-MCNC: 298 MG/DL — HIGH (ref 70–99)
GLUCOSE SERPL-MCNC: 193 MG/DL — HIGH (ref 70–99)
HCT VFR BLD CALC: 30 % — LOW (ref 34.5–45)
HGB BLD-MCNC: 9.9 G/DL — LOW (ref 11.5–15.5)
MCHC RBC-ENTMCNC: 24.2 PG — LOW (ref 27–34)
MCHC RBC-ENTMCNC: 33 GM/DL — SIGNIFICANT CHANGE UP (ref 32–36)
MCV RBC AUTO: 73.3 FL — LOW (ref 80–100)
NRBC # BLD: 0 /100 WBCS — SIGNIFICANT CHANGE UP (ref 0–0)
PLATELET # BLD AUTO: 280 K/UL — SIGNIFICANT CHANGE UP (ref 150–400)
POTASSIUM SERPL-MCNC: 4.7 MMOL/L — SIGNIFICANT CHANGE UP (ref 3.5–5.3)
POTASSIUM SERPL-SCNC: 4.7 MMOL/L — SIGNIFICANT CHANGE UP (ref 3.5–5.3)
PROT SERPL-MCNC: 5.7 GM/DL — LOW (ref 6–8.3)
RBC # BLD: 4.09 M/UL — SIGNIFICANT CHANGE UP (ref 3.8–5.2)
RBC # FLD: 15.2 % — HIGH (ref 10.3–14.5)
SODIUM SERPL-SCNC: 137 MMOL/L — SIGNIFICANT CHANGE UP (ref 135–145)
WBC # BLD: 15.53 K/UL — HIGH (ref 3.8–10.5)
WBC # FLD AUTO: 15.53 K/UL — HIGH (ref 3.8–10.5)

## 2018-01-30 PROCEDURE — 99233 SBSQ HOSP IP/OBS HIGH 50: CPT

## 2018-01-30 RX ORDER — ACETAMINOPHEN 500 MG
650 TABLET ORAL ONCE
Qty: 0 | Refills: 0 | Status: COMPLETED | OUTPATIENT
Start: 2018-01-30 | End: 2018-01-30

## 2018-01-30 RX ADMIN — Medication 4: at 07:48

## 2018-01-30 RX ADMIN — TIOTROPIUM BROMIDE 1 CAPSULE(S): 18 CAPSULE ORAL; RESPIRATORY (INHALATION) at 11:56

## 2018-01-30 RX ADMIN — TAMSULOSIN HYDROCHLORIDE 0.4 MILLIGRAM(S): 0.4 CAPSULE ORAL at 22:10

## 2018-01-30 RX ADMIN — MONTELUKAST 10 MILLIGRAM(S): 4 TABLET, CHEWABLE ORAL at 22:10

## 2018-01-30 RX ADMIN — AMLODIPINE BESYLATE 10 MILLIGRAM(S): 2.5 TABLET ORAL at 06:07

## 2018-01-30 RX ADMIN — Medication 100 MILLIGRAM(S): at 15:52

## 2018-01-30 RX ADMIN — ALBUTEROL 1 PUFF(S): 90 AEROSOL, METERED ORAL at 17:37

## 2018-01-30 RX ADMIN — HEPARIN SODIUM 5000 UNIT(S): 5000 INJECTION INTRAVENOUS; SUBCUTANEOUS at 06:09

## 2018-01-30 RX ADMIN — Medication 9 UNIT(S): at 07:48

## 2018-01-30 RX ADMIN — Medication 200 MILLIGRAM(S): at 06:07

## 2018-01-30 RX ADMIN — Medication 6: at 11:56

## 2018-01-30 RX ADMIN — LIDOCAINE 1 PATCH: 4 CREAM TOPICAL at 02:00

## 2018-01-30 RX ADMIN — Medication 7 UNIT(S): at 15:52

## 2018-01-30 RX ADMIN — Medication 25 MILLIGRAM(S): at 22:10

## 2018-01-30 RX ADMIN — Medication 4: at 15:53

## 2018-01-30 RX ADMIN — BENZOCAINE AND MENTHOL 1 LOZENGE: 5; 1 LIQUID ORAL at 06:10

## 2018-01-30 RX ADMIN — INSULIN GLARGINE 25 UNIT(S): 100 INJECTION, SOLUTION SUBCUTANEOUS at 22:10

## 2018-01-30 RX ADMIN — Medication 9 UNIT(S): at 11:55

## 2018-01-30 RX ADMIN — ALBUTEROL 1 PUFF(S): 90 AEROSOL, METERED ORAL at 06:08

## 2018-01-30 RX ADMIN — Medication 25 MILLIGRAM(S): at 15:52

## 2018-01-30 RX ADMIN — SENNA PLUS 2 TABLET(S): 8.6 TABLET ORAL at 22:10

## 2018-01-30 RX ADMIN — Medication 40 MILLIGRAM(S): at 06:07

## 2018-01-30 RX ADMIN — BUDESONIDE AND FORMOTEROL FUMARATE DIHYDRATE 2 PUFF(S): 160; 4.5 AEROSOL RESPIRATORY (INHALATION) at 15:52

## 2018-01-30 RX ADMIN — Medication 100 MILLIGRAM(S): at 22:10

## 2018-01-30 RX ADMIN — Medication 100 MILLIGRAM(S): at 06:07

## 2018-01-30 RX ADMIN — Medication 325 MILLIGRAM(S): at 11:55

## 2018-01-30 RX ADMIN — ATORVASTATIN CALCIUM 10 MILLIGRAM(S): 80 TABLET, FILM COATED ORAL at 22:10

## 2018-01-30 RX ADMIN — ALBUTEROL 1 PUFF(S): 90 AEROSOL, METERED ORAL at 11:55

## 2018-01-30 RX ADMIN — HUMAN INSULIN 12 UNIT(S): 100 INJECTION, SUSPENSION SUBCUTANEOUS at 07:48

## 2018-01-30 RX ADMIN — BUDESONIDE AND FORMOTEROL FUMARATE DIHYDRATE 2 PUFF(S): 160; 4.5 AEROSOL RESPIRATORY (INHALATION) at 06:08

## 2018-01-30 RX ADMIN — ALBUTEROL 1 PUFF(S): 90 AEROSOL, METERED ORAL at 15:52

## 2018-01-30 RX ADMIN — HEPARIN SODIUM 5000 UNIT(S): 5000 INJECTION INTRAVENOUS; SUBCUTANEOUS at 17:37

## 2018-01-30 RX ADMIN — Medication 650 MILLIGRAM(S): at 18:44

## 2018-01-30 RX ADMIN — HUMAN INSULIN 5 UNIT(S): 100 INJECTION, SUSPENSION SUBCUTANEOUS at 22:09

## 2018-01-30 RX ADMIN — Medication 25 MILLIGRAM(S): at 06:07

## 2018-01-30 RX ADMIN — Medication 200 MILLIGRAM(S): at 17:37

## 2018-01-30 RX ADMIN — ALBUTEROL 1 PUFF(S): 90 AEROSOL, METERED ORAL at 22:09

## 2018-01-30 NOTE — PROGRESS NOTE ADULT - PROBLEM SELECTOR PLAN 6
seferino on ckd   lasix held   f.u SLE labs and renal US   Nephro following.
-monitor BP  - c/w current meds
Pt had a bowel movement, cont colace
cont colace, pt declines laxatives, monitor BM
seferino on ckd   lasix held   f.u SLE labs and renal US   renal on board
seferino on ckd 3  lasix on hold  Bladder scan with urinary retention/?atonic bladder  place FC ,   f.u SLE and G/N work up.  nephro following  f/u BMP in am.

## 2018-01-30 NOTE — PROGRESS NOTE ADULT - PROBLEM SELECTOR PROBLEM 3
Acute urinary retention
Acute urinary retention
Steroid-induced diabetes
Steroid-induced diabetes
Type 2 diabetes mellitus without complication, with long-term current use of insulin
Steroid-induced diabetes

## 2018-01-30 NOTE — PROGRESS NOTE ADULT - PROBLEM SELECTOR PROBLEM 2
Hyperglycemia
Hyperglycemia
Type 2 diabetes mellitus with hyperglycemia, with long-term current use of insulin
Hyperglycemia
Type 2 diabetes mellitus with hyperglycemia, with long-term current use of insulin

## 2018-01-30 NOTE — DISCHARGE NOTE ADULT - HOSPITAL COURSE
72 YOF with PMHx of CAD, former smoker, lupus presents with intermittent chest pain for three days. +cough, productive.admitted with COPD exacerbation improved with duonebs and steroids. Troponin slightly elevated but now trending down. D/c on tapered steroids.  being Pt also with hyperglycemia/uncontrolled dm, seen by Endo: d/c on lantus and premeals insulin. Also with bladder distention/urinary retention, seferino on ckd seen by Nephrologist. D?c with Home PT. F/u with Endocrinology and Nephrology 1-2 weeks. Any changes or worsening of symptoms please revisit ED. 73 y/o F with PMH of CAD, former smoker, and lupus presented with intermittent chest pain for three days and +cough. She was admitted with impression of COPD exacerbation. She was started on Steroids and duo-nebs. During hospital course her steroids were tapered down. She was hyperglycemia/uncontrolled dm while on steroid course. Endocrine was consulted; she was treated with long acting lantus and pre-meals insulin. The patient had acute renal failure and was found to have urinary retention. Barksdale was placed and Nephrology and Urology was consulted. Flomax was started. Eventually Barksdale was removed followed by bladder protocol. Urinary retention was resolved. The patient was weak and Physical therapy was consulted. She was recommended to go home with home PT. F/u with Endocrinology and Nephrology 1-2 weeks. Any changes or worsening of symptoms please revisit ED.

## 2018-01-30 NOTE — PROGRESS NOTE ADULT - PROBLEM SELECTOR PROBLEM 5
DOLORES (acute kidney injury)
Essential hypertension
Essential hypertension
Weakness
Weakness
Essential hypertension

## 2018-01-30 NOTE — PROGRESS NOTE ADULT - PROBLEM SELECTOR PROBLEM 6
DOLORES (acute kidney injury)
Essential hypertension
DOLORES (acute kidney injury)
DOLORES (acute kidney injury)
Essential hypertension
Essential hypertension
Slow transit constipation
Slow transit constipation

## 2018-01-30 NOTE — PROGRESS NOTE ADULT - SUBJECTIVE AND OBJECTIVE BOX
Patient seen and examined in chair in no distress.  States that her hsieh catheter was removed overnight. She admits to voiding since removal.  Admits to lower abdominal discomfort.  Denies fever, chills, chest pain, sob.    T(F): 98 (01-30-18 @ 05:26), Max: 98 (01-30-18 @ 05:26)  HR: 75 (01-30-18 @ 05:26) (63 - 75)  BP: 143/72 (01-30-18 @ 05:26) (135/68 - 158/81)  RR: 18 (01-30-18 @ 05:26) (16 - 18)  SpO2: 96% (01-30-18 @ 05:26) (96% - 100%)    PHYSICAL EXAM:  General: Alert, oriented, NAD  CV: S1S2 RRR  Lung: Respirations nonlabored  Abdomen: Soft, NTND  Extremities: No pedal edema or calf tenderness noted   : Mild suprapubic tenderness    LABS:                        9.9    15.53 )-----------( 280      ( 30 Jan 2018 07:13 )             30.0     01-30    137  |  105  |  85<H>  ----------------------------<  193<H>  4.7   |  21<L>  |  3.46<H>    Ca    7.6<L>      30 Jan 2018 07:13    TPro  5.7<L>  /  Alb  1.9<L>  /  TBili  0.2  /  DBili  x   /  AST  18  /  ALT  19  /  AlkPhos  85  01-30    Impression: 72 year old female with PMH of Dementia, DM, Asthma, CVA, HLD, Neuropathy, SLE and HTN admitted chest pain and wheezing secondary to COPD exacerbation. Found to have urinary retention requiring Hsieh catheter likely due to diabetic neurogenic bladder. Elevated creatinine likely acute on chronic renal failure.  Plan:  - F/u results of bladder scan, will likely need hsieh replaced  - Monitor renal function  -Continue colace/senna/ miralax  -Continue medical management  -Continue renal management

## 2018-01-30 NOTE — PROGRESS NOTE ADULT - PROBLEM SELECTOR PLAN 2
-Hb A1c= 13.6.  Monitor FS  - c/w current regimen  - endo on board
-Hb A1c= 13.6.  Monitor FS  - c/w current regimen  - endo on board
Uncontrolled FS,  Endocrine consult appreciated, improving with tapering steroids, +leukocytosis most likely to steroids, cont same mgmt, FS monitoring with insulin s/s coverage
Uncontrolled FS, secondary to Steroids, Endocrine consult appreciated, cont same mgmt, FS monitoring with insulin s/s coverage
endo consult appreciated and added premeal lispro  c/w insulin  a1c 13.6. discussed with pt  monitor fsbs
qian mooney  endo consult appreciated and added premeal lispro  a1c 13.6. discussed with pt
lantus riss  endo consult appreciated and added premeal lispro  a1c 13.6. discussed with pt  Increase lantus dose as BS very high likely due to steroids
FSBSL    continue   current t/t

## 2018-01-30 NOTE — PROGRESS NOTE ADULT - SUBJECTIVE AND OBJECTIVE BOX
Patient is a 72y old  Female who presents with a chief complaint of chest pain (30 Jan 2018 18:31)      Interval History: finger sticks are in high 100's and low 200's   on Lantus 25 units and Lispro sliding scale coverage with meals and prandial lispro 9-9-7 units with breakfast, lunch, and dinner respectively . Also NPH BID   on Prednisone 30 mg now   steroid induced hyperglycemia     MEDICATIONS  (STANDING):  ALBUTerol    90 MICROgram(s) HFA Inhaler 1 Puff(s) Inhalation every 4 hours  amLODIPine   Tablet 10 milliGRAM(s) Oral daily  aspirin 325 milliGRAM(s) Oral daily  atorvastatin 10 milliGRAM(s) Oral at bedtime  buDESOnide 160 MICROgram(s)/formoterol 4.5 MICROgram(s) Inhaler 2 Puff(s) Inhalation two times a day  dextrose 5%. 1000 milliLiter(s) (50 mL/Hr) IV Continuous <Continuous>  dextrose 50% Injectable 12.5 Gram(s) IV Push once  dextrose 50% Injectable 25 Gram(s) IV Push once  dextrose 50% Injectable 25 Gram(s) IV Push once  docusate sodium 100 milliGRAM(s) Oral three times a day  heparin  Injectable 5000 Unit(s) SubCutaneous every 12 hours  hydrALAZINE 25 milliGRAM(s) Oral three times a day  insulin glargine Injectable (LANTUS) 25 Unit(s) SubCutaneous at bedtime  insulin lispro (HumaLOG) corrective regimen sliding scale   SubCutaneous three times a day before meals  insulin lispro (HumaLOG) corrective regimen sliding scale   SubCutaneous at bedtime  insulin lispro Injectable (HumaLOG) 9 Unit(s) SubCutaneous before breakfast  insulin lispro Injectable (HumaLOG) 9 Unit(s) SubCutaneous before lunch  insulin lispro Injectable (HumaLOG) 7 Unit(s) SubCutaneous before dinner  insulin NPH human recombinant 12 Unit(s) SubCutaneous before breakfast  insulin NPH human recombinant 5 Unit(s) SubCutaneous at bedtime  labetalol 200 milliGRAM(s) Oral two times a day  lidocaine   Patch 1 Patch Transdermal daily  montelukast 10 milliGRAM(s) Oral daily  polyethylene glycol 3350 17 Gram(s) Oral daily  predniSONE   Tablet 30 milliGRAM(s) Oral daily  senna 2 Tablet(s) Oral at bedtime  tamsulosin 0.4 milliGRAM(s) Oral at bedtime  tiotropium 18 MICROgram(s) Capsule 1 Capsule(s) Inhalation daily    MEDICATIONS  (PRN):  acetaminophen   Tablet 650 milliGRAM(s) Oral every 6 hours PRN mild pain  benzocaine 15 mG/menthol 3.6 mG Lozenge 1 Lozenge Oral every 4 hours PRN Sore Throat  dextrose Gel 1 Dose(s) Oral once PRN Blood Glucose LESS THAN 70 milliGRAM(s)/deciliter  glucagon  Injectable 1 milliGRAM(s) IntraMuscular once PRN Glucose LESS THAN 70 milligrams/deciliter  guaiFENesin    Syrup 100 milliGRAM(s) Oral every 6 hours PRN Cough      Allergies    codeine (Swelling)  penicillin (Anaphylaxis)  penicillins (Swelling)    Intolerances        REVIEW OF SYSTEMS:  CONSTITUTIONAL:   EYES: No eye pain, visual disturbances, or discharge  ENMT:  No difficulty hearing, tinnitus, vertigo; No sinus or throat pain  NECK: No pain or stiffness  RESPIRATORY:  shortness of breath  CARDIOVASCULAR: No chest pain, palpitations, dizziness, or leg swelling  GASTROINTESTINAL: No abdominal or epigastric pain. No nausea, vomiting, or hematemesis; No diarrhea or constipation. No melena or hematochezia.  GENITOURINARY: No dysuria, frequency, hematuria, or incontinence  NEUROLOGICAL: No headaches, memory loss, loss of strength, numbness, or tremors  SKIN: No itching, burning, rashes, or lesions   LYMPH NODES: No enlarged glands  ENDOCRINE: No heat or cold intolerance; No hair loss  MUSCULOSKELETAL: No joint pain or swelling; No muscle, back, or extremity pain  PSYCHIATRIC: No depression, anxiety, mood swings, or difficulty sleeping  HEME/LYMPH: No easy bruising, or bleeding gums  ALLERY AND IMMUNOLOGIC: No hives or eczema    Vital Signs Last 24 Hrs  T(C): 36.1 (30 Jan 2018 17:08), Max: 36.8 (30 Jan 2018 16:49)  T(F): 97 (30 Jan 2018 17:08), Max: 98.2 (30 Jan 2018 16:49)  HR: 75 (30 Jan 2018 22:12) (67 - 75)  BP: 151/72 (30 Jan 2018 22:12) (131/65 - 158/81)  BP(mean): --  RR: 17 (30 Jan 2018 17:08) (16 - 18)  SpO2: 96% (30 Jan 2018 17:08) (96% - 98%)    PHYSICAL EXAM:  GENERAL:   HEAD:  Atraumatic, Normocephalic  EYES: EOMI, PERRLA, conjunctiva and sclera clear  ENMT: No tonsillar erythema, exudates, or enlargement; Moist mucous membranes, Good dentition, No lesions  NECK: Supple, No JVD, Normal thyroid  NERVOUS SYSTEM:  Alert & Oriented X3, Good concentration; Motor Strength 5/5 B/L upper and lower extremities; DTRs 2+ intact and symmetric  CHEST/LUNG: Clear to percussion bilaterally; No rales, rhonchi, wheezing, or rubs  HEART: Regular rate and rhythm; No murmurs, rubs, or gallops  ABDOMEN: Soft, Nontender, Nondistended; Bowel sounds present  EXTREMITIES:  2+ Peripheral Pulses, No clubbing, cyanosis, or edema  SKIN: No rashes or lesions    LABS:        CAPILLARY BLOOD GLUCOSE      POCT Blood Glucose.: 127 mg/dL (30 Jan 2018 22:05)  POCT Blood Glucose.: 233 mg/dL (30 Jan 2018 15:50)  POCT Blood Glucose.: 298 mg/dL (30 Jan 2018 11:55)  POCT Blood Glucose.: 214 mg/dL (30 Jan 2018 07:27)  POCT Blood Glucose.: 158 mg/dL (29 Jan 2018 23:03)    Lipid panel:           Thyroid:  Diabetes Tests:  Parathyroid Panel:  Adrenals:  RADIOLOGY & ADDITIONAL TESTS:    Imaging Personally Reviewed:  [ ] YES  [ ] NO    Consultant(s) Notes Reviewed:  [ ] YES  [ ] NO    Care Discussed with Consultants/Other Providers [ ] YES  [ ] NO

## 2018-01-30 NOTE — PROGRESS NOTE ADULT - PROBLEM SELECTOR PLAN 3
-as above
-as above
Barksdale removed with bladder scan protocol, on Flomax, Urology note appreciated, f/u final recommendations.
cont hsieh, renal function improving, Urology note appreciated.
lantus /riss  premeal coverage
plan as above
plan as above

## 2018-01-30 NOTE — DISCHARGE NOTE ADULT - PLAN OF CARE
Prednisone taper for next 3 days Continue home meds Started on Lantus 15units bedtime and Lispro 5units premeals Follow up with Endo Albuterol nebs prn  Continue home meds Follow up with Endo in 1-2 weeks. Resolving Urinary Retention resolved.  Follow up with Nephrology clinic in 1-2 weeks. Resolved continue Senna and Colace Continue Flomax

## 2018-01-30 NOTE — PHYSICAL THERAPY INITIAL EVALUATION ADULT - IMPAIRMENTS FOUND, PT EVAL
gait, locomotion, and balance/muscle strength/ergonomics and body mechanics/aerobic capacity/endurance

## 2018-01-30 NOTE — PROGRESS NOTE ADULT - PROBLEM SELECTOR PLAN 4
Barksdale  catheter  in place   - monitor output
- as per RN ,hsieh was replaced yesterday for urinary retension and high vol on bladder scan  - Urology  consulted\  - monitor output
- as per RN ,hsieh was replaced yesterday for urinary retension and high vol on bladder scan. Urology  consulted\  - monitor output
improving w/o IVF, cont hsieh, Nephrology consult appreciated.
improving, Nephrology consult appreciated.
neurontin /analgesics
on neurontin /analgesics  HOLD gabapentin given lethargy.
neurontin /analgesics

## 2018-01-30 NOTE — DISCHARGE NOTE ADULT - CARE PROVIDER_API CALL
Bahman Rosado), EndocrinologyMetabDiabetes; Internal Medicine  [address] [facility]  [address]  [place] [address]  Phone: [phone]  Fax: [phone]    Johnny Dobson), Internal Medicine; Nephrology  [address] [facility]  [address] [place] [id number]  Phone: [phone]  Fax: [phone]

## 2018-01-30 NOTE — DISCHARGE NOTE ADULT - SECONDARY DIAGNOSIS.
DM (diabetes mellitus) DOLORES (acute kidney injury) Essential hypertension Neuropathy Steroid-induced diabetes Slow transit constipation Urinary retention

## 2018-01-30 NOTE — PROGRESS NOTE ADULT - PROBLEM SELECTOR PLAN 5
increase hydralazine dose to optimize bp control.
- DOLORES on CKD3  - Renal on board. Continue Barksdale.    - monitor renal status
- DOLORES on CKD3  - Renal on board   - monitor renal status
- DOLORES on CKD3  - Renal on board. Continue Barksdale.    - monitor renal status
PT consult appreciated, Home with home PT
c/w anti HTNves  monitor vitals
continue current meds
will get PT consult

## 2018-01-30 NOTE — DISCHARGE NOTE ADULT - CARE PLAN
Principal Discharge DX:	COPD exacerbation  Goal:	Prednisone taper for next 3 days  Assessment and plan of treatment:	Continue home meds  Secondary Diagnosis:	DM (diabetes mellitus)  Goal:	Started on Lantus 15units bedtime and Lispro 5units premeals  Assessment and plan of treatment:	Follow up with Endo  Secondary Diagnosis:	DOLORES (acute kidney injury)  Secondary Diagnosis:	Essential hypertension  Goal:	Continue home meds  Secondary Diagnosis:	Neuropathy  Goal:	Continue home meds  Secondary Diagnosis:	Steroid-induced diabetes  Secondary Diagnosis:	Slow transit constipation  Goal:	Continue home meds Principal Discharge DX:	COPD exacerbation  Goal:	Prednisone taper for next 3 days  Assessment and plan of treatment:	Albuterol nebs prn  Continue home meds  Secondary Diagnosis:	DM (diabetes mellitus)  Goal:	Started on Lantus 15units bedtime and Lispro 5units premeals  Assessment and plan of treatment:	Follow up with Endo in 1-2 weeks.  Secondary Diagnosis:	DOLORES (acute kidney injury)  Goal:	Resolving  Assessment and plan of treatment:	Urinary Retention resolved.  Follow up with Nephrology clinic in 1-2 weeks.  Secondary Diagnosis:	Essential hypertension  Goal:	Continue home meds  Secondary Diagnosis:	Slow transit constipation  Goal:	Resolved  Assessment and plan of treatment:	continue Senna and Colace  Secondary Diagnosis:	Urinary retention  Goal:	Resolved  Assessment and plan of treatment:	Continue Flomax

## 2018-01-30 NOTE — PROGRESS NOTE ADULT - PROBLEM SELECTOR PROBLEM 4
Acute urinary retention
DOLORES (acute kidney injury)
DOLORES (acute kidney injury)
Neuropathy

## 2018-01-30 NOTE — PROGRESS NOTE ADULT - SUBJECTIVE AND OBJECTIVE BOX
Patient is a 72y old  Female who presents with a chief complaint of chest pain (22 Jan 2018 18:02)      INTERVAL HPI/ OVERNIGHT EVENTS: Pt was seen and examined at bedside today, No significant overnight events, pt had a bowel movement, she states that breathing is much better today.      MEDICATIONS  (STANDING):  ALBUTerol    90 MICROgram(s) HFA Inhaler 1 Puff(s) Inhalation every 4 hours  amLODIPine   Tablet 10 milliGRAM(s) Oral daily  aspirin 325 milliGRAM(s) Oral daily  atorvastatin 10 milliGRAM(s) Oral at bedtime  buDESOnide 160 MICROgram(s)/formoterol 4.5 MICROgram(s) Inhaler 2 Puff(s) Inhalation two times a day  dextrose 5%. 1000 milliLiter(s) (50 mL/Hr) IV Continuous <Continuous>  dextrose 50% Injectable 12.5 Gram(s) IV Push once  dextrose 50% Injectable 25 Gram(s) IV Push once  dextrose 50% Injectable 25 Gram(s) IV Push once  docusate sodium 100 milliGRAM(s) Oral three times a day  heparin  Injectable 5000 Unit(s) SubCutaneous every 12 hours  hydrALAZINE 25 milliGRAM(s) Oral three times a day  insulin glargine Injectable (LANTUS) 25 Unit(s) SubCutaneous at bedtime  insulin lispro (HumaLOG) corrective regimen sliding scale   SubCutaneous three times a day before meals  insulin lispro (HumaLOG) corrective regimen sliding scale   SubCutaneous at bedtime  insulin lispro Injectable (HumaLOG) 9 Unit(s) SubCutaneous before breakfast  insulin lispro Injectable (HumaLOG) 9 Unit(s) SubCutaneous before lunch  insulin lispro Injectable (HumaLOG) 7 Unit(s) SubCutaneous before dinner  insulin NPH human recombinant 12 Unit(s) SubCutaneous before breakfast  insulin NPH human recombinant 5 Unit(s) SubCutaneous at bedtime  labetalol 200 milliGRAM(s) Oral two times a day  lidocaine   Patch 1 Patch Transdermal daily  montelukast 10 milliGRAM(s) Oral daily  polyethylene glycol 3350 17 Gram(s) Oral daily  senna 2 Tablet(s) Oral at bedtime  tamsulosin 0.4 milliGRAM(s) Oral at bedtime  tiotropium 18 MICROgram(s) Capsule 1 Capsule(s) Inhalation daily    MEDICATIONS  (PRN):  acetaminophen   Tablet 650 milliGRAM(s) Oral every 6 hours PRN mild pain  benzocaine 15 mG/menthol 3.6 mG Lozenge 1 Lozenge Oral every 4 hours PRN Sore Throat  dextrose Gel 1 Dose(s) Oral once PRN Blood Glucose LESS THAN 70 milliGRAM(s)/deciliter  glucagon  Injectable 1 milliGRAM(s) IntraMuscular once PRN Glucose LESS THAN 70 milligrams/deciliter  guaiFENesin    Syrup 100 milliGRAM(s) Oral every 6 hours PRN Cough      Allergies    codeine (Swelling)  penicillin (Anaphylaxis)  penicillins (Swelling)    Intolerances        REVIEW OF SYSTEMS:    Unable to examine due to [ ] Altered Mental Status [ ] Advanced Dementia [ ] Expressive Aphasia [ ] Non-verbal patient    CONSTITUTIONAL: No fever, + generalized weakness/Fatigue, No weight loss  EYES: No eye pain, visual disturbances, or discharge  ENMT:  No difficulty hearing, tinnitus, vertigo; No sinus or throat pain  NECK: No pain or stiffness  RESPIRATORY: mild shortness of breath, + cough, no wheezing, hemoptysis   CARDIOVASCULAR: No chest pain, palpitations, or leg swelling  GASTROINTESTINAL: No abdominal pain. No nausea, vomiting, diarrhea or constipation. No melena or hematochezia.  GENITOURINARY: No dysuria, frequency, hematuria, or incontinence  NEUROLOGICAL: No headaches, Dizziness, memory loss, loss of strength, numbness, or tremors  SKIN: No itching, burning, rashes, or lesions   MUSCULOSKELETAL: No joint pain or swelling; No muscle, back, or extremity pain  PSYCHIATRIC: No depression, anxiety, mood swings, or difficulty sleeping  HEME/LYMPH: No easy bruising, or bleeding gums  ALLERGY AND IMMUNOLOGIC: No hives or eczema    Vital Signs Last 24 Hrs  T(C): 36.1 (30 Jan 2018 17:08), Max: 36.8 (30 Jan 2018 16:49)  T(F): 97 (30 Jan 2018 17:08), Max: 98.2 (30 Jan 2018 16:49)  HR: 69 (30 Jan 2018 17:08) (67 - 75)  BP: 142/70 (30 Jan 2018 17:08) (131/65 - 158/81)  BP(mean): --  RR: 17 (30 Jan 2018 17:08) (16 - 18)  SpO2: 96% (30 Jan 2018 17:08) (96% - 98%)    PHYSICAL EXAM:  GENERAL: NAD, well-groomed, well-developed  HEAD:  Atraumatic, Normocephalic  EYES: EOMI, PERRLA, conjunctiva and sclera clear  ENMT: Moist mucous membranes  NECK: Supple, No JVD, Normal thyroid  CHEST/LUNG: decreased breath sounds, Clear to Auscultation bilaterally; No rales, rhonchi, wheezing, or rubs  HEART: Regular rate and rhythm; No murmurs, rubs, or gallops  ABDOMEN: Soft, Nontender, Nondistended; Bowel sounds present  EXTREMITIES:  2+ Peripheral Pulses, No clubbing, cyanosis, or edema  LYMPH: No lymphadenopathy noted  SKIN: No rashes or lesions  NERVOUS SYSTEM:  Alert & Oriented X3, Good concentration; Motor Strength 4/5 throughout     LABS:                        9.9    15.53 )-----------( 280      ( 30 Jan 2018 07:13 )             30.0     01-30    137  |  105  |  85<H>  ----------------------------<  193<H>  4.7   |  21<L>  |  3.46<H>    Ca    7.6<L>      30 Jan 2018 07:13    TPro  5.7<L>  /  Alb  1.9<L>  /  TBili  0.2  /  DBili  x   /  AST  18  /  ALT  19  /  AlkPhos  85  01-30        CAPILLARY BLOOD GLUCOSE      POCT Blood Glucose.: 233 mg/dL (30 Jan 2018 15:50)  POCT Blood Glucose.: 298 mg/dL (30 Jan 2018 11:55)  POCT Blood Glucose.: 214 mg/dL (30 Jan 2018 07:27)  POCT Blood Glucose.: 158 mg/dL (29 Jan 2018 23:03)        Culture - Urine (collected 01-28-18)  Source: .Urine Catheterized  Final Report (01-29-18):    No growth    Culture - Urine (collected 01-25-18)  Source: .Urine Clean Catch (Midstream)  Final Report (01-26-18):    No growth        RADIOLOGY & ADDITIONAL TESTS:          Imaging Personally Reviewed:  [ ] YES  [ ] NO    Consultant(s) Notes Reviewed:  [x ] YES  [ ] NO    Care Discussed with Consultants/Other Providers [x ] YES  [ ] NO

## 2018-01-30 NOTE — PROGRESS NOTE ADULT - SUBJECTIVE AND OBJECTIVE BOX
Patient feels well no complaints today.    MEDICATIONS  (STANDING):  ALBUTerol    90 MICROgram(s) HFA Inhaler 1 Puff(s) Inhalation every 4 hours  amLODIPine   Tablet 10 milliGRAM(s) Oral daily  aspirin 325 milliGRAM(s) Oral daily  atorvastatin 10 milliGRAM(s) Oral at bedtime  buDESOnide 160 MICROgram(s)/formoterol 4.5 MICROgram(s) Inhaler 2 Puff(s) Inhalation two times a day  dextrose 5%. 1000 milliLiter(s) (50 mL/Hr) IV Continuous <Continuous>  dextrose 50% Injectable 12.5 Gram(s) IV Push once  dextrose 50% Injectable 25 Gram(s) IV Push once  dextrose 50% Injectable 25 Gram(s) IV Push once  docusate sodium 100 milliGRAM(s) Oral three times a day  heparin  Injectable 5000 Unit(s) SubCutaneous every 12 hours  hydrALAZINE 25 milliGRAM(s) Oral three times a day  insulin glargine Injectable (LANTUS) 25 Unit(s) SubCutaneous at bedtime  insulin lispro (HumaLOG) corrective regimen sliding scale   SubCutaneous three times a day before meals  insulin lispro (HumaLOG) corrective regimen sliding scale   SubCutaneous at bedtime  insulin lispro Injectable (HumaLOG) 9 Unit(s) SubCutaneous before breakfast  insulin lispro Injectable (HumaLOG) 9 Unit(s) SubCutaneous before lunch  insulin lispro Injectable (HumaLOG) 7 Unit(s) SubCutaneous before dinner  insulin NPH human recombinant 12 Unit(s) SubCutaneous before breakfast  insulin NPH human recombinant 5 Unit(s) SubCutaneous at bedtime  labetalol 200 milliGRAM(s) Oral two times a day  lidocaine   Patch 1 Patch Transdermal daily  montelukast 10 milliGRAM(s) Oral daily  polyethylene glycol 3350 17 Gram(s) Oral daily  predniSONE   Tablet 40 milliGRAM(s) Oral daily  senna 2 Tablet(s) Oral at bedtime  tamsulosin 0.4 milliGRAM(s) Oral at bedtime  tiotropium 18 MICROgram(s) Capsule 1 Capsule(s) Inhalation daily    MEDICATIONS  (PRN):  acetaminophen   Tablet 650 milliGRAM(s) Oral every 6 hours PRN mild pain  benzocaine 15 mG/menthol 3.6 mG Lozenge 1 Lozenge Oral every 4 hours PRN Sore Throat  dextrose Gel 1 Dose(s) Oral once PRN Blood Glucose LESS THAN 70 milliGRAM(s)/deciliter  glucagon  Injectable 1 milliGRAM(s) IntraMuscular once PRN Glucose LESS THAN 70 milligrams/deciliter  guaiFENesin    Syrup 100 milliGRAM(s) Oral every 6 hours PRN Cough      01-29-18 @ 07:01  -  01-30-18 @ 07:00  --------------------------------------------------------  IN: 750 mL / OUT: 0 mL / NET: 750 mL    01-30-18 @ 07:01  -  01-30-18 @ 12:17  --------------------------------------------------------  IN: 350 mL / OUT: 0 mL / NET: 350 mL      PHYSICAL EXAM:      T(C): 36.3 (01-30-18 @ 10:38), Max: 36.7 (01-30-18 @ 05:26)  HR: 67 (01-30-18 @ 10:38) (63 - 75)  BP: 131/65 (01-30-18 @ 10:38) (131/65 - 158/81)  RR: 18 (01-30-18 @ 10:38) (16 - 18)  SpO2: 97% (01-30-18 @ 10:38) (96% - 99%)  Wt(kg): --  Respiratory: clear anteriorly, decreased BS at bases  Cardiovascular: S1 S2  Gastrointestinal: soft NT ND +BS  Extremities:   tr edema                                    9.9    15.53 )-----------( 280      ( 30 Jan 2018 07:13 )             30.0     01-30    137  |  105  |  85<H>  ----------------------------<  193<H>  4.7   |  21<L>  |  3.46<H>    Ca    7.6<L>      30 Jan 2018 07:13    TPro  5.7<L>  /  Alb  1.9<L>  /  TBili  0.2  /  DBili  x   /  AST  18  /  ALT  19  /  AlkPhos  85  01-30      LIVER FUNCTIONS - ( 30 Jan 2018 07:13 )  Alb: 1.9 g/dL / Pro: 5.7 gm/dL / ALK PHOS: 85 U/L / ALT: 19 U/L / AST: 18 U/L / GGT: x             Assessment and Plan:    DOLORES improving; CKD 3; HgbA1C >12 over 2 years; suspected HTN; diabetic nephrosclerosis;   Complete serological testing;   Stable from renal view, can follow as outpatient.  Follow PVR trend; 200's now; on flomax.

## 2018-01-31 VITALS
TEMPERATURE: 97 F | DIASTOLIC BLOOD PRESSURE: 74 MMHG | SYSTOLIC BLOOD PRESSURE: 150 MMHG | HEART RATE: 76 BPM | RESPIRATION RATE: 16 BRPM | OXYGEN SATURATION: 97 %

## 2018-01-31 LAB
ANION GAP SERPL CALC-SCNC: 10 MMOL/L — SIGNIFICANT CHANGE UP (ref 5–17)
AUTO DIFF PNL BLD: NEGATIVE — SIGNIFICANT CHANGE UP
BUN SERPL-MCNC: 82 MG/DL — HIGH (ref 7–23)
C-ANCA SER-ACNC: NEGATIVE — SIGNIFICANT CHANGE UP
CALCIUM SERPL-MCNC: 7.5 MG/DL — LOW (ref 8.5–10.1)
CHLORIDE SERPL-SCNC: 107 MMOL/L — SIGNIFICANT CHANGE UP (ref 96–108)
CO2 SERPL-SCNC: 23 MMOL/L — SIGNIFICANT CHANGE UP (ref 22–31)
CREAT SERPL-MCNC: 3.43 MG/DL — HIGH (ref 0.5–1.3)
DSDNA AB SER-ACNC: <12 IU/ML — SIGNIFICANT CHANGE UP
GBM IGG SER-ACNC: <0.2 U — SIGNIFICANT CHANGE UP
GLUCOSE BLDC GLUCOMTR-MCNC: 110 MG/DL — HIGH (ref 70–99)
GLUCOSE BLDC GLUCOMTR-MCNC: 133 MG/DL — HIGH (ref 70–99)
GLUCOSE SERPL-MCNC: 119 MG/DL — HIGH (ref 70–99)
HCT VFR BLD CALC: 30.9 % — LOW (ref 34.5–45)
HGB BLD-MCNC: 9.7 G/DL — LOW (ref 11.5–15.5)
MCHC RBC-ENTMCNC: 23 PG — LOW (ref 27–34)
MCHC RBC-ENTMCNC: 31.4 GM/DL — LOW (ref 32–36)
MCV RBC AUTO: 73.2 FL — LOW (ref 80–100)
NRBC # BLD: 0 /100 WBCS — SIGNIFICANT CHANGE UP (ref 0–0)
P-ANCA SER-ACNC: NEGATIVE — SIGNIFICANT CHANGE UP
PLATELET # BLD AUTO: 263 K/UL — SIGNIFICANT CHANGE UP (ref 150–400)
POTASSIUM SERPL-MCNC: 4.7 MMOL/L — SIGNIFICANT CHANGE UP (ref 3.5–5.3)
POTASSIUM SERPL-SCNC: 4.7 MMOL/L — SIGNIFICANT CHANGE UP (ref 3.5–5.3)
RBC # BLD: 4.22 M/UL — SIGNIFICANT CHANGE UP (ref 3.8–5.2)
RBC # FLD: 15.2 % — HIGH (ref 10.3–14.5)
SODIUM SERPL-SCNC: 140 MMOL/L — SIGNIFICANT CHANGE UP (ref 135–145)
WBC # BLD: 15.29 K/UL — HIGH (ref 3.8–10.5)
WBC # FLD AUTO: 15.29 K/UL — HIGH (ref 3.8–10.5)

## 2018-01-31 PROCEDURE — 99238 HOSP IP/OBS DSCHRG MGMT 30/<: CPT

## 2018-01-31 RX ORDER — INSULIN NPH HUM/REG INSULIN HM 70-30/ML
0 VIAL (ML) SUBCUTANEOUS
Qty: 0 | Refills: 0 | COMMUNITY

## 2018-01-31 RX ORDER — MONTELUKAST 4 MG/1
1 TABLET, CHEWABLE ORAL
Qty: 0 | Refills: 0 | COMMUNITY

## 2018-01-31 RX ORDER — TAMSULOSIN HYDROCHLORIDE 0.4 MG/1
1 CAPSULE ORAL
Qty: 0 | Refills: 0 | COMMUNITY
Start: 2018-01-31

## 2018-01-31 RX ORDER — INSULIN LISPRO 100/ML
5 VIAL (ML) SUBCUTANEOUS
Qty: 30 | Refills: 0 | OUTPATIENT
Start: 2018-01-31 | End: 2018-03-01

## 2018-01-31 RX ORDER — FUROSEMIDE 40 MG
1 TABLET ORAL
Qty: 0 | Refills: 0 | COMMUNITY

## 2018-01-31 RX ORDER — INSULIN GLARGINE 100 [IU]/ML
15 INJECTION, SOLUTION SUBCUTANEOUS
Qty: 30 | Refills: 0 | OUTPATIENT
Start: 2018-01-31 | End: 2018-03-01

## 2018-01-31 RX ORDER — PETROLATUM,WHITE
1 JELLY (GRAM) TOPICAL THREE TIMES A DAY
Qty: 0 | Refills: 0 | Status: DISCONTINUED | OUTPATIENT
Start: 2018-01-31 | End: 2018-01-31

## 2018-01-31 RX ORDER — MONTELUKAST 4 MG/1
1 TABLET, CHEWABLE ORAL
Qty: 0 | Refills: 0 | COMMUNITY
Start: 2018-01-31

## 2018-01-31 RX ORDER — POTASSIUM CHLORIDE 20 MEQ
1 PACKET (EA) ORAL
Qty: 0 | Refills: 0 | COMMUNITY

## 2018-01-31 RX ORDER — ATORVASTATIN CALCIUM 80 MG/1
1 TABLET, FILM COATED ORAL
Qty: 0 | Refills: 0 | COMMUNITY

## 2018-01-31 RX ADMIN — ALBUTEROL 1 PUFF(S): 90 AEROSOL, METERED ORAL at 13:52

## 2018-01-31 RX ADMIN — ALBUTEROL 1 PUFF(S): 90 AEROSOL, METERED ORAL at 10:10

## 2018-01-31 RX ADMIN — Medication 325 MILLIGRAM(S): at 12:35

## 2018-01-31 RX ADMIN — Medication 25 MILLIGRAM(S): at 13:52

## 2018-01-31 RX ADMIN — Medication 25 MILLIGRAM(S): at 06:15

## 2018-01-31 RX ADMIN — Medication 1 APPLICATION(S): at 13:52

## 2018-01-31 RX ADMIN — Medication 9 UNIT(S): at 12:37

## 2018-01-31 RX ADMIN — ALBUTEROL 1 PUFF(S): 90 AEROSOL, METERED ORAL at 01:37

## 2018-01-31 RX ADMIN — BUDESONIDE AND FORMOTEROL FUMARATE DIHYDRATE 2 PUFF(S): 160; 4.5 AEROSOL RESPIRATORY (INHALATION) at 06:14

## 2018-01-31 RX ADMIN — HUMAN INSULIN 12 UNIT(S): 100 INJECTION, SUSPENSION SUBCUTANEOUS at 07:51

## 2018-01-31 RX ADMIN — Medication 200 MILLIGRAM(S): at 06:15

## 2018-01-31 RX ADMIN — Medication 1 APPLICATION(S): at 06:14

## 2018-01-31 RX ADMIN — ALBUTEROL 1 PUFF(S): 90 AEROSOL, METERED ORAL at 06:14

## 2018-01-31 RX ADMIN — HEPARIN SODIUM 5000 UNIT(S): 5000 INJECTION INTRAVENOUS; SUBCUTANEOUS at 06:15

## 2018-01-31 RX ADMIN — AMLODIPINE BESYLATE 10 MILLIGRAM(S): 2.5 TABLET ORAL at 06:15

## 2018-01-31 RX ADMIN — TIOTROPIUM BROMIDE 1 CAPSULE(S): 18 CAPSULE ORAL; RESPIRATORY (INHALATION) at 13:52

## 2018-01-31 RX ADMIN — Medication 9 UNIT(S): at 07:51

## 2018-01-31 RX ADMIN — Medication 100 MILLIGRAM(S): at 06:15

## 2018-01-31 RX ADMIN — Medication 30 MILLIGRAM(S): at 06:15

## 2018-01-31 NOTE — PROGRESS NOTE ADULT - ASSESSMENT
steroid induced hyperglycemia
71 yo female with history of cad, former smoker, lupus presents with intermittent chest pain for three days. +cough, productive. Patient complain of short of breath with wheezing  and productive cough but sxs are improving s/p duonebs and steroids. Troponin slightly elevated but now trending down.  being tx for copd exacerbation , pt also with hyperglycemia/uncontrolled dm, also with bladder distention/urinary retention, seferino on ckd
71F 71 yo female with history of cad, former smoker, lupus presents with intermittent chest pain for three days. +cough, productive. Patient complain of short of breath with wheezing  and productive cough but sxs are improving s/p duonebs and steroids. Troponin slightly elevated but now trending down.  Will c/w tx for copd exacerbation
71F 71 yo female with history of cad, former smoker, lupus presents with intermittent chest pain for three days. +cough, productive. Patient complain of short of breath with wheezing  and productive cough but sxs are improving s/p duonebs and steroids. Troponin slightly elevated but now trending down.  being tx for copd exacerbation , pt also with hyperglycemia/uncontrolled dm, also with bladder distention/urinary retention, seferino on ckd
71F 71 yo female with history of cad, former smoker, lupus presents with intermittent chest pain for three days. +cough, productive. Patient complain of short of breath with wheezing  and productive cough but sxs are improving s/p duonebs and steroids. Troponin slightly elevated but now trending down.  being tx for copd exacerbation , pt also with hyperglycemia/uncontrolled dm, also with bladder distention/urinary retention, seferino on ckd,
71F 73 yo female with history of cad, former smoker, lupus presents with intermittent chest pain for three days. +cough, productive. Patient complain of short of breath with wheezing  and productive cough but sxs are improving s/p duonebs and steroids. Troponin slightly elevated but now trending down.  being tx for copd exacerbation , pt also with hyperglycemia/uncontrolled dm, also with bladder distention/urinary retention, seferino on ckd
73 yo female with history of cad, former smoker, lupus presents with intermittent chest pain for three days. +cough, productive. Patient complain of short of breath with wheezing  and productive cough but sxs are improving s/p duonebs and steroids. Troponin slightly elevated but now trending down.  being tx for copd exacerbation , pt also with hyperglycemia/uncontrolled dm, also with bladder distention/urinary retention, seferino on ckd
Exacerbation Of Asthma   Bronchitis    Recommendations  patient clinically improved   oxygen saturation ok on RA  change Solumedrol to Prednisone   bronchodilators   complete 7 day course of Antibiotics   Pulmonary signig off. Please reconsult PRN
diabetes
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
steroid induced hyperglycemia
72F 71 yo female with history of cad, former smoker, lupus presents with intermittent chest pain for three days. +cough, productive. Patient complain of short of breath with wheezing  and productive cough but sxs are improving s/p duonebs and steroids. Troponin slightly elevated but now trending down.  being tx for copd exacerbation
71F 71 yo female with history of cad, former smoker, lupus presents with intermittent chest pain for three days. +cough, productive. Patient complain of short of breath with wheezing  and productive cough but sxs are improving s/p duonebs and steroids. Troponin slightly elevated but now trending down.  being tx for copd exacerbation , pt also with hyperglycemia/uncontrolled dm, also with bladder distention/urinary retention, seferino on ckd

## 2018-01-31 NOTE — PROGRESS NOTE ADULT - SUBJECTIVE AND OBJECTIVE BOX
Patient feels well no complaints today.    MEDICATIONS  (STANDING):  ALBUTerol    90 MICROgram(s) HFA Inhaler 1 Puff(s) Inhalation every 4 hours  amLODIPine   Tablet 10 milliGRAM(s) Oral daily  aspirin 325 milliGRAM(s) Oral daily  atorvastatin 10 milliGRAM(s) Oral at bedtime  buDESOnide 160 MICROgram(s)/formoterol 4.5 MICROgram(s) Inhaler 2 Puff(s) Inhalation two times a day  dextrose 5%. 1000 milliLiter(s) (50 mL/Hr) IV Continuous <Continuous>  dextrose 50% Injectable 12.5 Gram(s) IV Push once  dextrose 50% Injectable 25 Gram(s) IV Push once  dextrose 50% Injectable 25 Gram(s) IV Push once  docusate sodium 100 milliGRAM(s) Oral three times a day  heparin  Injectable 5000 Unit(s) SubCutaneous every 12 hours  hydrALAZINE 25 milliGRAM(s) Oral three times a day  insulin glargine Injectable (LANTUS) 25 Unit(s) SubCutaneous at bedtime  insulin lispro (HumaLOG) corrective regimen sliding scale   SubCutaneous three times a day before meals  insulin lispro (HumaLOG) corrective regimen sliding scale   SubCutaneous at bedtime  insulin lispro Injectable (HumaLOG) 9 Unit(s) SubCutaneous before breakfast  insulin lispro Injectable (HumaLOG) 9 Unit(s) SubCutaneous before lunch  insulin lispro Injectable (HumaLOG) 7 Unit(s) SubCutaneous before dinner  insulin NPH human recombinant 12 Unit(s) SubCutaneous before breakfast  insulin NPH human recombinant 5 Unit(s) SubCutaneous at bedtime  labetalol 200 milliGRAM(s) Oral two times a day  lidocaine   Patch 1 Patch Transdermal daily  montelukast 10 milliGRAM(s) Oral daily  petrolatum white Ointment 1 Application(s) Topical three times a day  polyethylene glycol 3350 17 Gram(s) Oral daily  predniSONE   Tablet 30 milliGRAM(s) Oral daily  senna 2 Tablet(s) Oral at bedtime  tamsulosin 0.4 milliGRAM(s) Oral at bedtime  tiotropium 18 MICROgram(s) Capsule 1 Capsule(s) Inhalation daily    MEDICATIONS  (PRN):  acetaminophen   Tablet 650 milliGRAM(s) Oral every 6 hours PRN mild pain  benzocaine 15 mG/menthol 3.6 mG Lozenge 1 Lozenge Oral every 4 hours PRN Sore Throat  dextrose Gel 1 Dose(s) Oral once PRN Blood Glucose LESS THAN 70 milliGRAM(s)/deciliter  glucagon  Injectable 1 milliGRAM(s) IntraMuscular once PRN Glucose LESS THAN 70 milligrams/deciliter  guaiFENesin    Syrup 100 milliGRAM(s) Oral every 6 hours PRN Cough      01-30-18 @ 07:01  -  01-31-18 @ 07:00  --------------------------------------------------------  IN: 725 mL / OUT: 400 mL / NET: 325 mL    01-31-18 @ 07:01  -  01-31-18 @ 12:36  --------------------------------------------------------  IN: 375 mL / OUT: 0 mL / NET: 375 mL      PHYSICAL EXAM:      T(C): 36.2 (01-31-18 @ 11:33), Max: 36.9 (01-30-18 @ 23:38)  HR: 76 (01-31-18 @ 11:33) (69 - 77)  BP: 150/74 (01-31-18 @ 11:33) (138/70 - 159/74)  RR: 16 (01-31-18 @ 11:33) (16 - 18)  SpO2: 97% (01-31-18 @ 11:33) (94% - 98%)  Wt(kg): --  Respiratory: clear anteriorly, decreased BS at bases  Cardiovascular: S1 S2  Gastrointestinal: soft NT ND +BS  Extremities:   tr edema                                    9.7    15.29 )-----------( 263      ( 31 Jan 2018 07:33 )             30.9     01-31    140  |  107  |  82<H>  ----------------------------<  119<H>  4.7   |  23  |  3.43<H>    Ca    7.5<L>      31 Jan 2018 07:33    TPro  5.7<L>  /  Alb  1.9<L>  /  TBili  0.2  /  DBili  x   /  AST  18  /  ALT  19  /  AlkPhos  85  01-30      LIVER FUNCTIONS - ( 30 Jan 2018 07:13 )  Alb: 1.9 g/dL / Pro: 5.7 gm/dL / ALK PHOS: 85 U/L / ALT: 19 U/L / AST: 18 U/L / GGT: x             Assessment and Plan:  CKD 3-4; clinically suspected diabetic nephrosclerosis, less likely active LN ( no clinical findings and DsDNA, c3, c4 nl);   Continue supportive care; will need outpatient follow up.

## 2018-01-31 NOTE — PROGRESS NOTE ADULT - PROBLEM SELECTOR PLAN 1
Continue with the same regimen while inpatient   once steroids are decreased expect better finger sticks
-  c/w  duonebs/steroids   - d/c d levaquin , completed 5 days   - monitor O2 sats  -Now steroids changed to PO
-  c/w  duonebs/steroids   - d/c d levaquin , completed 5 days   - monitor O2 sats  -Now steroids changed to PO
Continue with the same regimen while inpatient   Patient can resume  home regimen upon discharge   Patient should have strict diet control and do exercise as tolerated upon discharge
Continue with the same regimen while inpatient   once steroids are decreased expect better finger sticks
No significant improvement, will cont with same oral Prednisone dose, nilsa
Patient should have strict diet control and do exercise as tolerated upon discharge   once steroids are decreased expect better finger sticks   possibly has low insulin reserve and hence will need insulin later and has current hyperglycemia on steroids
Pt states that her SOB has is much better today, will titrate oral prednisone, duonebs and oxygen supplement. discharge planning on tapering steroids.
add NPH 12 units at breakfast to count hyperglycemia induced by Prednisone in AM   also add NPH 5 units at bed time   NPH will be on top of Lispro sliding scale coverage with meals and prandial lispro and Lantus that she is getting   patient with poor insulin reserve and reacts badly to insulin with severe hyperglycemia   also Metformin is contraindicated   once steroids are decreased expect better finger sticks
duonebs/steroids   levaquin   pulmonary consult appreciated
oxygen, duonebs/steroids   levaquin   seen by Pulmonary  cxr negative for pna  Now steroids changed to PO  pulmonary signed off
steroid aggravated hyperglycemia   Continue with the same regimen while inpatient   once steroids are decreased expect better finger sticks - care to discontinue NPH especially as patient will have increased chances of hypoglycemia then   Patient can resume  home regimen upon discharge
uncontrolled secondary to steroids  continue current regimen   observe on increased lispro to 9units with breakfast and lunch first dose today
uncontrolled secondary to steroids  continue current regimen except increase lispro to 9units with breakfast and lunch
oxyegn, duonebs/steroids   levaquin   pulmonary following  cxr negative for pna
Continue   current  t/t

## 2018-01-31 NOTE — PROGRESS NOTE ADULT - PROVIDER SPECIALTY LIST ADULT
Endocrinology
Hospitalist
Nephrology
Pulmonology
Urology
Nephrology
Urology
Endocrinology
Hospitalist

## 2018-01-31 NOTE — CHART NOTE - NSCHARTNOTEFT_GEN_A_CORE
Assessment: 72 y.o. F pt c uncontrolled FS, improving c tapering steroids. as per endocrinology note, pts hyperglycemia is steroid-induced (COPD). +BM 1/31    Factors impacting intake: [x ] none [ ] nausea  [ ] vomiting [ ] diarrhea [ ] constipation  [ ]chewing problems [ ] swallowing issues  [ ] other:     Diet Presciption: Diet, Consistent Carbohydrate/No Snacks (01-23-18 @ 10:14)    Intake: % pt reports good appetite, denies N/V    Current Weight: 93.8 kg (1/31)  % Weight Change: gained 1.2 kg x 1 wk (1.3%)  BMI 32.4    Pertinent Medications: MEDICATIONS  (STANDING):  ALBUTerol    90 MICROgram(s) HFA Inhaler 1 Puff(s) Inhalation every 4 hours  amLODIPine   Tablet 10 milliGRAM(s) Oral daily  aspirin 325 milliGRAM(s) Oral daily  atorvastatin 10 milliGRAM(s) Oral at bedtime  buDESOnide 160 MICROgram(s)/formoterol 4.5 MICROgram(s) Inhaler 2 Puff(s) Inhalation two times a day  dextrose 5%. 1000 milliLiter(s) (50 mL/Hr) IV Continuous <Continuous>  dextrose 50% Injectable 12.5 Gram(s) IV Push once  dextrose 50% Injectable 25 Gram(s) IV Push once  dextrose 50% Injectable 25 Gram(s) IV Push once  docusate sodium 100 milliGRAM(s) Oral three times a day  heparin  Injectable 5000 Unit(s) SubCutaneous every 12 hours  hydrALAZINE 25 milliGRAM(s) Oral three times a day  insulin glargine Injectable (LANTUS) 25 Unit(s) SubCutaneous at bedtime  insulin lispro (HumaLOG) corrective regimen sliding scale   SubCutaneous three times a day before meals  insulin lispro (HumaLOG) corrective regimen sliding scale   SubCutaneous at bedtime  insulin lispro Injectable (HumaLOG) 9 Unit(s) SubCutaneous before breakfast  insulin lispro Injectable (HumaLOG) 9 Unit(s) SubCutaneous before lunch  insulin lispro Injectable (HumaLOG) 7 Unit(s) SubCutaneous before dinner  insulin NPH human recombinant 12 Unit(s) SubCutaneous before breakfast  insulin NPH human recombinant 5 Unit(s) SubCutaneous at bedtime  labetalol 200 milliGRAM(s) Oral two times a day  lidocaine   Patch 1 Patch Transdermal daily  montelukast 10 milliGRAM(s) Oral daily  petrolatum white Ointment 1 Application(s) Topical three times a day  polyethylene glycol 3350 17 Gram(s) Oral daily  predniSONE   Tablet 30 milliGRAM(s) Oral daily  senna 2 Tablet(s) Oral at bedtime  tamsulosin 0.4 milliGRAM(s) Oral at bedtime  tiotropium 18 MICROgram(s) Capsule 1 Capsule(s) Inhalation daily    MEDICATIONS  (PRN):  acetaminophen   Tablet 650 milliGRAM(s) Oral every 6 hours PRN mild pain  benzocaine 15 mG/menthol 3.6 mG Lozenge 1 Lozenge Oral every 4 hours PRN Sore Throat  dextrose Gel 1 Dose(s) Oral once PRN Blood Glucose LESS THAN 70 milliGRAM(s)/deciliter  glucagon  Injectable 1 milliGRAM(s) IntraMuscular once PRN Glucose LESS THAN 70 milligrams/deciliter  guaiFENesin    Syrup 100 milliGRAM(s) Oral every 6 hours PRN Cough    Pertinent Labs: 01-31 Na140 mmol/L Glu 119 mg/dL<H> K+ 4.7 mmol/L Cr  3.43 mg/dL<H> BUN 82 mg/dL<H> Phos n/a   Alb n/a   PAB n/a     01-23 [id number] 13.6     CAPILLARY BLOOD GLUCOSE      POCT Blood Glucose.: 110 mg/dL (31 Jan 2018 07:50)  POCT Blood Glucose.: 127 mg/dL (30 Jan 2018 22:05)  POCT Blood Glucose.: 233 mg/dL (30 Jan 2018 15:50)  POCT Blood Glucose.: 298 mg/dL (30 Jan 2018 11:55)    Skin: WDL. no edema noted.     Estimated Needs:   [x ] no change since previous assessment  [ ] recalculated:     Previous Nutrition Diagnosis:   [ ] Inadequate Energy Intake [ ]Inadequate Oral Intake [ ] Excessive Energy Intake [x ] Excessive CHO intake  [ ] Underweight [ ] Increased Nutrient Needs [ ] Overweight/Obesity   [ ] Altered GI Function [ ] Unintended Weight Loss [ ] Food & Nutrition Related Knowledge Deficit [ ] Malnutrition     Nutrition Diagnosis is [x ] ongoing  [ ] resolved [ ] not applicable     New Nutrition Diagnosis: [x ] not applicable       Interventions:   Recommend  [ ] Change Diet To:  [ ] Nutrition Supplement  [ ] Nutrition Support  [x ] Other: continue CCHO diet, no snacks    Monitoring and Evaluation:   [x ] PO intake [ x ] Tolerance to diet prescription [ x ] weights [ x ] labs[ x ] follow up per protocol  [ ] other: Assessment: 72 y.o. F pt c uncontrolled FS, improving c tapering steroids. as per endocrinology note, pts hyperglycemia is steroid-induced (COPD). pt had no questions on dm diet education she received last week from previous RD. +BM 1/31    Factors impacting intake: [x ] none [ ] nausea  [ ] vomiting [ ] diarrhea [ ] constipation  [ ]chewing problems [ ] swallowing issues  [ ] other:     Diet Presciption: Diet, Consistent Carbohydrate/No Snacks (01-23-18 @ 10:14)    Intake: % pt reports good appetite, denies N/V    Current Weight: 93.8 kg (1/31)  % Weight Change: gained 1.2 kg x 1 wk (1.3%)  BMI 32.4    Pertinent Medications: MEDICATIONS  (STANDING):  ALBUTerol    90 MICROgram(s) HFA Inhaler 1 Puff(s) Inhalation every 4 hours  amLODIPine   Tablet 10 milliGRAM(s) Oral daily  aspirin 325 milliGRAM(s) Oral daily  atorvastatin 10 milliGRAM(s) Oral at bedtime  buDESOnide 160 MICROgram(s)/formoterol 4.5 MICROgram(s) Inhaler 2 Puff(s) Inhalation two times a day  dextrose 5%. 1000 milliLiter(s) (50 mL/Hr) IV Continuous <Continuous>  dextrose 50% Injectable 12.5 Gram(s) IV Push once  dextrose 50% Injectable 25 Gram(s) IV Push once  dextrose 50% Injectable 25 Gram(s) IV Push once  docusate sodium 100 milliGRAM(s) Oral three times a day  heparin  Injectable 5000 Unit(s) SubCutaneous every 12 hours  hydrALAZINE 25 milliGRAM(s) Oral three times a day  insulin glargine Injectable (LANTUS) 25 Unit(s) SubCutaneous at bedtime  insulin lispro (HumaLOG) corrective regimen sliding scale   SubCutaneous three times a day before meals  insulin lispro (HumaLOG) corrective regimen sliding scale   SubCutaneous at bedtime  insulin lispro Injectable (HumaLOG) 9 Unit(s) SubCutaneous before breakfast  insulin lispro Injectable (HumaLOG) 9 Unit(s) SubCutaneous before lunch  insulin lispro Injectable (HumaLOG) 7 Unit(s) SubCutaneous before dinner  insulin NPH human recombinant 12 Unit(s) SubCutaneous before breakfast  insulin NPH human recombinant 5 Unit(s) SubCutaneous at bedtime  labetalol 200 milliGRAM(s) Oral two times a day  lidocaine   Patch 1 Patch Transdermal daily  montelukast 10 milliGRAM(s) Oral daily  petrolatum white Ointment 1 Application(s) Topical three times a day  polyethylene glycol 3350 17 Gram(s) Oral daily  predniSONE   Tablet 30 milliGRAM(s) Oral daily  senna 2 Tablet(s) Oral at bedtime  tamsulosin 0.4 milliGRAM(s) Oral at bedtime  tiotropium 18 MICROgram(s) Capsule 1 Capsule(s) Inhalation daily    MEDICATIONS  (PRN):  acetaminophen   Tablet 650 milliGRAM(s) Oral every 6 hours PRN mild pain  benzocaine 15 mG/menthol 3.6 mG Lozenge 1 Lozenge Oral every 4 hours PRN Sore Throat  dextrose Gel 1 Dose(s) Oral once PRN Blood Glucose LESS THAN 70 milliGRAM(s)/deciliter  glucagon  Injectable 1 milliGRAM(s) IntraMuscular once PRN Glucose LESS THAN 70 milligrams/deciliter  guaiFENesin    Syrup 100 milliGRAM(s) Oral every 6 hours PRN Cough    Pertinent Labs: 01-31 Na140 mmol/L Glu 119 mg/dL<H> K+ 4.7 mmol/L Cr  3.43 mg/dL<H> BUN 82 mg/dL<H> Phos n/a   Alb n/a   PAB n/a     01-23 [id number] 13.6     CAPILLARY BLOOD GLUCOSE      POCT Blood Glucose.: 110 mg/dL (31 Jan 2018 07:50)  POCT Blood Glucose.: 127 mg/dL (30 Jan 2018 22:05)  POCT Blood Glucose.: 233 mg/dL (30 Jan 2018 15:50)  POCT Blood Glucose.: 298 mg/dL (30 Jan 2018 11:55)    Skin: WDL. no edema noted.     Estimated Needs:   [x ] no change since previous assessment  [ ] recalculated:     Previous Nutrition Diagnosis:   [ ] Inadequate Energy Intake [ ]Inadequate Oral Intake [ ] Excessive Energy Intake [x ] Excessive CHO intake  [ ] Underweight [ ] Increased Nutrient Needs [ ] Overweight/Obesity   [ ] Altered GI Function [ ] Unintended Weight Loss [ ] Food & Nutrition Related Knowledge Deficit [ ] Malnutrition     Nutrition Diagnosis is [x ] ongoing  [ ] resolved [ ] not applicable     New Nutrition Diagnosis: [x ] not applicable       Interventions:   Recommend  [ ] Change Diet To:  [ ] Nutrition Supplement  [ ] Nutrition Support  [x ] Other: continue CCHO diet, no snacks    Monitoring and Evaluation:   [x ] PO intake [ x ] Tolerance to diet prescription [ x ] weights [ x ] labs[ x ] follow up per protocol  [ ] other:

## 2018-01-31 NOTE — PROGRESS NOTE ADULT - PROBLEM SELECTOR PROBLEM 1
Steroid-induced diabetes
COPD exacerbation
Moderate persistent asthma with exacerbation
Steroid-induced diabetes
Type 2 diabetes mellitus with hyperglycemia, with long-term current use of insulin
Type 2 diabetes mellitus with hyperglycemia, with long-term current use of insulin
COPD exacerbation
COPD exacerbation

## 2018-01-31 NOTE — PROGRESS NOTE ADULT - SUBJECTIVE AND OBJECTIVE BOX
Patient seen and examined at bedside. No complaints offered.   Voiding.     T(F): 97.2 (01-31-18 @ 11:33), Max: 98.5 (01-30-18 @ 23:38)  HR: 76 (01-31-18 @ 11:33) (69 - 77)  BP: 150/74 (01-31-18 @ 11:33) (138/70 - 159/74)  RR: 16 (01-31-18 @ 11:33) (16 - 18)  SpO2: 97% (01-31-18 @ 11:33) (94% - 98%)    General: Alert, oriented, NAD  CV: S1S2 RRR  Lung: Respirations nonlabored  Abdomen: Soft, NTND  Extremities: No pedal edema or calf tenderness noted   : No suprapubic tenderness    LABS:                        9.7    15.29 )-----------( 263      ( 31 Jan 2018 07:33 )             30.9     01-31    140  |  107  |  82<H>  ----------------------------<  119<H>  4.7   |  23  |  3.43<H>    Ca    7.5<L>      31 Jan 2018 07:33    TPro  5.7<L>  /  Alb  1.9<L>  /  TBili  0.2  /  DBili  x   /  AST  18  /  ALT  19  /  AlkPhos  85  01-30    Impression: 72 year old female with PMH of Dementia, DM, Asthma, CVA, HLD, Neuropathy, SLE and HTN admitted chest pain and wheezing secondary to COPD exacerbation. Found to have urinary retention requiring [place] catheter likely due to diabetic neurogenic bladder. Acute on CKD.   Plan:  - Repeat bladder scan today, f/u results   - Monitor renal function  -Continue colace/senna/ miralax  -Continue medical management  -Continue renal management

## 2018-02-01 PROBLEM — F03.90 UNSPECIFIED DEMENTIA WITHOUT BEHAVIORAL DISTURBANCE: Chronic | Status: ACTIVE | Noted: 2018-01-22

## 2018-02-02 ENCOUNTER — APPOINTMENT (OUTPATIENT)
Dept: HOME HEALTH SERVICES | Facility: HOME HEALTH | Age: 73
End: 2018-02-02
Payer: MEDICARE

## 2018-02-02 VITALS
HEART RATE: 78 BPM | DIASTOLIC BLOOD PRESSURE: 60 MMHG | SYSTOLIC BLOOD PRESSURE: 145 MMHG | RESPIRATION RATE: 16 BRPM | OXYGEN SATURATION: 98 %

## 2018-02-02 PROCEDURE — 99496 TRANSJ CARE MGMT HIGH F2F 7D: CPT

## 2018-02-02 RX ORDER — BLOOD-GLUCOSE METER
W/DEVICE KIT MISCELLANEOUS
Qty: 1 | Refills: 0 | Status: COMPLETED | COMMUNITY
Start: 2017-12-11 | End: 2018-02-02

## 2018-02-02 RX ORDER — ERYTHROMYCIN 5 MG/G
5 OINTMENT OPHTHALMIC
Qty: 4 | Refills: 0 | Status: COMPLETED | COMMUNITY
Start: 2017-11-27 | End: 2018-02-02

## 2018-02-02 RX ORDER — KETOROLAC TROMETHAMINE 4 MG/ML
0.4 SOLUTION/ DROPS OPHTHALMIC
Qty: 5 | Refills: 0 | Status: COMPLETED | COMMUNITY
Start: 2017-11-27 | End: 2018-02-02

## 2018-02-02 RX ORDER — INSULIN ASPART 100 [IU]/ML
(70-30) 100 INJECTION, SUSPENSION SUBCUTANEOUS ONCE
Qty: 1 | Refills: 3 | Status: COMPLETED | COMMUNITY
End: 2018-02-02

## 2018-02-02 RX ORDER — INSULIN GLARGINE 100 [IU]/ML
100 INJECTION, SOLUTION SUBCUTANEOUS
Qty: 2 | Refills: 5 | Status: COMPLETED | COMMUNITY
Start: 2018-02-02 | End: 2018-02-02

## 2018-02-02 RX ORDER — OFLOXACIN 3 MG/ML
0.3 SOLUTION/ DROPS OPHTHALMIC
Qty: 5 | Refills: 0 | Status: COMPLETED | COMMUNITY
Start: 2017-11-27 | End: 2018-02-02

## 2018-02-04 DIAGNOSIS — M32.9 SYSTEMIC LUPUS ERYTHEMATOSUS, UNSPECIFIED: ICD-10-CM

## 2018-02-04 DIAGNOSIS — I25.10 ATHEROSCLEROTIC HEART DISEASE OF NATIVE CORONARY ARTERY WITHOUT ANGINA PECTORIS: ICD-10-CM

## 2018-02-04 DIAGNOSIS — Z79.52 LONG TERM (CURRENT) USE OF SYSTEMIC STEROIDS: ICD-10-CM

## 2018-02-04 DIAGNOSIS — K59.01 SLOW TRANSIT CONSTIPATION: ICD-10-CM

## 2018-02-04 DIAGNOSIS — E78.5 HYPERLIPIDEMIA, UNSPECIFIED: ICD-10-CM

## 2018-02-04 DIAGNOSIS — N31.9 NEUROMUSCULAR DYSFUNCTION OF BLADDER, UNSPECIFIED: ICD-10-CM

## 2018-02-04 DIAGNOSIS — Z79.1 LONG TERM (CURRENT) USE OF NON-STEROIDAL ANTI-INFLAMMATORIES (NSAID): ICD-10-CM

## 2018-02-04 DIAGNOSIS — Z79.4 LONG TERM (CURRENT) USE OF INSULIN: ICD-10-CM

## 2018-02-04 DIAGNOSIS — T38.0X5A ADVERSE EFFECT OF GLUCOCORTICOIDS AND SYNTHETIC ANALOGUES, INITIAL ENCOUNTER: ICD-10-CM

## 2018-02-04 DIAGNOSIS — R07.9 CHEST PAIN, UNSPECIFIED: ICD-10-CM

## 2018-02-04 DIAGNOSIS — Z79.891 LONG TERM (CURRENT) USE OF OPIATE ANALGESIC: ICD-10-CM

## 2018-02-04 DIAGNOSIS — Z28.21 IMMUNIZATION NOT CARRIED OUT BECAUSE OF PATIENT REFUSAL: ICD-10-CM

## 2018-02-04 DIAGNOSIS — E11.65 TYPE 2 DIABETES MELLITUS WITH HYPERGLYCEMIA: ICD-10-CM

## 2018-02-04 DIAGNOSIS — E11.22 TYPE 2 DIABETES MELLITUS WITH DIABETIC CHRONIC KIDNEY DISEASE: ICD-10-CM

## 2018-02-04 DIAGNOSIS — Z87.891 PERSONAL HISTORY OF NICOTINE DEPENDENCE: ICD-10-CM

## 2018-02-04 DIAGNOSIS — D72.829 ELEVATED WHITE BLOOD CELL COUNT, UNSPECIFIED: ICD-10-CM

## 2018-02-04 DIAGNOSIS — Z88.0 ALLERGY STATUS TO PENICILLIN: ICD-10-CM

## 2018-02-04 DIAGNOSIS — I12.9 HYPERTENSIVE CHRONIC KIDNEY DISEASE WITH STAGE 1 THROUGH STAGE 4 CHRONIC KIDNEY DISEASE, OR UNSPECIFIED CHRONIC KIDNEY DISEASE: ICD-10-CM

## 2018-02-04 DIAGNOSIS — F03.90 UNSPECIFIED DEMENTIA WITHOUT BEHAVIORAL DISTURBANCE: ICD-10-CM

## 2018-02-04 DIAGNOSIS — E11.49 TYPE 2 DIABETES MELLITUS WITH OTHER DIABETIC NEUROLOGICAL COMPLICATION: ICD-10-CM

## 2018-02-04 DIAGNOSIS — Z79.51 LONG TERM (CURRENT) USE OF INHALED STEROIDS: ICD-10-CM

## 2018-02-04 DIAGNOSIS — J45.41 MODERATE PERSISTENT ASTHMA WITH (ACUTE) EXACERBATION: ICD-10-CM

## 2018-02-04 DIAGNOSIS — E11.40 TYPE 2 DIABETES MELLITUS WITH DIABETIC NEUROPATHY, UNSPECIFIED: ICD-10-CM

## 2018-02-04 DIAGNOSIS — N18.3 CHRONIC KIDNEY DISEASE, STAGE 3 (MODERATE): ICD-10-CM

## 2018-02-04 DIAGNOSIS — J44.1 CHRONIC OBSTRUCTIVE PULMONARY DISEASE WITH (ACUTE) EXACERBATION: ICD-10-CM

## 2018-02-04 DIAGNOSIS — Z88.5 ALLERGY STATUS TO NARCOTIC AGENT: ICD-10-CM

## 2018-02-04 DIAGNOSIS — N17.9 ACUTE KIDNEY FAILURE, UNSPECIFIED: ICD-10-CM

## 2018-02-04 DIAGNOSIS — Z79.82 LONG TERM (CURRENT) USE OF ASPIRIN: ICD-10-CM

## 2018-02-04 DIAGNOSIS — R33.9 RETENTION OF URINE, UNSPECIFIED: ICD-10-CM

## 2018-03-01 ENCOUNTER — APPOINTMENT (OUTPATIENT)
Dept: HOME HEALTH SERVICES | Facility: HOME HEALTH | Age: 73
End: 2018-03-01

## 2018-03-12 ENCOUNTER — CLINICAL ADVICE (OUTPATIENT)
Age: 73
End: 2018-03-12

## 2018-03-19 ENCOUNTER — MEDICATION RENEWAL (OUTPATIENT)
Age: 73
End: 2018-03-19

## 2018-03-21 ENCOUNTER — CHART COPY (OUTPATIENT)
Age: 73
End: 2018-03-21

## 2018-04-05 ENCOUNTER — CLINICAL ADVICE (OUTPATIENT)
Age: 73
End: 2018-04-05

## 2018-04-23 ENCOUNTER — CLINICAL ADVICE (OUTPATIENT)
Age: 73
End: 2018-04-23

## 2018-04-26 ENCOUNTER — CLINICAL ADVICE (OUTPATIENT)
Age: 73
End: 2018-04-26

## 2018-04-27 ENCOUNTER — MEDICATION RENEWAL (OUTPATIENT)
Age: 73
End: 2018-04-27

## 2018-05-07 ENCOUNTER — APPOINTMENT (OUTPATIENT)
Dept: HOME HEALTH SERVICES | Facility: HOME HEALTH | Age: 73
End: 2018-05-07
Payer: MEDICARE

## 2018-05-07 VITALS
SYSTOLIC BLOOD PRESSURE: 150 MMHG | OXYGEN SATURATION: 98 % | DIASTOLIC BLOOD PRESSURE: 70 MMHG | RESPIRATION RATE: 16 BRPM | HEART RATE: 70 BPM

## 2018-05-07 DIAGNOSIS — R26.81 UNSTEADINESS ON FEET: ICD-10-CM

## 2018-05-07 DIAGNOSIS — Z92.29 PERSONAL HISTORY OF OTHER DRUG THERAPY: ICD-10-CM

## 2018-05-07 PROCEDURE — 99350 HOME/RES VST EST HIGH MDM 60: CPT

## 2018-05-07 RX ORDER — INSULIN DETEMIR 100 [IU]/ML
100 INJECTION, SOLUTION SUBCUTANEOUS
Qty: 3 | Refills: 3 | Status: ACTIVE | COMMUNITY
Start: 2018-02-02 | End: 1900-01-01

## 2018-05-07 RX ORDER — NITROFURANTOIN MACROCRYSTALS 100 MG/1
100 CAPSULE ORAL
Qty: 14 | Refills: 0 | Status: COMPLETED | COMMUNITY
Start: 2018-03-12 | End: 2018-05-07

## 2018-05-07 RX ORDER — ALBUTEROL SULFATE 90 UG/1
108 (90 BASE) AEROSOL, METERED RESPIRATORY (INHALATION)
Qty: 1 | Refills: 3 | Status: ACTIVE | COMMUNITY
Start: 2018-05-07 | End: 1900-01-01

## 2018-05-07 RX ORDER — CIPROFLOXACIN HYDROCHLORIDE 250 MG/1
250 TABLET, FILM COATED ORAL
Qty: 10 | Refills: 0 | Status: COMPLETED | COMMUNITY
Start: 2018-03-19 | End: 2018-05-07

## 2018-05-07 RX ORDER — INSULIN ASPART 100 [IU]/ML
100 INJECTION, SOLUTION INTRAVENOUS; SUBCUTANEOUS 3 TIMES DAILY
Qty: 1 | Refills: 5 | Status: ACTIVE | COMMUNITY
Start: 2018-02-02 | End: 1900-01-01

## 2018-06-28 ENCOUNTER — APPOINTMENT (OUTPATIENT)
Dept: HOME HEALTH SERVICES | Facility: HOME HEALTH | Age: 73
End: 2018-06-28

## 2018-06-28 VITALS
HEART RATE: 71 BPM | OXYGEN SATURATION: 98 % | TEMPERATURE: 99.1 F | RESPIRATION RATE: 18 BRPM | SYSTOLIC BLOOD PRESSURE: 130 MMHG | DIASTOLIC BLOOD PRESSURE: 70 MMHG

## 2018-06-28 DIAGNOSIS — Z00.00 ENCOUNTER FOR GENERAL ADULT MEDICAL EXAMINATION W/OUT ABNORMAL FINDINGS: ICD-10-CM

## 2018-07-03 PROBLEM — Z00.00 ENCOUNTER FOR PREVENTIVE HEALTH EXAMINATION: Status: ACTIVE | Noted: 2017-04-25

## 2018-07-10 ENCOUNTER — APPOINTMENT (OUTPATIENT)
Dept: HOME HEALTH SERVICES | Facility: HOME HEALTH | Age: 73
End: 2018-07-10
Payer: MEDICARE

## 2018-07-10 VITALS
HEART RATE: 75 BPM | OXYGEN SATURATION: 97 % | DIASTOLIC BLOOD PRESSURE: 80 MMHG | SYSTOLIC BLOOD PRESSURE: 180 MMHG | RESPIRATION RATE: 16 BRPM

## 2018-07-10 DIAGNOSIS — N18.6 TYPE 2 DIABETES MELLITUS WITH DIABETIC CHRONIC KIDNEY DISEASE: ICD-10-CM

## 2018-07-10 DIAGNOSIS — E11.22 TYPE 2 DIABETES MELLITUS WITH DIABETIC CHRONIC KIDNEY DISEASE: ICD-10-CM

## 2018-07-10 DIAGNOSIS — Z79.4 TYPE 2 DIABETES MELLITUS WITH DIABETIC CHRONIC KIDNEY DISEASE: ICD-10-CM

## 2018-07-10 DIAGNOSIS — Z99.2 TYPE 2 DIABETES MELLITUS WITH DIABETIC CHRONIC KIDNEY DISEASE: ICD-10-CM

## 2018-07-10 PROCEDURE — 99350 HOME/RES VST EST HIGH MDM 60: CPT

## 2018-07-10 RX ORDER — NITROFURANTOIN (MONOHYDRATE/MACROCRYSTALS) 25; 75 MG/1; MG/1
100 CAPSULE ORAL
Qty: 14 | Refills: 0 | Status: COMPLETED | COMMUNITY
Start: 2018-03-13

## 2018-07-10 RX ORDER — PREDNISONE 10 MG/1
10 TABLET ORAL
Qty: 10 | Refills: 0 | Status: COMPLETED | COMMUNITY
Start: 2018-02-02

## 2018-07-10 RX ORDER — ASPIRIN 325 MG/1
325 TABLET, FILM COATED ORAL DAILY
Qty: 100 | Refills: 1 | Status: COMPLETED | COMMUNITY
Start: 2017-07-12 | End: 2018-07-10

## 2018-07-10 RX ORDER — PEN NEEDLE, DIABETIC 32GX 5/32"
32G X 5 MM NEEDLE, DISPOSABLE MISCELLANEOUS
Qty: 400 | Refills: 0 | Status: COMPLETED | COMMUNITY
Start: 2018-05-07

## 2018-07-11 ENCOUNTER — LABORATORY RESULT (OUTPATIENT)
Age: 73
End: 2018-07-11

## 2018-07-13 LAB
ALBUMIN SERPL ELPH-MCNC: 3.2 G/DL
ALP BLD-CCNC: 104 U/L
ALT SERPL-CCNC: 12 U/L
ANION GAP SERPL CALC-SCNC: 18 MMOL/L
AST SERPL-CCNC: 17 U/L
BASOPHILS # BLD AUTO: 0.01 K/UL
BASOPHILS NFR BLD AUTO: 0.1 %
BILIRUB SERPL-MCNC: <0.2 MG/DL
BUN SERPL-MCNC: 26 MG/DL
CALCIUM SERPL-MCNC: 8.2 MG/DL
CHLORIDE SERPL-SCNC: 99 MMOL/L
CHOLEST SERPL-MCNC: 260 MG/DL
CHOLEST/HDLC SERPL: 4.5 RATIO
CO2 SERPL-SCNC: 20 MMOL/L
CREAT SERPL-MCNC: 3.17 MG/DL
EOSINOPHIL # BLD AUTO: 0.06 K/UL
EOSINOPHIL NFR BLD AUTO: 0.8 %
HBA1C MFR BLD HPLC: 13.5 %
HCT VFR BLD CALC: 34.4 %
HDLC SERPL-MCNC: 58 MG/DL
HGB BLD-MCNC: 10.8 G/DL
IMM GRANULOCYTES NFR BLD AUTO: 0 %
LDLC SERPL CALC-MCNC: 159 MG/DL
LYMPHOCYTES # BLD AUTO: 3.58 K/UL
LYMPHOCYTES NFR BLD AUTO: 45.4 %
MAN DIFF?: NORMAL
MCHC RBC-ENTMCNC: 23.6 PG
MCHC RBC-ENTMCNC: 31.4 GM/DL
MCV RBC AUTO: 75.1 FL
MONOCYTES # BLD AUTO: 0.57 K/UL
MONOCYTES NFR BLD AUTO: 7.2 %
NEUTROPHILS # BLD AUTO: 3.67 K/UL
NEUTROPHILS NFR BLD AUTO: 46.5 %
PLATELET # BLD AUTO: 240 K/UL
POTASSIUM SERPL-SCNC: 4.2 MMOL/L
PROT SERPL-MCNC: 5.8 G/DL
RBC # BLD: 4.58 M/UL
RBC # FLD: 16.2 %
SODIUM SERPL-SCNC: 137 MMOL/L
TRIGL SERPL-MCNC: 216 MG/DL
WBC # FLD AUTO: 7.89 K/UL

## 2018-07-28 PROBLEM — Z86.73 HISTORY OF STROKE: Status: RESOLVED | Noted: 2017-04-26 | Resolved: 2018-07-28

## 2018-08-03 ENCOUNTER — APPOINTMENT (OUTPATIENT)
Dept: HOME HEALTH SERVICES | Facility: HOME HEALTH | Age: 73
End: 2018-08-03

## 2018-08-03 VITALS
DIASTOLIC BLOOD PRESSURE: 80 MMHG | TEMPERATURE: 97.3 F | RESPIRATION RATE: 18 BRPM | SYSTOLIC BLOOD PRESSURE: 150 MMHG | HEART RATE: 88 BPM | OXYGEN SATURATION: 98 %

## 2018-08-17 ENCOUNTER — INPATIENT (INPATIENT)
Facility: HOSPITAL | Age: 73
LOS: 9 days | Discharge: NURSING, REHAB OR LONG-TERM CARE | End: 2018-08-27
Attending: INTERNAL MEDICINE | Admitting: INTERNAL MEDICINE
Payer: MEDICARE

## 2018-08-17 VITALS
TEMPERATURE: 98 F | HEART RATE: 85 BPM | DIASTOLIC BLOOD PRESSURE: 81 MMHG | OXYGEN SATURATION: 97 % | SYSTOLIC BLOOD PRESSURE: 151 MMHG | WEIGHT: 205.03 LBS | RESPIRATION RATE: 18 BRPM | HEIGHT: 65 IN

## 2018-08-17 DIAGNOSIS — Z90.89 ACQUIRED ABSENCE OF OTHER ORGANS: Chronic | ICD-10-CM

## 2018-08-17 LAB
ALBUMIN SERPL ELPH-MCNC: 2.4 G/DL — LOW (ref 3.3–5)
ALP SERPL-CCNC: 100 U/L — SIGNIFICANT CHANGE UP (ref 40–120)
ALT FLD-CCNC: 18 U/L — SIGNIFICANT CHANGE UP (ref 12–78)
ANION GAP SERPL CALC-SCNC: 10 MMOL/L — SIGNIFICANT CHANGE UP (ref 5–17)
APPEARANCE UR: ABNORMAL
APTT BLD: 29.2 SEC — SIGNIFICANT CHANGE UP (ref 27.5–37.4)
AST SERPL-CCNC: 25 U/L — SIGNIFICANT CHANGE UP (ref 15–37)
BACTERIA # UR AUTO: ABNORMAL
BASOPHILS # BLD AUTO: 0.01 K/UL — SIGNIFICANT CHANGE UP (ref 0–0.2)
BASOPHILS NFR BLD AUTO: 0.1 % — SIGNIFICANT CHANGE UP (ref 0–2)
BILIRUB SERPL-MCNC: 0.3 MG/DL — SIGNIFICANT CHANGE UP (ref 0.2–1.2)
BILIRUB UR-MCNC: NEGATIVE — SIGNIFICANT CHANGE UP
BUN SERPL-MCNC: 36 MG/DL — HIGH (ref 7–23)
CALCIUM SERPL-MCNC: 8.1 MG/DL — LOW (ref 8.5–10.1)
CHLORIDE SERPL-SCNC: 107 MMOL/L — SIGNIFICANT CHANGE UP (ref 96–108)
CK MB BLD-MCNC: 0.7 % — SIGNIFICANT CHANGE UP (ref 0–3.5)
CK MB CFR SERPL CALC: 1.7 NG/ML — SIGNIFICANT CHANGE UP (ref 0.5–3.6)
CK SERPL-CCNC: 235 U/L — HIGH (ref 26–192)
CO2 SERPL-SCNC: 23 MMOL/L — SIGNIFICANT CHANGE UP (ref 22–31)
COLOR SPEC: YELLOW — SIGNIFICANT CHANGE UP
CREAT SERPL-MCNC: 4.09 MG/DL — HIGH (ref 0.5–1.3)
DIFF PNL FLD: ABNORMAL
EOSINOPHIL # BLD AUTO: 0.07 K/UL — SIGNIFICANT CHANGE UP (ref 0–0.5)
EOSINOPHIL NFR BLD AUTO: 0.7 % — SIGNIFICANT CHANGE UP (ref 0–6)
EPI CELLS # UR: ABNORMAL
GLUCOSE SERPL-MCNC: 244 MG/DL — HIGH (ref 70–99)
GLUCOSE UR QL: 250 MG/DL
HCT VFR BLD CALC: 33.5 % — LOW (ref 34.5–45)
HGB BLD-MCNC: 10.1 G/DL — LOW (ref 11.5–15.5)
IMM GRANULOCYTES NFR BLD AUTO: 0.2 % — SIGNIFICANT CHANGE UP (ref 0–1.5)
INR BLD: 0.99 RATIO — SIGNIFICANT CHANGE UP (ref 0.88–1.16)
KETONES UR-MCNC: NEGATIVE — SIGNIFICANT CHANGE UP
LEUKOCYTE ESTERASE UR-ACNC: ABNORMAL
LYMPHOCYTES # BLD AUTO: 3.78 K/UL — HIGH (ref 1–3.3)
LYMPHOCYTES # BLD AUTO: 36.7 % — SIGNIFICANT CHANGE UP (ref 13–44)
MCHC RBC-ENTMCNC: 23.1 PG — LOW (ref 27–34)
MCHC RBC-ENTMCNC: 30.1 GM/DL — LOW (ref 32–36)
MCV RBC AUTO: 76.7 FL — LOW (ref 80–100)
MONOCYTES # BLD AUTO: 1.15 K/UL — HIGH (ref 0–0.9)
MONOCYTES NFR BLD AUTO: 11.2 % — SIGNIFICANT CHANGE UP (ref 2–14)
NEUTROPHILS # BLD AUTO: 5.28 K/UL — SIGNIFICANT CHANGE UP (ref 1.8–7.4)
NEUTROPHILS NFR BLD AUTO: 51.1 % — SIGNIFICANT CHANGE UP (ref 43–77)
NITRITE UR-MCNC: NEGATIVE — SIGNIFICANT CHANGE UP
NRBC # BLD: 0 /100 WBCS — SIGNIFICANT CHANGE UP (ref 0–0)
PH UR: 7 — SIGNIFICANT CHANGE UP (ref 5–8)
PLATELET # BLD AUTO: 245 K/UL — SIGNIFICANT CHANGE UP (ref 150–400)
POTASSIUM SERPL-MCNC: 4.4 MMOL/L — SIGNIFICANT CHANGE UP (ref 3.5–5.3)
POTASSIUM SERPL-SCNC: 4.4 MMOL/L — SIGNIFICANT CHANGE UP (ref 3.5–5.3)
PROT SERPL-MCNC: 6.9 GM/DL — SIGNIFICANT CHANGE UP (ref 6–8.3)
PROT UR-MCNC: 500 MG/DL
PROTHROM AB SERPL-ACNC: 10.8 SEC — SIGNIFICANT CHANGE UP (ref 9.8–12.7)
RBC # BLD: 4.37 M/UL — SIGNIFICANT CHANGE UP (ref 3.8–5.2)
RBC # FLD: 15.5 % — HIGH (ref 10.3–14.5)
RBC CASTS # UR COMP ASSIST: >50 /HPF (ref 0–4)
SODIUM SERPL-SCNC: 140 MMOL/L — SIGNIFICANT CHANGE UP (ref 135–145)
SP GR SPEC: 1.01 — SIGNIFICANT CHANGE UP (ref 1.01–1.02)
TROPONIN I SERPL-MCNC: <.015 NG/ML — SIGNIFICANT CHANGE UP (ref 0.01–0.04)
UROBILINOGEN FLD QL: NEGATIVE MG/DL — SIGNIFICANT CHANGE UP
WBC # BLD: 10.31 K/UL — SIGNIFICANT CHANGE UP (ref 3.8–10.5)
WBC # FLD AUTO: 10.31 K/UL — SIGNIFICANT CHANGE UP (ref 3.8–10.5)
WBC UR QL: ABNORMAL

## 2018-08-17 PROCEDURE — 73030 X-RAY EXAM OF SHOULDER: CPT | Mod: 26,LT

## 2018-08-17 PROCEDURE — 72100 X-RAY EXAM L-S SPINE 2/3 VWS: CPT | Mod: 26

## 2018-08-17 PROCEDURE — 72070 X-RAY EXAM THORAC SPINE 2VWS: CPT | Mod: 26

## 2018-08-17 PROCEDURE — 70450 CT HEAD/BRAIN W/O DYE: CPT | Mod: 26

## 2018-08-17 PROCEDURE — 99284 EMERGENCY DEPT VISIT MOD MDM: CPT

## 2018-08-17 PROCEDURE — 71045 X-RAY EXAM CHEST 1 VIEW: CPT | Mod: 26

## 2018-08-17 RX ORDER — ACETAMINOPHEN 500 MG
975 TABLET ORAL ONCE
Qty: 0 | Refills: 0 | Status: COMPLETED | OUTPATIENT
Start: 2018-08-17 | End: 2018-08-17

## 2018-08-17 RX ORDER — TRAMADOL HYDROCHLORIDE 50 MG/1
50 TABLET ORAL ONCE
Qty: 0 | Refills: 0 | Status: DISCONTINUED | OUTPATIENT
Start: 2018-08-17 | End: 2018-08-17

## 2018-08-17 RX ORDER — CIPROFLOXACIN LACTATE 400MG/40ML
200 VIAL (ML) INTRAVENOUS ONCE
Qty: 0 | Refills: 0 | Status: COMPLETED | OUTPATIENT
Start: 2018-08-17 | End: 2018-08-17

## 2018-08-17 RX ADMIN — Medication 200 MILLIGRAM(S): at 22:17

## 2018-08-17 RX ADMIN — Medication 100 MILLIGRAM(S): at 21:09

## 2018-08-17 RX ADMIN — TRAMADOL HYDROCHLORIDE 50 MILLIGRAM(S): 50 TABLET ORAL at 19:29

## 2018-08-17 RX ADMIN — TRAMADOL HYDROCHLORIDE 50 MILLIGRAM(S): 50 TABLET ORAL at 18:51

## 2018-08-17 RX ADMIN — Medication 975 MILLIGRAM(S): at 18:51

## 2018-08-17 NOTE — ED PROVIDER NOTE - MEDICAL DECISION MAKING DETAILS
pt signed out to Dr. Montgomery pending further evaluation in AM by PT for possible short term rehab placement - CKD close to baseline noted. pt signed out to Dr. Montgomery pending further evaluation in AM by PT for possible short term rehab placement - CKD close to baseline noted. PT has evaluated and recommended short term rehab due to gait instability

## 2018-08-17 NOTE — ED ADULT TRIAGE NOTE - CHIEF COMPLAINT QUOTE
I tried to get out of bed at midnight and I fell to the floor, hitting my head on the floor.  I've had a headache every since.  Hx of CVA and Dementia per granddaughter

## 2018-08-17 NOTE — ED PROVIDER NOTE - CARE PLAN
Principal Discharge DX:	Accidental fall  Secondary Diagnosis:	Thoracic sprain  Secondary Diagnosis:	Lumbosacral strain

## 2018-08-17 NOTE — ED PROVIDER NOTE - PROGRESS NOTE DETAILS
Patient received in sign out from Dr. Armstrong for fall.  Labs reviewed and at baseline, imaging does not show injury, +UTI, will treat, patient pending SW/PT in am. Patient rested in ER overnight without event.  Daughter present in ER this morning.  Essential meds ordered.  Per daughter patient has had chronic UTI for months.  Pending PT/SW eval this morning for difficulty walking. Care endorsed to Dr. Edmondson.

## 2018-08-17 NOTE — ED PROVIDER NOTE - OBJECTIVE STATEMENT
72 year old female with PMH of asthma, HTN, HLD, Dementia, CVA hx, CKD presenting to ED due to head trauma after fall at home - unsure of cause, or mechanism but does not feel unwell, with no known LOC occurring this AM - complaining of neck and back pain today and has had a difficult time getting out of bed. Pt normally as per daughter already has difficult time walking prior to fall and now with back pain has not gotten up on her own. No hip pain noted or knee or foot pain.

## 2018-08-17 NOTE — ED PROVIDER NOTE - MUSCULOSKELETAL MINIMAL EXAM
Midline C spine nontender, T8/9 area tender to palpation, mild lateral lumbar tenderness, hip ROM intact without deficit, Left shoulder with some difficulty with abduction/elevation but passive movement intact./normal range of motion

## 2018-08-18 DIAGNOSIS — I10 ESSENTIAL (PRIMARY) HYPERTENSION: ICD-10-CM

## 2018-08-18 DIAGNOSIS — E11.9 TYPE 2 DIABETES MELLITUS WITHOUT COMPLICATIONS: ICD-10-CM

## 2018-08-18 DIAGNOSIS — N39.0 URINARY TRACT INFECTION, SITE NOT SPECIFIED: ICD-10-CM
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
## 2018-08-18 DIAGNOSIS — N18.5 CHRONIC KIDNEY DISEASE, STAGE 5: ICD-10-CM

## 2018-08-18 LAB
GLUCOSE BLDC GLUCOMTR-MCNC: 189 MG/DL — HIGH (ref 70–99)
GLUCOSE BLDC GLUCOMTR-MCNC: 224 MG/DL — HIGH (ref 70–99)
GLUCOSE BLDC GLUCOMTR-MCNC: 280 MG/DL — HIGH (ref 70–99)

## 2018-08-18 PROCEDURE — 93010 ELECTROCARDIOGRAM REPORT: CPT

## 2018-08-18 RX ORDER — DEXTROSE 50 % IN WATER 50 %
12.5 SYRINGE (ML) INTRAVENOUS ONCE
Qty: 0 | Refills: 0 | Status: DISCONTINUED | OUTPATIENT
Start: 2018-08-18 | End: 2018-08-27

## 2018-08-18 RX ORDER — GABAPENTIN 400 MG/1
400 CAPSULE ORAL
Qty: 0 | Refills: 0 | Status: DISCONTINUED | OUTPATIENT
Start: 2018-08-18 | End: 2018-08-18

## 2018-08-18 RX ORDER — CIPROFLOXACIN LACTATE 400MG/40ML
250 VIAL (ML) INTRAVENOUS DAILY
Qty: 0 | Refills: 0 | Status: COMPLETED | OUTPATIENT
Start: 2018-08-19 | End: 2018-08-21

## 2018-08-18 RX ORDER — INSULIN GLARGINE 100 [IU]/ML
15 INJECTION, SOLUTION SUBCUTANEOUS AT BEDTIME
Qty: 0 | Refills: 0 | Status: DISCONTINUED | OUTPATIENT
Start: 2018-08-18 | End: 2018-08-27

## 2018-08-18 RX ORDER — MAGNESIUM HYDROXIDE 400 MG/1
30 TABLET, CHEWABLE ORAL DAILY
Qty: 0 | Refills: 0 | Status: DISCONTINUED | OUTPATIENT
Start: 2018-08-18 | End: 2018-08-27

## 2018-08-18 RX ORDER — FUROSEMIDE 40 MG
40 TABLET ORAL DAILY
Qty: 0 | Refills: 0 | Status: DISCONTINUED | OUTPATIENT
Start: 2018-08-18 | End: 2018-08-26

## 2018-08-18 RX ORDER — ALBUTEROL 90 UG/1
2 AEROSOL, METERED ORAL EVERY 6 HOURS
Qty: 0 | Refills: 0 | Status: DISCONTINUED | OUTPATIENT
Start: 2018-08-18 | End: 2018-08-27

## 2018-08-18 RX ORDER — HYDRALAZINE HCL 50 MG
25 TABLET ORAL ONCE
Qty: 0 | Refills: 0 | Status: COMPLETED | OUTPATIENT
Start: 2018-08-18 | End: 2018-08-18

## 2018-08-18 RX ORDER — INSULIN LISPRO 100/ML
VIAL (ML) SUBCUTANEOUS
Qty: 0 | Refills: 0 | Status: DISCONTINUED | OUTPATIENT
Start: 2018-08-18 | End: 2018-08-27

## 2018-08-18 RX ORDER — DEXTROSE 50 % IN WATER 50 %
25 SYRINGE (ML) INTRAVENOUS ONCE
Qty: 0 | Refills: 0 | Status: DISCONTINUED | OUTPATIENT
Start: 2018-08-18 | End: 2018-08-27

## 2018-08-18 RX ORDER — HEPARIN SODIUM 5000 [USP'U]/ML
5000 INJECTION INTRAVENOUS; SUBCUTANEOUS EVERY 12 HOURS
Qty: 0 | Refills: 0 | Status: DISCONTINUED | OUTPATIENT
Start: 2018-08-18 | End: 2018-08-27

## 2018-08-18 RX ORDER — TRAMADOL HYDROCHLORIDE 50 MG/1
50 TABLET ORAL ONCE
Qty: 0 | Refills: 0 | Status: DISCONTINUED | OUTPATIENT
Start: 2018-08-18 | End: 2018-08-18

## 2018-08-18 RX ORDER — AMLODIPINE BESYLATE 2.5 MG/1
10 TABLET ORAL ONCE
Qty: 0 | Refills: 0 | Status: COMPLETED | OUTPATIENT
Start: 2018-08-18 | End: 2018-08-18

## 2018-08-18 RX ORDER — ASPIRIN/CALCIUM CARB/MAGNESIUM 324 MG
81 TABLET ORAL DAILY
Qty: 0 | Refills: 0 | Status: DISCONTINUED | OUTPATIENT
Start: 2018-08-18 | End: 2018-08-27

## 2018-08-18 RX ORDER — ATORVASTATIN CALCIUM 80 MG/1
80 TABLET, FILM COATED ORAL AT BEDTIME
Qty: 0 | Refills: 0 | Status: DISCONTINUED | OUTPATIENT
Start: 2018-08-18 | End: 2018-08-27

## 2018-08-18 RX ORDER — KETOROLAC TROMETHAMINE 30 MG/ML
15 SYRINGE (ML) INJECTION ONCE
Qty: 0 | Refills: 0 | Status: DISCONTINUED | OUTPATIENT
Start: 2018-08-18 | End: 2018-08-18

## 2018-08-18 RX ORDER — SODIUM CHLORIDE 9 MG/ML
1000 INJECTION INTRAMUSCULAR; INTRAVENOUS; SUBCUTANEOUS ONCE
Qty: 0 | Refills: 0 | Status: COMPLETED | OUTPATIENT
Start: 2018-08-18 | End: 2018-08-18

## 2018-08-18 RX ORDER — INSULIN LISPRO 100/ML
VIAL (ML) SUBCUTANEOUS AT BEDTIME
Qty: 0 | Refills: 0 | Status: DISCONTINUED | OUTPATIENT
Start: 2018-08-18 | End: 2018-08-27

## 2018-08-18 RX ORDER — SODIUM CHLORIDE 9 MG/ML
1000 INJECTION, SOLUTION INTRAVENOUS
Qty: 0 | Refills: 0 | Status: DISCONTINUED | OUTPATIENT
Start: 2018-08-18 | End: 2018-08-27

## 2018-08-18 RX ORDER — INSULIN HUMAN 100 [IU]/ML
6 INJECTION, SOLUTION SUBCUTANEOUS ONCE
Qty: 0 | Refills: 0 | Status: COMPLETED | OUTPATIENT
Start: 2018-08-18 | End: 2018-08-18

## 2018-08-18 RX ORDER — LABETALOL HCL 100 MG
200 TABLET ORAL
Qty: 0 | Refills: 0 | Status: DISCONTINUED | OUTPATIENT
Start: 2018-08-18 | End: 2018-08-27

## 2018-08-18 RX ORDER — AMLODIPINE BESYLATE 2.5 MG/1
5 TABLET ORAL DAILY
Qty: 0 | Refills: 0 | Status: DISCONTINUED | OUTPATIENT
Start: 2018-08-18 | End: 2018-08-27

## 2018-08-18 RX ORDER — ACETAMINOPHEN 500 MG
650 TABLET ORAL EVERY 6 HOURS
Qty: 0 | Refills: 0 | Status: DISCONTINUED | OUTPATIENT
Start: 2018-08-18 | End: 2018-08-27

## 2018-08-18 RX ORDER — GABAPENTIN 400 MG/1
300 CAPSULE ORAL
Qty: 0 | Refills: 0 | Status: DISCONTINUED | OUTPATIENT
Start: 2018-08-18 | End: 2018-08-27

## 2018-08-18 RX ORDER — ATORVASTATIN CALCIUM 80 MG/1
80 TABLET, FILM COATED ORAL ONCE
Qty: 0 | Refills: 0 | Status: COMPLETED | OUTPATIENT
Start: 2018-08-18 | End: 2018-08-18

## 2018-08-18 RX ORDER — DEXTROSE 50 % IN WATER 50 %
15 SYRINGE (ML) INTRAVENOUS ONCE
Qty: 0 | Refills: 0 | Status: DISCONTINUED | OUTPATIENT
Start: 2018-08-18 | End: 2018-08-27

## 2018-08-18 RX ORDER — CIPROFLOXACIN LACTATE 400MG/40ML
1 VIAL (ML) INTRAVENOUS
Qty: 9 | Refills: 0 | OUTPATIENT
Start: 2018-08-18 | End: 2018-08-24

## 2018-08-18 RX ORDER — SENNA PLUS 8.6 MG/1
2 TABLET ORAL AT BEDTIME
Qty: 0 | Refills: 0 | Status: DISCONTINUED | OUTPATIENT
Start: 2018-08-18 | End: 2018-08-27

## 2018-08-18 RX ORDER — MONTELUKAST 4 MG/1
10 TABLET, CHEWABLE ORAL DAILY
Qty: 0 | Refills: 0 | Status: DISCONTINUED | OUTPATIENT
Start: 2018-08-18 | End: 2018-08-27

## 2018-08-18 RX ORDER — FUROSEMIDE 40 MG
40 TABLET ORAL ONCE
Qty: 0 | Refills: 0 | Status: COMPLETED | OUTPATIENT
Start: 2018-08-18 | End: 2018-08-18

## 2018-08-18 RX ORDER — GLUCAGON INJECTION, SOLUTION 0.5 MG/.1ML
1 INJECTION, SOLUTION SUBCUTANEOUS ONCE
Qty: 0 | Refills: 0 | Status: DISCONTINUED | OUTPATIENT
Start: 2018-08-18 | End: 2018-08-27

## 2018-08-18 RX ORDER — BUDESONIDE AND FORMOTEROL FUMARATE DIHYDRATE 160; 4.5 UG/1; UG/1
2 AEROSOL RESPIRATORY (INHALATION)
Qty: 0 | Refills: 0 | Status: DISCONTINUED | OUTPATIENT
Start: 2018-08-18 | End: 2018-08-27

## 2018-08-18 RX ADMIN — Medication 15 MILLIGRAM(S): at 08:24

## 2018-08-18 RX ADMIN — TRAMADOL HYDROCHLORIDE 50 MILLIGRAM(S): 50 TABLET ORAL at 08:24

## 2018-08-18 RX ADMIN — ATORVASTATIN CALCIUM 80 MILLIGRAM(S): 80 TABLET, FILM COATED ORAL at 21:47

## 2018-08-18 RX ADMIN — GABAPENTIN 300 MILLIGRAM(S): 400 CAPSULE ORAL at 18:21

## 2018-08-18 RX ADMIN — INSULIN HUMAN 6 UNIT(S): 100 INJECTION, SOLUTION SUBCUTANEOUS at 09:13

## 2018-08-18 RX ADMIN — Medication 200 MILLIGRAM(S): at 18:21

## 2018-08-18 RX ADMIN — Medication 15 MILLIGRAM(S): at 09:00

## 2018-08-18 RX ADMIN — Medication 40 MILLIGRAM(S): at 06:24

## 2018-08-18 RX ADMIN — Medication 25 MILLIGRAM(S): at 06:24

## 2018-08-18 RX ADMIN — ATORVASTATIN CALCIUM 80 MILLIGRAM(S): 80 TABLET, FILM COATED ORAL at 06:24

## 2018-08-18 RX ADMIN — AMLODIPINE BESYLATE 10 MILLIGRAM(S): 2.5 TABLET ORAL at 04:05

## 2018-08-18 RX ADMIN — HEPARIN SODIUM 5000 UNIT(S): 5000 INJECTION INTRAVENOUS; SUBCUTANEOUS at 18:21

## 2018-08-18 RX ADMIN — TRAMADOL HYDROCHLORIDE 50 MILLIGRAM(S): 50 TABLET ORAL at 09:00

## 2018-08-18 RX ADMIN — INSULIN GLARGINE 15 UNIT(S): 100 INJECTION, SOLUTION SUBCUTANEOUS at 21:51

## 2018-08-18 RX ADMIN — Medication 4: at 16:47

## 2018-08-18 RX ADMIN — SODIUM CHLORIDE 1000 MILLILITER(S): 9 INJECTION INTRAMUSCULAR; INTRAVENOUS; SUBCUTANEOUS at 11:50

## 2018-08-18 NOTE — H&P ADULT - PMH
Asthma    Asthma    CKD (chronic kidney disease) stage 5, GFR less than 15 ml/min    CVA (cerebral vascular accident)    CVA (cerebral vascular accident)    Dementia    DM (diabetes mellitus)    DM (diabetes mellitus)    HLD (hyperlipidemia)    HTN (hypertension)    Neuropathy    SLE (systemic lupus erythematosus)

## 2018-08-18 NOTE — PHYSICAL THERAPY INITIAL EVALUATION ADULT - GENERAL OBSERVATIONS, REHAB EVAL
Pt. found supine, awake, alert, oriented x 4 and coherent, on room air,c/o pain in L shoulder, weakness and easy fatigue and periodic dizziness and lightheadedness..

## 2018-08-18 NOTE — H&P ADULT - NSHPLABSRESULTS_GEN_ALL_CORE
10.1                 140  | 23   | 36           10.31 >-----------< 245     ------------------------< 244                   33.5                 4.4  | 107  | 4.09                                         Ca 8.1   Mg x     Ph x      Urinalysis Basic - ( 17 Aug 2018 19:59 )    Color: Yellow / Appearance: Slightly Turbid / S.010 / pH: x  Gluc: x / Ketone: Negative  / Bili: Negative / Urobili: Negative mg/dL   Blood: x / Protein: 500 mg/dL / Nitrite: Negative   Leuk Esterase: Moderate / RBC: >50 /HPF / WBC 6-10   Sq Epi: x / Non Sq Epi: Moderate / Bacteria: Moderate  < from: Xray Chest 1 View AP/PA. (18 @ 19:24) >    EXAM:  XR CHEST AP OR PA 1V                        PROCEDURE DATE:  2018    INTERPRETATION:  AP semierect chest on 2018 at 7:01 PM.   Patient had a fall.    Heart is magnified by technique.    The lung fields and pleural surfaces are unremarkable.    The chest is similar to  of this year.    IMPRESSION: No acute finding.    < end of copied text >    < from: US Renal (18 @ 17:48) >    EXAM:  US KIDNEY(S)                        PROCEDURE DATE:  2018    INTERPRETATION:  CLINICAL INFORMATION: Acute kidney injury. Chronic renal   disease.COMPARISON: None available.    TECHNIQUE: Sonography of the kidneys and bladder.     FINDINGS:    Right kidney:  8.2 cm. No renal mass, hydronephrosis or calculi.    Left kidney:  9.5 cm. No renal mass or hydronephrosis. Questionable left   renal calculus measuring 5 mm.     Urinary bladder: The bladder is distended and unremarkable. Prevoid   volume was 791 cc. Patient was unable to void at time of exam.    IMPRESSION:     Possible nonobstructing left renal calculus.    Distended bladder. Patient was unable to void at time of exam.    No hydronephrosis bilaterally.      < end of copied text >    < from: CT Head No Cont (18 @ 17:41) >  EXAM:  CT BRAIN                        PROCEDURE DATE:  2018    INTERPRETATION:  INDICATION:  Status post trauma with head injury.     Headache.  TECHNIQUE:  A non contrast 2.5mm axial CT study of the brain was   performed from skull base to vertex. Coronal and sagittal reformations   were generated from the axial data.  COMPARISON EXAMINATION:  CT 2017    FINDINGS:  HEMISPHERES:Again noted are extensive chronic small vessel ischemic   changes throughout the basal ganglia and white matter of both   hemispheres. Small lymph nodes are noted in the thalami. No acute   abnormality is noted.  VENTRICLES:  Ex vacuo enlargement is suggested that.  POSTERIOR FOSSA:  Scattered chronic ischemic changes are noted.  EXTRACEREBRAL SPACES:  No subdural or epidural collections are noted.  SKULL BASE AND CALVARIUM:  Appears intact.  No fracture or destructive   lesion is identified.  SINUSES AND MASTOIDS:  Clear.  MISCELLANEOUS:  There are artifacts and patient motion during theexam.    IMPRESSION:    1)  extensive chronic ischemic changes throughout both hemispheres with   volume loss. Similar findings were noted previously. No acute abnormality   suggested..  2)  no intracerebral hemorrhage or contusion is identified.    < end of copied text >    EKG sinus rhythm NAC

## 2018-08-18 NOTE — PHYSICAL THERAPY INITIAL EVALUATION ADULT - DIAGNOSIS, PT EVAL
Decreased general endurance, strength, unsteady balance standing and walking, c/o periodic dizziness and lightheadness.

## 2018-08-18 NOTE — PHYSICAL THERAPY INITIAL EVALUATION ADULT - PERTINENT HX OF CURRENT PROBLEM, REHAB EVAL
Pt stated that she had an episode of fall at home backwards around 2 am ; radiologic studies revealed no acute fractures.

## 2018-08-18 NOTE — PHYSICAL THERAPY INITIAL EVALUATION ADULT - RANGE OF MOTION EXAMINATION, REHAB EVAL
pain in the left shouler upon movt/bilateral upper extremity ROM was WFL (within functional limits)/bilateral lower extremity ROM was WFL (within functional limits)

## 2018-08-18 NOTE — PHYSICAL THERAPY INITIAL EVALUATION ADULT - IMPAIRMENTS FOUND, PT EVAL
aerobic capacity/endurance/muscle strength/ROM/ergonomics and body mechanics/gait, locomotion, and balance

## 2018-08-18 NOTE — ED ADULT NURSE REASSESSMENT NOTE - NS ED NURSE REASSESS COMMENT FT1
Patient received at 0715 a/o x3 with daughter at bedside, admits to 10/10 left shoulder and back pains, currently waiting for PT evaluation, patient and daughter informed.

## 2018-08-18 NOTE — H&P ADULT - NSHPPHYSICALEXAM_GEN_ALL_CORE
General: A/ox 3, No acute Distress  skin : Normal  Head. Billy. No lacerations  Neck: Supple, NO JVD  Cardiac: S1 S2, No M/R/G  Pulmonary: CTAP, Breathing unlabored, No Rhonchi/Rales/Wheezing  Abdomen: Soft, Non -tender, +BS x 4 quads  Extremities: No Rashes, No edema  Neuro: A/o x 3, No focal deficits. Normal Motor and sensory exam  Vascular: Normal distal pulses.  Extremities: No edema  Decubiti ; None

## 2018-08-19 LAB
CULTURE RESULTS: SIGNIFICANT CHANGE UP
GLUCOSE BLDC GLUCOMTR-MCNC: 147 MG/DL — HIGH (ref 70–99)
GLUCOSE BLDC GLUCOMTR-MCNC: 206 MG/DL — HIGH (ref 70–99)
GLUCOSE BLDC GLUCOMTR-MCNC: 206 MG/DL — HIGH (ref 70–99)
GLUCOSE BLDC GLUCOMTR-MCNC: 249 MG/DL — HIGH (ref 70–99)
HBA1C BLD-MCNC: 12.4 % — HIGH (ref 4–5.6)
SPECIMEN SOURCE: SIGNIFICANT CHANGE UP

## 2018-08-19 RX ADMIN — HEPARIN SODIUM 5000 UNIT(S): 5000 INJECTION INTRAVENOUS; SUBCUTANEOUS at 05:36

## 2018-08-19 RX ADMIN — Medication 250 MILLIGRAM(S): at 11:28

## 2018-08-19 RX ADMIN — ATORVASTATIN CALCIUM 80 MILLIGRAM(S): 80 TABLET, FILM COATED ORAL at 21:34

## 2018-08-19 RX ADMIN — Medication 81 MILLIGRAM(S): at 11:28

## 2018-08-19 RX ADMIN — Medication 4: at 11:28

## 2018-08-19 RX ADMIN — INSULIN GLARGINE 15 UNIT(S): 100 INJECTION, SOLUTION SUBCUTANEOUS at 21:34

## 2018-08-19 RX ADMIN — BUDESONIDE AND FORMOTEROL FUMARATE DIHYDRATE 2 PUFF(S): 160; 4.5 AEROSOL RESPIRATORY (INHALATION) at 17:40

## 2018-08-19 RX ADMIN — MONTELUKAST 10 MILLIGRAM(S): 4 TABLET, CHEWABLE ORAL at 11:28

## 2018-08-19 RX ADMIN — Medication 200 MILLIGRAM(S): at 17:40

## 2018-08-19 RX ADMIN — GABAPENTIN 300 MILLIGRAM(S): 400 CAPSULE ORAL at 17:40

## 2018-08-19 RX ADMIN — AMLODIPINE BESYLATE 5 MILLIGRAM(S): 2.5 TABLET ORAL at 05:36

## 2018-08-19 RX ADMIN — GABAPENTIN 300 MILLIGRAM(S): 400 CAPSULE ORAL at 05:36

## 2018-08-19 RX ADMIN — Medication 4: at 16:29

## 2018-08-19 RX ADMIN — Medication 40 MILLIGRAM(S): at 05:36

## 2018-08-19 RX ADMIN — HEPARIN SODIUM 5000 UNIT(S): 5000 INJECTION INTRAVENOUS; SUBCUTANEOUS at 17:40

## 2018-08-20 ENCOUNTER — APPOINTMENT (OUTPATIENT)
Dept: HOME HEALTH SERVICES | Facility: HOME HEALTH | Age: 73
End: 2018-08-20

## 2018-08-20 ENCOUNTER — TRANSCRIPTION ENCOUNTER (OUTPATIENT)
Age: 73
End: 2018-08-20

## 2018-08-20 PROBLEM — N18.5 CHRONIC KIDNEY DISEASE, STAGE 5: Chronic | Status: ACTIVE | Noted: 2018-08-18

## 2018-08-20 LAB
ANION GAP SERPL CALC-SCNC: 10 MMOL/L — SIGNIFICANT CHANGE UP (ref 5–17)
BUN SERPL-MCNC: 47 MG/DL — HIGH (ref 7–23)
CALCIUM SERPL-MCNC: 7.6 MG/DL — LOW (ref 8.5–10.1)
CHLORIDE SERPL-SCNC: 106 MMOL/L — SIGNIFICANT CHANGE UP (ref 96–108)
CO2 SERPL-SCNC: 22 MMOL/L — SIGNIFICANT CHANGE UP (ref 22–31)
CREAT SERPL-MCNC: 4.28 MG/DL — HIGH (ref 0.5–1.3)
GLUCOSE BLDC GLUCOMTR-MCNC: 148 MG/DL — HIGH (ref 70–99)
GLUCOSE BLDC GLUCOMTR-MCNC: 198 MG/DL — HIGH (ref 70–99)
GLUCOSE BLDC GLUCOMTR-MCNC: 232 MG/DL — HIGH (ref 70–99)
GLUCOSE BLDC GLUCOMTR-MCNC: 261 MG/DL — HIGH (ref 70–99)
GLUCOSE SERPL-MCNC: 201 MG/DL — HIGH (ref 70–99)
HCT VFR BLD CALC: 30.6 % — LOW (ref 34.5–45)
HGB BLD-MCNC: 9.3 G/DL — LOW (ref 11.5–15.5)
MCHC RBC-ENTMCNC: 23.2 PG — LOW (ref 27–34)
MCHC RBC-ENTMCNC: 30.4 GM/DL — LOW (ref 32–36)
MCV RBC AUTO: 76.3 FL — LOW (ref 80–100)
NRBC # BLD: 0 /100 WBCS — SIGNIFICANT CHANGE UP (ref 0–0)
PLATELET # BLD AUTO: 259 K/UL — SIGNIFICANT CHANGE UP (ref 150–400)
POTASSIUM SERPL-MCNC: 4.7 MMOL/L — SIGNIFICANT CHANGE UP (ref 3.5–5.3)
POTASSIUM SERPL-SCNC: 4.7 MMOL/L — SIGNIFICANT CHANGE UP (ref 3.5–5.3)
RBC # BLD: 4.01 M/UL — SIGNIFICANT CHANGE UP (ref 3.8–5.2)
RBC # FLD: 15 % — HIGH (ref 10.3–14.5)
SODIUM SERPL-SCNC: 138 MMOL/L — SIGNIFICANT CHANGE UP (ref 135–145)
WBC # BLD: 8.85 K/UL — SIGNIFICANT CHANGE UP (ref 3.8–10.5)
WBC # FLD AUTO: 8.85 K/UL — SIGNIFICANT CHANGE UP (ref 3.8–10.5)

## 2018-08-20 RX ORDER — INSULIN LISPRO 100/ML
5 VIAL (ML) SUBCUTANEOUS
Qty: 0 | Refills: 0 | Status: DISCONTINUED | OUTPATIENT
Start: 2018-08-20 | End: 2018-08-27

## 2018-08-20 RX ORDER — CIPROFLOXACIN LACTATE 400MG/40ML
1 VIAL (ML) INTRAVENOUS
Qty: 0 | Refills: 0 | COMMUNITY
Start: 2018-08-20 | End: 2018-08-21

## 2018-08-20 RX ADMIN — ATORVASTATIN CALCIUM 80 MILLIGRAM(S): 80 TABLET, FILM COATED ORAL at 22:51

## 2018-08-20 RX ADMIN — INSULIN GLARGINE 15 UNIT(S): 100 INJECTION, SOLUTION SUBCUTANEOUS at 22:52

## 2018-08-20 RX ADMIN — Medication 250 MILLIGRAM(S): at 11:34

## 2018-08-20 RX ADMIN — Medication 5 UNIT(S): at 16:27

## 2018-08-20 RX ADMIN — SENNA PLUS 2 TABLET(S): 8.6 TABLET ORAL at 22:51

## 2018-08-20 RX ADMIN — Medication 650 MILLIGRAM(S): at 14:47

## 2018-08-20 RX ADMIN — HEPARIN SODIUM 5000 UNIT(S): 5000 INJECTION INTRAVENOUS; SUBCUTANEOUS at 17:33

## 2018-08-20 RX ADMIN — Medication 650 MILLIGRAM(S): at 15:50

## 2018-08-20 RX ADMIN — BUDESONIDE AND FORMOTEROL FUMARATE DIHYDRATE 2 PUFF(S): 160; 4.5 AEROSOL RESPIRATORY (INHALATION) at 17:33

## 2018-08-20 RX ADMIN — Medication 5 UNIT(S): at 11:33

## 2018-08-20 RX ADMIN — Medication 81 MILLIGRAM(S): at 11:34

## 2018-08-20 RX ADMIN — GABAPENTIN 300 MILLIGRAM(S): 400 CAPSULE ORAL at 05:26

## 2018-08-20 RX ADMIN — Medication 200 MILLIGRAM(S): at 05:25

## 2018-08-20 RX ADMIN — Medication 200 MILLIGRAM(S): at 17:33

## 2018-08-20 RX ADMIN — MONTELUKAST 10 MILLIGRAM(S): 4 TABLET, CHEWABLE ORAL at 11:34

## 2018-08-20 RX ADMIN — GABAPENTIN 300 MILLIGRAM(S): 400 CAPSULE ORAL at 17:33

## 2018-08-20 RX ADMIN — HEPARIN SODIUM 5000 UNIT(S): 5000 INJECTION INTRAVENOUS; SUBCUTANEOUS at 05:25

## 2018-08-20 RX ADMIN — AMLODIPINE BESYLATE 5 MILLIGRAM(S): 2.5 TABLET ORAL at 05:25

## 2018-08-20 RX ADMIN — Medication 2: at 22:51

## 2018-08-20 RX ADMIN — Medication 2: at 07:58

## 2018-08-20 RX ADMIN — Medication 4: at 11:33

## 2018-08-20 RX ADMIN — Medication 40 MILLIGRAM(S): at 05:25

## 2018-08-20 NOTE — DISCHARGE NOTE ADULT - MEDICATION SUMMARY - MEDICATIONS TO TAKE
I will START or STAY ON the medications listed below when I get home from the hospital:    Lancets  -- Lancets needed for finger stick  -- Indication: For DM (diabetes mellitus)    Gluco monitor and finger strips  -- Glucomonitor and Finger strips for DM monitoring  -- Indication: For DM (diabetes mellitus)    aspirin 81 mg oral delayed release tablet  -- 1 tab(s) by mouth once a day  -- Indication: For Cardiac Protection    acetaminophen 325 mg oral tablet  -- 2 tab(s) by mouth every 8 hours, As Needed mild pain  -- Indication: For Analgesia    tamsulosin 0.4 mg oral capsule  -- 1 cap(s) by mouth once a day (at bedtime)  -- Indication: For Urinary Retention    gabapentin 100 mg oral capsule  -- 2 cap(s) by mouth 2 times a day  -- Indication: For Neuropathy    Lantus 100 units/mL subcutaneous solution  -- 15 unit(s) subcutaneous once a day (at bedtime)   -- Do not drink alcoholic beverages when taking this medication.  It is very important that you take or use this exactly as directed.  Do not skip doses or discontinue unless directed by your doctor.  Keep in refrigerator.  Do not freeze.    -- Indication: For DM (diabetes mellitus)    HumaLOG 100 units/mL injectable solution  -- 5 unit(s) injectable 3 times a day (before meals)   -- Indication: For DM (diabetes mellitus)    atorvastatin 80 mg oral tablet  -- 1 tab(s) by mouth once a day (at bedtime)  -- Indication: For HLD    labetalol 200 mg oral tablet  -- 1 tab(s) by mouth 2 times a day  -- Indication: For HTN (hypertension)    budesonide-formoterol 160 mcg-4.5 mcg/inh inhalation aerosol  -- 2 puff(s) inhaled 2 times a day  -- Indication: For COPD Asthma    albuterol 90 mcg/inh inhalation aerosol  -- 2 puff(s) inhaled 4 times a day  -- For inhalation only.  It is very important that you take or use this exactly as directed.  Do not skip doses or discontinue unless directed by your doctor.  Obtain medical advice before taking any non-prescription drugs as some may affect the action of this medication.  Shake well before use.    -- Indication: For COPD Asthma    amLODIPine 10 mg oral tablet  -- 1 tab(s) by mouth once a day  -- Indication: For HTN (hypertension)    furosemide 40 mg oral tablet  -- 1 tab(s) by mouth once a day  -- Avoid prolonged or excessive exposure to direct and/or artificial sunlight while taking this medication.  It is very important that you take or use this exactly as directed.  Do not skip doses or discontinue unless directed by your doctor.  It may be advisable to drink a full glass orange juice or eat a banana daily while taking this medication.    -- Indication: For HTN (hypertension)    docusate sodium 100 mg oral capsule  -- 1 cap(s) by mouth 3 times a day  -- Indication: For Constipation    polyethylene glycol 3350 oral powder for reconstitution  -- 17 gram(s) by mouth once a day  -- Indication: For Constipation    senna oral tablet  -- 2 tab(s) by mouth once a day (at bedtime)  -- Indication: For Constipation    montelukast 10 mg oral tablet  -- 1 tab(s) by mouth once a day  -- Indication: For COPD Asthma    ciprofloxacin 250 mg oral tablet  -- 1 tab(s) by mouth once a day  Last dose 8/21/2018  -- Indication: For UTI (urinary tract infection)    hydrALAZINE 25 mg oral tablet  -- 1 tab(s) by mouth 2 times a day  -- Indication: For HTN (hypertension)

## 2018-08-20 NOTE — DISCHARGE NOTE ADULT - NSTOBACCOHOTLINE_GEN_A_CS
[facility] Smokers Quitline ([id number]) [facility] Smokers Quitline ([id number])

## 2018-08-20 NOTE — DIETITIAN INITIAL EVALUATION ADULT. - PERTINENT LABORATORY DATA
08-20 Na138 mmol/L Glu 201 mg/dL<H> K+ 4.7 mmol/L Cr  4.28 mg/dL<H> BUN 47 mg/dL<H> Phos n/a   Alb n/a   PAB n/a

## 2018-08-20 NOTE — DISCHARGE NOTE ADULT - HOSPITAL COURSE
72 year old female with PMH of asthma, HTN, HLD, Dementia, CVA hx, CKD presenting to ED due to head trauma after fall at home - unsure of cause, or mechanism but does not feel unwell, with no known LOC occurring this AM - complaining of neck and back pain today and has had a difficult time getting out of bed. Pt normally as per daughter already has difficult time walking prior to fall and now with back pain has not gotten up on her own. No hip pain noted or knee or foot pain. Patient was managed inpatient and was seen by PT who recommended outpatient rehab. Patient also presented with a UTI which was also managed inpatient with Ciprofloxacin, with the last dose of the abx 8/21/2018.

## 2018-08-20 NOTE — DIETITIAN INITIAL EVALUATION ADULT. - ADHERENCE
pt says her daughter tries to get her to follow diet for dm but she doesn't like the food. pt reports she eats a lot of crackers, drinks juice

## 2018-08-20 NOTE — DIETITIAN INITIAL EVALUATION ADULT. - NS AS NUTRI INTERV ED CONTENT
Purpose of the nutrition education/Nutrition relationship to health/disease/Recommended modifications/meal planning c the plate method; verbal education provided to pt, written education left for daughter

## 2018-08-20 NOTE — DISCHARGE NOTE ADULT - CARE PLAN
Principal Discharge DX:	Accidental fall, initial encounter  Goal:	Resolved  Assessment and plan of treatment:	Continue with safety measures to ensure no future falls.  Secondary Diagnosis:	Chronic UTI (urinary tract infection)  Goal:	Resolving  Assessment and plan of treatment:	Treated with Ciprofloxacin inpatient. Finish last dose on 8/21/2018  Secondary Diagnosis:	DM (diabetes mellitus)  Assessment and plan of treatment:	Continue with Insulin Regimen and follow up with Primary Care Provider  Secondary Diagnosis:	Essential hypertension  Assessment and plan of treatment:	Continue with current medication regimen. Follow up with Primary Care Provider

## 2018-08-20 NOTE — CONSULT NOTE ADULT - SUBJECTIVE AND OBJECTIVE BOX
Information from chart:  "Patient is a 72y old  Female who presents with a chief complaint of s/p fall, unsteady gait, DM, HTN (20 Aug 2018 14:41)    HPI:  72 year old female with PMH of asthma, HTN, HLD, Dementia, CVA hx, CKD presenting to ED due to head trauma after fall at home - unsure of cause, or mechanism but does not feel unwell, with no known LOC occurring this AM - complaining of neck and back pain today and has had a difficult time getting out of bed. Pt normally as per daughter already has difficult time walking prior to fall and now with back pain has not gotten up on her own. No hip pain noted or knee or foot pain. (18 Aug 2018 12:26)   "  Patient seen in the past; CKD 4-5; hx SLE but C3C4 and DsDNA normal 2018 during the evaluation; hx DM with high HgbA1c over 12;       PAST MEDICAL & SURGICAL HISTORY:  CKD (chronic kidney disease) stage 5, GFR less than 15 ml/min  Dementia  DM (diabetes mellitus)  Asthma  CVA (cerebral vascular accident)  CVA (cerebral vascular accident)  HLD (hyperlipidemia)  Asthma  Neuropathy  SLE (systemic lupus erythematosus)  DM (diabetes mellitus)  HTN (hypertension)  No significant past surgical history  S/P tonsillectomy    FAMILY HISTORY:  No pertinent family history in first degree relatives  No pertinent family history in first degree relatives    Allergies    codeine (Swelling)  penicillin (Anaphylaxis)  penicillins (Swelling)    Intolerances      Home Medications:  acetaminophen 325 mg oral tablet: 2 tab(s) orally every 8 hours, As Needed mild pain (2018 14:09)  amLODIPine 10 mg oral tablet: 1 tab(s) orally once a day (2018 14:09)  aspirin 81 mg oral delayed release tablet: 1 tab(s) orally once a day (2016 10:27)  atorvastatin 80 mg oral tablet: 1 tab(s) orally once a day (at bedtime) (2016 10:27)  budesonide-formoterol 160 mcg-4.5 mcg/inh inhalation aerosol: 2 puff(s) inhaled 2 times a day (2016 10:27)  ciprofloxacin 250 mg oral tablet: 1 tab(s) orally once a day  Last dose 2018 (20 Aug 2018 14:49)  docusate sodium 100 mg oral capsule: 1 cap(s) orally 3 times a day (2016 10:27)  gabapentin 100 mg oral capsule: 2 cap(s) orally 2 times a day (2016 10:27)  labetalol 200 mg oral tablet: 1 tab(s) orally 2 times a day (2016 10:27)  montelukast 10 mg oral tablet: 1 tab(s) orally once a day (2018 12:18)  polyethylene glycol 3350 oral powder for reconstitution: 17 gram(s) orally once a day (2016 10:27)  senna oral tablet: 2 tab(s) orally once a day (at bedtime) (2016 10:27)  tamsulosin 0.4 mg oral capsule: 1 cap(s) orally once a day (at bedtime) (2018 11:52)    MEDICATIONS  (STANDING):  amLODIPine   Tablet 5 milliGRAM(s) Oral daily  aspirin enteric coated 81 milliGRAM(s) Oral daily  atorvastatin 80 milliGRAM(s) Oral at bedtime  buDESOnide 160 MICROgram(s)/formoterol 4.5 MICROgram(s) Inhaler 2 Puff(s) Inhalation two times a day  ciprofloxacin     Tablet 250 milliGRAM(s) Oral daily  dextrose 5%. 1000 milliLiter(s) (50 mL/Hr) IV Continuous <Continuous>  dextrose 50% Injectable 12.5 Gram(s) IV Push once  dextrose 50% Injectable 25 Gram(s) IV Push once  dextrose 50% Injectable 25 Gram(s) IV Push once  furosemide    Tablet 40 milliGRAM(s) Oral daily  gabapentin 300 milliGRAM(s) Oral two times a day  heparin  Injectable 5000 Unit(s) SubCutaneous every 12 hours  insulin glargine Injectable (LANTUS) 15 Unit(s) SubCutaneous at bedtime  insulin lispro (HumaLOG) corrective regimen sliding scale   SubCutaneous three times a day before meals  insulin lispro (HumaLOG) corrective regimen sliding scale   SubCutaneous at bedtime  insulin lispro Injectable (HumaLOG) 5 Unit(s) SubCutaneous before breakfast  insulin lispro Injectable (HumaLOG) 5 Unit(s) SubCutaneous before lunch  insulin lispro Injectable (HumaLOG) 5 Unit(s) SubCutaneous before dinner  labetalol 200 milliGRAM(s) Oral two times a day  montelukast 10 milliGRAM(s) Oral daily  senna 2 Tablet(s) Oral at bedtime    MEDICATIONS  (PRN):  acetaminophen   Tablet. 650 milliGRAM(s) Oral every 6 hours PRN Mild Pain (1 - 3)  ALBUTerol    90 MICROgram(s) HFA Inhaler 2 Puff(s) Inhalation every 6 hours PRN Shortness of Breath and/or Wheezing  dextrose 40% Gel 15 Gram(s) Oral once PRN Blood Glucose LESS THAN 70 milliGRAM(s)/deciliter  glucagon  Injectable 1 milliGRAM(s) IntraMuscular once PRN Glucose LESS THAN 70 milligrams/deciliter  magnesium hydroxide Suspension 30 milliLiter(s) Oral daily PRN Constipation    Vital Signs Last 24 Hrs  T(C): 36.4 (20 Aug 2018 11:30), Max: 36.4 (20 Aug 2018 11:30)  T(F): 97.6 (20 Aug 2018 11:30), Max: 97.6 (20 Aug 2018 11:30)  HR: 72 (20 Aug 2018 11:30) (70 - 72)  BP: 158/72 (20 Aug 2018 11:30) (140/78 - 178/87)  BP(mean): --  RR: 16 (20 Aug 2018 11:30) (16 - 18)  SpO2: 92% (20 Aug 2018 11:30) (92% - 97%)    Daily     Daily Weight in k.2 (20 Aug 2018 14:41)    18 @ 07:  -  18 @ 07:00  --------------------------------------------------------  IN: 300 mL / OUT: 800 mL / NET: -500 mL    18 @ 07:18 @ 15:51  --------------------------------------------------------  IN: 825 mL / OUT: 0 mL / NET: 825 mL      CAPILLARY BLOOD GLUCOSE      POCT Blood Glucose.: 232 mg/dL (20 Aug 2018 11:33)  POCT Blood Glucose.: 198 mg/dL (20 Aug 2018 07:57)  POCT Blood Glucose.: 249 mg/dL (19 Aug 2018 21:26)  POCT Blood Glucose.: 206 mg/dL (19 Aug 2018 16:18)    PHYSICAL EXAM:      T(C): 36.4 (18 @ 11:30), Max: 36.4 (18 @ 11:30)  HR: 72 (18 @ 11:30) (70 - 72)  BP: 158/72 (18 @ 11:30) (140/78 - 178/87)  RR: 16 (18 @ 11:30) (16 - 18)  SpO2: 92% (18 @ 11:30) (92% - 97%)  Wt(kg): --  Respiratory: clear anteriorly, decreased BS at bases  Cardiovascular: S1 S2  Gastrointestinal: soft NT mild distention +BS  Extremities: 1  edema                  138  |  106  |  47<H>  ----------------------------<  201<H>  4.7   |  22  |  4.28<H>    Ca    7.6<L>      20 Aug 2018 07:04                            9.3    8.85  )-----------( 259      ( 20 Aug 2018 07:04 )             30.6       Assessment and Plan    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  Slow reinstitution of ARB;   Follow bladder scan, hx of urinary retention.

## 2018-08-20 NOTE — DISCHARGE NOTE ADULT - PLAN OF CARE
Resolved Continue with safety measures to ensure no future falls. Resolving Treated with Ciprofloxacin inpatient. Finish last dose on 8/21/2018 Continue with Insulin Regimen and follow up with Primary Care Provider Continue with current medication regimen. Follow up with Primary Care Provider

## 2018-08-20 NOTE — DIETITIAN INITIAL EVALUATION ADULT. - PERTINENT MEDS FT
ecotrin, heparin, cipro, d5@50mL/hr, humalog, tylenol, proventil, norvasc, lipitor, symbicort, glutose 15 prn, glucagon prn, lasix, lantus, normodyne, MOM, senna, singulair

## 2018-08-20 NOTE — DIETITIAN INITIAL EVALUATION ADULT. - OTHER INFO
pt seen due to HgA1c 12.4%. pt has had dm for many years. controls c humalog and lantus at home. says she takes 5 units in the morning and 30 units at night. reports she ate well at breakfast this morning, bread, eggs, and half an Ensure that her granddaughter brought her. denies N/V.

## 2018-08-20 NOTE — DIETITIAN INITIAL EVALUATION ADULT. - ENERGY NEEDS
Height (cm): 170.18 (08-18)  Weight (kg): 100 (08-18)  BMI (kg/m2): 34.5 (08-18)  Ideal Body Weight: 61.4 kg +/- 10%  % Ideal Body Weight: 163%

## 2018-08-20 NOTE — DISCHARGE NOTE ADULT - CARE PROVIDER_API CALL
Caitlin Coronel), Emergency Medicine; Internal Medicine  [address] [place] [address]  Phone: [phone]  Fax: [phone]

## 2018-08-20 NOTE — DIETITIAN INITIAL EVALUATION ADULT. - ORAL INTAKE PTA
pt reports she lives c her daughter. has a HHA M-F. they do grocery shopping and cooking for her. she has partial dentures; no difficulty chewing/swallowing

## 2018-08-20 NOTE — DISCHARGE NOTE ADULT - PATIENT PORTAL LINK FT
You can access the [facility] Patient Portal, offered by [facility], by registering with the following website: [email or web address]

## 2018-08-21 LAB
ANION GAP SERPL CALC-SCNC: 10 MMOL/L — SIGNIFICANT CHANGE UP (ref 5–17)
BUN SERPL-MCNC: 54 MG/DL — HIGH (ref 7–23)
CALCIUM SERPL-MCNC: 7.6 MG/DL — LOW (ref 8.5–10.1)
CHLORIDE SERPL-SCNC: 106 MMOL/L — SIGNIFICANT CHANGE UP (ref 96–108)
CK SERPL-CCNC: 100 U/L — SIGNIFICANT CHANGE UP (ref 26–192)
CO2 SERPL-SCNC: 24 MMOL/L — SIGNIFICANT CHANGE UP (ref 22–31)
CREAT SERPL-MCNC: 4.06 MG/DL — HIGH (ref 0.5–1.3)
GLUCOSE BLDC GLUCOMTR-MCNC: 155 MG/DL — HIGH (ref 70–99)
GLUCOSE BLDC GLUCOMTR-MCNC: 180 MG/DL — HIGH (ref 70–99)
GLUCOSE BLDC GLUCOMTR-MCNC: 183 MG/DL — HIGH (ref 70–99)
GLUCOSE BLDC GLUCOMTR-MCNC: 237 MG/DL — HIGH (ref 70–99)
GLUCOSE SERPL-MCNC: 181 MG/DL — HIGH (ref 70–99)
HCT VFR BLD CALC: 31 % — LOW (ref 34.5–45)
HGB BLD-MCNC: 9.5 G/DL — LOW (ref 11.5–15.5)
MCHC RBC-ENTMCNC: 23.5 PG — LOW (ref 27–34)
MCHC RBC-ENTMCNC: 30.6 GM/DL — LOW (ref 32–36)
MCV RBC AUTO: 76.5 FL — LOW (ref 80–100)
NRBC # BLD: 0 /100 WBCS — SIGNIFICANT CHANGE UP (ref 0–0)
PLATELET # BLD AUTO: 293 K/UL — SIGNIFICANT CHANGE UP (ref 150–400)
POTASSIUM SERPL-MCNC: 4.5 MMOL/L — SIGNIFICANT CHANGE UP (ref 3.5–5.3)
POTASSIUM SERPL-SCNC: 4.5 MMOL/L — SIGNIFICANT CHANGE UP (ref 3.5–5.3)
RBC # BLD: 4.05 M/UL — SIGNIFICANT CHANGE UP (ref 3.8–5.2)
RBC # FLD: 15.3 % — HIGH (ref 10.3–14.5)
SODIUM SERPL-SCNC: 140 MMOL/L — SIGNIFICANT CHANGE UP (ref 135–145)
WBC # BLD: 8.85 K/UL — SIGNIFICANT CHANGE UP (ref 3.8–10.5)
WBC # FLD AUTO: 8.85 K/UL — SIGNIFICANT CHANGE UP (ref 3.8–10.5)

## 2018-08-21 PROCEDURE — 76775 US EXAM ABDO BACK WALL LIM: CPT | Mod: 26

## 2018-08-21 RX ORDER — TAMSULOSIN HYDROCHLORIDE 0.4 MG/1
0.4 CAPSULE ORAL AT BEDTIME
Qty: 0 | Refills: 0 | Status: DISCONTINUED | OUTPATIENT
Start: 2018-08-21 | End: 2018-08-27

## 2018-08-21 RX ADMIN — GABAPENTIN 300 MILLIGRAM(S): 400 CAPSULE ORAL at 05:53

## 2018-08-21 RX ADMIN — Medication 5 UNIT(S): at 13:09

## 2018-08-21 RX ADMIN — Medication 4: at 13:09

## 2018-08-21 RX ADMIN — INSULIN GLARGINE 15 UNIT(S): 100 INJECTION, SOLUTION SUBCUTANEOUS at 23:03

## 2018-08-21 RX ADMIN — Medication 2: at 07:54

## 2018-08-21 RX ADMIN — AMLODIPINE BESYLATE 5 MILLIGRAM(S): 2.5 TABLET ORAL at 05:54

## 2018-08-21 RX ADMIN — Medication 200 MILLIGRAM(S): at 05:53

## 2018-08-21 RX ADMIN — Medication 40 MILLIGRAM(S): at 05:54

## 2018-08-21 RX ADMIN — TAMSULOSIN HYDROCHLORIDE 0.4 MILLIGRAM(S): 0.4 CAPSULE ORAL at 17:38

## 2018-08-21 RX ADMIN — ATORVASTATIN CALCIUM 80 MILLIGRAM(S): 80 TABLET, FILM COATED ORAL at 23:05

## 2018-08-21 RX ADMIN — Medication 0: at 23:05

## 2018-08-21 RX ADMIN — MONTELUKAST 10 MILLIGRAM(S): 4 TABLET, CHEWABLE ORAL at 13:10

## 2018-08-21 RX ADMIN — Medication 5 UNIT(S): at 16:07

## 2018-08-21 RX ADMIN — GABAPENTIN 300 MILLIGRAM(S): 400 CAPSULE ORAL at 17:36

## 2018-08-21 RX ADMIN — BUDESONIDE AND FORMOTEROL FUMARATE DIHYDRATE 2 PUFF(S): 160; 4.5 AEROSOL RESPIRATORY (INHALATION) at 17:36

## 2018-08-21 RX ADMIN — HEPARIN SODIUM 5000 UNIT(S): 5000 INJECTION INTRAVENOUS; SUBCUTANEOUS at 05:54

## 2018-08-21 RX ADMIN — Medication 250 MILLIGRAM(S): at 13:10

## 2018-08-21 RX ADMIN — Medication 81 MILLIGRAM(S): at 13:10

## 2018-08-21 RX ADMIN — SENNA PLUS 2 TABLET(S): 8.6 TABLET ORAL at 23:05

## 2018-08-21 RX ADMIN — BUDESONIDE AND FORMOTEROL FUMARATE DIHYDRATE 2 PUFF(S): 160; 4.5 AEROSOL RESPIRATORY (INHALATION) at 07:55

## 2018-08-21 RX ADMIN — HEPARIN SODIUM 5000 UNIT(S): 5000 INJECTION INTRAVENOUS; SUBCUTANEOUS at 17:37

## 2018-08-21 RX ADMIN — Medication 200 MILLIGRAM(S): at 17:37

## 2018-08-21 RX ADMIN — Medication 5 UNIT(S): at 07:54

## 2018-08-21 RX ADMIN — Medication 2: at 16:07

## 2018-08-22 LAB
GLUCOSE BLDC GLUCOMTR-MCNC: 138 MG/DL — HIGH (ref 70–99)
GLUCOSE BLDC GLUCOMTR-MCNC: 150 MG/DL — HIGH (ref 70–99)
GLUCOSE BLDC GLUCOMTR-MCNC: 211 MG/DL — HIGH (ref 70–99)
GLUCOSE BLDC GLUCOMTR-MCNC: 255 MG/DL — HIGH (ref 70–99)

## 2018-08-22 RX ORDER — LABETALOL HCL 100 MG
5 TABLET ORAL ONCE
Qty: 0 | Refills: 0 | Status: COMPLETED | OUTPATIENT
Start: 2018-08-22 | End: 2018-08-22

## 2018-08-22 RX ADMIN — Medication 200 MILLIGRAM(S): at 05:03

## 2018-08-22 RX ADMIN — BUDESONIDE AND FORMOTEROL FUMARATE DIHYDRATE 2 PUFF(S): 160; 4.5 AEROSOL RESPIRATORY (INHALATION) at 05:02

## 2018-08-22 RX ADMIN — Medication 200 MILLIGRAM(S): at 16:56

## 2018-08-22 RX ADMIN — MONTELUKAST 10 MILLIGRAM(S): 4 TABLET, CHEWABLE ORAL at 13:42

## 2018-08-22 RX ADMIN — GABAPENTIN 300 MILLIGRAM(S): 400 CAPSULE ORAL at 16:56

## 2018-08-22 RX ADMIN — SENNA PLUS 2 TABLET(S): 8.6 TABLET ORAL at 21:01

## 2018-08-22 RX ADMIN — HEPARIN SODIUM 5000 UNIT(S): 5000 INJECTION INTRAVENOUS; SUBCUTANEOUS at 05:03

## 2018-08-22 RX ADMIN — ATORVASTATIN CALCIUM 80 MILLIGRAM(S): 80 TABLET, FILM COATED ORAL at 21:01

## 2018-08-22 RX ADMIN — Medication 5 UNIT(S): at 16:55

## 2018-08-22 RX ADMIN — Medication 40 MILLIGRAM(S): at 05:02

## 2018-08-22 RX ADMIN — Medication 6: at 16:55

## 2018-08-22 RX ADMIN — HEPARIN SODIUM 5000 UNIT(S): 5000 INJECTION INTRAVENOUS; SUBCUTANEOUS at 16:57

## 2018-08-22 RX ADMIN — INSULIN GLARGINE 15 UNIT(S): 100 INJECTION, SOLUTION SUBCUTANEOUS at 21:00

## 2018-08-22 RX ADMIN — GABAPENTIN 300 MILLIGRAM(S): 400 CAPSULE ORAL at 05:03

## 2018-08-22 RX ADMIN — BUDESONIDE AND FORMOTEROL FUMARATE DIHYDRATE 2 PUFF(S): 160; 4.5 AEROSOL RESPIRATORY (INHALATION) at 16:56

## 2018-08-22 RX ADMIN — Medication 81 MILLIGRAM(S): at 13:41

## 2018-08-22 RX ADMIN — AMLODIPINE BESYLATE 5 MILLIGRAM(S): 2.5 TABLET ORAL at 05:03

## 2018-08-22 RX ADMIN — TAMSULOSIN HYDROCHLORIDE 0.4 MILLIGRAM(S): 0.4 CAPSULE ORAL at 21:01

## 2018-08-23 LAB
GLUCOSE BLDC GLUCOMTR-MCNC: 156 MG/DL — HIGH (ref 70–99)
GLUCOSE BLDC GLUCOMTR-MCNC: 234 MG/DL — HIGH (ref 70–99)
GLUCOSE BLDC GLUCOMTR-MCNC: 260 MG/DL — HIGH (ref 70–99)
GLUCOSE BLDC GLUCOMTR-MCNC: 347 MG/DL — HIGH (ref 70–99)

## 2018-08-23 RX ADMIN — Medication 81 MILLIGRAM(S): at 11:44

## 2018-08-23 RX ADMIN — TAMSULOSIN HYDROCHLORIDE 0.4 MILLIGRAM(S): 0.4 CAPSULE ORAL at 21:36

## 2018-08-23 RX ADMIN — SENNA PLUS 2 TABLET(S): 8.6 TABLET ORAL at 21:36

## 2018-08-23 RX ADMIN — Medication 40 MILLIGRAM(S): at 05:31

## 2018-08-23 RX ADMIN — ATORVASTATIN CALCIUM 80 MILLIGRAM(S): 80 TABLET, FILM COATED ORAL at 21:36

## 2018-08-23 RX ADMIN — Medication 4: at 21:36

## 2018-08-23 RX ADMIN — INSULIN GLARGINE 15 UNIT(S): 100 INJECTION, SOLUTION SUBCUTANEOUS at 21:36

## 2018-08-23 RX ADMIN — Medication 200 MILLIGRAM(S): at 18:03

## 2018-08-23 RX ADMIN — GABAPENTIN 300 MILLIGRAM(S): 400 CAPSULE ORAL at 05:32

## 2018-08-23 RX ADMIN — Medication 4: at 17:28

## 2018-08-23 RX ADMIN — Medication 2: at 11:44

## 2018-08-23 RX ADMIN — Medication 5 UNIT(S): at 08:04

## 2018-08-23 RX ADMIN — Medication 6: at 08:04

## 2018-08-23 RX ADMIN — Medication 5 UNIT(S): at 11:44

## 2018-08-23 RX ADMIN — Medication 5 UNIT(S): at 17:28

## 2018-08-23 RX ADMIN — HEPARIN SODIUM 5000 UNIT(S): 5000 INJECTION INTRAVENOUS; SUBCUTANEOUS at 05:32

## 2018-08-23 RX ADMIN — AMLODIPINE BESYLATE 5 MILLIGRAM(S): 2.5 TABLET ORAL at 05:32

## 2018-08-23 RX ADMIN — BUDESONIDE AND FORMOTEROL FUMARATE DIHYDRATE 2 PUFF(S): 160; 4.5 AEROSOL RESPIRATORY (INHALATION) at 05:32

## 2018-08-23 RX ADMIN — BUDESONIDE AND FORMOTEROL FUMARATE DIHYDRATE 2 PUFF(S): 160; 4.5 AEROSOL RESPIRATORY (INHALATION) at 18:03

## 2018-08-23 RX ADMIN — Medication 200 MILLIGRAM(S): at 05:28

## 2018-08-23 RX ADMIN — HEPARIN SODIUM 5000 UNIT(S): 5000 INJECTION INTRAVENOUS; SUBCUTANEOUS at 18:03

## 2018-08-23 RX ADMIN — GABAPENTIN 300 MILLIGRAM(S): 400 CAPSULE ORAL at 18:03

## 2018-08-23 RX ADMIN — MONTELUKAST 10 MILLIGRAM(S): 4 TABLET, CHEWABLE ORAL at 11:44

## 2018-08-24 LAB
ANION GAP SERPL CALC-SCNC: 11 MMOL/L — SIGNIFICANT CHANGE UP (ref 5–17)
BUN SERPL-MCNC: 72 MG/DL — HIGH (ref 7–23)
CALCIUM SERPL-MCNC: 7.5 MG/DL — LOW (ref 8.5–10.1)
CHLORIDE SERPL-SCNC: 105 MMOL/L — SIGNIFICANT CHANGE UP (ref 96–108)
CO2 SERPL-SCNC: 22 MMOL/L — SIGNIFICANT CHANGE UP (ref 22–31)
CREAT SERPL-MCNC: 4.14 MG/DL — HIGH (ref 0.5–1.3)
GLUCOSE BLDC GLUCOMTR-MCNC: 140 MG/DL — HIGH (ref 70–99)
GLUCOSE BLDC GLUCOMTR-MCNC: 174 MG/DL — HIGH (ref 70–99)
GLUCOSE BLDC GLUCOMTR-MCNC: 196 MG/DL — HIGH (ref 70–99)
GLUCOSE BLDC GLUCOMTR-MCNC: 266 MG/DL — HIGH (ref 70–99)
GLUCOSE SERPL-MCNC: 215 MG/DL — HIGH (ref 70–99)
POTASSIUM SERPL-MCNC: 5.2 MMOL/L — SIGNIFICANT CHANGE UP (ref 3.5–5.3)
POTASSIUM SERPL-SCNC: 5.2 MMOL/L — SIGNIFICANT CHANGE UP (ref 3.5–5.3)
SODIUM SERPL-SCNC: 138 MMOL/L — SIGNIFICANT CHANGE UP (ref 135–145)

## 2018-08-24 RX ORDER — TRAMADOL HYDROCHLORIDE 50 MG/1
50 TABLET ORAL ONCE
Qty: 0 | Refills: 0 | Status: DISCONTINUED | OUTPATIENT
Start: 2018-08-24 | End: 2018-08-24

## 2018-08-24 RX ADMIN — Medication 200 MILLIGRAM(S): at 17:21

## 2018-08-24 RX ADMIN — Medication 2: at 16:53

## 2018-08-24 RX ADMIN — Medication 200 MILLIGRAM(S): at 05:37

## 2018-08-24 RX ADMIN — BUDESONIDE AND FORMOTEROL FUMARATE DIHYDRATE 2 PUFF(S): 160; 4.5 AEROSOL RESPIRATORY (INHALATION) at 05:37

## 2018-08-24 RX ADMIN — MONTELUKAST 10 MILLIGRAM(S): 4 TABLET, CHEWABLE ORAL at 11:31

## 2018-08-24 RX ADMIN — BUDESONIDE AND FORMOTEROL FUMARATE DIHYDRATE 2 PUFF(S): 160; 4.5 AEROSOL RESPIRATORY (INHALATION) at 17:29

## 2018-08-24 RX ADMIN — Medication 6: at 11:29

## 2018-08-24 RX ADMIN — AMLODIPINE BESYLATE 5 MILLIGRAM(S): 2.5 TABLET ORAL at 05:37

## 2018-08-24 RX ADMIN — HEPARIN SODIUM 5000 UNIT(S): 5000 INJECTION INTRAVENOUS; SUBCUTANEOUS at 05:37

## 2018-08-24 RX ADMIN — TAMSULOSIN HYDROCHLORIDE 0.4 MILLIGRAM(S): 0.4 CAPSULE ORAL at 22:30

## 2018-08-24 RX ADMIN — INSULIN GLARGINE 15 UNIT(S): 100 INJECTION, SOLUTION SUBCUTANEOUS at 22:28

## 2018-08-24 RX ADMIN — Medication 650 MILLIGRAM(S): at 23:28

## 2018-08-24 RX ADMIN — Medication 2: at 08:00

## 2018-08-24 RX ADMIN — SENNA PLUS 2 TABLET(S): 8.6 TABLET ORAL at 22:28

## 2018-08-24 RX ADMIN — GABAPENTIN 300 MILLIGRAM(S): 400 CAPSULE ORAL at 17:20

## 2018-08-24 RX ADMIN — Medication 5 UNIT(S): at 11:29

## 2018-08-24 RX ADMIN — Medication 81 MILLIGRAM(S): at 11:31

## 2018-08-24 RX ADMIN — Medication 650 MILLIGRAM(S): at 22:36

## 2018-08-24 RX ADMIN — HEPARIN SODIUM 5000 UNIT(S): 5000 INJECTION INTRAVENOUS; SUBCUTANEOUS at 17:20

## 2018-08-24 RX ADMIN — Medication 40 MILLIGRAM(S): at 05:37

## 2018-08-24 RX ADMIN — GABAPENTIN 300 MILLIGRAM(S): 400 CAPSULE ORAL at 05:37

## 2018-08-24 RX ADMIN — ATORVASTATIN CALCIUM 80 MILLIGRAM(S): 80 TABLET, FILM COATED ORAL at 22:28

## 2018-08-24 RX ADMIN — Medication 5 UNIT(S): at 08:01

## 2018-08-24 RX ADMIN — Medication 5 UNIT(S): at 16:53

## 2018-08-25 LAB
GLUCOSE BLDC GLUCOMTR-MCNC: 171 MG/DL — HIGH (ref 70–99)
GLUCOSE BLDC GLUCOMTR-MCNC: 175 MG/DL — HIGH (ref 70–99)
GLUCOSE BLDC GLUCOMTR-MCNC: 176 MG/DL — HIGH (ref 70–99)
GLUCOSE BLDC GLUCOMTR-MCNC: 228 MG/DL — HIGH (ref 70–99)

## 2018-08-25 RX ADMIN — BUDESONIDE AND FORMOTEROL FUMARATE DIHYDRATE 2 PUFF(S): 160; 4.5 AEROSOL RESPIRATORY (INHALATION) at 05:19

## 2018-08-25 RX ADMIN — HEPARIN SODIUM 5000 UNIT(S): 5000 INJECTION INTRAVENOUS; SUBCUTANEOUS at 17:10

## 2018-08-25 RX ADMIN — AMLODIPINE BESYLATE 5 MILLIGRAM(S): 2.5 TABLET ORAL at 05:20

## 2018-08-25 RX ADMIN — Medication 5 UNIT(S): at 07:49

## 2018-08-25 RX ADMIN — Medication 5 UNIT(S): at 11:25

## 2018-08-25 RX ADMIN — ATORVASTATIN CALCIUM 80 MILLIGRAM(S): 80 TABLET, FILM COATED ORAL at 22:28

## 2018-08-25 RX ADMIN — SENNA PLUS 2 TABLET(S): 8.6 TABLET ORAL at 22:28

## 2018-08-25 RX ADMIN — INSULIN GLARGINE 15 UNIT(S): 100 INJECTION, SOLUTION SUBCUTANEOUS at 22:25

## 2018-08-25 RX ADMIN — Medication 40 MILLIGRAM(S): at 05:20

## 2018-08-25 RX ADMIN — TAMSULOSIN HYDROCHLORIDE 0.4 MILLIGRAM(S): 0.4 CAPSULE ORAL at 22:37

## 2018-08-25 RX ADMIN — Medication 4: at 07:48

## 2018-08-25 RX ADMIN — Medication 81 MILLIGRAM(S): at 11:24

## 2018-08-25 RX ADMIN — Medication 5 UNIT(S): at 17:10

## 2018-08-25 RX ADMIN — TRAMADOL HYDROCHLORIDE 50 MILLIGRAM(S): 50 TABLET ORAL at 00:44

## 2018-08-25 RX ADMIN — HEPARIN SODIUM 5000 UNIT(S): 5000 INJECTION INTRAVENOUS; SUBCUTANEOUS at 05:19

## 2018-08-25 RX ADMIN — GABAPENTIN 300 MILLIGRAM(S): 400 CAPSULE ORAL at 17:10

## 2018-08-25 RX ADMIN — MONTELUKAST 10 MILLIGRAM(S): 4 TABLET, CHEWABLE ORAL at 11:24

## 2018-08-25 RX ADMIN — GABAPENTIN 300 MILLIGRAM(S): 400 CAPSULE ORAL at 05:19

## 2018-08-25 RX ADMIN — BUDESONIDE AND FORMOTEROL FUMARATE DIHYDRATE 2 PUFF(S): 160; 4.5 AEROSOL RESPIRATORY (INHALATION) at 17:11

## 2018-08-25 RX ADMIN — TRAMADOL HYDROCHLORIDE 50 MILLIGRAM(S): 50 TABLET ORAL at 02:12

## 2018-08-25 RX ADMIN — Medication 4: at 11:25

## 2018-08-25 RX ADMIN — Medication 200 MILLIGRAM(S): at 17:11

## 2018-08-25 RX ADMIN — Medication 200 MILLIGRAM(S): at 05:20

## 2018-08-25 RX ADMIN — Medication 2: at 17:10

## 2018-08-25 NOTE — PROVIDER CONTACT NOTE (OTHER) - RECOMMENDATIONS
tramadol although allergic to codeine was given 8/17 and 8/18 with no allergic reaction Daughter at bedside said she takes it all the time

## 2018-08-26 LAB
ANION GAP SERPL CALC-SCNC: 9 MMOL/L — SIGNIFICANT CHANGE UP (ref 5–17)
BUN SERPL-MCNC: 79 MG/DL — HIGH (ref 7–23)
CALCIUM SERPL-MCNC: 7.6 MG/DL — LOW (ref 8.5–10.1)
CHLORIDE SERPL-SCNC: 107 MMOL/L — SIGNIFICANT CHANGE UP (ref 96–108)
CO2 SERPL-SCNC: 23 MMOL/L — SIGNIFICANT CHANGE UP (ref 22–31)
CREAT SERPL-MCNC: 4.43 MG/DL — HIGH (ref 0.5–1.3)
GLUCOSE BLDC GLUCOMTR-MCNC: 134 MG/DL — HIGH (ref 70–99)
GLUCOSE BLDC GLUCOMTR-MCNC: 136 MG/DL — HIGH (ref 70–99)
GLUCOSE BLDC GLUCOMTR-MCNC: 164 MG/DL — HIGH (ref 70–99)
GLUCOSE BLDC GLUCOMTR-MCNC: 233 MG/DL — HIGH (ref 70–99)
GLUCOSE SERPL-MCNC: 130 MG/DL — HIGH (ref 70–99)
HCT VFR BLD CALC: 29.2 % — LOW (ref 34.5–45)
HGB BLD-MCNC: 9.1 G/DL — LOW (ref 11.5–15.5)
MCHC RBC-ENTMCNC: 23.6 PG — LOW (ref 27–34)
MCHC RBC-ENTMCNC: 31.2 GM/DL — LOW (ref 32–36)
MCV RBC AUTO: 75.8 FL — LOW (ref 80–100)
NRBC # BLD: 0 /100 WBCS — SIGNIFICANT CHANGE UP (ref 0–0)
PLATELET # BLD AUTO: 285 K/UL — SIGNIFICANT CHANGE UP (ref 150–400)
POTASSIUM SERPL-MCNC: 5.2 MMOL/L — SIGNIFICANT CHANGE UP (ref 3.5–5.3)
POTASSIUM SERPL-SCNC: 5.2 MMOL/L — SIGNIFICANT CHANGE UP (ref 3.5–5.3)
RBC # BLD: 3.85 M/UL — SIGNIFICANT CHANGE UP (ref 3.8–5.2)
RBC # FLD: 15.3 % — HIGH (ref 10.3–14.5)
SODIUM SERPL-SCNC: 139 MMOL/L — SIGNIFICANT CHANGE UP (ref 135–145)
WBC # BLD: 12.53 K/UL — HIGH (ref 3.8–10.5)
WBC # FLD AUTO: 12.53 K/UL — HIGH (ref 3.8–10.5)

## 2018-08-26 RX ADMIN — Medication 81 MILLIGRAM(S): at 11:10

## 2018-08-26 RX ADMIN — BUDESONIDE AND FORMOTEROL FUMARATE DIHYDRATE 2 PUFF(S): 160; 4.5 AEROSOL RESPIRATORY (INHALATION) at 17:12

## 2018-08-26 RX ADMIN — HEPARIN SODIUM 5000 UNIT(S): 5000 INJECTION INTRAVENOUS; SUBCUTANEOUS at 05:21

## 2018-08-26 RX ADMIN — SENNA PLUS 2 TABLET(S): 8.6 TABLET ORAL at 22:12

## 2018-08-26 RX ADMIN — TAMSULOSIN HYDROCHLORIDE 0.4 MILLIGRAM(S): 0.4 CAPSULE ORAL at 22:12

## 2018-08-26 RX ADMIN — Medication 200 MILLIGRAM(S): at 07:09

## 2018-08-26 RX ADMIN — AMLODIPINE BESYLATE 5 MILLIGRAM(S): 2.5 TABLET ORAL at 05:21

## 2018-08-26 RX ADMIN — ATORVASTATIN CALCIUM 80 MILLIGRAM(S): 80 TABLET, FILM COATED ORAL at 22:12

## 2018-08-26 RX ADMIN — Medication 40 MILLIGRAM(S): at 05:20

## 2018-08-26 RX ADMIN — Medication 200 MILLIGRAM(S): at 17:12

## 2018-08-26 RX ADMIN — HEPARIN SODIUM 5000 UNIT(S): 5000 INJECTION INTRAVENOUS; SUBCUTANEOUS at 17:12

## 2018-08-26 RX ADMIN — BUDESONIDE AND FORMOTEROL FUMARATE DIHYDRATE 2 PUFF(S): 160; 4.5 AEROSOL RESPIRATORY (INHALATION) at 07:09

## 2018-08-26 RX ADMIN — GABAPENTIN 300 MILLIGRAM(S): 400 CAPSULE ORAL at 05:20

## 2018-08-26 RX ADMIN — GABAPENTIN 300 MILLIGRAM(S): 400 CAPSULE ORAL at 17:12

## 2018-08-26 RX ADMIN — Medication 5 UNIT(S): at 17:12

## 2018-08-26 RX ADMIN — INSULIN GLARGINE 15 UNIT(S): 100 INJECTION, SOLUTION SUBCUTANEOUS at 22:10

## 2018-08-26 RX ADMIN — Medication 4: at 17:12

## 2018-08-26 RX ADMIN — MONTELUKAST 10 MILLIGRAM(S): 4 TABLET, CHEWABLE ORAL at 11:10

## 2018-08-27 VITALS
RESPIRATION RATE: 20 BRPM | HEART RATE: 66 BPM | TEMPERATURE: 98 F | DIASTOLIC BLOOD PRESSURE: 69 MMHG | SYSTOLIC BLOOD PRESSURE: 155 MMHG | OXYGEN SATURATION: 98 %

## 2018-08-27 LAB
ANION GAP SERPL CALC-SCNC: 11 MMOL/L — SIGNIFICANT CHANGE UP (ref 5–17)
BUN SERPL-MCNC: 85 MG/DL — HIGH (ref 7–23)
CALCIUM SERPL-MCNC: 7.8 MG/DL — LOW (ref 8.5–10.1)
CHLORIDE SERPL-SCNC: 108 MMOL/L — SIGNIFICANT CHANGE UP (ref 96–108)
CO2 SERPL-SCNC: 22 MMOL/L — SIGNIFICANT CHANGE UP (ref 22–31)
CREAT SERPL-MCNC: 4.54 MG/DL — HIGH (ref 0.5–1.3)
GLUCOSE BLDC GLUCOMTR-MCNC: 120 MG/DL — HIGH (ref 70–99)
GLUCOSE BLDC GLUCOMTR-MCNC: 148 MG/DL — HIGH (ref 70–99)
GLUCOSE BLDC GLUCOMTR-MCNC: 151 MG/DL — HIGH (ref 70–99)
GLUCOSE SERPL-MCNC: 102 MG/DL — HIGH (ref 70–99)
HCT VFR BLD CALC: 28.9 % — LOW (ref 34.5–45)
HGB BLD-MCNC: 8.9 G/DL — LOW (ref 11.5–15.5)
MCHC RBC-ENTMCNC: 23.2 PG — LOW (ref 27–34)
MCHC RBC-ENTMCNC: 30.8 GM/DL — LOW (ref 32–36)
MCV RBC AUTO: 75.3 FL — LOW (ref 80–100)
NRBC # BLD: 0 /100 WBCS — SIGNIFICANT CHANGE UP (ref 0–0)
PLATELET # BLD AUTO: 297 K/UL — SIGNIFICANT CHANGE UP (ref 150–400)
POTASSIUM SERPL-MCNC: 5.3 MMOL/L — SIGNIFICANT CHANGE UP (ref 3.5–5.3)
POTASSIUM SERPL-SCNC: 5.3 MMOL/L — SIGNIFICANT CHANGE UP (ref 3.5–5.3)
RBC # BLD: 3.84 M/UL — SIGNIFICANT CHANGE UP (ref 3.8–5.2)
RBC # FLD: 15.1 % — HIGH (ref 10.3–14.5)
SODIUM SERPL-SCNC: 141 MMOL/L — SIGNIFICANT CHANGE UP (ref 135–145)
WBC # BLD: 12.2 K/UL — HIGH (ref 3.8–10.5)
WBC # FLD AUTO: 12.2 K/UL — HIGH (ref 3.8–10.5)

## 2018-08-27 RX ORDER — CIPROFLOXACIN LACTATE 400MG/40ML
250 VIAL (ML) INTRAVENOUS DAILY
Qty: 0 | Refills: 0 | Status: DISCONTINUED | OUTPATIENT
Start: 2018-08-27 | End: 2018-08-27

## 2018-08-27 RX ADMIN — Medication 5 UNIT(S): at 15:52

## 2018-08-27 RX ADMIN — GABAPENTIN 300 MILLIGRAM(S): 400 CAPSULE ORAL at 05:44

## 2018-08-27 RX ADMIN — Medication 200 MILLIGRAM(S): at 17:09

## 2018-08-27 RX ADMIN — BUDESONIDE AND FORMOTEROL FUMARATE DIHYDRATE 2 PUFF(S): 160; 4.5 AEROSOL RESPIRATORY (INHALATION) at 05:44

## 2018-08-27 RX ADMIN — MONTELUKAST 10 MILLIGRAM(S): 4 TABLET, CHEWABLE ORAL at 11:32

## 2018-08-27 RX ADMIN — Medication 200 MILLIGRAM(S): at 05:44

## 2018-08-27 RX ADMIN — Medication 81 MILLIGRAM(S): at 11:32

## 2018-08-27 RX ADMIN — Medication 5 UNIT(S): at 07:46

## 2018-08-27 RX ADMIN — HEPARIN SODIUM 5000 UNIT(S): 5000 INJECTION INTRAVENOUS; SUBCUTANEOUS at 17:09

## 2018-08-27 RX ADMIN — Medication 250 MILLIGRAM(S): at 11:32

## 2018-08-27 RX ADMIN — Medication 2: at 11:32

## 2018-08-27 RX ADMIN — Medication 5 UNIT(S): at 11:32

## 2018-08-27 RX ADMIN — AMLODIPINE BESYLATE 5 MILLIGRAM(S): 2.5 TABLET ORAL at 05:44

## 2018-08-27 RX ADMIN — HEPARIN SODIUM 5000 UNIT(S): 5000 INJECTION INTRAVENOUS; SUBCUTANEOUS at 05:44

## 2018-08-27 RX ADMIN — BUDESONIDE AND FORMOTEROL FUMARATE DIHYDRATE 2 PUFF(S): 160; 4.5 AEROSOL RESPIRATORY (INHALATION) at 17:09

## 2018-08-27 RX ADMIN — GABAPENTIN 300 MILLIGRAM(S): 400 CAPSULE ORAL at 17:09

## 2018-08-27 NOTE — PROGRESS NOTE ADULT - PROVIDER SPECIALTY LIST ADULT
Internal Medicine
Nephrology
Internal Medicine

## 2018-08-27 NOTE — PROGRESS NOTE ADULT - SUBJECTIVE AND OBJECTIVE BOX
Patient is a 72y old  Female who presents with a chief complaint of s/p fall, unsteady gait, DM, HTN (20 Aug 2018 14:41)  Mediclly stable. For LTC placement.     INTERVAL HPI/OVERNIGHT EVENTS:  PAST MEDICAL & SURGICAL HISTORY:  CKD (chronic kidney disease) stage 5, GFR less than 15 ml/min  Dementia  DM (diabetes mellitus)  Asthma  CVA (cerebral vascular accident)  CVA (cerebral vascular accident)  HLD (hyperlipidemia)  Asthma  Neuropathy  SLE (systemic lupus erythematosus)  DM (diabetes mellitus)  HTN (hypertension)  No significant past surgical history  S/P tonsillectomy      MEDICATIONS  (STANDING):  amLODIPine   Tablet 5 milliGRAM(s) Oral daily  aspirin enteric coated 81 milliGRAM(s) Oral daily  atorvastatin 80 milliGRAM(s) Oral at bedtime  buDESOnide 160 MICROgram(s)/formoterol 4.5 MICROgram(s) Inhaler 2 Puff(s) Inhalation two times a day  dextrose 5%. 1000 milliLiter(s) (50 mL/Hr) IV Continuous <Continuous>  dextrose 50% Injectable 12.5 Gram(s) IV Push once  dextrose 50% Injectable 25 Gram(s) IV Push once  dextrose 50% Injectable 25 Gram(s) IV Push once  furosemide    Tablet 40 milliGRAM(s) Oral daily  gabapentin 300 milliGRAM(s) Oral two times a day  heparin  Injectable 5000 Unit(s) SubCutaneous every 12 hours  insulin glargine Injectable (LANTUS) 15 Unit(s) SubCutaneous at bedtime  insulin lispro (HumaLOG) corrective regimen sliding scale   SubCutaneous three times a day before meals  insulin lispro (HumaLOG) corrective regimen sliding scale   SubCutaneous at bedtime  insulin lispro Injectable (HumaLOG) 5 Unit(s) SubCutaneous before breakfast  insulin lispro Injectable (HumaLOG) 5 Unit(s) SubCutaneous before lunch  insulin lispro Injectable (HumaLOG) 5 Unit(s) SubCutaneous before dinner  labetalol 200 milliGRAM(s) Oral two times a day  montelukast 10 milliGRAM(s) Oral daily  senna 2 Tablet(s) Oral at bedtime  tamsulosin 0.4 milliGRAM(s) Oral at bedtime    MEDICATIONS  (PRN):  acetaminophen   Tablet. 650 milliGRAM(s) Oral every 6 hours PRN Mild Pain (1 - 3)  ALBUTerol    90 MICROgram(s) HFA Inhaler 2 Puff(s) Inhalation every 6 hours PRN Shortness of Breath and/or Wheezing  dextrose 40% Gel 15 Gram(s) Oral once PRN Blood Glucose LESS THAN 70 milliGRAM(s)/deciliter  glucagon  Injectable 1 milliGRAM(s) IntraMuscular once PRN Glucose LESS THAN 70 milligrams/deciliter  magnesium hydroxide Suspension 30 milliLiter(s) Oral daily PRN Constipation      Allergies    codeine (Swelling)  penicillin (Anaphylaxis)  penicillins (Swelling)    Intolerances        REVIEW OF SYSTEMS:  CONSTITUTIONAL: No fever, weight loss, or fatigue  EYES: No eye pain, visual disturbances, or discharge  ENMT:  No difficulty hearing, tinnitus, vertigo; No sinus or throat pain  NECK: No pain or stiffness  BREASTS: No pain, masses, or nipple discharge  RESPIRATORY: No cough, wheezing, chills or hemoptysis; No shortness of breath  CARDIOVASCULAR: No chest pain, palpitations, dizziness, or leg swelling  GASTROINTESTINAL: No abdominal or epigastric pain. No nausea, vomiting, or hematemesis; No diarrhea or constipation. No melena or hematochezia.  GENITOURINARY: No dysuria, frequency, hematuria, or incontinence  NEUROLOGICAL: No headaches, memory loss, loss of strength, numbness, or tremors  SKIN: No itching, burning, rashes, or lesions   LYMPH NODES: No enlarged glands  ENDOCRINE: No heat or cold intolerance; No hair loss  MUSCULOSKELETAL: No joint pain or swelling; No muscle, back, or extremity pain  PSYCHIATRIC: No depression, anxiety, mood swings, or difficulty sleeping  HEME/LYMPH: No easy bruising, or bleeding gums  ALLERY AND IMMUNOLOGIC: No hives or eczema    Vital Signs Last 24 Hrs  T(C): 37.2 (24 Aug 2018 05:24), Max: 37.2 (24 Aug 2018 05:24)  T(F): 98.9 (24 Aug 2018 05:24), Max: 98.9 (24 Aug 2018 05:24)  HR: 79 (24 Aug 2018 05:24) (68 - 79)  BP: 136/73 (24 Aug 2018 05:24) (136/73 - 166/83)  BP(mean): --  RR: 17 (24 Aug 2018 05:24) (17 - 19)  SpO2: 95% (24 Aug 2018 05:24) (95% - 98%)    PHYSICAL EXAM:  GENERAL: NAD, well-groomed, well-developed  HEAD:  Atraumatic, Normocephalic  EYES: EOMI, PERRLA, conjunctiva and sclera clear  ENMT: No tonsillar erythema, exudates, or enlargement; Moist mucous membranes, Good dentition, No lesions  NECK: Supple, No JVD, Normal thyroid  NERVOUS SYSTEM:  Alert & Oriented X3, Good concentration; Motor Strength 5/5 B/L upper and lower extremities; DTRs 2+ intact and symmetric  CHEST/LUNG: Clear to percussion bilaterally; No rales, rhonchi, wheezing, or rubs  HEART: Regular rate and rhythm; No murmurs, rubs, or gallops  ABDOMEN: Soft, Nontender, Nondistended; Bowel sounds present. Chronic indwelling hsieh cath for outflow tract obstruction  EXTREMITIES:  2+ Peripheral Pulses, No clubbing, cyanosis, or edema  LYMPH: No lymphadenopathy noted  SKIN: No rashes or lesions    LABS:    08-24    138  |  105  |  72<H>  ----------------------------<  215<H>  5.2   |  22  |  4.14<H>    Ca    7.5<L>      24 Aug 2018 07:19            CAPILLARY BLOOD GLUCOSE      POCT Blood Glucose.: 196 mg/dL (24 Aug 2018 07:59)  POCT Blood Glucose.: 347 mg/dL (23 Aug 2018 21:35)  POCT Blood Glucose.: 234 mg/dL (23 Aug 2018 16:39)          Hemoglobin A1C, Whole Blood: 12.4 % (08-19 @ 10:59)        RADIOLOGY & ADDITIONAL TESTS:    Imaging Personally Reviewed:  [ ] YES  [ ] NO    Consultant(s) Notes Reviewed:  [ ] YES  [ ] NO    Care Discussed with Consultants/Other Providers [ ] YES  [ ] NO    Care discussed with family,         [  ]   yes  [  ]  No    imp:    stable[ ]    unstable[  ]     improving [   ]       unchanged  [ x ]                Plans:  Continue present plans  [ x]               New consult [  ]   specialty  .......               order test[  ]    test name.                  Discharge Planning  [  ]
Denies complaints     acetaminophen   Tablet. 650 milliGRAM(s) Oral every 6 hours PRN  ALBUTerol    90 MICROgram(s) HFA Inhaler 2 Puff(s) Inhalation every 6 hours PRN  amLODIPine   Tablet 5 milliGRAM(s) Oral daily  aspirin enteric coated 81 milliGRAM(s) Oral daily  atorvastatin 80 milliGRAM(s) Oral at bedtime  buDESOnide 160 MICROgram(s)/formoterol 4.5 MICROgram(s) Inhaler 2 Puff(s) Inhalation two times a day  dextrose 40% Gel 15 Gram(s) Oral once PRN  dextrose 5%. 1000 milliLiter(s) IV Continuous <Continuous>  dextrose 50% Injectable 12.5 Gram(s) IV Push once  dextrose 50% Injectable 25 Gram(s) IV Push once  dextrose 50% Injectable 25 Gram(s) IV Push once  furosemide    Tablet 40 milliGRAM(s) Oral daily  gabapentin 300 milliGRAM(s) Oral two times a day  glucagon  Injectable 1 milliGRAM(s) IntraMuscular once PRN  heparin  Injectable 5000 Unit(s) SubCutaneous every 12 hours  insulin glargine Injectable (LANTUS) 15 Unit(s) SubCutaneous at bedtime  insulin lispro (HumaLOG) corrective regimen sliding scale   SubCutaneous three times a day before meals  insulin lispro (HumaLOG) corrective regimen sliding scale   SubCutaneous at bedtime  insulin lispro Injectable (HumaLOG) 5 Unit(s) SubCutaneous before breakfast  insulin lispro Injectable (HumaLOG) 5 Unit(s) SubCutaneous before lunch  insulin lispro Injectable (HumaLOG) 5 Unit(s) SubCutaneous before dinner  labetalol 200 milliGRAM(s) Oral two times a day  magnesium hydroxide Suspension 30 milliLiter(s) Oral daily PRN  montelukast 10 milliGRAM(s) Oral daily  senna 2 Tablet(s) Oral at bedtime  tamsulosin 0.4 milliGRAM(s) Oral at bedtime    Vital Signs Last 24 Hrs  T(C): 37.3 (08-25-18 @ 11:55), Max: 37.3 (08-25-18 @ 11:55)  T(F): 99.2 (08-25-18 @ 11:55), Max: 99.2 (08-25-18 @ 11:55)  HR: 74 (08-25-18 @ 11:55) (69 - 79)  BP: 137/67 (08-25-18 @ 11:55) (137/67 - 167/70)  RR: 16 (08-25-18 @ 11:55) (16 - 18)  SpO2: 99% (08-25-18 @ 11:55) (96% - 99%)    Respiratory: clear anteriorly, decreased BS at bases  Cardiovascular: S1 S2  Gastrointestinal: soft NT ND +BS  Extremities:  tr edema    24 Aug 2018 07:19    138    |  105    |  72     ----------------------------<  215    5.2     |  22     |  4.14     Ca    7.5        24 Aug 2018 07:19    Assessment and Plan:    CKD 4-5, stable  Close outpatient renal follow up  Patient wish is to avoid HD at this time  No urgent indication at present  BMP in am
Patient feels well no complaints today.    MEDICATIONS  (STANDING):  amLODIPine   Tablet 5 milliGRAM(s) Oral daily  aspirin enteric coated 81 milliGRAM(s) Oral daily  atorvastatin 80 milliGRAM(s) Oral at bedtime  buDESOnide 160 MICROgram(s)/formoterol 4.5 MICROgram(s) Inhaler 2 Puff(s) Inhalation two times a day  dextrose 5%. 1000 milliLiter(s) (50 mL/Hr) IV Continuous <Continuous>  dextrose 50% Injectable 12.5 Gram(s) IV Push once  dextrose 50% Injectable 25 Gram(s) IV Push once  dextrose 50% Injectable 25 Gram(s) IV Push once  furosemide    Tablet 40 milliGRAM(s) Oral daily  gabapentin 300 milliGRAM(s) Oral two times a day  heparin  Injectable 5000 Unit(s) SubCutaneous every 12 hours  insulin glargine Injectable (LANTUS) 15 Unit(s) SubCutaneous at bedtime  insulin lispro (HumaLOG) corrective regimen sliding scale   SubCutaneous three times a day before meals  insulin lispro (HumaLOG) corrective regimen sliding scale   SubCutaneous at bedtime  insulin lispro Injectable (HumaLOG) 5 Unit(s) SubCutaneous before breakfast  insulin lispro Injectable (HumaLOG) 5 Unit(s) SubCutaneous before lunch  insulin lispro Injectable (HumaLOG) 5 Unit(s) SubCutaneous before dinner  labetalol 200 milliGRAM(s) Oral two times a day  montelukast 10 milliGRAM(s) Oral daily  senna 2 Tablet(s) Oral at bedtime  tamsulosin 0.4 milliGRAM(s) Oral at bedtime    MEDICATIONS  (PRN):  acetaminophen   Tablet. 650 milliGRAM(s) Oral every 6 hours PRN Mild Pain (1 - 3)  ALBUTerol    90 MICROgram(s) HFA Inhaler 2 Puff(s) Inhalation every 6 hours PRN Shortness of Breath and/or Wheezing  dextrose 40% Gel 15 Gram(s) Oral once PRN Blood Glucose LESS THAN 70 milliGRAM(s)/deciliter  glucagon  Injectable 1 milliGRAM(s) IntraMuscular once PRN Glucose LESS THAN 70 milligrams/deciliter  magnesium hydroxide Suspension 30 milliLiter(s) Oral daily PRN Constipation      08-23-18 @ 07:01  -  08-24-18 @ 07:00  --------------------------------------------------------  IN: 200 mL / OUT: 4300 mL / NET: -4100 mL      PHYSICAL EXAM:      T(C): 37.2 (08-24-18 @ 05:24), Max: 37.2 (08-24-18 @ 05:24)  HR: 79 (08-24-18 @ 05:24) (68 - 79)  BP: 136/73 (08-24-18 @ 05:24) (136/73 - 166/83)  RR: 17 (08-24-18 @ 05:24) (17 - 19)  SpO2: 95% (08-24-18 @ 05:24) (95% - 96%)  Wt(kg): --  Respiratory: clear anteriorly, decreased BS at bases  Cardiovascular: S1 S2  Gastrointestinal: soft NT ND +BS  Extremities:  tr edema                08-24    138  |  105  |  72<H>  ----------------------------<  215<H>  5.2   |  22  |  4.14<H>    Ca    7.5<L>      24 Aug 2018 07:19            Assessment and Plan:    CKD 4-5 stable;  Will need close outpatient follow up.  Patient resistant to the prospect of HD in the near future.
Patient feels well no complaints today. hsieh replaced.    MEDICATIONS  (STANDING):  amLODIPine   Tablet 5 milliGRAM(s) Oral daily  aspirin enteric coated 81 milliGRAM(s) Oral daily  atorvastatin 80 milliGRAM(s) Oral at bedtime  buDESOnide 160 MICROgram(s)/formoterol 4.5 MICROgram(s) Inhaler 2 Puff(s) Inhalation two times a day  dextrose 5%. 1000 milliLiter(s) (50 mL/Hr) IV Continuous <Continuous>  dextrose 50% Injectable 12.5 Gram(s) IV Push once  dextrose 50% Injectable 25 Gram(s) IV Push once  dextrose 50% Injectable 25 Gram(s) IV Push once  furosemide    Tablet 40 milliGRAM(s) Oral daily  gabapentin 300 milliGRAM(s) Oral two times a day  heparin  Injectable 5000 Unit(s) SubCutaneous every 12 hours  insulin glargine Injectable (LANTUS) 15 Unit(s) SubCutaneous at bedtime  insulin lispro (HumaLOG) corrective regimen sliding scale   SubCutaneous three times a day before meals  insulin lispro (HumaLOG) corrective regimen sliding scale   SubCutaneous at bedtime  insulin lispro Injectable (HumaLOG) 5 Unit(s) SubCutaneous before breakfast  insulin lispro Injectable (HumaLOG) 5 Unit(s) SubCutaneous before lunch  insulin lispro Injectable (HumaLOG) 5 Unit(s) SubCutaneous before dinner  labetalol 200 milliGRAM(s) Oral two times a day  montelukast 10 milliGRAM(s) Oral daily  senna 2 Tablet(s) Oral at bedtime  tamsulosin 0.4 milliGRAM(s) Oral at bedtime    MEDICATIONS  (PRN):  acetaminophen   Tablet. 650 milliGRAM(s) Oral every 6 hours PRN Mild Pain (1 - 3)  ALBUTerol    90 MICROgram(s) HFA Inhaler 2 Puff(s) Inhalation every 6 hours PRN Shortness of Breath and/or Wheezing  dextrose 40% Gel 15 Gram(s) Oral once PRN Blood Glucose LESS THAN 70 milliGRAM(s)/deciliter  glucagon  Injectable 1 milliGRAM(s) IntraMuscular once PRN Glucose LESS THAN 70 milligrams/deciliter  magnesium hydroxide Suspension 30 milliLiter(s) Oral daily PRN Constipation      08-21-18 @ 07:01  -  08-22-18 @ 07:00  --------------------------------------------------------  IN: 0 mL / OUT: 2750 mL / NET: -2750 mL      PHYSICAL EXAM:      T(C): 36.4 (08-22-18 @ 11:15), Max: 36.8 (08-22-18 @ 05:19)  HR: 67 (08-22-18 @ 11:15) (67 - 74)  BP: 142/70 (08-22-18 @ 11:15) (142/70 - 163/86)  RR: 16 (08-22-18 @ 11:15) (16 - 20)  SpO2: 98% (08-22-18 @ 11:15) (97% - 98%)  Wt(kg): --  Respiratory: clear anteriorly, decreased BS at bases  Cardiovascular: S1 S2  Gastrointestinal: soft NT ND +BS  Extremities:  1 edema                            9.5    8.85  )-----------( 293      ( 21 Aug 2018 07:01 )             31.0     08-21    140  |  106  |  54<H>  ----------------------------<  181<H>  4.5   |  24  |  4.06<H>    Ca    7.6<L>      21 Aug 2018 07:01      Assessment and Plan:    CKD 4-5 with severe proteinuria suspected diabetic nephrosclerosis; advancing;   Hsieh placed for high PVR;   Indices stable;   Will follow.
Patient is a 72y old  Female who presents with a chief complaint of s/p fall, unsteady gait, DM, HTN (18 Aug 2018 12:26)  CKD stage5    INTERVAL HPI/OVERNIGHT EVENTS: uneventful  PAST MEDICAL & SURGICAL HISTORY:  CKD (chronic kidney disease) stage 5, GFR less than 15 ml/min  Dementia  DM (diabetes mellitus)  Asthma  CVA (cerebral vascular accident)  CVA (cerebral vascular accident)  HLD (hyperlipidemia)  Asthma  Neuropathy  SLE (systemic lupus erythematosus)  DM (diabetes mellitus)  HTN (hypertension)  No significant past surgical history  S/P tonsillectomy      MEDICATIONS  (STANDING):  amLODIPine   Tablet 5 milliGRAM(s) Oral daily  aspirin enteric coated 81 milliGRAM(s) Oral daily  atorvastatin 80 milliGRAM(s) Oral at bedtime  buDESOnide 160 MICROgram(s)/formoterol 4.5 MICROgram(s) Inhaler 2 Puff(s) Inhalation two times a day  ciprofloxacin     Tablet 250 milliGRAM(s) Oral daily  dextrose 5%. 1000 milliLiter(s) (50 mL/Hr) IV Continuous <Continuous>  dextrose 50% Injectable 12.5 Gram(s) IV Push once  dextrose 50% Injectable 25 Gram(s) IV Push once  dextrose 50% Injectable 25 Gram(s) IV Push once  furosemide    Tablet 40 milliGRAM(s) Oral daily  gabapentin 300 milliGRAM(s) Oral two times a day  heparin  Injectable 5000 Unit(s) SubCutaneous every 12 hours  insulin glargine Injectable (LANTUS) 15 Unit(s) SubCutaneous at bedtime  insulin lispro (HumaLOG) corrective regimen sliding scale   SubCutaneous three times a day before meals  insulin lispro (HumaLOG) corrective regimen sliding scale   SubCutaneous at bedtime  insulin lispro Injectable (HumaLOG) 5 Unit(s) SubCutaneous before breakfast  insulin lispro Injectable (HumaLOG) 5 Unit(s) SubCutaneous before lunch  insulin lispro Injectable (HumaLOG) 5 Unit(s) SubCutaneous before dinner  labetalol 200 milliGRAM(s) Oral two times a day  montelukast 10 milliGRAM(s) Oral daily  senna 2 Tablet(s) Oral at bedtime    MEDICATIONS  (PRN):  acetaminophen   Tablet. 650 milliGRAM(s) Oral every 6 hours PRN Mild Pain (1 - 3)  ALBUTerol    90 MICROgram(s) HFA Inhaler 2 Puff(s) Inhalation every 6 hours PRN Shortness of Breath and/or Wheezing  dextrose 40% Gel 15 Gram(s) Oral once PRN Blood Glucose LESS THAN 70 milliGRAM(s)/deciliter  glucagon  Injectable 1 milliGRAM(s) IntraMuscular once PRN Glucose LESS THAN 70 milligrams/deciliter  magnesium hydroxide Suspension 30 milliLiter(s) Oral daily PRN Constipation      Allergies    codeine (Swelling)  penicillin (Anaphylaxis)  penicillins (Swelling)    Intolerances        REVIEW OF SYSTEMS:  CONSTITUTIONAL: No fever, weight loss, or fatigue  EYES: No eye pain, visual disturbances, or discharge  ENMT:  No difficulty hearing, tinnitus, vertigo; No sinus or throat pain  NECK: No pain or stiffness  BREASTS: No pain, masses, or nipple discharge  RESPIRATORY: No cough, wheezing, chills or hemoptysis; No shortness of breath  CARDIOVASCULAR: No chest pain, palpitations, dizziness, or leg swelling  GASTROINTESTINAL: No abdominal or epigastric pain. No nausea, vomiting, or hematemesis; No diarrhea or constipation. No melena or hematochezia.  GENITOURINARY: No dysuria, frequency, hematuria, or incontinence  NEUROLOGICAL: No headaches, memory loss, loss of strength, numbness, or tremors  SKIN: No itching, burning, rashes, or lesions   LYMPH NODES: No enlarged glands  ENDOCRINE: No heat or cold intolerance; No hair loss  MUSCULOSKELETAL: No joint pain or swelling; No muscle, back, or extremity pain  PSYCHIATRIC: No depression, anxiety, mood swings, or difficulty sleeping  HEME/LYMPH: No easy bruising, or bleeding gums  ALLERY AND IMMUNOLOGIC: No hives or eczema    Vital Signs Last 24 Hrs  T(C): 36.3 (20 Aug 2018 05:21), Max: 36.3 (19 Aug 2018 11:15)  T(F): 97.4 (20 Aug 2018 05:21), Max: 97.4 (19 Aug 2018 11:15)  HR: 70 (20 Aug 2018 05:21) (67 - 70)  BP: 140/78 (20 Aug 2018 05:21) (140/78 - 178/87)  BP(mean): --  RR: 18 (20 Aug 2018 05:21) (18 - 18)  SpO2: 97% (20 Aug 2018 05:21) (95% - 97%)    PHYSICAL EXAM:  GENERAL: NAD, well-groomed, well-developed  HEAD:  Atraumatic, Normocephalic  EYES: EOMI, PERRLA, conjunctiva and sclera clear  ENMT: No tonsillar erythema, exudates, or enlargement; Moist mucous membranes, Good dentition, No lesions  NECK: Supple, No JVD, Normal thyroid  NERVOUS SYSTEM:  Alert & Oriented X3, Good concentration; Motor Strength 5/5 B/L upper and lower extremities; DTRs 2+ intact and symmetric  CHEST/LUNG: Clear to percussion bilaterally; No rales, rhonchi, wheezing, or rubs  HEART: Regular rate and rhythm; No murmurs, rubs, or gallops  ABDOMEN: Soft, Nontender, Nondistended; Bowel sounds present  EXTREMITIES:  2+ Peripheral Pulses, No clubbing, cyanosis, or edema  LYMPH: No lymphadenopathy noted  SKIN: No rashes or lesions    LABS:                        9.3    8.85  )-----------( 259      ( 20 Aug 2018 07:04 )             30.6     08-20    138  |  106  |  47<H>  ----------------------------<  201<H>  4.7   |  22  |  4.28<H>    Ca    7.6<L>      20 Aug 2018 07:04            CAPILLARY BLOOD GLUCOSE      POCT Blood Glucose.: 198 mg/dL (20 Aug 2018 07:57)  POCT Blood Glucose.: 249 mg/dL (19 Aug 2018 21:26)  POCT Blood Glucose.: 206 mg/dL (19 Aug 2018 16:18)  POCT Blood Glucose.: 206 mg/dL (19 Aug 2018 11:22)          Hemoglobin A1C, Whole Blood: 12.4 % (08-19 @ 10:59)        RADIOLOGY & ADDITIONAL TESTS:    Imaging Personally Reviewed:  [ ] YES  [ ] NO    Consultant(s) Notes Reviewed:  [ ] YES  [ ] NO    Care Discussed with Consultants/Other Providers [ ] YES  [ ] NO    Care discussed with family,         [  ]   yes  [  ]  No    imp:    stable[ x]    unstable[  ]     improving [   ]       unchanged  [  ]                Plans:  Continue present plans  [x ] dischrge to rehab when bed available               New consult [  ]   specialty  .......               order test[  ]    test name.                  Discharge Planning  [  ]
Patient is a 72y old  Female who presents with a chief complaint of s/p fall, unsteady gait, DM, HTN (18 Aug 2018 12:26)  admitted status post fall for discharge to rehab.     INTERVAL HPI/OVERNIGHT EVENTS: un eventful.  PAST MEDICAL & SURGICAL HISTORY:  CKD (chronic kidney disease) stage 5, GFR less than 15 ml/min  Dementia  DM (diabetes mellitus)  Asthma  CVA (cerebral vascular accident)  CVA (cerebral vascular accident)  HLD (hyperlipidemia)  Asthma  Neuropathy  SLE (systemic lupus erythematosus)  DM (diabetes mellitus)  HTN (hypertension)  No significant past surgical history  S/P tonsillectomy      MEDICATIONS  (STANDING):  amLODIPine   Tablet 5 milliGRAM(s) Oral daily  aspirin enteric coated 81 milliGRAM(s) Oral daily  atorvastatin 80 milliGRAM(s) Oral at bedtime  buDESOnide 160 MICROgram(s)/formoterol 4.5 MICROgram(s) Inhaler 2 Puff(s) Inhalation two times a day  ciprofloxacin     Tablet 250 milliGRAM(s) Oral daily  dextrose 5%. 1000 milliLiter(s) (50 mL/Hr) IV Continuous <Continuous>  dextrose 50% Injectable 12.5 Gram(s) IV Push once  dextrose 50% Injectable 25 Gram(s) IV Push once  dextrose 50% Injectable 25 Gram(s) IV Push once  furosemide    Tablet 40 milliGRAM(s) Oral daily  gabapentin 300 milliGRAM(s) Oral two times a day  heparin  Injectable 5000 Unit(s) SubCutaneous every 12 hours  insulin glargine Injectable (LANTUS) 15 Unit(s) SubCutaneous at bedtime  insulin lispro (HumaLOG) corrective regimen sliding scale   SubCutaneous three times a day before meals  insulin lispro (HumaLOG) corrective regimen sliding scale   SubCutaneous at bedtime  labetalol 200 milliGRAM(s) Oral two times a day  montelukast 10 milliGRAM(s) Oral daily  senna 2 Tablet(s) Oral at bedtime    MEDICATIONS  (PRN):  acetaminophen   Tablet. 650 milliGRAM(s) Oral every 6 hours PRN Mild Pain (1 - 3)  ALBUTerol    90 MICROgram(s) HFA Inhaler 2 Puff(s) Inhalation every 6 hours PRN Shortness of Breath and/or Wheezing  dextrose 40% Gel 15 Gram(s) Oral once PRN Blood Glucose LESS THAN 70 milliGRAM(s)/deciliter  glucagon  Injectable 1 milliGRAM(s) IntraMuscular once PRN Glucose LESS THAN 70 milligrams/deciliter  magnesium hydroxide Suspension 30 milliLiter(s) Oral daily PRN Constipation      Allergies    codeine (Swelling)  penicillin (Anaphylaxis)  penicillins (Swelling)    Intolerances        REVIEW OF SYSTEMS:  CONSTITUTIONAL: No fever, weight loss, or fatigue  EYES: No eye pain, visual disturbances, or discharge  ENMT:  No difficulty hearing, tinnitus, vertigo; No sinus or throat pain  NECK: No pain or stiffness  BREASTS: No pain, masses, or nipple discharge  RESPIRATORY: No cough, wheezing, chills or hemoptysis; No shortness of breath  CARDIOVASCULAR: No chest pain, palpitations, dizziness, or leg swelling  GASTROINTESTINAL: No abdominal or epigastric pain. No nausea, vomiting, or hematemesis; No diarrhea or constipation. No melena or hematochezia.  GENITOURINARY: No dysuria, frequency, hematuria, or incontinence  NEUROLOGICAL: No headaches, memory loss, loss of strength, numbness, or tremors  SKIN: No itching, burning, rashes, or lesions   LYMPH NODES: No enlarged glands  ENDOCRINE: No heat or cold intolerance; No hair loss  MUSCULOSKELETAL: No joint pain or swelling; No muscle, back, or extremity pain  PSYCHIATRIC: No depression, anxiety, mood swings, or difficulty sleeping  HEME/LYMPH: No easy bruising, or bleeding gums  ALLERY AND IMMUNOLOGIC: No hives or eczema    Vital Signs Last 24 Hrs  T(C): 36.4 (19 Aug 2018 05:36), Max: 36.9 (18 Aug 2018 11:24)  T(F): 97.6 (19 Aug 2018 05:36), Max: 98.4 (18 Aug 2018 11:24)  HR: 66 (19 Aug 2018 05:36) (66 - 81)  BP: 156/71 (19 Aug 2018 05:36) (145/75 - 166/79)  BP(mean): --  RR: 19 (19 Aug 2018 05:36) (16 - 19)  SpO2: 97% (19 Aug 2018 05:36) (95% - 100%)    PHYSICAL EXAM:  GENERAL: NAD, well-groomed, well-developed  HEAD:  Atraumatic, Normocephalic  EYES: EOMI, PERRLA, conjunctiva and sclera clear  ENMT: No tonsillar erythema, exudates, or enlargement; Moist mucous membranes, Good dentition, No lesions  NECK: Supple, No JVD, Normal thyroid  NERVOUS SYSTEM:  Alert & Oriented X3, Good concentration; Motor Strength 5/5 B/L upper and lower extremities; DTRs 2+ intact and symmetric  CHEST/LUNG: Clear to percussion bilaterally; No rales, rhonchi, wheezing, or rubs  HEART: Regular rate and rhythm; No murmurs, rubs, or gallops  ABDOMEN: Soft, Nontender, Nondistended; Bowel sounds present  EXTREMITIES:  2+ Peripheral Pulses, No clubbing, cyanosis, or edema  LYMPH: No lymphadenopathy noted  SKIN: No rashes or lesions    LABS:                        10.1   10.31 )-----------( 245      ( 17 Aug 2018 18:52 )             33.5         140  |  107  |  36<H>  ----------------------------<  244<H>  4.4   |  23  |  4.09<H>    Ca    8.1<L>      17 Aug 2018 18:52    TPro  6.9  /  Alb  2.4<L>  /  TBili  0.3  /  DBili  x   /  AST  25  /  ALT  18  /  AlkPhos  100        PT/INR - ( 17 Aug 2018 18:52 )   PT: 10.8 sec;   INR: 0.99 ratio         PTT - ( 17 Aug 2018 18:52 )  PTT:29.2 sec  Urinalysis Basic - ( 17 Aug 2018 19:59 )    Color: Yellow / Appearance: Slightly Turbid / S.010 / pH: x  Gluc: x / Ketone: Negative  / Bili: Negative / Urobili: Negative mg/dL   Blood: x / Protein: 500 mg/dL / Nitrite: Negative   Leuk Esterase: Moderate / RBC: >50 /HPF / WBC 6-10   Sq Epi: x / Non Sq Epi: Moderate / Bacteria: Moderate      CAPILLARY BLOOD GLUCOSE      POCT Blood Glucose.: 147 mg/dL (19 Aug 2018 07:41)  POCT Blood Glucose.: 189 mg/dL (18 Aug 2018 21:48)  POCT Blood Glucose.: 224 mg/dL (18 Aug 2018 16:34)      CARDIAC MARKERS ( 17 Aug 2018 18:52 )  <.015 ng/mL / x     / 235 U/L / x     / 1.7 ng/mL      Hemoglobin A1C, Whole Blood: 12.4 % ( @ 10:59)        RADIOLOGY & ADDITIONAL TESTS:    Imaging Personally Reviewed:  [ ] YES  [ ] NO    Consultant(s) Notes Reviewed:  [ ] YES  [ ] NO    Care Discussed with Consultants/Other Providers [ ] YES  [ ] NO    Care discussed with family,         [  ]   yes  [  ]  No    imp:    stable[ ]    unstable[  ]     improving [x   ]       unchanged  [  ]                Plans:  Continue present plans  [ x ]               New consult [  ]   specialty  .......               order test[  ]    test name.cbc, bmp                  Discharge Planning  [  ]
Patient is a 72y old  Female who presents with a chief complaint of s/p fall, unsteady gait, DM, HTN (20 Aug 2018 14:41)    Renal note reviewed  INTERVAL HPI/OVERNIGHT EVENTS: uneventful  PAST MEDICAL & SURGICAL HISTORY:  CKD (chronic kidney disease) stage 5, GFR less than 15 ml/min  Dementia  DM (diabetes mellitus)  Asthma  CVA (cerebral vascular accident)  CVA (cerebral vascular accident)  HLD (hyperlipidemia)  Asthma  Neuropathy  SLE (systemic lupus erythematosus)  DM (diabetes mellitus)  HTN (hypertension)  No significant past surgical history  S/P tonsillectomy      MEDICATIONS  (STANDING):  amLODIPine   Tablet 5 milliGRAM(s) Oral daily  aspirin enteric coated 81 milliGRAM(s) Oral daily  atorvastatin 80 milliGRAM(s) Oral at bedtime  buDESOnide 160 MICROgram(s)/formoterol 4.5 MICROgram(s) Inhaler 2 Puff(s) Inhalation two times a day  ciprofloxacin     Tablet 250 milliGRAM(s) Oral daily  dextrose 5%. 1000 milliLiter(s) (50 mL/Hr) IV Continuous <Continuous>  dextrose 50% Injectable 12.5 Gram(s) IV Push once  dextrose 50% Injectable 25 Gram(s) IV Push once  dextrose 50% Injectable 25 Gram(s) IV Push once  gabapentin 300 milliGRAM(s) Oral two times a day  heparin  Injectable 5000 Unit(s) SubCutaneous every 12 hours  insulin glargine Injectable (LANTUS) 15 Unit(s) SubCutaneous at bedtime  insulin lispro (HumaLOG) corrective regimen sliding scale   SubCutaneous three times a day before meals  insulin lispro (HumaLOG) corrective regimen sliding scale   SubCutaneous at bedtime  insulin lispro Injectable (HumaLOG) 5 Unit(s) SubCutaneous before breakfast  insulin lispro Injectable (HumaLOG) 5 Unit(s) SubCutaneous before lunch  insulin lispro Injectable (HumaLOG) 5 Unit(s) SubCutaneous before dinner  labetalol 200 milliGRAM(s) Oral two times a day  montelukast 10 milliGRAM(s) Oral daily  senna 2 Tablet(s) Oral at bedtime  tamsulosin 0.4 milliGRAM(s) Oral at bedtime    MEDICATIONS  (PRN):  acetaminophen   Tablet. 650 milliGRAM(s) Oral every 6 hours PRN Mild Pain (1 - 3)  ALBUTerol    90 MICROgram(s) HFA Inhaler 2 Puff(s) Inhalation every 6 hours PRN Shortness of Breath and/or Wheezing  dextrose 40% Gel 15 Gram(s) Oral once PRN Blood Glucose LESS THAN 70 milliGRAM(s)/deciliter  glucagon  Injectable 1 milliGRAM(s) IntraMuscular once PRN Glucose LESS THAN 70 milligrams/deciliter  magnesium hydroxide Suspension 30 milliLiter(s) Oral daily PRN Constipation      Allergies    codeine (Swelling)  penicillin (Anaphylaxis)  penicillins (Swelling)    Intolerances        REVIEW OF SYSTEMS:  CONSTITUTIONAL: No fever, weight loss, or fatigue  EYES: No eye pain, visual disturbances, or discharge  ENMT:  No difficulty hearing, tinnitus, vertigo; No sinus or throat pain  NECK: No pain or stiffness  BREASTS: No pain, masses, or nipple discharge  RESPIRATORY: No cough, wheezing, chills or hemoptysis; No shortness of breath  CARDIOVASCULAR: No chest pain, palpitations, dizziness, or leg swelling  GASTROINTESTINAL: No abdominal or epigastric pain. No nausea, vomiting, or hematemesis; No diarrhea or constipation. No melena or hematochezia.  GENITOURINARY: No dysuria, frequency, hematuria, or incontinence  NEUROLOGICAL: No headaches, memory loss, loss of strength, numbness, or tremors  SKIN: No itching, burning, rashes, or lesions   LYMPH NODES: No enlarged glands  ENDOCRINE: No heat or cold intolerance; No hair loss  MUSCULOSKELETAL: No joint pain or swelling; No muscle, back, or extremity pain  PSYCHIATRIC: No depression, anxiety, mood swings, or difficulty sleeping  HEME/LYMPH: No easy bruising, or bleeding gums  ALLERY AND IMMUNOLOGIC: No hives or eczema    Vital Signs Last 24 Hrs  T(C): 36.8 (27 Aug 2018 05:19), Max: 37.1 (26 Aug 2018 23:51)  T(F): 98.2 (27 Aug 2018 05:19), Max: 98.8 (26 Aug 2018 23:51)  HR: 68 (27 Aug 2018 05:19) (61 - 74)  BP: 147/66 (27 Aug 2018 05:19) (133/62 - 155/60)  BP(mean): --  RR: 19 (27 Aug 2018 05:19) (16 - 19)  SpO2: 98% (27 Aug 2018 05:19) (92% - 98%)    PHYSICAL EXAM:  GENERAL: NAD, well-groomed, well-developed  HEAD:  Atraumatic, Normocephalic  EYES: EOMI, PERRLA, conjunctiva and sclera clear  ENMT: No tonsillar erythema, exudates, or enlargement; Moist mucous membranes, Good dentition, No lesions  NECK: Supple, No JVD, Normal thyroid  NERVOUS SYSTEM:  Alert & Oriented X3, Good concentration; Motor Strength 5/5 B/L upper and lower extremities; DTRs 2+ intact and symmetric  CHEST/LUNG: Clear to percussion bilaterally; No rales, rhonchi, wheezing, or rubs  HEART: Regular rate and rhythm; No murmurs, rubs, or gallops  ABDOMEN: Soft, Nontender, Nondistended; Bowel sounds present  EXTREMITIES:  2+ Peripheral Pulses, No clubbing, cyanosis, or edema  LYMPH: No lymphadenopathy noted  SKIN: No rashes or lesions    LABS:                        8.9    12.20 )-----------( 297      ( 27 Aug 2018 06:21 )             28.9     08-27    141  |  108  |  85<H>  ----------------------------<  102<H>  5.3   |  22  |  4.54<H>    Ca    7.8<L>      27 Aug 2018 06:21            CAPILLARY BLOOD GLUCOSE      POCT Blood Glucose.: 120 mg/dL (27 Aug 2018 07:46)  POCT Blood Glucose.: 164 mg/dL (26 Aug 2018 22:07)  POCT Blood Glucose.: 233 mg/dL (26 Aug 2018 16:48)                RADIOLOGY & ADDITIONAL TESTS:    Imaging Personally Reviewed:  [ ] YES  [ ] NO    Consultant(s) Notes Reviewed:  [ ] YES  [ ] NO    Care Discussed with Consultants/Other Providers [ ] YES  [ ] NO    Care discussed with family,         [  ]   yes  [  ]  No    imp:    stable[x ]    unstable[  ]     improving [ x  ]       unchanged  [  ]                Plans:  Continue present plans  [ x ] for discharge to LTC. cipro started empirically for elevated EWBC               New consult [  ]   specialty  .......               order test[  ]    test name.                  Discharge Planning  [  ]
Patient is a 72y old  Female who presents with a chief complaint of s/p fall, unsteady gait, DM, HTN (20 Aug 2018 14:41)    stable for LTC placement.   INTERVAL HPI/OVERNIGHT EVENTS: uneventful  PAST MEDICAL & SURGICAL HISTORY:  CKD (chronic kidney disease) stage 5, GFR less than 15 ml/min  Dementia  DM (diabetes mellitus)  Asthma  CVA (cerebral vascular accident)  CVA (cerebral vascular accident)  HLD (hyperlipidemia)  Asthma  Neuropathy  SLE (systemic lupus erythematosus)  DM (diabetes mellitus)  HTN (hypertension)  No significant past surgical history  S/P tonsillectomy      MEDICATIONS  (STANDING):  amLODIPine   Tablet 5 milliGRAM(s) Oral daily  aspirin enteric coated 81 milliGRAM(s) Oral daily  atorvastatin 80 milliGRAM(s) Oral at bedtime  buDESOnide 160 MICROgram(s)/formoterol 4.5 MICROgram(s) Inhaler 2 Puff(s) Inhalation two times a day  dextrose 5%. 1000 milliLiter(s) (50 mL/Hr) IV Continuous <Continuous>  dextrose 50% Injectable 12.5 Gram(s) IV Push once  dextrose 50% Injectable 25 Gram(s) IV Push once  dextrose 50% Injectable 25 Gram(s) IV Push once  furosemide    Tablet 40 milliGRAM(s) Oral daily  gabapentin 300 milliGRAM(s) Oral two times a day  heparin  Injectable 5000 Unit(s) SubCutaneous every 12 hours  insulin glargine Injectable (LANTUS) 15 Unit(s) SubCutaneous at bedtime  insulin lispro (HumaLOG) corrective regimen sliding scale   SubCutaneous three times a day before meals  insulin lispro (HumaLOG) corrective regimen sliding scale   SubCutaneous at bedtime  insulin lispro Injectable (HumaLOG) 5 Unit(s) SubCutaneous before breakfast  insulin lispro Injectable (HumaLOG) 5 Unit(s) SubCutaneous before lunch  insulin lispro Injectable (HumaLOG) 5 Unit(s) SubCutaneous before dinner  labetalol 200 milliGRAM(s) Oral two times a day  montelukast 10 milliGRAM(s) Oral daily  senna 2 Tablet(s) Oral at bedtime  tamsulosin 0.4 milliGRAM(s) Oral at bedtime    MEDICATIONS  (PRN):  acetaminophen   Tablet. 650 milliGRAM(s) Oral every 6 hours PRN Mild Pain (1 - 3)  ALBUTerol    90 MICROgram(s) HFA Inhaler 2 Puff(s) Inhalation every 6 hours PRN Shortness of Breath and/or Wheezing  dextrose 40% Gel 15 Gram(s) Oral once PRN Blood Glucose LESS THAN 70 milliGRAM(s)/deciliter  glucagon  Injectable 1 milliGRAM(s) IntraMuscular once PRN Glucose LESS THAN 70 milligrams/deciliter  magnesium hydroxide Suspension 30 milliLiter(s) Oral daily PRN Constipation      Allergies    codeine (Swelling)  penicillin (Anaphylaxis)  penicillins (Swelling)    Intolerances        REVIEW OF SYSTEMS:  CONSTITUTIONAL: No fever, weight loss, or fatigue  EYES: No eye pain, visual disturbances, or discharge  ENMT:  No difficulty hearing, tinnitus, vertigo; No sinus or throat pain  NECK: No pain or stiffness  BREASTS: No pain, masses, or nipple discharge  RESPIRATORY: No cough, wheezing, chills or hemoptysis; No shortness of breath  CARDIOVASCULAR: No chest pain, palpitations, dizziness, or leg swelling  GASTROINTESTINAL: No abdominal or epigastric pain. No nausea, vomiting, or hematemesis; No diarrhea or constipation. No melena or hematochezia.  GENITOURINARY: No dysuria, frequency, hematuria, or incontinence  NEUROLOGICAL: No headaches, memory loss, loss of strength, numbness, or tremors  SKIN: No itching, burning, rashes, or lesions   LYMPH NODES: No enlarged glands  ENDOCRINE: No heat or cold intolerance; No hair loss  MUSCULOSKELETAL: No joint pain or swelling; No muscle, back, or extremity pain  PSYCHIATRIC: No depression, anxiety, mood swings, or difficulty sleeping  HEME/LYMPH: No easy bruising, or bleeding gums  ALLERY AND IMMUNOLOGIC: No hives or eczema    Vital Signs Last 24 Hrs  T(C): 36.2 (25 Aug 2018 05:51), Max: 36.7 (24 Aug 2018 23:50)  T(F): 97.2 (25 Aug 2018 05:51), Max: 98 (24 Aug 2018 23:50)  HR: 69 (25 Aug 2018 05:51) (69 - 79)  BP: 146/65 (25 Aug 2018 05:51) (142/66 - 167/70)  BP(mean): --  RR: 17 (25 Aug 2018 05:51) (16 - 18)  SpO2: 96% (25 Aug 2018 05:51) (96% - 96%)    PHYSICAL EXAM:  GENERAL: NAD, well-groomed, well-developed  HEAD:  Atraumatic, Normocephalic  EYES: EOMI, PERRLA, conjunctiva and sclera clear  ENMT: No tonsillar erythema, exudates, or enlargement; Moist mucous membranes, Good dentition, No lesions  NECK: Supple, No JVD, Normal thyroid  NERVOUS SYSTEM:  Alert & Oriented X3, Good concentration; Motor Strength 5/5 B/L upper and lower extremities; DTRs 2+ intact and symmetric  CHEST/LUNG: Clear to percussion bilaterally; No rales, rhonchi, wheezing, or rubs  HEART: Regular rate and rhythm; No murmurs, rubs, or gallops  ABDOMEN: Soft, Nontender, Nondistended; Bowel sounds present  EXTREMITIES:  2+ Peripheral Pulses, No clubbing, cyanosis, or edema  LYMPH: No lymphadenopathy noted  SKIN: No rashes or lesions    LABS:    08-24    138  |  105  |  72<H>  ----------------------------<  215<H>  5.2   |  22  |  4.14<H>    Ca    7.5<L>      24 Aug 2018 07:19            CAPILLARY BLOOD GLUCOSE      POCT Blood Glucose.: 140 mg/dL (24 Aug 2018 22:23)  POCT Blood Glucose.: 174 mg/dL (24 Aug 2018 16:51)  POCT Blood Glucose.: 266 mg/dL (24 Aug 2018 11:28)  POCT Blood Glucose.: 196 mg/dL (24 Aug 2018 07:59)          Hemoglobin A1C, Whole Blood: 12.4 % (08-19 @ 10:59)        RADIOLOGY & ADDITIONAL TESTS:    Imaging Personally Reviewed:  [ ] YES  [ ] NO    Consultant(s) Notes Reviewed:  [ ] YES  [ ] NO    Care Discussed with Consultants/Other Providers [ ] YES  [ ] NO    Care discussed with family,         [  ]   yes  [  ]  No    imp:    stable[x ]    unstable[  ]     improving [   ]       unchanged  [  ]                Plans:  Continue present plans  [x  ]               New consult [  ]   specialty  .......               order test[  ]    test name.                  Discharge Planning  [  ]
Patient is a 72y old  Female who presents with a chief complaint of s/p fall, unsteady gait, DM, HTN (20 Aug 2018 14:41)  cliniclly stable for dischage    INTERVAL HPI/OVERNIGHT EVENTS:un eventful. diabetes under control  PAST MEDICAL & SURGICAL HISTORY:  CKD (chronic kidney disease) stage 5, GFR less than 15 ml/min  Dementia  DM (diabetes mellitus)  Asthma  CVA (cerebral vascular accident)  CVA (cerebral vascular accident)  HLD (hyperlipidemia)  Asthma  Neuropathy  SLE (systemic lupus erythematosus)  DM (diabetes mellitus)  HTN (hypertension)  No significant past surgical history  S/P tonsillectomy      MEDICATIONS  (STANDING):  amLODIPine   Tablet 5 milliGRAM(s) Oral daily  aspirin enteric coated 81 milliGRAM(s) Oral daily  atorvastatin 80 milliGRAM(s) Oral at bedtime  buDESOnide 160 MICROgram(s)/formoterol 4.5 MICROgram(s) Inhaler 2 Puff(s) Inhalation two times a day  dextrose 5%. 1000 milliLiter(s) (50 mL/Hr) IV Continuous <Continuous>  dextrose 50% Injectable 12.5 Gram(s) IV Push once  dextrose 50% Injectable 25 Gram(s) IV Push once  dextrose 50% Injectable 25 Gram(s) IV Push once  furosemide    Tablet 40 milliGRAM(s) Oral daily  gabapentin 300 milliGRAM(s) Oral two times a day  heparin  Injectable 5000 Unit(s) SubCutaneous every 12 hours  insulin glargine Injectable (LANTUS) 15 Unit(s) SubCutaneous at bedtime  insulin lispro (HumaLOG) corrective regimen sliding scale   SubCutaneous three times a day before meals  insulin lispro (HumaLOG) corrective regimen sliding scale   SubCutaneous at bedtime  insulin lispro Injectable (HumaLOG) 5 Unit(s) SubCutaneous before breakfast  insulin lispro Injectable (HumaLOG) 5 Unit(s) SubCutaneous before lunch  insulin lispro Injectable (HumaLOG) 5 Unit(s) SubCutaneous before dinner  labetalol 200 milliGRAM(s) Oral two times a day  montelukast 10 milliGRAM(s) Oral daily  senna 2 Tablet(s) Oral at bedtime  tamsulosin 0.4 milliGRAM(s) Oral at bedtime    MEDICATIONS  (PRN):  acetaminophen   Tablet. 650 milliGRAM(s) Oral every 6 hours PRN Mild Pain (1 - 3)  ALBUTerol    90 MICROgram(s) HFA Inhaler 2 Puff(s) Inhalation every 6 hours PRN Shortness of Breath and/or Wheezing  dextrose 40% Gel 15 Gram(s) Oral once PRN Blood Glucose LESS THAN 70 milliGRAM(s)/deciliter  glucagon  Injectable 1 milliGRAM(s) IntraMuscular once PRN Glucose LESS THAN 70 milligrams/deciliter  magnesium hydroxide Suspension 30 milliLiter(s) Oral daily PRN Constipation      Allergies    codeine (Swelling)  penicillin (Anaphylaxis)  penicillins (Swelling)    Intolerances        REVIEW OF SYSTEMS:  CONSTITUTIONAL: No fever, weight loss, or fatigue  EYES: No eye pain, visual disturbances, or discharge  ENMT:  No difficulty hearing, tinnitus, vertigo; No sinus or throat pain  NECK: No pain or stiffness  BREASTS: No pain, masses, or nipple discharge  RESPIRATORY: No cough, wheezing, chills or hemoptysis; No shortness of breath  CARDIOVASCULAR: No chest pain, palpitations, dizziness, or leg swelling  GASTROINTESTINAL: No abdominal or epigastric pain. No nausea, vomiting, or hematemesis; No diarrhea or constipation. No melena or hematochezia.  GENITOURINARY: No dysuria, frequency, hematuria, or incontinence  NEUROLOGICAL: No headaches, memory loss, loss of strength, numbness, or tremors  SKIN: No itching, burning, rashes, or lesions   LYMPH NODES: No enlarged glands  ENDOCRINE: No heat or cold intolerance; No hair loss  MUSCULOSKELETAL: No joint pain or swelling; No muscle, back, or extremity pain  PSYCHIATRIC: No depression, anxiety, mood swings, or difficulty sleeping  HEME/LYMPH: No easy bruising, or bleeding gums  ALLERY AND IMMUNOLOGIC: No hives or eczema    Vital Signs Last 24 Hrs  T(C): 36.6 (23 Aug 2018 04:50), Max: 37.2 (23 Aug 2018 02:00)  T(F): 97.9 (23 Aug 2018 04:50), Max: 99 (23 Aug 2018 02:00)  HR: 80 (23 Aug 2018 04:50) (65 - 80)  BP: 136/59 (23 Aug 2018 04:50) (103/83 - 151/75)  BP(mean): --  RR: 18 (23 Aug 2018 04:50) (18 - 20)  SpO2: 98% (23 Aug 2018 04:50) (96% - 98%)    PHYSICAL EXAM:  GENERAL: NAD, well-groomed, well-developed  HEAD:  Atraumatic, Normocephalic  EYES: EOMI, PERRLA, conjunctiva and sclera clear  ENMT: No tonsillar erythema, exudates, or enlargement; Moist mucous membranes, Good dentition, No lesions  NECK: Supple, No JVD, Normal thyroid  NERVOUS SYSTEM:  Alert & Oriented X3, Good concentration; Motor Strength 5/5 B/L upper and lower extremities; DTRs 2+ intact and symmetric  CHEST/LUNG: Clear to percussion bilaterally; No rales, rhonchi, wheezing, or rubs  HEART: Regular rate and rhythm; No murmurs, rubs, or gallops  ABDOMEN: Soft, Nontender, Nondistended; Bowel sounds present  EXTREMITIES:  2+ Peripheral Pulses, No clubbing, cyanosis, or edema  LYMPH: No lymphadenopathy noted  SKIN: No rashes or lesions    LABS:                CAPILLARY BLOOD GLUCOSE      POCT Blood Glucose.: 156 mg/dL (23 Aug 2018 11:18)  POCT Blood Glucose.: 260 mg/dL (23 Aug 2018 07:49)  POCT Blood Glucose.: 211 mg/dL (22 Aug 2018 20:46)  POCT Blood Glucose.: 255 mg/dL (22 Aug 2018 16:43)          Hemoglobin A1C, Whole Blood: 12.4 % (08-19 @ 10:59)        RADIOLOGY & ADDITIONAL TESTS:    Imaging Personally Reviewed:  [ ] YES  [ ] NO    Consultant(s) Notes Reviewed:  [ ] YES  [ ] NO    Care Discussed with Consultants/Other Providers [ ] YES  [ ] NO    Care discussed with family,         [  ]   yes  [  ]  No    imp:    stable[ ]    unstable[  ]     improving [  x ]       unchanged  [  ]                Plans:  Continue present plans  [ x ]               New consult [  ]   specialty  .......               order test[  ]    test name.                  Discharge Planning  [  ]
Patient is a 72y old  Female who presents with a chief complaint of s/p fall, unsteady gait, DM, HTN (20 Aug 2018 14:41)  potassium 5.2  awaiting LTC placement. ckd stage 5.   INTERVAL HPI/OVERNIGHT EVENTS: un eventful  PAST MEDICAL & SURGICAL HISTORY:  CKD (chronic kidney disease) stage 5, GFR less than 15 ml/min  Dementia  DM (diabetes mellitus)  Asthma  CVA (cerebral vascular accident)  CVA (cerebral vascular accident)  HLD (hyperlipidemia)  Asthma  Neuropathy  SLE (systemic lupus erythematosus)  DM (diabetes mellitus)  HTN (hypertension)  No significant past surgical history  S/P tonsillectomy      MEDICATIONS  (STANDING):  amLODIPine   Tablet 5 milliGRAM(s) Oral daily  aspirin enteric coated 81 milliGRAM(s) Oral daily  atorvastatin 80 milliGRAM(s) Oral at bedtime  buDESOnide 160 MICROgram(s)/formoterol 4.5 MICROgram(s) Inhaler 2 Puff(s) Inhalation two times a day  dextrose 5%. 1000 milliLiter(s) (50 mL/Hr) IV Continuous <Continuous>  dextrose 50% Injectable 12.5 Gram(s) IV Push once  dextrose 50% Injectable 25 Gram(s) IV Push once  dextrose 50% Injectable 25 Gram(s) IV Push once  furosemide    Tablet 40 milliGRAM(s) Oral daily  gabapentin 300 milliGRAM(s) Oral two times a day  heparin  Injectable 5000 Unit(s) SubCutaneous every 12 hours  insulin glargine Injectable (LANTUS) 15 Unit(s) SubCutaneous at bedtime  insulin lispro (HumaLOG) corrective regimen sliding scale   SubCutaneous three times a day before meals  insulin lispro (HumaLOG) corrective regimen sliding scale   SubCutaneous at bedtime  insulin lispro Injectable (HumaLOG) 5 Unit(s) SubCutaneous before breakfast  insulin lispro Injectable (HumaLOG) 5 Unit(s) SubCutaneous before lunch  insulin lispro Injectable (HumaLOG) 5 Unit(s) SubCutaneous before dinner  labetalol 200 milliGRAM(s) Oral two times a day  montelukast 10 milliGRAM(s) Oral daily  senna 2 Tablet(s) Oral at bedtime  tamsulosin 0.4 milliGRAM(s) Oral at bedtime    MEDICATIONS  (PRN):  acetaminophen   Tablet. 650 milliGRAM(s) Oral every 6 hours PRN Mild Pain (1 - 3)  ALBUTerol    90 MICROgram(s) HFA Inhaler 2 Puff(s) Inhalation every 6 hours PRN Shortness of Breath and/or Wheezing  dextrose 40% Gel 15 Gram(s) Oral once PRN Blood Glucose LESS THAN 70 milliGRAM(s)/deciliter  glucagon  Injectable 1 milliGRAM(s) IntraMuscular once PRN Glucose LESS THAN 70 milligrams/deciliter  magnesium hydroxide Suspension 30 milliLiter(s) Oral daily PRN Constipation      Allergies    codeine (Swelling)  penicillin (Anaphylaxis)  penicillins (Swelling)    Intolerances        REVIEW OF SYSTEMS:  CONSTITUTIONAL: No fever, weight loss, or fatigue  EYES: No eye pain, visual disturbances, or discharge  ENMT:  No difficulty hearing, tinnitus, vertigo; No sinus or throat pain  NECK: No pain or stiffness  BREASTS: No pain, masses, or nipple discharge  RESPIRATORY: No cough, wheezing, chills or hemoptysis; No shortness of breath  CARDIOVASCULAR: No chest pain, palpitations, dizziness, or leg swelling  GASTROINTESTINAL: No abdominal or epigastric pain. No nausea, vomiting, or hematemesis; No diarrhea or constipation. No melena or hematochezia.  GENITOURINARY: No dysuria, frequency, hematuria, or incontinence  NEUROLOGICAL: No headaches, memory loss, loss of strength, numbness, or tremors  SKIN: No itching, burning, rashes, or lesions   LYMPH NODES: No enlarged glands  ENDOCRINE: No heat or cold intolerance; No hair loss  MUSCULOSKELETAL: No joint pain or swelling; No muscle, back, or extremity pain  PSYCHIATRIC: No depression, anxiety, mood swings, or difficulty sleeping  HEME/LYMPH: No easy bruising, or bleeding gums  ALLERY AND IMMUNOLOGIC: No hives or eczema    Vital Signs Last 24 Hrs  T(C): 36.7 (26 Aug 2018 05:36), Max: 37.3 (25 Aug 2018 11:55)  T(F): 98.1 (26 Aug 2018 05:36), Max: 99.2 (25 Aug 2018 11:55)  HR: 63 (26 Aug 2018 05:36) (63 - 74)  BP: 155/63 (26 Aug 2018 05:36) (137/67 - 155/63)  BP(mean): --  RR: 17 (26 Aug 2018 05:36) (16 - 17)  SpO2: 97% (26 Aug 2018 05:36) (97% - 99%)    PHYSICAL EXAM:  GENERAL: NAD, well-groomed, well-developed  HEAD:  Atraumatic, Normocephalic  EYES: EOMI, PERRLA, conjunctiva and sclera clear  ENMT: No tonsillar erythema, exudates, or enlargement; Moist mucous membranes, Good dentition, No lesions  NECK: Supple, No JVD, Normal thyroid  NERVOUS SYSTEM:  Alert & Oriented X3, Good concentration; Motor Strength 5/5 B/L upper and lower extremities; DTRs 2+ intact and symmetric  CHEST/LUNG: Clear to percussion bilaterally; No rales, rhonchi, wheezing, or rubs  HEART: Regular rate and rhythm; No murmurs, rubs, or gallops  ABDOMEN: Soft, Nontender, Nondistended; Bowel sounds present  EXTREMITIES:  2+ Peripheral Pulses, No clubbing, cyanosis, or edema  LYMPH: No lymphadenopathy noted  SKIN: No rashes or lesions    LABS:                        9.1    12.53 )-----------( 285      ( 26 Aug 2018 06:45 )             29.2     08-26    139  |  107  |  79<H>  ----------------------------<  130<H>  5.2   |  23  |  4.43<H>    Ca    7.6<L>      26 Aug 2018 06:45            CAPILLARY BLOOD GLUCOSE      POCT Blood Glucose.: 136 mg/dL (26 Aug 2018 07:29)  POCT Blood Glucose.: 175 mg/dL (25 Aug 2018 22:07)  POCT Blood Glucose.: 176 mg/dL (25 Aug 2018 17:05)  POCT Blood Glucose.: 228 mg/dL (25 Aug 2018 11:22)          Hemoglobin A1C, Whole Blood: 12.4 % (08-19 @ 10:59)        RADIOLOGY & ADDITIONAL TESTS:    Imaging Personally Reviewed:  [ ] YES  [ ] NO    Consultant(s) Notes Reviewed:  [ ] YES  [ ] NO    Care Discussed with Consultants/Other Providers [ ] YES  [ ] NO    Care discussed with family,         [  ]   yes  [  ]  No    imp:    stable[ ]    unstable[  ]     improving [   ]       unchanged  [ x ]                Plans:  Continue present plans  [x  ]               New consult [  ]   specialty  .......               order test[  ]    test name.                  Discharge Planning  [  ]
Patient is a 72y old  Female who presents with a chief complaint of s/p fall, unsteady gait, DM, HTN (20 Aug 2018 14:41)  stable for dischrge    INTERVAL HPI/OVERNIGHT EVENTS: uneventful  PAST MEDICAL & SURGICAL HISTORY:  CKD (chronic kidney disease) stage 5, GFR less than 15 ml/min  Dementia  DM (diabetes mellitus)  Asthma  CVA (cerebral vascular accident)  CVA (cerebral vascular accident)  HLD (hyperlipidemia)  Asthma  Neuropathy  SLE (systemic lupus erythematosus)  DM (diabetes mellitus)  HTN (hypertension)  No significant past surgical history  S/P tonsillectomy      MEDICATIONS  (STANDING):  amLODIPine   Tablet 5 milliGRAM(s) Oral daily  aspirin enteric coated 81 milliGRAM(s) Oral daily  atorvastatin 80 milliGRAM(s) Oral at bedtime  buDESOnide 160 MICROgram(s)/formoterol 4.5 MICROgram(s) Inhaler 2 Puff(s) Inhalation two times a day  ciprofloxacin     Tablet 250 milliGRAM(s) Oral daily  dextrose 5%. 1000 milliLiter(s) (50 mL/Hr) IV Continuous <Continuous>  dextrose 50% Injectable 12.5 Gram(s) IV Push once  dextrose 50% Injectable 25 Gram(s) IV Push once  dextrose 50% Injectable 25 Gram(s) IV Push once  furosemide    Tablet 40 milliGRAM(s) Oral daily  gabapentin 300 milliGRAM(s) Oral two times a day  heparin  Injectable 5000 Unit(s) SubCutaneous every 12 hours  insulin glargine Injectable (LANTUS) 15 Unit(s) SubCutaneous at bedtime  insulin lispro (HumaLOG) corrective regimen sliding scale   SubCutaneous three times a day before meals  insulin lispro (HumaLOG) corrective regimen sliding scale   SubCutaneous at bedtime  insulin lispro Injectable (HumaLOG) 5 Unit(s) SubCutaneous before breakfast  insulin lispro Injectable (HumaLOG) 5 Unit(s) SubCutaneous before lunch  insulin lispro Injectable (HumaLOG) 5 Unit(s) SubCutaneous before dinner  labetalol 200 milliGRAM(s) Oral two times a day  montelukast 10 milliGRAM(s) Oral daily  senna 2 Tablet(s) Oral at bedtime    MEDICATIONS  (PRN):  acetaminophen   Tablet. 650 milliGRAM(s) Oral every 6 hours PRN Mild Pain (1 - 3)  ALBUTerol    90 MICROgram(s) HFA Inhaler 2 Puff(s) Inhalation every 6 hours PRN Shortness of Breath and/or Wheezing  dextrose 40% Gel 15 Gram(s) Oral once PRN Blood Glucose LESS THAN 70 milliGRAM(s)/deciliter  glucagon  Injectable 1 milliGRAM(s) IntraMuscular once PRN Glucose LESS THAN 70 milligrams/deciliter  magnesium hydroxide Suspension 30 milliLiter(s) Oral daily PRN Constipation      Allergies    codeine (Swelling)  penicillin (Anaphylaxis)  penicillins (Swelling)    Intolerances        REVIEW OF SYSTEMS:  CONSTITUTIONAL: No fever, weight loss, or fatigue  EYES: No eye pain, visual disturbances, or discharge  ENMT:  No difficulty hearing, tinnitus, vertigo; No sinus or throat pain  NECK: No pain or stiffness  BREASTS: No pain, masses, or nipple discharge  RESPIRATORY: No cough, wheezing, chills or hemoptysis; No shortness of breath  CARDIOVASCULAR: No chest pain, palpitations, dizziness, or leg swelling  GASTROINTESTINAL: No abdominal or epigastric pain. No nausea, vomiting, or hematemesis; No diarrhea or constipation. No melena or hematochezia.  GENITOURINARY: No dysuria, frequency, hematuria, or incontinence  NEUROLOGICAL: No headaches, memory loss, loss of strength, numbness, or tremors  SKIN: No itching, burning, rashes, or lesions   LYMPH NODES: No enlarged glands  ENDOCRINE: No heat or cold intolerance; No hair loss  MUSCULOSKELETAL: No joint pain or swelling; No muscle, back, or extremity pain  PSYCHIATRIC: No depression, anxiety, mood swings, or difficulty sleeping  HEME/LYMPH: No easy bruising, or bleeding gums  ALLERY AND IMMUNOLOGIC: No hives or eczema    Vital Signs Last 24 Hrs  T(C): 36.6 (21 Aug 2018 05:23), Max: 37.1 (20 Aug 2018 17:24)  T(F): 97.9 (21 Aug 2018 05:23), Max: 98.7 (20 Aug 2018 17:24)  HR: 69 (21 Aug 2018 05:23) (69 - 75)  BP: 166/82 (21 Aug 2018 05:23) (158/72 - 178/79)  BP(mean): --  RR: 17 (21 Aug 2018 05:23) (15 - 17)  SpO2: 95% (21 Aug 2018 05:23) (92% - 97%)    PHYSICAL EXAM:  GENERAL: NAD, well-groomed, well-developed  HEAD:  Atraumatic, Normocephalic  EYES: EOMI, PERRLA, conjunctiva and sclera clear  ENMT: No tonsillar erythema, exudates, or enlargement; Moist mucous membranes, Good dentition, No lesions  NECK: Supple, No JVD, Normal thyroid  NERVOUS SYSTEM:  Alert & Oriented X3, Good concentration; Motor Strength 5/5 B/L upper and lower extremities; DTRs 2+ intact and symmetric  CHEST/LUNG: Clear to percussion bilaterally; No rales, rhonchi, wheezing, or rubs  HEART: Regular rate and rhythm; No murmurs, rubs, or gallops  ABDOMEN: Soft, Nontender, Nondistended; Bowel sounds present  EXTREMITIES:  2+ Peripheral Pulses, No clubbing, cyanosis, or edema  LYMPH: No lymphadenopathy noted  SKIN: No rashes or lesions    LABS:                        9.5    8.85  )-----------( 293      ( 21 Aug 2018 07:01 )             31.0     08-21    140  |  106  |  54<H>  ----------------------------<  181<H>  4.5   |  24  |  4.06<H>    Ca    7.6<L>      21 Aug 2018 07:01            CAPILLARY BLOOD GLUCOSE      POCT Blood Glucose.: 180 mg/dL (21 Aug 2018 07:37)  POCT Blood Glucose.: 261 mg/dL (20 Aug 2018 22:50)  POCT Blood Glucose.: 148 mg/dL (20 Aug 2018 16:26)  POCT Blood Glucose.: 232 mg/dL (20 Aug 2018 11:33)      CARDIAC MARKERS ( 21 Aug 2018 07:01 )  x     / x     / 100 U/L / x     / x          Hemoglobin A1C, Whole Blood: 12.4 % (08-19 @ 10:59)        RADIOLOGY & ADDITIONAL TESTS:    Imaging Personally Reviewed:  [ ] YES  [ ] NO    Consultant(s) Notes Reviewed:  [ ] YES  [ ] NO    Care Discussed with Consultants/Other Providers [ ] YES  [ ] NO    Care discussed with family,         [  ]   yes  [  ]  No    imp:    stable[x ]    unstable[  ]     improving [   ]       unchanged  [  ]                Plans:  Continue present plans  [ x ] stable for dischrge               New consult [  ]   specialty  .......               order test[  ]    test name.                  Discharge Planning  [  ]
Resting    acetaminophen   Tablet. 650 milliGRAM(s) Oral every 6 hours PRN  ALBUTerol    90 MICROgram(s) HFA Inhaler 2 Puff(s) Inhalation every 6 hours PRN  amLODIPine   Tablet 5 milliGRAM(s) Oral daily  aspirin enteric coated 81 milliGRAM(s) Oral daily  atorvastatin 80 milliGRAM(s) Oral at bedtime  buDESOnide 160 MICROgram(s)/formoterol 4.5 MICROgram(s) Inhaler 2 Puff(s) Inhalation two times a day  dextrose 40% Gel 15 Gram(s) Oral once PRN  dextrose 5%. 1000 milliLiter(s) IV Continuous <Continuous>  dextrose 50% Injectable 12.5 Gram(s) IV Push once  dextrose 50% Injectable 25 Gram(s) IV Push once  dextrose 50% Injectable 25 Gram(s) IV Push once  furosemide    Tablet 40 milliGRAM(s) Oral daily  gabapentin 300 milliGRAM(s) Oral two times a day  glucagon  Injectable 1 milliGRAM(s) IntraMuscular once PRN  heparin  Injectable 5000 Unit(s) SubCutaneous every 12 hours  insulin glargine Injectable (LANTUS) 15 Unit(s) SubCutaneous at bedtime  insulin lispro (HumaLOG) corrective regimen sliding scale   SubCutaneous three times a day before meals  insulin lispro (HumaLOG) corrective regimen sliding scale   SubCutaneous at bedtime  insulin lispro Injectable (HumaLOG) 5 Unit(s) SubCutaneous before breakfast  insulin lispro Injectable (HumaLOG) 5 Unit(s) SubCutaneous before lunch  insulin lispro Injectable (HumaLOG) 5 Unit(s) SubCutaneous before dinner  labetalol 200 milliGRAM(s) Oral two times a day  magnesium hydroxide Suspension 30 milliLiter(s) Oral daily PRN  montelukast 10 milliGRAM(s) Oral daily  senna 2 Tablet(s) Oral at bedtime  tamsulosin 0.4 milliGRAM(s) Oral at bedtime    Vital Signs Last 24 Hrs  T(C): 36.4 (08-26-18 @ 11:28), Max: 36.9 (08-25-18 @ 17:15)  T(F): 97.6 (08-26-18 @ 11:28), Max: 98.4 (08-25-18 @ 17:15)  HR: 64 (08-26-18 @ 11:28) (63 - 69)  BP: 133/62 (08-26-18 @ 11:28) (133/62 - 155/63)  RR: 16 (08-26-18 @ 11:28) (16 - 17)  SpO2: 98% (08-26-18 @ 11:28) (97% - 98%)    Respiratory: good air entry b/l  Cardiovascular: S1 S2  Gastrointestinal: soft NT ND +BS  Extremities:  tr edema                        9.1    12.53 )-----------( 285      ( 26 Aug 2018 06:45 )             29.2     26 Aug 2018 06:45    139    |  107    |  79     ----------------------------<  130    5.2     |  23     |  4.43     Ca    7.6        26 Aug 2018 06:45    Assessment and Plan:    CKD 5, lytes are stable  No nephrotoxins  Renal diet  Retention, chronic hsieh  Wants to avoid RRT  No urgent indication at present  F/u BMP  Close outpatient renal follow up
Subjective: no complaints.     MEDICATIONS  (STANDING):  amLODIPine   Tablet 5 milliGRAM(s) Oral daily  aspirin enteric coated 81 milliGRAM(s) Oral daily  atorvastatin 80 milliGRAM(s) Oral at bedtime  buDESOnide 160 MICROgram(s)/formoterol 4.5 MICROgram(s) Inhaler 2 Puff(s) Inhalation two times a day  dextrose 5%. 1000 milliLiter(s) (50 mL/Hr) IV Continuous <Continuous>  dextrose 50% Injectable 12.5 Gram(s) IV Push once  dextrose 50% Injectable 25 Gram(s) IV Push once  dextrose 50% Injectable 25 Gram(s) IV Push once  furosemide    Tablet 40 milliGRAM(s) Oral daily  gabapentin 300 milliGRAM(s) Oral two times a day  heparin  Injectable 5000 Unit(s) SubCutaneous every 12 hours  insulin glargine Injectable (LANTUS) 15 Unit(s) SubCutaneous at bedtime  insulin lispro (HumaLOG) corrective regimen sliding scale   SubCutaneous three times a day before meals  insulin lispro (HumaLOG) corrective regimen sliding scale   SubCutaneous at bedtime  insulin lispro Injectable (HumaLOG) 5 Unit(s) SubCutaneous before breakfast  insulin lispro Injectable (HumaLOG) 5 Unit(s) SubCutaneous before lunch  insulin lispro Injectable (HumaLOG) 5 Unit(s) SubCutaneous before dinner  labetalol 200 milliGRAM(s) Oral two times a day  montelukast 10 milliGRAM(s) Oral daily  senna 2 Tablet(s) Oral at bedtime  tamsulosin 0.4 milliGRAM(s) Oral at bedtime    MEDICATIONS  (PRN):  acetaminophen   Tablet. 650 milliGRAM(s) Oral every 6 hours PRN Mild Pain (1 - 3)  ALBUTerol    90 MICROgram(s) HFA Inhaler 2 Puff(s) Inhalation every 6 hours PRN Shortness of Breath and/or Wheezing  dextrose 40% Gel 15 Gram(s) Oral once PRN Blood Glucose LESS THAN 70 milliGRAM(s)/deciliter  glucagon  Injectable 1 milliGRAM(s) IntraMuscular once PRN Glucose LESS THAN 70 milligrams/deciliter  magnesium hydroxide Suspension 30 milliLiter(s) Oral daily PRN Constipation          T(C): 36.1 (08-23-18 @ 11:39), Max: 37.2 (08-23-18 @ 02:00)  HR: 68 (08-23-18 @ 11:39) (65 - 80)  BP: 154/71 (08-23-18 @ 11:39) (103/83 - 154/71)  RR: 17 (08-23-18 @ 11:39) (17 - 20)  SpO2: 98% (08-23-18 @ 11:39) (96% - 98%)  Wt(kg): --        I&O's Detail    22 Aug 2018 07:01  -  23 Aug 2018 07:00  --------------------------------------------------------  IN:    Oral Fluid: 300 mL  Total IN: 300 mL    OUT:    Indwelling Catheter - Urethral: 1600 mL  Total OUT: 1600 mL    Total NET: -1300 mL               PHYSICAL EXAM:    GENERAL: comfortable  EYES: EOMI, PERRLA, conjunctiva and sclera clear  NECK: Supple, no inc in JVP  CHEST/LUNG: Clear  HEART: S1S2  ABDOMEN: Soft, Nontender, Nondistended; Bowel sounds present  EXTREMITIES: no edema   NEURO: no asterixis        Impression:  * CKD4, proteinuria. Prob diabetic nephopathy  * Urinary retention  * Anemia of CKD + prob Fe def ( microcytosis )    Recommendations:   * TOV  * Follow repeat C  * Fe deficiency w/u. IV Fe PRN

## 2018-08-27 NOTE — PROGRESS NOTE ADULT - ASSESSMENT
medically stable for discharge to LTC.
Stable for discharge.
bed ridden patient with CKD stage 5, Diabetes, bladder outflow obstruction, Demented . chronic hsieh. . stable for discharge. Awaiting placement. LTC
s/p fall with back pain.  UTi   dementia,   DM  HTN  ckd stage 5
s/p mechanical fall , pain  Diabetes,  HTn  CKD stage 5
stable for discharge
stable for discharge to rehab
stable for discharge.

## 2018-08-27 NOTE — CHART NOTE - NSCHARTNOTEFT_GEN_A_CORE
Upon Nutritional Assessment by the Registered Dietitian your patient was determined to meet criteria / has evidence of the following diagnosis/diagnoses:          [ ]  Mild Protein Calorie Malnutrition        [x]  Moderate Protein Calorie Malnutrition        [ ] Severe Protein Calorie Malnutrition        [ ] Unspecified Protein Calorie Malnutrition        [ ] Underweight / BMI <19        [ ] Morbid Obesity / BMI > 40      Findings as based on:  •  Comprehensive nutrition assessment and consultation  •  Calorie counts (nutrient intake analysis)  •  Food acceptance and intake status from observations by staff  •  Follow up  •  Patient education  •  Intervention secondary to interdisciplinary rounds  •   concerns      Treatment:    The following diet has been recommended:  Consistent Carbohydrate Renal (with evening snack), DASH/TLC diets + Suplena w/ carb steady 2x daily (850 kcals & 21 gm protein)    PROVIDER Section:     By signing this assessment you are acknowledging and agree with the diagnosis/diagnoses assigned by the Registered Dietitian    Comments:

## 2018-08-27 NOTE — CHART NOTE - NSCHARTNOTEFT_GEN_A_CORE
Assessment: Pt seen for follow-up. Pt admitted s/p fall, unsteady gait. Pt's PMHx includes asthma, CKD, CVA, dementia, DM, HLD, HTN, neuropathy, and systemic lupus erythematosus. Pt is bed ridden and demented with CKD Stage 5 (not on HD), Diabetes, bladder outflow obstruction, chronic hsieh.    Factors impacting intake: [ ] none [ ] nausea  [ ] vomiting [ ] diarrhea [ ] constipation  [ ]chewing problems [ ] swallowing issues  [x] other: Pt with dementia hx    Diet Prescription: Diet, DASH/TLC:   Sodium & Cholesterol Restricted  Consistent Carbohydrate {Evening Snack}  For patients receiving Renal Replacement - No Protein Restr, No Conc K, No Conc Phos, Low Sodium (RENAL) (08-26-18 @ 16:34)    Intake: Po intake mostly fair, ranges from %; Pt assisted with tray set-up.    Current Weight: 100.2 kg (8/27), 102.2 kg (8/20)  % Weight Change: 1.95% (2 kg) wt loss x 1 week during hospitalization  Last BM: BM (x2) on 8/25    Nutrition focused physical exam conducted; Subcutaneous fat loss: [mild] Orbital fat pads region, [WNL]Buccal fat region, [mild]Triceps region, [unable]Ribs region.  Muscle wasting: [mild]Temples region, [mild]Clavicle region, [WNL]Shoulder region, [WNL]Scapula region, [mild]Interosseous region,  [unable]thigh region, [unable]Calf region    Pertinent Medications: MEDICATIONS  (STANDING):  amLODIPine   Tablet 5 milliGRAM(s) Oral daily  aspirin enteric coated 81 milliGRAM(s) Oral daily  atorvastatin 80 milliGRAM(s) Oral at bedtime  buDESOnide 160 MICROgram(s)/formoterol 4.5 MICROgram(s) Inhaler 2 Puff(s) Inhalation two times a day  ciprofloxacin     Tablet 250 milliGRAM(s) Oral daily  dextrose 5%. 1000 milliLiter(s) (50 mL/Hr) IV Continuous <Continuous>  dextrose 50% Injectable 12.5 Gram(s) IV Push once  dextrose 50% Injectable 25 Gram(s) IV Push once  dextrose 50% Injectable 25 Gram(s) IV Push once  gabapentin 300 milliGRAM(s) Oral two times a day  heparin  Injectable 5000 Unit(s) SubCutaneous every 12 hours  insulin glargine Injectable (LANTUS) 15 Unit(s) SubCutaneous at bedtime  insulin lispro (HumaLOG) corrective regimen sliding scale   SubCutaneous three times a day before meals  insulin lispro (HumaLOG) corrective regimen sliding scale   SubCutaneous at bedtime  insulin lispro Injectable (HumaLOG) 5 Unit(s) SubCutaneous before breakfast  insulin lispro Injectable (HumaLOG) 5 Unit(s) SubCutaneous before lunch  insulin lispro Injectable (HumaLOG) 5 Unit(s) SubCutaneous before dinner  labetalol 200 milliGRAM(s) Oral two times a day  montelukast 10 milliGRAM(s) Oral daily  senna 2 Tablet(s) Oral at bedtime  tamsulosin 0.4 milliGRAM(s) Oral at bedtime    MEDICATIONS  (PRN):  acetaminophen   Tablet. 650 milliGRAM(s) Oral every 6 hours PRN Mild Pain (1 - 3)  ALBUTerol    90 MICROgram(s) HFA Inhaler 2 Puff(s) Inhalation every 6 hours PRN Shortness of Breath and/or Wheezing  dextrose 40% Gel 15 Gram(s) Oral once PRN Blood Glucose LESS THAN 70 milliGRAM(s)/deciliter  glucagon  Injectable 1 milliGRAM(s) IntraMuscular once PRN Glucose LESS THAN 70 milligrams/deciliter  magnesium hydroxide Suspension 30 milliLiter(s) Oral daily PRN Constipation    Pertinent Labs: 08-27 Na141 mmol/L Glu 102 mg/dL<H> K+ 5.3 mmol/L Cr  4.54 mg/dL<H> BUN 85 mg/dL<H> 08-19 [id number] 12.4 %<H>     CAPILLARY BLOOD GLUCOSE      POCT Blood Glucose.: 151 mg/dL (27 Aug 2018 11:31)  POCT Blood Glucose.: 120 mg/dL (27 Aug 2018 07:46)  POCT Blood Glucose.: 164 mg/dL (26 Aug 2018 22:07)  POCT Blood Glucose.: 233 mg/dL (26 Aug 2018 16:48)    Skin: WDL; Edema 1+ L & R foot    Estimated Needs:   [x] no change since previous assessment on 8/20/18  [ ] recalculated:     Previous Nutrition Diagnosis:   [ ] Inadequate Energy Intake [ ]Inadequate Oral Intake [ ] Excessive Energy Intake   [ ] Underweight [ ] Increased Nutrient Needs [ ] Overweight/Obesity [x] Altered Nutrition-Related Laboratory Values (HgbA1c, glucose)  [ ] Altered GI Function [ ] Unintended Weight Loss [ ] Food & Nutrition Related Knowledge Deficit [ ] Malnutrition     Nutrition Diagnosis is [x] ongoing  [ ] resolved [ ] not applicable     New Nutrition Diagnosis: [ ] not applicable [ ] Inadequate Energy Intake [ ]Inadequate Oral Intake [ ] Excessive Energy Intake   [ ] Underweight [ ] Increased Nutrient Needs [ ] Overweight/Obesity   [ ] Altered GI Function [ ] Unintended Weight Loss [ ] Food & Nutrition Related Knowledge Deficit [x] Malnutrition - moderate, acute    Related to: Inadequate energy/protein intake    As evidenced by: Physical signs of mild muscle wasting and fat depletion as noted above & 1.95% wt loss x 1 week during hospitalization      Interventions:   Recommend  [ ] Change Diet To: Consistent Carbohydrate Renal (w/ evening snack), DASH/TLC diets  [ ] Nutrition Supplement  [x] Nutrition Support: Suplena w/ carb steady 2x daily (850 kcals & 21 gm protein)  [ ] Other:     Monitoring and Evaluation:   [ x ] PO intake [ x ] Tolerance to diet prescription [ x ] weights [ x ] labs[ x ] follow up per protocol  [ ] other:

## 2018-08-31 DIAGNOSIS — N18.5 CHRONIC KIDNEY DISEASE, STAGE 5: ICD-10-CM

## 2018-08-31 DIAGNOSIS — N13.9 OBSTRUCTIVE AND REFLUX UROPATHY, UNSPECIFIED: ICD-10-CM

## 2018-08-31 DIAGNOSIS — N39.0 URINARY TRACT INFECTION, SITE NOT SPECIFIED: ICD-10-CM

## 2018-08-31 DIAGNOSIS — E11.22 TYPE 2 DIABETES MELLITUS WITH DIABETIC CHRONIC KIDNEY DISEASE: ICD-10-CM

## 2018-08-31 DIAGNOSIS — E13.40 OTHER SPECIFIED DIABETES MELLITUS WITH DIABETIC NEUROPATHY, UNSPECIFIED: ICD-10-CM

## 2018-08-31 DIAGNOSIS — R26.81 UNSTEADINESS ON FEET: ICD-10-CM

## 2018-08-31 DIAGNOSIS — Z86.73 PERSONAL HISTORY OF TRANSIENT ISCHEMIC ATTACK (TIA), AND CEREBRAL INFARCTION WITHOUT RESIDUAL DEFICITS: ICD-10-CM

## 2018-08-31 DIAGNOSIS — M32.9 SYSTEMIC LUPUS ERYTHEMATOSUS, UNSPECIFIED: ICD-10-CM

## 2018-08-31 DIAGNOSIS — Z79.4 LONG TERM (CURRENT) USE OF INSULIN: ICD-10-CM

## 2018-08-31 DIAGNOSIS — S23.3XXA SPRAIN OF LIGAMENTS OF THORACIC SPINE, INITIAL ENCOUNTER: ICD-10-CM

## 2018-08-31 DIAGNOSIS — Z90.89 ACQUIRED ABSENCE OF OTHER ORGANS: ICD-10-CM

## 2018-08-31 DIAGNOSIS — E78.5 HYPERLIPIDEMIA, UNSPECIFIED: ICD-10-CM

## 2018-08-31 DIAGNOSIS — R33.8 OTHER RETENTION OF URINE: ICD-10-CM
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
## 2018-08-31 DIAGNOSIS — E44.0 MODERATE PROTEIN-CALORIE MALNUTRITION: ICD-10-CM

## 2018-08-31 DIAGNOSIS — I12.0 HYPERTENSIVE CHRONIC KIDNEY DISEASE WITH STAGE 5 CHRONIC KIDNEY DISEASE OR END STAGE RENAL DISEASE: ICD-10-CM

## 2018-08-31 DIAGNOSIS — Z74.01 BED CONFINEMENT STATUS: ICD-10-CM

## 2018-08-31 DIAGNOSIS — J45.909 UNSPECIFIED ASTHMA, UNCOMPLICATED: ICD-10-CM

## 2018-08-31 DIAGNOSIS — F03.90 UNSPECIFIED DEMENTIA WITHOUT BEHAVIORAL DISTURBANCE: ICD-10-CM

## 2018-08-31 DIAGNOSIS — D63.1 ANEMIA IN CHRONIC KIDNEY DISEASE: ICD-10-CM

## 2018-08-31 DIAGNOSIS — Z88.5 ALLERGY STATUS TO NARCOTIC AGENT: ICD-10-CM
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
## 2018-08-31 DIAGNOSIS — Z88.0 ALLERGY STATUS TO PENICILLIN: ICD-10-CM

## 2018-10-24 ENCOUNTER — APPOINTMENT (OUTPATIENT)
Dept: HOME HEALTH SERVICES | Facility: HOME HEALTH | Age: 73
End: 2018-10-24
Payer: MEDICARE

## 2018-10-24 VITALS
WEIGHT: 218 LBS | SYSTOLIC BLOOD PRESSURE: 140 MMHG | HEART RATE: 78 BPM | RESPIRATION RATE: 18 BRPM | DIASTOLIC BLOOD PRESSURE: 70 MMHG | BODY MASS INDEX: 32.66 KG/M2 | HEIGHT: 68.4 IN | TEMPERATURE: 97.4 F

## 2018-10-24 PROCEDURE — 99349 HOME/RES VST EST MOD MDM 40: CPT

## 2018-11-01 RX ORDER — LANCETS
EACH MISCELLANEOUS
Qty: 60 | Refills: 5 | Status: ACTIVE | COMMUNITY
Start: 2018-02-02 | End: 1900-01-01

## 2018-11-01 RX ORDER — PEN NEEDLE, DIABETIC 29 G X1/2"
31G X 5 MM NEEDLE, DISPOSABLE MISCELLANEOUS
Qty: 60 | Refills: 3 | Status: ACTIVE | COMMUNITY
Start: 2017-07-19 | End: 1900-01-01

## 2018-11-01 RX ORDER — BLOOD SUGAR DIAGNOSTIC
STRIP MISCELLANEOUS
Qty: 300 | Refills: 11 | Status: ACTIVE | COMMUNITY
Start: 2017-05-23 | End: 1900-01-01

## 2018-11-23 ENCOUNTER — LABORATORY RESULT (OUTPATIENT)
Age: 73
End: 2018-11-23

## 2018-11-23 ENCOUNTER — APPOINTMENT (OUTPATIENT)
Dept: HOME HEALTH SERVICES | Facility: HOME HEALTH | Age: 73
End: 2018-11-23

## 2018-11-23 VITALS
DIASTOLIC BLOOD PRESSURE: 70 MMHG | HEART RATE: 76 BPM | OXYGEN SATURATION: 98 % | SYSTOLIC BLOOD PRESSURE: 114 MMHG | RESPIRATION RATE: 18 BRPM | TEMPERATURE: 99.1 F

## 2018-11-26 LAB
APPEARANCE: ABNORMAL
BILIRUBIN URINE: NEGATIVE
BLOOD URINE: ABNORMAL
COLOR: YELLOW
GLUCOSE QUALITATIVE U: 500 MG/DL
KETONES URINE: NEGATIVE
LEUKOCYTE ESTERASE URINE: NEGATIVE
NITRITE URINE: NEGATIVE
PH URINE: 5
PROTEIN URINE: 300 MG/DL
SPECIFIC GRAVITY URINE: 1.02
UROBILINOGEN URINE: NEGATIVE MG/DL

## 2018-12-17 ENCOUNTER — APPOINTMENT (OUTPATIENT)
Dept: HOME HEALTH SERVICES | Facility: HOME HEALTH | Age: 73
End: 2018-12-17
Payer: MEDICARE

## 2018-12-17 VITALS
DIASTOLIC BLOOD PRESSURE: 85 MMHG | HEART RATE: 88 BPM | HEIGHT: 68 IN | OXYGEN SATURATION: 97 % | TEMPERATURE: 97.8 F | RESPIRATION RATE: 18 BRPM | SYSTOLIC BLOOD PRESSURE: 130 MMHG

## 2018-12-17 DIAGNOSIS — E10.29 TYPE 1 DIABETES MELLITUS WITH OTHER DIABETIC KIDNEY COMPLICATION: ICD-10-CM

## 2018-12-17 DIAGNOSIS — R45.1 RESTLESSNESS AND AGITATION: ICD-10-CM

## 2018-12-17 DIAGNOSIS — N18.4 TYPE 2 DIABETES MELLITUS WITH DIABETIC CHRONIC KIDNEY DISEASE: ICD-10-CM

## 2018-12-17 DIAGNOSIS — E11.22 TYPE 2 DIABETES MELLITUS WITH DIABETIC CHRONIC KIDNEY DISEASE: ICD-10-CM

## 2018-12-17 DIAGNOSIS — N18.4 CHRONIC KIDNEY DISEASE, STAGE 4 (SEVERE): ICD-10-CM

## 2018-12-17 DIAGNOSIS — Z92.89 PERSONAL HISTORY OF OTHER MEDICAL TREATMENT: ICD-10-CM

## 2018-12-17 DIAGNOSIS — Z79.4 TYPE 2 DIABETES MELLITUS WITH DIABETIC CHRONIC KIDNEY DISEASE: ICD-10-CM

## 2018-12-17 DIAGNOSIS — F03.90 UNSPECIFIED DEMENTIA W/OUT BEHAVIORAL DISTURBANCE: ICD-10-CM

## 2018-12-17 PROCEDURE — 99349 HOME/RES VST EST MOD MDM 40: CPT

## 2018-12-17 RX ORDER — CIPROFLOXACIN HYDROCHLORIDE 250 MG/1
250 TABLET, FILM COATED ORAL
Qty: 14 | Refills: 0 | Status: DISCONTINUED | COMMUNITY
Start: 2018-11-26 | End: 2018-12-17

## 2018-12-19 LAB
ALBUMIN SERPL ELPH-MCNC: 3.1 G/DL
ALP BLD-CCNC: 93 U/L
ALT SERPL-CCNC: 13 U/L
ANION GAP SERPL CALC-SCNC: 12 MMOL/L
AST SERPL-CCNC: 12 U/L
BASOPHILS # BLD AUTO: 0.01 K/UL
BASOPHILS NFR BLD AUTO: 0.1 %
BILIRUB SERPL-MCNC: <0.2 MG/DL
BUN SERPL-MCNC: 46 MG/DL
CALCIUM SERPL-MCNC: 8.4 MG/DL
CHLORIDE SERPL-SCNC: 95 MMOL/L
CO2 SERPL-SCNC: 27 MMOL/L
CREAT SERPL-MCNC: 3.93 MG/DL
EOSINOPHIL # BLD AUTO: 0.08 K/UL
EOSINOPHIL NFR BLD AUTO: 0.9 %
HCT VFR BLD CALC: 30 %
HGB BLD-MCNC: 9.1 G/DL
IMM GRANULOCYTES NFR BLD AUTO: 0.1 %
LYMPHOCYTES # BLD AUTO: 4.81 K/UL
LYMPHOCYTES NFR BLD AUTO: 57 %
MAN DIFF?: NORMAL
MCHC RBC-ENTMCNC: 23.3 PG
MCHC RBC-ENTMCNC: 30.3 GM/DL
MCV RBC AUTO: 76.7 FL
MONOCYTES # BLD AUTO: 0.75 K/UL
MONOCYTES NFR BLD AUTO: 8.9 %
NEUTROPHILS # BLD AUTO: 2.78 K/UL
NEUTROPHILS NFR BLD AUTO: 33 %
PLATELET # BLD AUTO: 216 K/UL
POTASSIUM SERPL-SCNC: 3.9 MMOL/L
PROT SERPL-MCNC: 6.1 G/DL
RBC # BLD: 3.91 M/UL
RBC # FLD: 14.3 %
SODIUM SERPL-SCNC: 134 MMOL/L
WBC # FLD AUTO: 8.44 K/UL

## 2018-12-20 LAB — HBA1C MFR BLD HPLC: 13.1 %

## 2019-01-03 PROCEDURE — G0180 MD CERTIFICATION HHA PATIENT: CPT

## 2019-01-24 ENCOUNTER — APPOINTMENT (OUTPATIENT)
Dept: HOME HEALTH SERVICES | Facility: HOME HEALTH | Age: 74
End: 2019-01-24

## 2019-01-24 VITALS
RESPIRATION RATE: 18 BRPM | SYSTOLIC BLOOD PRESSURE: 150 MMHG | OXYGEN SATURATION: 96 % | DIASTOLIC BLOOD PRESSURE: 70 MMHG | HEART RATE: 81 BPM | TEMPERATURE: 98 F

## 2019-02-14 ENCOUNTER — MEDICATION RENEWAL (OUTPATIENT)
Age: 74
End: 2019-02-14

## 2019-02-14 RX ORDER — DOCUSATE SODIUM 100 MG/1
100 CAPSULE, LIQUID FILLED ORAL
Qty: 90 | Refills: 3 | Status: ACTIVE | COMMUNITY
Start: 2019-02-14 | End: 1900-01-01

## 2019-02-22 ENCOUNTER — OTHER (OUTPATIENT)
Age: 74
End: 2019-02-22

## 2019-02-22 ENCOUNTER — APPOINTMENT (OUTPATIENT)
Dept: HOME HEALTH SERVICES | Facility: HOME HEALTH | Age: 74
End: 2019-02-22
Payer: MEDICARE

## 2019-02-22 VITALS
TEMPERATURE: 98 F | SYSTOLIC BLOOD PRESSURE: 130 MMHG | RESPIRATION RATE: 15 BRPM | DIASTOLIC BLOOD PRESSURE: 80 MMHG | OXYGEN SATURATION: 97 % | HEART RATE: 76 BPM

## 2019-02-22 DIAGNOSIS — Z87.898 PERSONAL HISTORY OF OTHER SPECIFIED CONDITIONS: ICD-10-CM

## 2019-02-22 DIAGNOSIS — H01.129 DISCOID LUPUS ERYTHEMATOSUS OF UNSPECIFIED EYE, UNSPECIFIED EYELID: ICD-10-CM

## 2019-02-22 DIAGNOSIS — N18.5 CHRONIC KIDNEY DISEASE, STAGE 5: ICD-10-CM

## 2019-02-22 DIAGNOSIS — E11.22 TYPE 2 DIABETES MELLITUS WITH DIABETIC CHRONIC KIDNEY DISEASE: ICD-10-CM

## 2019-02-22 DIAGNOSIS — N18.5 TYPE 2 DIABETES MELLITUS WITH DIABETIC CHRONIC KIDNEY DISEASE: ICD-10-CM

## 2019-02-22 DIAGNOSIS — F03.91 UNSPECIFIED DEMENTIA WITH BEHAVIORAL DISTURBANCE: ICD-10-CM

## 2019-02-22 DIAGNOSIS — H10.31 UNSPECIFIED ACUTE CONJUNCTIVITIS, RIGHT EYE: ICD-10-CM

## 2019-02-22 DIAGNOSIS — Z92.29 PERSONAL HISTORY OF OTHER DRUG THERAPY: ICD-10-CM

## 2019-02-22 DIAGNOSIS — Z87.09 PERSONAL HISTORY OF OTHER DISEASES OF THE RESPIRATORY SYSTEM: ICD-10-CM

## 2019-02-22 DIAGNOSIS — I50.9 HEART FAILURE, UNSPECIFIED: ICD-10-CM

## 2019-02-22 DIAGNOSIS — J44.9 CHRONIC OBSTRUCTIVE PULMONARY DISEASE, UNSPECIFIED: ICD-10-CM

## 2019-02-22 DIAGNOSIS — I10 ESSENTIAL (PRIMARY) HYPERTENSION: ICD-10-CM

## 2019-02-22 DIAGNOSIS — E78.5 HYPERLIPIDEMIA, UNSPECIFIED: ICD-10-CM

## 2019-02-22 PROCEDURE — 99350 HOME/RES VST EST HIGH MDM 60: CPT

## 2019-02-22 RX ORDER — LABETALOL HYDROCHLORIDE 200 MG/1
200 TABLET, FILM COATED ORAL TWICE DAILY
Qty: 180 | Refills: 3 | Status: ACTIVE | COMMUNITY
Start: 2017-07-19

## 2019-02-22 RX ORDER — AMLODIPINE BESYLATE 10 MG/1
10 TABLET ORAL DAILY
Qty: 90 | Refills: 3 | Status: ACTIVE | COMMUNITY
Start: 2017-07-12

## 2019-02-22 RX ORDER — PREDNISOLONE ACETATE 10 MG/ML
1 SUSPENSION/ DROPS OPHTHALMIC
Qty: 1 | Refills: 11 | Status: ACTIVE | COMMUNITY
Start: 2017-11-27 | End: 1900-01-01

## 2019-02-22 RX ORDER — FUROSEMIDE 40 MG/1
40 TABLET ORAL DAILY
Qty: 30 | Refills: 3 | Status: ACTIVE | COMMUNITY
Start: 2017-07-12

## 2019-02-22 RX ORDER — FOLIC ACID 1 MG/1
1 TABLET ORAL DAILY
Qty: 30 | Refills: 4 | Status: ACTIVE | COMMUNITY
Start: 2019-02-14

## 2019-02-22 RX ORDER — ATORVASTATIN CALCIUM 80 MG/1
80 TABLET, FILM COATED ORAL
Qty: 90 | Refills: 3 | Status: ACTIVE | COMMUNITY
Start: 2017-04-26

## 2019-02-22 RX ORDER — SODIUM BICARBONATE 650 MG/1
650 TABLET ORAL
Qty: 60 | Refills: 0 | Status: ACTIVE | COMMUNITY
Start: 2019-02-14

## 2019-02-22 RX ORDER — HYDRALAZINE HYDROCHLORIDE 25 MG/1
25 TABLET ORAL
Qty: 180 | Refills: 3 | Status: ACTIVE | COMMUNITY
Start: 2018-02-02

## 2019-02-22 RX ORDER — RISPERIDONE 0.25 MG/1
0.25 TABLET, FILM COATED ORAL TWICE DAILY
Qty: 180 | Refills: 3 | Status: ACTIVE | COMMUNITY
Start: 2017-06-02

## 2019-02-22 NOTE — CURRENT MEDS
[Medication and Allergies Reconciled] : medication and allergies reconciled [High Risk Medications Reviewed and Reconciled (Beers Criteria)] : high risk medications reviewed and reconciled [Non compliant with medications] : Patient is non compliant with medications

## 2019-02-22 NOTE — LETTER HEADER
[Care Plan reviewed and provided to patient/caregiver] : Care plan reviewed and provided to patient/caregiver [Care Plan reviewed every ___ weeks] : Care plan reviewed every [unfilled] weeks [Care Plan managed/Care coordinated by: ___] : Care plan managed/Care coordinated by: [unfilled] [Patient/Caregiver agrees to have other providers send summary of their care to this office] : Patient/caregiver agrees to have other providers send summary of their care to this office [Initiation or substantial revisions made to care plan involving mod/high medical decision making for complex CCM] : Initiation or substantial revisions made to care plan involving mod/high medical decision making for complex CCM

## 2019-02-28 PROBLEM — F03.91 DEMENTIA WITH BEHAVIORAL DISTURBANCE: Status: ACTIVE | Noted: 2018-12-17

## 2019-02-28 PROBLEM — E78.5 HYPERLIPIDEMIA: Status: ACTIVE | Noted: 2017-04-26

## 2019-02-28 PROBLEM — E11.22 DM TYPE 2 CAUSING CKD STAGE 5: Status: ACTIVE | Noted: 2018-12-17

## 2019-02-28 PROBLEM — H01.129: Status: ACTIVE | Noted: 2017-04-26

## 2019-02-28 PROBLEM — I10 HYPERTENSION: Status: ACTIVE | Noted: 2017-04-26

## 2019-02-28 PROBLEM — N18.5 CKD (CHRONIC KIDNEY DISEASE) STAGE 5, GFR LESS THAN 15 ML/MIN: Status: ACTIVE | Noted: 2018-12-17

## 2019-02-28 PROBLEM — I50.9 CHF (CONGESTIVE HEART FAILURE): Status: ACTIVE | Noted: 2017-07-19

## 2019-02-28 PROBLEM — J44.9 ASTHMA WITH COPD: Status: ACTIVE | Noted: 2018-02-02

## 2019-02-28 RX ORDER — INSULIN LISPRO 100 [IU]/ML
100 INJECTION, SOLUTION INTRAVENOUS; SUBCUTANEOUS
Qty: 3 | Refills: 0 | Status: DISCONTINUED | COMMUNITY
Start: 2018-08-27 | End: 2019-02-28

## 2019-02-28 NOTE — HISTORY OF PRESENT ILLNESS
[Patient] : patient [Family Member] : family member [FreeTextEntry2] : PMH: Dementia, Lupus, Type 2 DM, CKD 5,  COPD w/ Asthma, CHF, HTN, HLD, CVA hx,  \par \par Interval events: has been seeing renal for CKD- may start dialysis depending on next lab results\par . \par \par Pt alert to name and place, confused to date reports she is scared to start dialysis as she feels it may hurt; daughter via phone reports pt has been nervous over this\par \par Uncontrolled DMII: last a1c 13.1 in December- on Levemir and NovoLog; admits she does not check blood sugar "often" \par \par CKD stage 5: as above\par \par Dementia with behavioral disturbances: Continue risperidone.\par \par HTN, HLD: on atorvastatin, hydralazine, labetalol\par \par CHF: has no edema or SOB\par \par \par \par \par \par \par \par

## 2019-02-28 NOTE — REVIEW OF SYSTEMS
[Negative] : Heme/Lymph [Fatigue] : fatigue [FreeTextEntry1] : patient is a poor historian due to dementia

## 2019-02-28 NOTE — COUNSELING
[Non - Smoker] : non-smoker [Not Indicated] : not indicated [Minimize unnecessary interventions] : minimize unnecessary interventions [Full Code] : Code Status: Full Code [FreeTextEntry5] : unable to get screening, high risk

## 2019-03-05 LAB
ALBUMIN SERPL ELPH-MCNC: 3.1 G/DL
ALP BLD-CCNC: 77 U/L
ALT SERPL-CCNC: 6 U/L
ANION GAP SERPL CALC-SCNC: 15 MMOL/L
AST SERPL-CCNC: 18 U/L
BASOPHILS # BLD AUTO: 0.01 K/UL
BASOPHILS NFR BLD AUTO: 0.1 %
BILIRUB SERPL-MCNC: 0.2 MG/DL
BUN SERPL-MCNC: 31 MG/DL
CALCIUM SERPL-MCNC: 8.1 MG/DL
CHLORIDE SERPL-SCNC: 107 MMOL/L
CHOLEST SERPL-MCNC: 136 MG/DL
CHOLEST/HDLC SERPL: 3 RATIO
CO2 SERPL-SCNC: 19 MMOL/L
CREAT SERPL-MCNC: 4.24 MG/DL
EOSINOPHIL # BLD AUTO: 0.15 K/UL
EOSINOPHIL NFR BLD AUTO: 1.7 %
GLUCOSE SERPL-MCNC: 162 MG/DL
HBA1C MFR BLD HPLC: 10 %
HCT VFR BLD CALC: 28 %
HDLC SERPL-MCNC: 45 MG/DL
HGB BLD-MCNC: 8.5 G/DL
IMM GRANULOCYTES NFR BLD AUTO: 0.2 %
LDLC SERPL CALC-MCNC: 72 MG/DL
LYMPHOCYTES # BLD AUTO: 3.87 K/UL
LYMPHOCYTES NFR BLD AUTO: 42.6 %
MAN DIFF?: NORMAL
MCHC RBC-ENTMCNC: 23.5 PG
MCHC RBC-ENTMCNC: 30.4 GM/DL
MCV RBC AUTO: 77.6 FL
MONOCYTES # BLD AUTO: 0.74 K/UL
MONOCYTES NFR BLD AUTO: 8.1 %
NEUTROPHILS # BLD AUTO: 4.3 K/UL
NEUTROPHILS NFR BLD AUTO: 47.3 %
PLATELET # BLD AUTO: 225 K/UL
POTASSIUM SERPL-SCNC: 4.9 MMOL/L
PREALB SERPL NEPH-MCNC: 25 MG/DL
PROT SERPL-MCNC: 6.1 G/DL
RBC # BLD: 3.61 M/UL
RBC # FLD: 16.1 %
SODIUM SERPL-SCNC: 141 MMOL/L
TRIGL SERPL-MCNC: 97 MG/DL
TSH SERPL-ACNC: 1.82 UIU/ML
WBC # FLD AUTO: 9.09 K/UL

## 2019-03-13 ENCOUNTER — MEDICATION RENEWAL (OUTPATIENT)
Age: 74
End: 2019-03-13

## 2019-03-13 RX ORDER — TAMSULOSIN HYDROCHLORIDE 0.4 MG/1
0.4 CAPSULE ORAL
Qty: 90 | Refills: 1 | Status: ACTIVE | COMMUNITY
Start: 2018-02-02 | End: 1900-01-01

## 2019-03-14 LAB
APTT BLD: 27.7 SEC
INR PPP: 0.98 RATIO
PT BLD: 11.3 SEC

## 2019-03-18 LAB
HBV CORE IGG+IGM SER QL: NONREACTIVE
HBV SURFACE AB SER QL: NONREACTIVE
HBV SURFACE AG SER QL: NONREACTIVE
HCV AB SER QL: NONREACTIVE
HCV S/CO RATIO: 0.11 S/CO

## 2019-03-20 ENCOUNTER — INPATIENT (INPATIENT)
Facility: HOSPITAL | Age: 74
LOS: 1 days | Discharge: HOME | DRG: 682 | End: 2019-03-22
Attending: INTERNAL MEDICINE | Admitting: INTERNAL MEDICINE
Payer: COMMERCIAL

## 2019-03-20 VITALS
RESPIRATION RATE: 16 BRPM | OXYGEN SATURATION: 97 % | DIASTOLIC BLOOD PRESSURE: 78 MMHG | TEMPERATURE: 98 F | HEART RATE: 83 BPM | HEIGHT: 67 IN | WEIGHT: 210.1 LBS | SYSTOLIC BLOOD PRESSURE: 191 MMHG

## 2019-03-20 DIAGNOSIS — Z90.89 ACQUIRED ABSENCE OF OTHER ORGANS: Chronic | ICD-10-CM

## 2019-03-20 DIAGNOSIS — N18.9 CHRONIC KIDNEY DISEASE, UNSPECIFIED: ICD-10-CM

## 2019-03-20 LAB
ALBUMIN SERPL ELPH-MCNC: 3 G/DL — LOW (ref 3.5–5)
ALP SERPL-CCNC: 94 U/L — SIGNIFICANT CHANGE UP (ref 40–120)
ALT FLD-CCNC: 23 U/L DA — SIGNIFICANT CHANGE UP (ref 10–60)
ANION GAP SERPL CALC-SCNC: 8 MMOL/L — SIGNIFICANT CHANGE UP (ref 5–17)
APTT BLD: 29.7 SEC — SIGNIFICANT CHANGE UP (ref 27.5–36.3)
AST SERPL-CCNC: 24 U/L — SIGNIFICANT CHANGE UP (ref 10–40)
BASOPHILS # BLD AUTO: 0.02 K/UL — SIGNIFICANT CHANGE UP (ref 0–0.2)
BASOPHILS NFR BLD AUTO: 0.2 % — SIGNIFICANT CHANGE UP (ref 0–2)
BILIRUB SERPL-MCNC: 0.1 MG/DL — LOW (ref 0.2–1.2)
BUN SERPL-MCNC: 36 MG/DL — HIGH (ref 7–18)
CALCIUM SERPL-MCNC: 8.2 MG/DL — LOW (ref 8.4–10.5)
CHLORIDE SERPL-SCNC: 110 MMOL/L — HIGH (ref 96–108)
CO2 SERPL-SCNC: 22 MMOL/L — SIGNIFICANT CHANGE UP (ref 22–31)
CREAT SERPL-MCNC: 4.65 MG/DL — HIGH (ref 0.5–1.3)
EOSINOPHIL # BLD AUTO: 0.15 K/UL — SIGNIFICANT CHANGE UP (ref 0–0.5)
EOSINOPHIL NFR BLD AUTO: 1.6 % — SIGNIFICANT CHANGE UP (ref 0–6)
GLUCOSE SERPL-MCNC: 176 MG/DL — HIGH (ref 70–99)
HCT VFR BLD CALC: 29.5 % — LOW (ref 34.5–45)
HGB BLD-MCNC: 8.9 G/DL — LOW (ref 11.5–15.5)
IMM GRANULOCYTES NFR BLD AUTO: 0.2 % — SIGNIFICANT CHANGE UP (ref 0–1.5)
INR BLD: 0.95 RATIO — SIGNIFICANT CHANGE UP (ref 0.88–1.16)
LYMPHOCYTES # BLD AUTO: 3.7 K/UL — HIGH (ref 1–3.3)
LYMPHOCYTES # BLD AUTO: 39.4 % — SIGNIFICANT CHANGE UP (ref 13–44)
MAGNESIUM SERPL-MCNC: 1.7 MG/DL — SIGNIFICANT CHANGE UP (ref 1.6–2.6)
MCHC RBC-ENTMCNC: 23.7 PG — LOW (ref 27–34)
MCHC RBC-ENTMCNC: 30.2 GM/DL — LOW (ref 32–36)
MCV RBC AUTO: 78.7 FL — LOW (ref 80–100)
MONOCYTES # BLD AUTO: 0.86 K/UL — SIGNIFICANT CHANGE UP (ref 0–0.9)
MONOCYTES NFR BLD AUTO: 9.2 % — SIGNIFICANT CHANGE UP (ref 2–14)
NEUTROPHILS # BLD AUTO: 4.63 K/UL — SIGNIFICANT CHANGE UP (ref 1.8–7.4)
NEUTROPHILS NFR BLD AUTO: 49.4 % — SIGNIFICANT CHANGE UP (ref 43–77)
NRBC # BLD: 0 /100 WBCS — SIGNIFICANT CHANGE UP (ref 0–0)
NT-PROBNP SERPL-SCNC: 1719 PG/ML — HIGH (ref 0–125)
PLATELET # BLD AUTO: 230 K/UL — SIGNIFICANT CHANGE UP (ref 150–400)
POTASSIUM SERPL-MCNC: 4.4 MMOL/L — SIGNIFICANT CHANGE UP (ref 3.5–5.3)
POTASSIUM SERPL-SCNC: 4.4 MMOL/L — SIGNIFICANT CHANGE UP (ref 3.5–5.3)
PROT SERPL-MCNC: 7.3 G/DL — SIGNIFICANT CHANGE UP (ref 6–8.3)
PROTHROM AB SERPL-ACNC: 10.5 SEC — SIGNIFICANT CHANGE UP (ref 10–12.9)
RBC # BLD: 3.75 M/UL — LOW (ref 3.8–5.2)
RBC # FLD: 16.9 % — HIGH (ref 10.3–14.5)
SODIUM SERPL-SCNC: 140 MMOL/L — SIGNIFICANT CHANGE UP (ref 135–145)
TROPONIN I SERPL-MCNC: 0.02 NG/ML — SIGNIFICANT CHANGE UP (ref 0–0.04)
WBC # BLD: 9.38 K/UL — SIGNIFICANT CHANGE UP (ref 3.8–10.5)
WBC # FLD AUTO: 9.38 K/UL — SIGNIFICANT CHANGE UP (ref 3.8–10.5)

## 2019-03-20 PROCEDURE — 99283 EMERGENCY DEPT VISIT LOW MDM: CPT | Mod: 25

## 2019-03-20 PROCEDURE — 71045 X-RAY EXAM CHEST 1 VIEW: CPT | Mod: 26

## 2019-03-20 RX ORDER — ATORVASTATIN CALCIUM 80 MG/1
80 TABLET, FILM COATED ORAL ONCE
Qty: 0 | Refills: 0 | Status: COMPLETED | OUTPATIENT
Start: 2019-03-20 | End: 2019-03-20

## 2019-03-20 RX ORDER — LABETALOL HCL 100 MG
200 TABLET ORAL ONCE
Qty: 0 | Refills: 0 | Status: COMPLETED | OUTPATIENT
Start: 2019-03-20 | End: 2019-03-20

## 2019-03-20 RX ORDER — SODIUM BICARBONATE 1 MEQ/ML
650 SYRINGE (ML) INTRAVENOUS ONCE
Qty: 0 | Refills: 0 | Status: COMPLETED | OUTPATIENT
Start: 2019-03-20 | End: 2019-03-20

## 2019-03-20 RX ORDER — DOCUSATE SODIUM 100 MG
200 CAPSULE ORAL ONCE
Qty: 0 | Refills: 0 | Status: COMPLETED | OUTPATIENT
Start: 2019-03-20 | End: 2019-03-20

## 2019-03-20 RX ORDER — RISPERIDONE 4 MG/1
0.25 TABLET ORAL ONCE
Qty: 0 | Refills: 0 | Status: COMPLETED | OUTPATIENT
Start: 2019-03-20 | End: 2019-03-20

## 2019-03-20 RX ORDER — HYDRALAZINE HCL 50 MG
25 TABLET ORAL ONCE
Qty: 0 | Refills: 0 | Status: COMPLETED | OUTPATIENT
Start: 2019-03-20 | End: 2019-03-20

## 2019-03-20 RX ORDER — TAMSULOSIN HYDROCHLORIDE 0.4 MG/1
0.4 CAPSULE ORAL ONCE
Qty: 0 | Refills: 0 | Status: COMPLETED | OUTPATIENT
Start: 2019-03-20 | End: 2019-03-20

## 2019-03-20 RX ADMIN — Medication 200 MILLIGRAM(S): at 23:07

## 2019-03-20 RX ADMIN — Medication 25 MILLIGRAM(S): at 23:07

## 2019-03-20 RX ADMIN — TAMSULOSIN HYDROCHLORIDE 0.4 MILLIGRAM(S): 0.4 CAPSULE ORAL at 23:07

## 2019-03-20 RX ADMIN — Medication 200 MILLIGRAM(S): at 23:08

## 2019-03-20 RX ADMIN — Medication 650 MILLIGRAM(S): at 23:57

## 2019-03-20 RX ADMIN — RISPERIDONE 0.25 MILLIGRAM(S): 4 TABLET ORAL at 23:08

## 2019-03-20 RX ADMIN — ATORVASTATIN CALCIUM 80 MILLIGRAM(S): 80 TABLET, FILM COATED ORAL at 23:08

## 2019-03-20 NOTE — ED PROVIDER NOTE - OBJECTIVE STATEMENT
72 y/o F with a PMHx of Asthma, HTN, HLD, Dementia, CKD, Stroke, SLE and a PSHx of tonsillectomy presents to ED by Nephrologist to start dialysis. Pt has no other complaints. Drug Allergy: Codeine, Penicillin.

## 2019-03-20 NOTE — ED PROVIDER NOTE - CLINICAL SUMMARY MEDICAL DECISION MAKING FREE TEXT BOX
Labs, EKG, UA and admission for dialysis. Labs, EKG, UA and admission for dialysis.  labs are at patients baseline. will admit.

## 2019-03-20 NOTE — ED CLERICAL - NS ED CLERK NOTE PRE-ARRIVAL INFORMATION; ADDITIONAL PRE-ARRIVAL INFORMATION

## 2019-03-21 DIAGNOSIS — I10 ESSENTIAL (PRIMARY) HYPERTENSION: ICD-10-CM

## 2019-03-21 DIAGNOSIS — Z29.9 ENCOUNTER FOR PROPHYLACTIC MEASURES, UNSPECIFIED: ICD-10-CM

## 2019-03-21 DIAGNOSIS — N18.6 END STAGE RENAL DISEASE: ICD-10-CM

## 2019-03-21 DIAGNOSIS — E11.9 TYPE 2 DIABETES MELLITUS WITHOUT COMPLICATIONS: ICD-10-CM

## 2019-03-21 LAB
24R-OH-CALCIDIOL SERPL-MCNC: 22.9 NG/ML — LOW (ref 30–80)
ANION GAP SERPL CALC-SCNC: 7 MMOL/L — SIGNIFICANT CHANGE UP (ref 5–17)
BASOPHILS # BLD AUTO: 0.02 K/UL — SIGNIFICANT CHANGE UP (ref 0–0.2)
BASOPHILS NFR BLD AUTO: 0.2 % — SIGNIFICANT CHANGE UP (ref 0–2)
BUN SERPL-MCNC: 36 MG/DL — HIGH (ref 7–18)
CALCIUM SERPL-MCNC: 7.6 MG/DL — LOW (ref 8.4–10.5)
CHLORIDE SERPL-SCNC: 111 MMOL/L — HIGH (ref 96–108)
CHOLEST SERPL-MCNC: 122 MG/DL — SIGNIFICANT CHANGE UP (ref 10–199)
CO2 SERPL-SCNC: 22 MMOL/L — SIGNIFICANT CHANGE UP (ref 22–31)
CREAT SERPL-MCNC: 4.63 MG/DL — HIGH (ref 0.5–1.3)
EOSINOPHIL # BLD AUTO: 0.17 K/UL — SIGNIFICANT CHANGE UP (ref 0–0.5)
EOSINOPHIL NFR BLD AUTO: 1.9 % — SIGNIFICANT CHANGE UP (ref 0–6)
FOLATE SERPL-MCNC: >20 NG/ML — SIGNIFICANT CHANGE UP
GLUCOSE BLDC GLUCOMTR-MCNC: 165 MG/DL — HIGH (ref 70–99)
GLUCOSE BLDC GLUCOMTR-MCNC: 192 MG/DL — HIGH (ref 70–99)
GLUCOSE BLDC GLUCOMTR-MCNC: 198 MG/DL — HIGH (ref 70–99)
GLUCOSE BLDC GLUCOMTR-MCNC: 258 MG/DL — HIGH (ref 70–99)
GLUCOSE SERPL-MCNC: 179 MG/DL — HIGH (ref 70–99)
HBA1C BLD-MCNC: 9 % — HIGH (ref 4–5.6)
HCT VFR BLD CALC: 25.3 % — LOW (ref 34.5–45)
HCV AB S/CO SERPL IA: 0.12 S/CO — SIGNIFICANT CHANGE UP (ref 0–0.79)
HCV AB SERPL-IMP: SIGNIFICANT CHANGE UP
HDLC SERPL-MCNC: 51 MG/DL — SIGNIFICANT CHANGE UP
HGB BLD-MCNC: 7.7 G/DL — LOW (ref 11.5–15.5)
IMM GRANULOCYTES NFR BLD AUTO: 0.2 % — SIGNIFICANT CHANGE UP (ref 0–1.5)
LIPID PNL WITH DIRECT LDL SERPL: 50 MG/DL — SIGNIFICANT CHANGE UP
LYMPHOCYTES # BLD AUTO: 4.19 K/UL — HIGH (ref 1–3.3)
LYMPHOCYTES # BLD AUTO: 47.3 % — HIGH (ref 13–44)
MAGNESIUM SERPL-MCNC: 1.7 MG/DL — SIGNIFICANT CHANGE UP (ref 1.6–2.6)
MCHC RBC-ENTMCNC: 23.8 PG — LOW (ref 27–34)
MCHC RBC-ENTMCNC: 30.4 GM/DL — LOW (ref 32–36)
MCV RBC AUTO: 78.3 FL — LOW (ref 80–100)
MONOCYTES # BLD AUTO: 0.82 K/UL — SIGNIFICANT CHANGE UP (ref 0–0.9)
MONOCYTES NFR BLD AUTO: 9.3 % — SIGNIFICANT CHANGE UP (ref 2–14)
NEUTROPHILS # BLD AUTO: 3.63 K/UL — SIGNIFICANT CHANGE UP (ref 1.8–7.4)
NEUTROPHILS NFR BLD AUTO: 41.1 % — LOW (ref 43–77)
NRBC # BLD: 0 /100 WBCS — SIGNIFICANT CHANGE UP (ref 0–0)
PHOSPHATE SERPL-MCNC: 3.9 MG/DL — SIGNIFICANT CHANGE UP (ref 2.5–4.5)
PLATELET # BLD AUTO: 204 K/UL — SIGNIFICANT CHANGE UP (ref 150–400)
POTASSIUM SERPL-MCNC: 4 MMOL/L — SIGNIFICANT CHANGE UP (ref 3.5–5.3)
POTASSIUM SERPL-SCNC: 4 MMOL/L — SIGNIFICANT CHANGE UP (ref 3.5–5.3)
RBC # BLD: 3.23 M/UL — LOW (ref 3.8–5.2)
RBC # FLD: 17 % — HIGH (ref 10.3–14.5)
SODIUM SERPL-SCNC: 140 MMOL/L — SIGNIFICANT CHANGE UP (ref 135–145)
T3FREE SERPL-MCNC: 1.79 PG/ML — LOW (ref 1.8–4.6)
T4 FREE SERPL-MCNC: 1.1 NG/DL — SIGNIFICANT CHANGE UP (ref 0.9–1.8)
TOTAL CHOLESTEROL/HDL RATIO MEASUREMENT: 2.4 RATIO — LOW (ref 3.3–7.1)
TRIGL SERPL-MCNC: 103 MG/DL — SIGNIFICANT CHANGE UP (ref 10–149)
TSH SERPL-MCNC: 5.02 UU/ML — HIGH (ref 0.34–4.82)
VIT B12 SERPL-MCNC: 371 PG/ML — SIGNIFICANT CHANGE UP (ref 232–1245)
WBC # BLD: 8.85 K/UL — SIGNIFICANT CHANGE UP (ref 3.8–10.5)
WBC # FLD AUTO: 8.85 K/UL — SIGNIFICANT CHANGE UP (ref 3.8–10.5)

## 2019-03-21 PROCEDURE — 93010 ELECTROCARDIOGRAM REPORT: CPT

## 2019-03-21 PROCEDURE — 74019 RADEX ABDOMEN 2 VIEWS: CPT | Mod: 26

## 2019-03-21 PROCEDURE — 74018 RADEX ABDOMEN 1 VIEW: CPT | Mod: 26

## 2019-03-21 RX ORDER — MONTELUKAST 4 MG/1
10 TABLET, CHEWABLE ORAL DAILY
Qty: 0 | Refills: 0 | Status: DISCONTINUED | OUTPATIENT
Start: 2019-03-21 | End: 2019-03-22

## 2019-03-21 RX ORDER — INSULIN LISPRO 100/ML
VIAL (ML) SUBCUTANEOUS
Qty: 0 | Refills: 0 | Status: DISCONTINUED | OUTPATIENT
Start: 2019-03-21 | End: 2019-03-22

## 2019-03-21 RX ORDER — INSULIN GLARGINE 100 [IU]/ML
14 INJECTION, SOLUTION SUBCUTANEOUS AT BEDTIME
Qty: 0 | Refills: 0 | Status: DISCONTINUED | OUTPATIENT
Start: 2019-03-21 | End: 2019-03-22

## 2019-03-21 RX ORDER — LABETALOL HCL 100 MG
200 TABLET ORAL
Qty: 0 | Refills: 0 | Status: DISCONTINUED | OUTPATIENT
Start: 2019-03-21 | End: 2019-03-22

## 2019-03-21 RX ORDER — TUBERCULIN PURIFIED PROTEIN DERIVATIVE 5 [IU]/.1ML
5 INJECTION, SOLUTION INTRADERMAL ONCE
Qty: 0 | Refills: 0 | Status: DISCONTINUED | OUTPATIENT
Start: 2019-03-21 | End: 2019-03-21

## 2019-03-21 RX ORDER — ERYTHROPOIETIN 10000 [IU]/ML
20000 INJECTION, SOLUTION INTRAVENOUS; SUBCUTANEOUS ONCE
Qty: 0 | Refills: 0 | Status: COMPLETED | OUTPATIENT
Start: 2019-03-21 | End: 2019-03-21

## 2019-03-21 RX ORDER — HEPARIN SODIUM 5000 [USP'U]/ML
5000 INJECTION INTRAVENOUS; SUBCUTANEOUS EVERY 8 HOURS
Qty: 0 | Refills: 0 | Status: DISCONTINUED | OUTPATIENT
Start: 2019-03-21 | End: 2019-03-22

## 2019-03-21 RX ORDER — LACTULOSE 10 G/15ML
10 SOLUTION ORAL ONCE
Qty: 0 | Refills: 0 | Status: DISCONTINUED | OUTPATIENT
Start: 2019-03-21 | End: 2019-03-22

## 2019-03-21 RX ORDER — AMLODIPINE BESYLATE 2.5 MG/1
10 TABLET ORAL DAILY
Qty: 0 | Refills: 0 | Status: DISCONTINUED | OUTPATIENT
Start: 2019-03-21 | End: 2019-03-22

## 2019-03-21 RX ORDER — METOPROLOL TARTRATE 50 MG
5 TABLET ORAL ONCE
Qty: 0 | Refills: 0 | Status: COMPLETED | OUTPATIENT
Start: 2019-03-21 | End: 2019-03-21

## 2019-03-21 RX ADMIN — Medication 1: at 12:35

## 2019-03-21 RX ADMIN — Medication 1: at 07:56

## 2019-03-21 RX ADMIN — MONTELUKAST 10 MILLIGRAM(S): 4 TABLET, CHEWABLE ORAL at 12:33

## 2019-03-21 RX ADMIN — ERYTHROPOIETIN 20000 UNIT(S): 10000 INJECTION, SOLUTION INTRAVENOUS; SUBCUTANEOUS at 17:07

## 2019-03-21 RX ADMIN — Medication 5 MILLIGRAM(S): at 20:44

## 2019-03-21 RX ADMIN — Medication 200 MILLIGRAM(S): at 05:24

## 2019-03-21 RX ADMIN — AMLODIPINE BESYLATE 10 MILLIGRAM(S): 2.5 TABLET ORAL at 05:25

## 2019-03-21 RX ADMIN — HEPARIN SODIUM 5000 UNIT(S): 5000 INJECTION INTRAVENOUS; SUBCUTANEOUS at 05:25

## 2019-03-21 RX ADMIN — HEPARIN SODIUM 5000 UNIT(S): 5000 INJECTION INTRAVENOUS; SUBCUTANEOUS at 13:26

## 2019-03-21 RX ADMIN — HEPARIN SODIUM 5000 UNIT(S): 5000 INJECTION INTRAVENOUS; SUBCUTANEOUS at 22:44

## 2019-03-21 RX ADMIN — Medication 3: at 22:42

## 2019-03-21 RX ADMIN — INSULIN GLARGINE 14 UNIT(S): 100 INJECTION, SOLUTION SUBCUTANEOUS at 22:43

## 2019-03-21 RX ADMIN — Medication 200 MILLIGRAM(S): at 18:38

## 2019-03-21 NOTE — H&P ADULT - PROBLEM SELECTOR PLAN 2
Patient only recalls taking Lantus 14U HS, primary team to contact pharmacy  HbA1C on Dec/2018: 12%  c/w HSS and Lantus 14U HS  Monitor FS and titrate insulin as needed

## 2019-03-21 NOTE — H&P ADULT - PROBLEM SELECTOR PLAN 4
IMPROVE VTE Individual Risk Assessment          RISK                                                          Points  [  ] Previous VTE                                                3  [  ] Thrombophilia                                             2  [  ] Lower limb paralysis                                   2        (unable to hold up >15 seconds)    [  ] Current Cancer                                             2         (within 6 months)  [ x ] Immobilization > 24 hrs                              1  [  ] ICU/CCU stay > 24 hours                             1  [ x ] Age > 60                                                         1    IMPROVE VTE Score: 2  HSQ for DVT PPx

## 2019-03-21 NOTE — CONSULT NOTE ADULT - ASSESSMENT
# CKD V. Patient with progressive CKD and uremia. s/p permacth placement. HD today and tomorrow.  # HTN UF at HD . cont labetalol, amlodipine,   # anemia of CKD. Procrit on HD. Will get venofer on HD   #lytes and acid base at goal.      RECS:  Patient  has outpatient HD unit at [facility] and can be discharged tomorrow after HD and start HD at Tuesday.

## 2019-03-21 NOTE — H&P ADULT - ASSESSMENT
73F, from home, lives w/ daughter, w/ PMHx CKD stage 5, Dementia, HTN, asthma, CVA, SLE, T2DM, HTN sent from PCP clinic for worsening CKD. Patient denies CP, SOB, palpitations or any other complaint. Patient being admitted to start HD.     Patient is a poor historian and does not recall her home meds. Primary team to contact pharmacy for medication reconciliation: [facility] [phone].

## 2019-03-21 NOTE — H&P ADULT - NSICDXPASTMEDICALHX_GEN_ALL_CORE_FT
PAST MEDICAL HISTORY:  Asthma     Asthma     CKD (chronic kidney disease) stage 5, GFR less than 15 ml/min     CVA (cerebral vascular accident)     CVA (cerebral vascular accident)     Dementia     DM (diabetes mellitus)     DM (diabetes mellitus)     HLD (hyperlipidemia)     HTN (hypertension)     Neuropathy     SLE (systemic lupus erythematosus)

## 2019-03-21 NOTE — CONSULT NOTE ADULT - SUBJECTIVE AND OBJECTIVE BOX
[facility] : Progress Note :: [phone], (office [phone]),   Dr Brown / Dr Hernández / Dr Krueger / Dr Ugalde / Dr Henrique LLOYD / Dr Davis / Dr Spann / Dr Denny galvan  _____________________________________________________________________________________________  Patient is a 73y Female with  CKD V, dementia,HTN, Asthma, CVA SLE. Follows with me in the office with [facility]. Recently with worsening uremia and  was scheduled to have outpatient dialysis after placement of a permacath. However, sec to scheduling conflicts, asked to come to HD to start HD in the ED. Admits to poor appetite, nausea or vomitting.    PAST MEDICAL & SURGICAL HISTORY:  CKD (chronic kidney disease) stage 5, GFR less than 15 ml/min  Dementia  DM (diabetes mellitus)  Asthma  CVA (cerebral vascular accident)  CVA (cerebral vascular accident)  HLD (hyperlipidemia)  Asthma  Neuropathy  SLE (systemic lupus erythematosus)  DM (diabetes mellitus)  HTN (hypertension)  No significant past surgical history  S/P tonsillectomy    codeine (Swelling)  penicillin (Anaphylaxis)  penicillins (Swelling)    Home Medications Reviewed  Hospital Medications:   MEDICATIONS  (STANDING):  amLODIPine   Tablet 10 milliGRAM(s) Oral daily  epoetin melba Injectable 15747 Unit(s) IV Push once  heparin  Injectable 5000 Unit(s) SubCutaneous every 8 hours  insulin glargine Injectable (LANTUS) 14 Unit(s) SubCutaneous at bedtime  insulin lispro (HumaLOG) corrective regimen sliding scale   SubCutaneous Before meals and at bedtime  labetalol 200 milliGRAM(s) Oral two times a day  lactulose Syrup 10 Gram(s) Oral once  montelukast 10 milliGRAM(s) Oral daily    SOCIAL HISTORY:  Denies ETOh,Smoking,   FAMILY HISTORY:  No pertinent family history in first degree relatives  No pertinent family history in first degree relatives      VITALS:  T(F): 98.7 (03-21-19 @ 15:18), Max: 99.5 (03-21-19 @ 00:00)  HR: 72 (03-21-19 @ 15:18)  BP: 154/68 (03-21-19 @ 15:18)  RR: 19 (03-21-19 @ 15:18)  SpO2: 100% (03-21-19 @ 15:18)  Wt(kg): --    Height (cm): 170.18 (03-20 @ 21:34)  Weight (kg): 95.3 (03-20 @ 21:34)  BMI (kg/m2): 32.9 (03-20 @ 21:34)  BSA (m2): 2.06 (03-20 @ 21:34)  PHYSICAL EXAM:  Constitutional: NAD  HEENT: anicteric sclera, oropharynx clear.  Neck: No JVD  Respiratory: CTAB, no wheezes, rales or rhonchi  Cardiovascular: S1, S2, RRR  Gastrointestinal: BS+, soft, NT/ND  Extremities:  No peripheral edema  Neurological: A/O x 3, no focal deficits. No asterixis.  Psychiatric: Normal mood, normal affect  : No CVA tenderness. No hsieh.   Vascular Access: RT IJ permacath.    LABS:  03-21    140  |  111<H>  |  36<H>  ----------------------------<  179<H>  4.0   |  22  |  4.63<H>    Ca    7.6<L>      21 Mar 2019 06:43  Phos  3.9     03-21  Mg     1.7     03-21    TPro  7.3  /  Alb  3.0<L>  /  TBili  0.1<L>  /  DBili      /  AST  24  /  ALT  23  /  AlkPhos  94  03-20    Creatinine Trend: 4.63 <--, 4.65 <--                        7.7    8.85  )-----------( 204      ( 21 Mar 2019 06:43 )             25.3     Urine Studies:      RADIOLOGY & ADDITIONAL STUDIES:

## 2019-03-21 NOTE — H&P ADULT - PROBLEM SELECTOR PLAN 1
Patient w/ Hx CKD stage 5 2/2 diabetes/HTN, sent from PCP to start HD in hospital  R sided permacath in place  Asymptomatic, BMP stable: no hyperkalemia, no acidosis, BP stable  No need for emergent HD   Follow up BMP QD, Avoid nephrotoxic drugs  Dr Brown

## 2019-03-21 NOTE — H&P ADULT - PROBLEM SELECTOR PLAN 3
Patient does not recall home meds.  Was in Labetalol 200 mg + Hydralazine 25 mg in the past, will start for now, adjust medications once confirmed with pharmacy.  Monitor BP and adjust meds as needed Patient does not recall home meds.  Was in Labetalol 200 mg + Hydralazine 25 mg  + Norvasc 10 mg QD in the past, will start for now (except Hydralazine given SLE Hx), adjust medications once confirmed with pharmacy.  Monitor BP and adjust meds as needed

## 2019-03-21 NOTE — CHART NOTE - NSCHARTNOTEFT_GEN_A_CORE
patient was seen and evaluated in ER, please see resident doctor's H&P section for full information. Will start HD today and tomorrow, PPD place, then D/C planning HD out patient already scheduled next Tuesday by renal. C/O constipation laxatives.

## 2019-03-21 NOTE — H&P ADULT - HISTORY OF PRESENT ILLNESS
73F, from home, lives w/ daughter, w/ PMHx CKD stage 5, Dementia, HTN, asthma, CVA, SLE, T2DM, HTN sent from PCP clinic for worsening CKD. Patient denies CP, SOB, palpitations or any other complaint. Patient being admitted to start HD.

## 2019-03-22 ENCOUNTER — APPOINTMENT (OUTPATIENT)
Dept: HOME HEALTH SERVICES | Facility: HOME HEALTH | Age: 74
End: 2019-03-22

## 2019-03-22 ENCOUNTER — TRANSCRIPTION ENCOUNTER (OUTPATIENT)
Age: 74
End: 2019-03-22

## 2019-03-22 VITALS
DIASTOLIC BLOOD PRESSURE: 81 MMHG | WEIGHT: 217.38 LBS | RESPIRATION RATE: 16 BRPM | SYSTOLIC BLOOD PRESSURE: 174 MMHG | TEMPERATURE: 99 F | HEART RATE: 79 BPM

## 2019-03-22 LAB
APPEARANCE UR: ABNORMAL
BILIRUB UR-MCNC: NEGATIVE — SIGNIFICANT CHANGE UP
COLOR SPEC: YELLOW — SIGNIFICANT CHANGE UP
DIFF PNL FLD: ABNORMAL
GLUCOSE BLDC GLUCOMTR-MCNC: 88 MG/DL — SIGNIFICANT CHANGE UP (ref 70–99)
GLUCOSE BLDC GLUCOMTR-MCNC: 90 MG/DL — SIGNIFICANT CHANGE UP (ref 70–99)
GLUCOSE BLDC GLUCOMTR-MCNC: 90 MG/DL — SIGNIFICANT CHANGE UP (ref 70–99)
GLUCOSE UR QL: NEGATIVE — SIGNIFICANT CHANGE UP
KETONES UR-MCNC: NEGATIVE — SIGNIFICANT CHANGE UP
LEUKOCYTE ESTERASE UR-ACNC: ABNORMAL
NITRITE UR-MCNC: NEGATIVE — SIGNIFICANT CHANGE UP
PH UR: 7 — SIGNIFICANT CHANGE UP (ref 5–8)
PROT UR-MCNC: 500 MG/DL
SP GR SPEC: 1.01 — SIGNIFICANT CHANGE UP (ref 1.01–1.02)
UROBILINOGEN FLD QL: NEGATIVE — SIGNIFICANT CHANGE UP

## 2019-03-22 PROCEDURE — 84443 ASSAY THYROID STIM HORMONE: CPT

## 2019-03-22 PROCEDURE — 82746 ASSAY OF FOLIC ACID SERUM: CPT

## 2019-03-22 PROCEDURE — 86901 BLOOD TYPING SEROLOGIC RH(D): CPT

## 2019-03-22 PROCEDURE — 83735 ASSAY OF MAGNESIUM: CPT

## 2019-03-22 PROCEDURE — 84439 ASSAY OF FREE THYROXINE: CPT

## 2019-03-22 PROCEDURE — 84481 FREE ASSAY (FT-3): CPT

## 2019-03-22 PROCEDURE — 81001 URINALYSIS AUTO W/SCOPE: CPT

## 2019-03-22 PROCEDURE — 87086 URINE CULTURE/COLONY COUNT: CPT

## 2019-03-22 PROCEDURE — 74019 RADEX ABDOMEN 2 VIEWS: CPT

## 2019-03-22 PROCEDURE — 86803 HEPATITIS C AB TEST: CPT

## 2019-03-22 PROCEDURE — 82962 GLUCOSE BLOOD TEST: CPT

## 2019-03-22 PROCEDURE — 85730 THROMBOPLASTIN TIME PARTIAL: CPT

## 2019-03-22 PROCEDURE — 85610 PROTHROMBIN TIME: CPT

## 2019-03-22 PROCEDURE — 84484 ASSAY OF TROPONIN QUANT: CPT

## 2019-03-22 PROCEDURE — 71045 X-RAY EXAM CHEST 1 VIEW: CPT

## 2019-03-22 PROCEDURE — 99285 EMERGENCY DEPT VISIT HI MDM: CPT | Mod: 25

## 2019-03-22 PROCEDURE — 80074 ACUTE HEPATITIS PANEL: CPT

## 2019-03-22 PROCEDURE — 85027 COMPLETE CBC AUTOMATED: CPT

## 2019-03-22 PROCEDURE — 86900 BLOOD TYPING SEROLOGIC ABO: CPT

## 2019-03-22 PROCEDURE — 86850 RBC ANTIBODY SCREEN: CPT

## 2019-03-22 PROCEDURE — 80053 COMPREHEN METABOLIC PANEL: CPT

## 2019-03-22 PROCEDURE — 80048 BASIC METABOLIC PNL TOTAL CA: CPT

## 2019-03-22 PROCEDURE — 82306 VITAMIN D 25 HYDROXY: CPT

## 2019-03-22 PROCEDURE — 80061 LIPID PANEL: CPT

## 2019-03-22 PROCEDURE — 99261: CPT

## 2019-03-22 PROCEDURE — 74018 RADEX ABDOMEN 1 VIEW: CPT

## 2019-03-22 PROCEDURE — 86706 HEP B SURFACE ANTIBODY: CPT

## 2019-03-22 PROCEDURE — 93005 ELECTROCARDIOGRAM TRACING: CPT

## 2019-03-22 PROCEDURE — 87186 SC STD MICRODIL/AGAR DIL: CPT

## 2019-03-22 PROCEDURE — 36415 COLL VENOUS BLD VENIPUNCTURE: CPT

## 2019-03-22 PROCEDURE — 83880 ASSAY OF NATRIURETIC PEPTIDE: CPT

## 2019-03-22 PROCEDURE — 82607 VITAMIN B-12: CPT

## 2019-03-22 PROCEDURE — 84100 ASSAY OF PHOSPHORUS: CPT

## 2019-03-22 PROCEDURE — 83036 HEMOGLOBIN GLYCOSYLATED A1C: CPT

## 2019-03-22 RX ORDER — MULTIVIT WITH MIN/MFOLATE/K2 340-15/3 G
1 POWDER (GRAM) ORAL ONCE
Qty: 0 | Refills: 0 | Status: COMPLETED | OUTPATIENT
Start: 2019-03-22 | End: 2019-03-22

## 2019-03-22 RX ADMIN — AMLODIPINE BESYLATE 10 MILLIGRAM(S): 2.5 TABLET ORAL at 06:28

## 2019-03-22 RX ADMIN — HEPARIN SODIUM 5000 UNIT(S): 5000 INJECTION INTRAVENOUS; SUBCUTANEOUS at 06:28

## 2019-03-22 RX ADMIN — Medication 200 MILLIGRAM(S): at 06:28

## 2019-03-22 NOTE — DISCHARGE NOTE NURSING/CASE MANAGEMENT/SOCIAL WORK - NSDCDPATPORTLINK_GEN_ALL_CORE
You can access the [facility] Patient Portal, offered by [facility], by registering with the following website: [email or web address]

## 2019-03-22 NOTE — DISCHARGE NOTE PROVIDER - NSDCCPCAREPLAN_GEN_ALL_CORE_FT
PRINCIPAL DISCHARGE DIAGNOSIS  Diagnosis: ESRD needing dialysis  Assessment and Plan of Treatment: patient is ESRD patient and admitted for new dialysis. Patient had perma catheter placed on 03/21/2019 on right side.  Patient had dialysis done yesterday and patient is having maintenance dialysis session today. After dialysis, patient will be discharged. Patient will have next dialysis session on tuesday.  Patient is stable for discharge per primary attending.

## 2019-03-22 NOTE — PROGRESS NOTE ADULT - SUBJECTIVE AND OBJECTIVE BOX
Patient is a 73y old  Female who presents with a chief complaint of WORSENING CKD (21 Mar 2019 16:51)/new HD, AXR showed constipation, laxative, HD today then D/C home , HD out patient had scheduled HD next Tuesday, PPD, anemia epogen during HD      INTERVAL HPI/OVERNIGHT EVENTS:  T(C): 37.1 (19 @ 07:20), Max: 37.2 (19 @ 18:43)  HR: 77 (19 @ 07:20) (69 - 85)  BP: 162/63 (19 @ 07:20) (154/68 - 185/85)  RR: 18 (19 @ 07:20) (18 - 20)  SpO2: 98% (19 @ 07:20) (96% - 100%)  Wt(kg): --    LABS:                        7.7    8.85  )-----------( 204      ( 21 Mar 2019 06:43 )             25.3     -    140  |  111<H>  |  36<H>  ----------------------------<  179<H>  4.0   |  22  |  4.63<H>    Ca    7.6<L>      21 Mar 2019 06:43  Phos  3.9     -  Mg     1.7         TPro  7.3  /  Alb  3.0<L>  /  TBili  0.1<L>  /  DBili  x   /  AST  24  /  ALT  23  /  AlkPhos  94  -    PT/INR - ( 20 Mar 2019 22:49 )   PT: 10.5 sec;   INR: 0.95 ratio         PTT - ( 20 Mar 2019 22:49 )  PTT:29.7 sec  Urinalysis Basic - ( 22 Mar 2019 07:53 )    Color: Yellow / Appearance: Slightly Turbid / S.010 / pH: x  Gluc: x / Ketone: Negative  / Bili: Negative / Urobili: Negative   Blood: x / Protein: 500 mg/dL / Nitrite: Negative   Leuk Esterase: Trace / RBC: 0-2 /HPF / WBC 0-2 /HPF   Sq Epi: x / Non Sq Epi: x / Bacteria: Many /HPF      CAPILLARY BLOOD GLUCOSE      POCT Blood Glucose.: 88 mg/dL (22 Mar 2019 08:23)  POCT Blood Glucose.: 258 mg/dL (21 Mar 2019 21:46)  POCT Blood Glucose.: 165 mg/dL (21 Mar 2019 17:12)  POCT Blood Glucose.: 192 mg/dL (21 Mar 2019 11:29)        RADIOLOGY & ADDITIONAL TESTS:  < from: Xray Abdomen 1 View PORTABLE -Urgent (19 @ 19:01) >  Impression: Large amount of stool in the right colon.    Paucity of small bowel gas.    If clinically indicated, abdominal/pelvic CT may be pursued for further   evaluation.      < end of copied text >    Consultant(s) Notes Reviewed:  [x ] YES  [ ] NO    PHYSICAL EXAM:  GENERAL: well built, well nourished  HEAD:  Atraumatic, Normocephalic  EYES: EOMI, PERRLA, conjunctiva and sclera clear  ENT: No tonsillar erythema, exudates, or enlargement; Moist mucous membranes, Good dentition, No lesions  NECK: Supple, No JVD, Normal thyroid, no enlarged nodes  NERVOUS SYSTEM:  Alert & Oriented X3, Good concentration; Motor Strength 5/5 B/L upper and lower extremities; DTRs 2+ intact and symmetric, sensory intact  CHEST/LUNG: B/L good air entry; No rales, rhonchi, or wheezing  HEART: S1S2 normal, no S3, Regular rate and rhythm; No murmurs, rubs, or gallops  ABDOMEN: Soft, Nontender, mild distended; Bowel sounds present  EXTREMITIES:  2+ Peripheral Pulses, No clubbing, cyanosis, or edema  LYMPH: No lymphadenopathy noted  SKIN: No rashes or lesions    Care Discussed with Consultants/Other Providers [ x] YES  [ ] NO

## 2019-03-22 NOTE — CHART NOTE - NSCHARTNOTEFT_GEN_A_CORE
EVENT: Pt c/o of feeling uncomfortable. Has not voided for many hours overnight.     OBJECTIVE:  Vital Signs Last 24 Hrs  T(C): 37.1 (22 Mar 2019 01:24), Max: 37.2 (21 Mar 2019 18:43)  T(F): 98.7 (22 Mar 2019 01:24), Max: 98.9 (21 Mar 2019 18:43)  HR: 80 (22 Mar 2019 01:24) (69 - 85)  BP: 169/66 (22 Mar 2019 01:24) (154/68 - 185/85)  BP(mean): --  RR: 18 (22 Mar 2019 01:24) (18 - 20)  SpO2: 96% (22 Mar 2019 01:24) (96% - 100%)      LABS:                        7.7    8.85  )-----------( 204      ( 21 Mar 2019 06:43 )             25.3   CARDIAC MARKERS ( 20 Mar 2019 22:49 )  0.017 ng/mL / x     / x     / x     / x        03-21    140  |  111<H>  |  36<H>  ----------------------------<  179<H>  4.0   |  22  |  4.63<H>    Ca    7.6<L>      21 Mar 2019 06:43  Phos  3.9     03-21  Mg     1.7     03-21    TPro  7.3  /  Alb  3.0<L>  /  TBili  0.1<L>  /  DBili  x   /  AST  24  /  ALT  23  /  AlkPhos  94  03-20          ASSESSMENT:  HPI:  73F, from home, lives w/ daughter, w/ PMHx CKD stage 5, Dementia, HTN, asthma, CVA, SLE, T2DM, HTN sent from PCP clinic for worsening CKD. Patient denies CP, SOB, palpitations or any other complaint. Patient being admitted to start HD. (21 Mar 2019 02:59)      PLAN: Bladder scan pt and then straight cath    FOLLOW UP / RESULT: For urinary retention

## 2019-03-22 NOTE — DISCHARGE NOTE PROVIDER - HOSPITAL COURSE
73F, from home, lives w/ daughter, w/ PMHx CKD stage 5, Dementia, HTN, asthma, CVA, SLE, T2DM, HTN sent from PCP clinic for worsening CKD. Patient denies CP, SOB, palpitations or any other complaint. Patient being admitted to start HD.    patient is ESRD patient and admitted for new dialysis. Patient had perma catheter placed on 03/21/2019 on right side.    Patient had dialysis done yesterday and patient is having maintenance dialysis session today. After dialysis, patient will be discharged. Patient will have next dialysis session on tuesday.    Patient is stable for discharge per primary attending.

## 2019-03-22 NOTE — PROGRESS NOTE ADULT - ASSESSMENT
73 yr old female from home, H/O HTN/DM/CVA/SLE/CKD stage 5/asthma/dementia, admit hospital for worsening BUN/Cr, start HD and HD today, PPD placement, AXR showed large stool right colon, laxatives, F/U AXR out patient, patient had out patient HD spot on Tuesday, D/C home today after HD.

## 2019-03-23 LAB
HAV IGM SER-ACNC: SIGNIFICANT CHANGE UP
HBV CORE IGM SER-ACNC: SIGNIFICANT CHANGE UP
HBV SURFACE AB SER-ACNC: SIGNIFICANT CHANGE UP
HBV SURFACE AG SER-ACNC: SIGNIFICANT CHANGE UP
HCV AB S/CO SERPL IA: 0.12 S/CO — SIGNIFICANT CHANGE UP (ref 0–0.79)
HCV AB SERPL-IMP: SIGNIFICANT CHANGE UP

## 2019-03-25 ENCOUNTER — APPOINTMENT (OUTPATIENT)
Dept: HOME HEALTH SERVICES | Facility: HOME HEALTH | Age: 74
End: 2019-03-25

## 2019-03-26 RX ORDER — ALCOHOL ANTISEPTIC PADS
70 PADS, MEDICATED (EA) TOPICAL
Qty: 400 | Refills: 5 | Status: DISCONTINUED | COMMUNITY
Start: 2018-02-02 | End: 2019-03-26

## 2019-03-30 ENCOUNTER — INPATIENT (INPATIENT)
Facility: HOSPITAL | Age: 74
LOS: 8 days | Discharge: ANOTHER HOSPITAL | End: 2019-04-08
Attending: FAMILY MEDICINE | Admitting: FAMILY MEDICINE
Payer: MEDICARE

## 2019-03-30 VITALS
WEIGHT: 210.1 LBS | HEART RATE: 111 BPM | DIASTOLIC BLOOD PRESSURE: 86 MMHG | OXYGEN SATURATION: 96 % | RESPIRATION RATE: 21 BRPM | SYSTOLIC BLOOD PRESSURE: 181 MMHG

## 2019-03-30 DIAGNOSIS — I10 ESSENTIAL (PRIMARY) HYPERTENSION: ICD-10-CM

## 2019-03-30 DIAGNOSIS — Z90.89 ACQUIRED ABSENCE OF OTHER ORGANS: Chronic | ICD-10-CM

## 2019-03-30 DIAGNOSIS — I12.0 HYPERTENSIVE CHRONIC KIDNEY DISEASE WITH STAGE 5 CHRONIC KIDNEY DISEASE OR END STAGE RENAL DISEASE: ICD-10-CM

## 2019-03-30 DIAGNOSIS — N18.5 CHRONIC KIDNEY DISEASE, STAGE 5: ICD-10-CM

## 2019-03-30 DIAGNOSIS — R41.82 ALTERED MENTAL STATUS, UNSPECIFIED: ICD-10-CM

## 2019-03-30 LAB
ALBUMIN SERPL ELPH-MCNC: 2.6 G/DL — LOW (ref 3.3–5)
ALP SERPL-CCNC: 90 U/L — SIGNIFICANT CHANGE UP (ref 40–120)
ALT FLD-CCNC: 58 U/L — SIGNIFICANT CHANGE UP (ref 12–78)
ANION GAP SERPL CALC-SCNC: 10 MMOL/L — SIGNIFICANT CHANGE UP (ref 5–17)
APTT BLD: 34.3 SEC — SIGNIFICANT CHANGE UP (ref 28.5–37)
AST SERPL-CCNC: 212 U/L — HIGH (ref 15–37)
BASE EXCESS BLDA CALC-SCNC: 3.1 MMOL/L — HIGH (ref -2–2)
BILIRUB SERPL-MCNC: 0.2 MG/DL — SIGNIFICANT CHANGE UP (ref 0.2–1.2)
BLD GP AB SCN SERPL QL: SIGNIFICANT CHANGE UP
BLOOD GAS COMMENTS: SIGNIFICANT CHANGE UP
BLOOD GAS COMMENTS: SIGNIFICANT CHANGE UP
BLOOD GAS SOURCE: SIGNIFICANT CHANGE UP
BUN SERPL-MCNC: 11 MG/DL — SIGNIFICANT CHANGE UP (ref 7–23)
CALCIUM SERPL-MCNC: 8 MG/DL — LOW (ref 8.5–10.1)
CHLORIDE SERPL-SCNC: 100 MMOL/L — SIGNIFICANT CHANGE UP (ref 96–108)
CO2 SERPL-SCNC: 27 MMOL/L — SIGNIFICANT CHANGE UP (ref 22–31)
CREAT SERPL-MCNC: 2.35 MG/DL — HIGH (ref 0.5–1.3)
GLUCOSE SERPL-MCNC: 113 MG/DL — HIGH (ref 70–99)
HCO3 BLDA-SCNC: 27 MMOL/L — SIGNIFICANT CHANGE UP (ref 21–29)
HCT VFR BLD CALC: 30.8 % — LOW (ref 34.5–45)
HGB BLD-MCNC: 9.5 G/DL — LOW (ref 11.5–15.5)
HOROWITZ INDEX BLDA+IHG-RTO: 24 — SIGNIFICANT CHANGE UP
INR BLD: 1.03 RATIO — SIGNIFICANT CHANGE UP (ref 0.88–1.16)
MAGNESIUM SERPL-MCNC: 1.8 MG/DL — SIGNIFICANT CHANGE UP (ref 1.6–2.6)
MCHC RBC-ENTMCNC: 23.9 PG — LOW (ref 27–34)
MCHC RBC-ENTMCNC: 30.8 GM/DL — LOW (ref 32–36)
MCV RBC AUTO: 77.4 FL — LOW (ref 80–100)
NRBC # BLD: 0 /100 WBCS — SIGNIFICANT CHANGE UP (ref 0–0)
NT-PROBNP SERPL-SCNC: HIGH PG/ML (ref 0–125)
PCO2 BLDA: 41 MMHG — SIGNIFICANT CHANGE UP (ref 32–46)
PH BLD: 7.44 — SIGNIFICANT CHANGE UP (ref 7.35–7.45)
PLATELET # BLD AUTO: 156 K/UL — SIGNIFICANT CHANGE UP (ref 150–400)
PO2 BLDA: 86 MMHG — SIGNIFICANT CHANGE UP (ref 74–108)
POTASSIUM SERPL-MCNC: 3.1 MMOL/L — LOW (ref 3.5–5.3)
POTASSIUM SERPL-SCNC: 3.1 MMOL/L — LOW (ref 3.5–5.3)
PROT SERPL-MCNC: 7 GM/DL — SIGNIFICANT CHANGE UP (ref 6–8.3)
PROTHROM AB SERPL-ACNC: 11.5 SEC — SIGNIFICANT CHANGE UP (ref 10–12.9)
RBC # BLD: 3.98 M/UL — SIGNIFICANT CHANGE UP (ref 3.8–5.2)
RBC # FLD: 16.4 % — HIGH (ref 10.3–14.5)
SAO2 % BLDA: 97 % — HIGH (ref 92–96)
SODIUM SERPL-SCNC: 137 MMOL/L — SIGNIFICANT CHANGE UP (ref 135–145)
TROPONIN I SERPL-MCNC: 4.57 NG/ML — HIGH (ref 0.01–0.04)
WBC # BLD: 10.2 K/UL — SIGNIFICANT CHANGE UP (ref 3.8–10.5)
WBC # FLD AUTO: 10.2 K/UL — SIGNIFICANT CHANGE UP (ref 3.8–10.5)

## 2019-03-30 PROCEDURE — 99285 EMERGENCY DEPT VISIT HI MDM: CPT

## 2019-03-30 PROCEDURE — 70496 CT ANGIOGRAPHY HEAD: CPT | Mod: 26

## 2019-03-30 PROCEDURE — 70498 CT ANGIOGRAPHY NECK: CPT | Mod: 26

## 2019-03-30 PROCEDURE — 99223 1ST HOSP IP/OBS HIGH 75: CPT | Mod: AI

## 2019-03-30 PROCEDURE — 93010 ELECTROCARDIOGRAM REPORT: CPT

## 2019-03-30 PROCEDURE — 70450 CT HEAD/BRAIN W/O DYE: CPT | Mod: 26,59

## 2019-03-30 PROCEDURE — 71045 X-RAY EXAM CHEST 1 VIEW: CPT | Mod: 26

## 2019-03-30 RX ORDER — HYDRALAZINE HCL 50 MG
25 TABLET ORAL
Qty: 0 | Refills: 0 | Status: DISCONTINUED | OUTPATIENT
Start: 2019-03-30 | End: 2019-04-08

## 2019-03-30 RX ORDER — GABAPENTIN 400 MG/1
200 CAPSULE ORAL
Qty: 0 | Refills: 0 | Status: DISCONTINUED | OUTPATIENT
Start: 2019-03-30 | End: 2019-04-03

## 2019-03-30 RX ORDER — INSULIN LISPRO 100/ML
VIAL (ML) SUBCUTANEOUS AT BEDTIME
Qty: 0 | Refills: 0 | Status: DISCONTINUED | OUTPATIENT
Start: 2019-03-30 | End: 2019-04-08

## 2019-03-30 RX ORDER — ASPIRIN/CALCIUM CARB/MAGNESIUM 324 MG
300 TABLET ORAL ONCE
Qty: 0 | Refills: 0 | Status: COMPLETED | OUTPATIENT
Start: 2019-03-30 | End: 2019-03-30

## 2019-03-30 RX ORDER — TAMSULOSIN HYDROCHLORIDE 0.4 MG/1
0.4 CAPSULE ORAL AT BEDTIME
Qty: 0 | Refills: 0 | Status: DISCONTINUED | OUTPATIENT
Start: 2019-03-30 | End: 2019-04-08

## 2019-03-30 RX ORDER — DEXTROSE 50 % IN WATER 50 %
25 SYRINGE (ML) INTRAVENOUS ONCE
Qty: 0 | Refills: 0 | Status: DISCONTINUED | OUTPATIENT
Start: 2019-03-30 | End: 2019-04-08

## 2019-03-30 RX ORDER — SODIUM CHLORIDE 9 MG/ML
1000 INJECTION, SOLUTION INTRAVENOUS
Qty: 0 | Refills: 0 | Status: DISCONTINUED | OUTPATIENT
Start: 2019-03-30 | End: 2019-04-08

## 2019-03-30 RX ORDER — ATORVASTATIN CALCIUM 80 MG/1
80 TABLET, FILM COATED ORAL AT BEDTIME
Qty: 0 | Refills: 0 | Status: DISCONTINUED | OUTPATIENT
Start: 2019-03-30 | End: 2019-04-05

## 2019-03-30 RX ORDER — INSULIN LISPRO 100/ML
VIAL (ML) SUBCUTANEOUS
Qty: 0 | Refills: 0 | Status: DISCONTINUED | OUTPATIENT
Start: 2019-03-30 | End: 2019-04-08

## 2019-03-30 RX ORDER — AMLODIPINE BESYLATE 2.5 MG/1
10 TABLET ORAL DAILY
Qty: 0 | Refills: 0 | Status: DISCONTINUED | OUTPATIENT
Start: 2019-03-30 | End: 2019-04-08

## 2019-03-30 RX ORDER — IPRATROPIUM/ALBUTEROL SULFATE 18-103MCG
3 AEROSOL WITH ADAPTER (GRAM) INHALATION EVERY 6 HOURS
Qty: 0 | Refills: 0 | Status: DISCONTINUED | OUTPATIENT
Start: 2019-03-30 | End: 2019-04-08

## 2019-03-30 RX ORDER — ASPIRIN/CALCIUM CARB/MAGNESIUM 324 MG
81 TABLET ORAL DAILY
Qty: 0 | Refills: 0 | Status: DISCONTINUED | OUTPATIENT
Start: 2019-03-30 | End: 2019-04-08

## 2019-03-30 RX ORDER — MONTELUKAST 4 MG/1
10 TABLET, CHEWABLE ORAL DAILY
Qty: 0 | Refills: 0 | Status: DISCONTINUED | OUTPATIENT
Start: 2019-03-30 | End: 2019-04-08

## 2019-03-30 RX ORDER — ACETAMINOPHEN 500 MG
650 TABLET ORAL EVERY 6 HOURS
Qty: 0 | Refills: 0 | Status: DISCONTINUED | OUTPATIENT
Start: 2019-03-30 | End: 2019-04-08

## 2019-03-30 RX ORDER — DEXTROSE 50 % IN WATER 50 %
12.5 SYRINGE (ML) INTRAVENOUS ONCE
Qty: 0 | Refills: 0 | Status: DISCONTINUED | OUTPATIENT
Start: 2019-03-30 | End: 2019-04-08

## 2019-03-30 RX ORDER — GLUCAGON INJECTION, SOLUTION 0.5 MG/.1ML
1 INJECTION, SOLUTION SUBCUTANEOUS ONCE
Qty: 0 | Refills: 0 | Status: DISCONTINUED | OUTPATIENT
Start: 2019-03-30 | End: 2019-04-08

## 2019-03-30 RX ORDER — LABETALOL HCL 100 MG
200 TABLET ORAL
Qty: 0 | Refills: 0 | Status: DISCONTINUED | OUTPATIENT
Start: 2019-03-30 | End: 2019-04-08

## 2019-03-30 RX ORDER — ASPIRIN/CALCIUM CARB/MAGNESIUM 324 MG
325 TABLET ORAL ONCE
Qty: 0 | Refills: 0 | Status: DISCONTINUED | OUTPATIENT
Start: 2019-03-30 | End: 2019-03-30

## 2019-03-30 RX ORDER — POTASSIUM CHLORIDE 20 MEQ
10 PACKET (EA) ORAL
Qty: 0 | Refills: 0 | Status: COMPLETED | OUTPATIENT
Start: 2019-03-30 | End: 2019-03-30

## 2019-03-30 RX ORDER — HEPARIN SODIUM 5000 [USP'U]/ML
5000 INJECTION INTRAVENOUS; SUBCUTANEOUS EVERY 12 HOURS
Qty: 0 | Refills: 0 | Status: DISCONTINUED | OUTPATIENT
Start: 2019-03-30 | End: 2019-04-08

## 2019-03-30 RX ORDER — DEXTROSE 50 % IN WATER 50 %
15 SYRINGE (ML) INTRAVENOUS ONCE
Qty: 0 | Refills: 0 | Status: DISCONTINUED | OUTPATIENT
Start: 2019-03-30 | End: 2019-04-08

## 2019-03-30 RX ORDER — BUDESONIDE AND FORMOTEROL FUMARATE DIHYDRATE 160; 4.5 UG/1; UG/1
2 AEROSOL RESPIRATORY (INHALATION)
Qty: 0 | Refills: 0 | Status: DISCONTINUED | OUTPATIENT
Start: 2019-03-30 | End: 2019-04-08

## 2019-03-30 RX ADMIN — Medication 10 MILLIEQUIVALENT(S): at 20:10

## 2019-03-30 RX ADMIN — Medication 100 MILLIEQUIVALENT(S): at 21:20

## 2019-03-30 RX ADMIN — Medication 100 MILLIEQUIVALENT(S): at 19:03

## 2019-03-30 RX ADMIN — Medication 100 MILLIEQUIVALENT(S): at 20:11

## 2019-03-30 RX ADMIN — Medication 300 MILLIGRAM(S): at 19:03

## 2019-03-30 RX ADMIN — Medication 25 MILLIGRAM(S): at 23:22

## 2019-03-30 RX ADMIN — Medication 200 MILLIGRAM(S): at 23:22

## 2019-03-30 NOTE — ED PROVIDER NOTE - CRITICAL CARE PROVIDED
documentation/consult w/ pt's family directly relating to pts condition/additional history taking/interpretation of diagnostic studies/consultation with other physicians/direct patient care (not related to procedure)/conducted a detailed discussion of DNR status

## 2019-03-30 NOTE — ED ADULT NURSE REASSESSMENT NOTE - NS ED NURSE REASSESS COMMENT FT1
received patient as code stroke . pt had dialysis today and went home and daughter found the patient lethargic. pt was taken to Ct scan stat. pt was brought back to ed unable to do swallowing evaluation on the patient due to lethargy Dr morton was notifed.

## 2019-03-30 NOTE — H&P ADULT - NSHPREVIEWOFSYSTEMS_GEN_ALL_CORE
REVIEW OF SYSTEMS:    CONSTITUTIONAL: +weakness; denies fevers or chills  EYES/ENT: No visual changes;  No vertigo or throat pain   NECK: No pain or stiffness  RESPIRATORY: + cough and wheezing. No hemoptysis; No shortness of breath  CARDIOVASCULAR: No chest pain or palpitations  GASTROINTESTINAL: No abdominal or epigastric pain. No nausea, vomiting, or hematemesis; No diarrhea or constipation. No melena or hematochezia.  GENITOURINARY: No dysuria, frequency or hematuria  NEUROLOGICAL: No numbness or weakness  SKIN: No itching, burning, rashes. + sacral ulcer  All other review of systems is negative unless indicated above.

## 2019-03-30 NOTE — H&P ADULT - NSHPPHYSICALEXAM_GEN_ALL_CORE
PHYSICAL EXAM:    Constitutional: NAD, well-groomed, well-developed  Neuro: Awake; Speech hesitant and unclear  Neck: No JVD No bruit  Respiratory: CTAB  Cardiovascular: S1 and S2, RRR,   Gastrointestinal: BS+, soft, NT/ND  Extremities: No clubbing cyanosis or edema No varicosities  Vascular: 2+ peripheral pulses  Psychiatric: Flat affect  Musculoskeletal: 4/5 strength b/l upper and lower extremities

## 2019-03-30 NOTE — H&P ADULT - HISTORY OF PRESENT ILLNESS
74yo female, from home with pmh ESRD on dialysis (TTS), Dementia, HTN, asthma, CVA with no significant residual, SLE, T2DM, HTN presents with unresponsive episode.  Per daughter, pt had a fall yesterday.  This morning, pt was more quiet then usual but able to moan and say yes/no. Pt at baseline very verbal. Daughter took her to dialysis and noted at 1230pm that she was keeping food in her mouth and more dazed. When getting pt home and moving her daughter noted that pt was unresponsive and her eyes were" rolling back". 74yo female, from home with pmh ESRD on dialysis (TTS)-recently started 3/21/19-, Dementia, HTN, asthma, CVA with no significant residual, SLE, T2DM, HTN presents with unresponsive episode.  Per daughter, pt had a fall yesterday.  This morning, pt was more quiet then usual but able to moan and say yes/no. Pt at baseline very verbal. Daughter took her to dialysis and noted at 1230pm that she was keeping food in her mouth and more dazed. When getting pt home and moving her daughter noted that pt was unresponsive and her eyes were" rolling back".

## 2019-03-30 NOTE — H&P ADULT - ASSESSMENT
72yo female, from home with pmh ESRD on dialysis (TTS), Dementia, HTN, asthma, CVA with no significant residual, SLE, T2DM, HTN presents with unresponsive episode.  Per daughter, pt had a fall yesterday.  This morning, pt was more quiet then usual but able to moan and say yes/no. Pt at baseline very verbal. Daughter took her to dialysis and noted at 1230pm that she was keeping food in her mouth and more dazed. When getting pt home and moving her daughter noted that pt was unresponsive and her eyes were" rolling back". Family at bedside  CT of head negative for negative for acute CVA.  +troponin: cardiology consulted    IMPROVE VTE Individual Risk Assessment          RISK                                                          Points  [  ] Previous VTE                                                3  [  ] Thrombophilia                                             2  [  ] Lower limb paralysis                                   2        (unable to hold up >15 seconds)    [  ] Current Cancer                                             2         (within 6 months)  [1  ] Immobilization > 24 hrs                              1  [  ] ICU/CCU stay > 24 hours                             1  [1  ] Age > 60                                                         1    IMPROVE VTE Score: 2

## 2019-03-30 NOTE — H&P ADULT - PROBLEM SELECTOR PLAN 1
Neurology consult Neurology consult  [facility]  EEG per neurology  MRI of head

## 2019-03-30 NOTE — ED ADULT TRIAGE NOTE - CHIEF COMPLAINT QUOTE
pt BIBA with c/o acute onset of, unresponsive to verbal commands, left sided gaze noted in triage  CODE STROKE pt BIBA with c/o acute onset of, unresponsive to verbal commands AMS , left sided gaze noted in triage  CODE STROKE, pt non verbal

## 2019-03-30 NOTE — ED PROVIDER NOTE - CLINICAL SUMMARY MEDICAL DECISION MAKING FREE TEXT BOX
CT, EKG, labs CT, EKG, labs    d/w dr cooper for admission to dr peck. dr garay aware for consult. pt seen by dr ramos, appears improved and improving all 4 extremities, thinks may be seizure.

## 2019-03-30 NOTE — ED PROVIDER NOTE - PROGRESS NOTE DETAILS
telestroke called, d/w dr. rojo who spoke with neurology. Not a candidate given unclear time of onset, recommend cta and call back if occlussion. pt slightly more improved, apears to be moving rt leg and left arm and blinking more. trop noted, however pt clinically presents as stroke. d/w dr garay for consult dr ramos also aware.

## 2019-03-30 NOTE — H&P ADULT - NSHPLABSRESULTS_GEN_ALL_CORE
CBC Full  -  ( 30 Mar 2019 17:22 )  WBC Count : 10.20 K/uL  RBC Count : 3.98 M/uL  Hemoglobin : 9.5 g/dL  Hematocrit : 30.8 %  Platelet Count - Automated : 156 K/uL  Mean Cell Volume : 77.4 fl  Mean Cell Hemoglobin : 23.9 pg  Mean Cell Hemoglobin Concentration : 30.8 gm/dL  Auto Neutrophil # : x  Auto Lymphocyte # : x  Auto Monocyte # : x  Auto Eosinophil # : x  Auto Basophil # : x  Auto Neutrophil % : x  Auto Lymphocyte % : x  Auto Monocyte % : x  Auto Eosinophil % : x  Auto Basophil % : x      03-30 @ 17:22    137 | 100 | 11  /8.0 | 1.8 | --  _______________________/  3.1 | 27 | 2.35 \113                          \  < from: CT Angio Head w/ IV Cont (03.30.19 @ 17:54) >    1. No significant ICA stenosis. No dissection or occlusion.   2. Mild atherosclerotic calcification involving proximal ICA with less   than 50%   narrowing/stenosis of the ICA.     < end of copied text >    < from: CT Head No Cont (03.30.19 @ 17:08) >    IMPRESSION:    1)  multifocal chronic ischemic changes with atrophy. No definitive acute   abnormality or hemorrhage.  2)  follow-up MR imaging may be considered for further assessment.      < end of copied text >

## 2019-03-30 NOTE — ED ADULT NURSE NOTE - OBJECTIVE STATEMENT
received pt in bed 4 presenting to the ER c/o of left sided weakness since 330pm. pt was non verbal. MD evaluated, CT scan done. VSS. will continue to monitor and provide care as needed

## 2019-03-30 NOTE — ED ADULT NURSE NOTE - CHIEF COMPLAINT QUOTE
pt BIBA with c/o acute onset of, unresponsive to verbal commands AMS , left sided gaze noted in triage  CODE STROKE, pt non verbal

## 2019-03-30 NOTE — ED ADULT NURSE NOTE - NSIMPLEMENTINTERV_GEN_ALL_ED
Implemented All Fall Risk Interventions:  [place] to call system. Call bell, personal items and telephone within reach. Instruct patient to call for assistance. Room bathroom lighting operational. Non-slip footwear when patient is off stretcher. Physically safe environment: no spills, clutter or unnecessary equipment. Stretcher in lowest position, wheels locked, appropriate side rails in place. Provide visual cue, wrist band, yellow gown, etc. Monitor gait and stability. Monitor for mental status changes and reorient to person, place, and time. Review medications for side effects contributing to fall risk. Reinforce activity limits and safety measures with patient and family.

## 2019-03-30 NOTE — ED PROVIDER NOTE - OBJECTIVE STATEMENT
74yo female, from home with pmh ESRD on dialysis (TTS), Dementia, HTN, asthma, CVA with no signficant residual, SLE, T2DM, HTN presents with unresponsive episode.  Per daughter, pt had a fall yesterday.  This morning, pt was more quiet then usual but able to moan and say yes/no. Pt at baseline very verbal. Daughter took her to dialysis and noted at 1230pm that she was keeping food in her mouth and more dazed. When getting pt home and moving her from van to house noted pt completely unresponsive. No AC, but pt was heparinized today.

## 2019-03-30 NOTE — ED PROVIDER NOTE - PHYSICAL EXAMINATION
Gen: Alert, Lt gaze preferance  Head: NC, AT, PERRL, EOMI, normal lids/conjunctiva   ENT: patent oropharynx without erythema/exudate, uvula midline  Neck: supple, no tenderness  Pulm: Bilateral clear BS  CV: RRR, no M/R/G, +dist pulses   Abd: soft, NT/ND, +BS  Neuro:

## 2019-03-31 ENCOUNTER — OTHER (OUTPATIENT)
Age: 74
End: 2019-03-31

## 2019-03-31 DIAGNOSIS — E87.6 HYPOKALEMIA: ICD-10-CM

## 2019-03-31 DIAGNOSIS — N18.6 END STAGE RENAL DISEASE: ICD-10-CM

## 2019-03-31 DIAGNOSIS — E11.65 TYPE 2 DIABETES MELLITUS WITH HYPERGLYCEMIA: ICD-10-CM

## 2019-03-31 DIAGNOSIS — R41.82 ALTERED MENTAL STATUS, UNSPECIFIED: ICD-10-CM

## 2019-03-31 DIAGNOSIS — N39.0 URINARY TRACT INFECTION, SITE NOT SPECIFIED: ICD-10-CM

## 2019-03-31 DIAGNOSIS — Z29.9 ENCOUNTER FOR PROPHYLACTIC MEASURES, UNSPECIFIED: ICD-10-CM

## 2019-03-31 DIAGNOSIS — N17.9 ACUTE KIDNEY FAILURE, UNSPECIFIED: ICD-10-CM

## 2019-03-31 DIAGNOSIS — G93.41 METABOLIC ENCEPHALOPATHY: ICD-10-CM

## 2019-03-31 LAB
ANION GAP SERPL CALC-SCNC: 11 MMOL/L — SIGNIFICANT CHANGE UP (ref 5–17)
APPEARANCE UR: ABNORMAL
BILIRUB UR-MCNC: NEGATIVE — SIGNIFICANT CHANGE UP
BUN SERPL-MCNC: 20 MG/DL — SIGNIFICANT CHANGE UP (ref 7–23)
CALCIUM SERPL-MCNC: 8 MG/DL — LOW (ref 8.5–10.1)
CHLORIDE SERPL-SCNC: 101 MMOL/L — SIGNIFICANT CHANGE UP (ref 96–108)
CHOLEST SERPL-MCNC: 150 MG/DL — SIGNIFICANT CHANGE UP (ref 10–199)
CO2 SERPL-SCNC: 24 MMOL/L — SIGNIFICANT CHANGE UP (ref 22–31)
COLOR SPEC: YELLOW — SIGNIFICANT CHANGE UP
CREAT SERPL-MCNC: 3.3 MG/DL — HIGH (ref 0.5–1.3)
DIFF PNL FLD: ABNORMAL
GLUCOSE BLDC GLUCOMTR-MCNC: 107 MG/DL — HIGH (ref 70–99)
GLUCOSE BLDC GLUCOMTR-MCNC: 108 MG/DL — HIGH (ref 70–99)
GLUCOSE BLDC GLUCOMTR-MCNC: 116 MG/DL — HIGH (ref 70–99)
GLUCOSE BLDC GLUCOMTR-MCNC: 128 MG/DL — HIGH (ref 70–99)
GLUCOSE SERPL-MCNC: 122 MG/DL — HIGH (ref 70–99)
GLUCOSE UR QL: NEGATIVE MG/DL — SIGNIFICANT CHANGE UP
HBA1C BLD-MCNC: 8.5 % — HIGH (ref 4–5.6)
HCT VFR BLD CALC: 31.9 % — LOW (ref 34.5–45)
HDLC SERPL-MCNC: 56 MG/DL — SIGNIFICANT CHANGE UP
HGB BLD-MCNC: 9.8 G/DL — LOW (ref 11.5–15.5)
KETONES UR-MCNC: NEGATIVE — SIGNIFICANT CHANGE UP
LEUKOCYTE ESTERASE UR-ACNC: NEGATIVE — SIGNIFICANT CHANGE UP
LIPID PNL WITH DIRECT LDL SERPL: 75 MG/DL — SIGNIFICANT CHANGE UP
MCHC RBC-ENTMCNC: 23.6 PG — LOW (ref 27–34)
MCHC RBC-ENTMCNC: 30.7 GM/DL — LOW (ref 32–36)
MCV RBC AUTO: 76.7 FL — LOW (ref 80–100)
NITRITE UR-MCNC: NEGATIVE — SIGNIFICANT CHANGE UP
NRBC # BLD: 0 /100 WBCS — SIGNIFICANT CHANGE UP (ref 0–0)
PH UR: 8 — SIGNIFICANT CHANGE UP (ref 5–8)
PLATELET # BLD AUTO: 163 K/UL — SIGNIFICANT CHANGE UP (ref 150–400)
POTASSIUM SERPL-MCNC: 3.7 MMOL/L — SIGNIFICANT CHANGE UP (ref 3.5–5.3)
POTASSIUM SERPL-SCNC: 3.7 MMOL/L — SIGNIFICANT CHANGE UP (ref 3.5–5.3)
PROT UR-MCNC: 500 MG/DL
RBC # BLD: 4.16 M/UL — SIGNIFICANT CHANGE UP (ref 3.8–5.2)
RBC # FLD: 16.1 % — HIGH (ref 10.3–14.5)
SODIUM SERPL-SCNC: 136 MMOL/L — SIGNIFICANT CHANGE UP (ref 135–145)
SP GR SPEC: 1.01 — SIGNIFICANT CHANGE UP (ref 1.01–1.02)
TOTAL CHOLESTEROL/HDL RATIO MEASUREMENT: 2.7 RATIO — LOW (ref 3.3–7.1)
TRIGL SERPL-MCNC: 94 MG/DL — SIGNIFICANT CHANGE UP (ref 10–149)
TROPONIN I SERPL-MCNC: 2.81 NG/ML — HIGH (ref 0.01–0.04)
UROBILINOGEN FLD QL: NEGATIVE MG/DL — SIGNIFICANT CHANGE UP
WBC # BLD: 15.08 K/UL — HIGH (ref 3.8–10.5)
WBC # FLD AUTO: 15.08 K/UL — HIGH (ref 3.8–10.5)

## 2019-03-31 PROCEDURE — 99233 SBSQ HOSP IP/OBS HIGH 50: CPT

## 2019-03-31 RX ORDER — ACETAMINOPHEN 500 MG
650 TABLET ORAL ONCE
Qty: 0 | Refills: 0 | Status: COMPLETED | OUTPATIENT
Start: 2019-03-31 | End: 2019-03-31

## 2019-03-31 RX ORDER — CEFTRIAXONE 500 MG/1
INJECTION, POWDER, FOR SOLUTION INTRAMUSCULAR; INTRAVENOUS
Qty: 0 | Refills: 0 | Status: DISCONTINUED | OUTPATIENT
Start: 2019-03-31 | End: 2019-03-31

## 2019-03-31 RX ADMIN — TAMSULOSIN HYDROCHLORIDE 0.4 MILLIGRAM(S): 0.4 CAPSULE ORAL at 22:12

## 2019-03-31 RX ADMIN — Medication 81 MILLIGRAM(S): at 11:49

## 2019-03-31 RX ADMIN — Medication 200 MILLIGRAM(S): at 06:09

## 2019-03-31 RX ADMIN — BUDESONIDE AND FORMOTEROL FUMARATE DIHYDRATE 2 PUFF(S): 160; 4.5 AEROSOL RESPIRATORY (INHALATION) at 06:09

## 2019-03-31 RX ADMIN — HEPARIN SODIUM 5000 UNIT(S): 5000 INJECTION INTRAVENOUS; SUBCUTANEOUS at 06:10

## 2019-03-31 RX ADMIN — BUDESONIDE AND FORMOTEROL FUMARATE DIHYDRATE 2 PUFF(S): 160; 4.5 AEROSOL RESPIRATORY (INHALATION) at 17:13

## 2019-03-31 RX ADMIN — ATORVASTATIN CALCIUM 80 MILLIGRAM(S): 80 TABLET, FILM COATED ORAL at 22:12

## 2019-03-31 RX ADMIN — Medication 650 MILLIGRAM(S): at 17:14

## 2019-03-31 RX ADMIN — AMLODIPINE BESYLATE 10 MILLIGRAM(S): 2.5 TABLET ORAL at 06:09

## 2019-03-31 RX ADMIN — Medication 25 MILLIGRAM(S): at 17:12

## 2019-03-31 RX ADMIN — GABAPENTIN 200 MILLIGRAM(S): 400 CAPSULE ORAL at 17:12

## 2019-03-31 RX ADMIN — GABAPENTIN 200 MILLIGRAM(S): 400 CAPSULE ORAL at 06:09

## 2019-03-31 RX ADMIN — MONTELUKAST 10 MILLIGRAM(S): 4 TABLET, CHEWABLE ORAL at 16:40

## 2019-03-31 RX ADMIN — HEPARIN SODIUM 5000 UNIT(S): 5000 INJECTION INTRAVENOUS; SUBCUTANEOUS at 17:13

## 2019-03-31 RX ADMIN — Medication 650 MILLIGRAM(S): at 01:36

## 2019-03-31 RX ADMIN — Medication 25 MILLIGRAM(S): at 06:24

## 2019-03-31 RX ADMIN — Medication 200 MILLIGRAM(S): at 17:12

## 2019-03-31 NOTE — PROGRESS NOTE ADULT - PROBLEM SELECTOR PLAN 3
+fever/elev wbc  add ceftriaxone, cont ivf, follow up ucx +fever/elev wbc  give Levaquin x 1 dose per ID/Dr Calderón, cont ivf, follow up ucx

## 2019-03-31 NOTE — CONSULT NOTE ADULT - SUBJECTIVE AND OBJECTIVE BOX
Nephrology Consultation: MD ELAINA HighDILLON  73y    HPI:  72yo female, from home with pmh ESRD on dialysis (TTS)-recently started 3/21/19-, Dementia, HTN, asthma, CVA with no significant residual, SLE, T2DM, HTN presents with unresponsive episode.  Per daughter, pt had a fall yesterday.  This morning, pt was more quiet then usual but able to moan and say yes/no. Pt at baseline very verbal. Daughter took her to dialysis and noted at 1230pm that she was keeping food in her mouth and more dazed. When getting pt home and moving her daughter noted that pt was unresponsive and her eyes were" rolling back". Had CT scan that was negative last night.   Had dialysis yesterday at [facility].     PAST MEDICAL & SURGICAL HISTORY:  CKD (chronic kidney disease) stage 5, GFR less than 15 ml/min  Dementia  DM (diabetes mellitus)  Asthma  CVA (cerebral vascular accident)  CVA (cerebral vascular accident)  HLD (hyperlipidemia)  Asthma  Neuropathy  SLE (systemic lupus erythematosus)  DM (diabetes mellitus)  HTN (hypertension)  No significant past surgical history  S/P tonsillectomy      Allergies    codeine (Swelling)  penicillin (Anaphylaxis)  penicillins (Swelling)    Intolerances        Home Medications:  acetaminophen 325 mg oral tablet: 2 tab(s) orally every 8 hours, As Needed mild pain (30 Mar 2019 22:29)  amLODIPine 10 mg oral tablet: 1 tab(s) orally once a day (30 Mar 2019 22:29)  aspirin 81 mg oral delayed release tablet: 1 tab(s) orally once a day (30 Mar 2019 22:29)  atorvastatin 80 mg oral tablet: 1 tab(s) orally once a day (at bedtime) (30 Mar 2019 22:29)  budesonide-formoterol 160 mcg-4.5 mcg/inh inhalation aerosol: 2 puff(s) inhaled 2 times a day (30 Mar 2019 22:29)  docusate sodium 100 mg oral capsule: 1 cap(s) orally 3 times a day (2016 10:27)  gabapentin 100 mg oral capsule: 2 cap(s) orally 2 times a day (2016 10:27)  labetalol 200 mg oral tablet: 1 tab(s) orally 2 times a day (2016 10:27)  montelukast 10 mg oral tablet: 1 tab(s) orally once a day (2018 12:18)  polyethylene glycol 3350 oral powder for reconstitution: 17 gram(s) orally once a day (2016 10:27)  senna oral tablet: 2 tab(s) orally once a day (at bedtime) (2016 10:27)  tamsulosin 0.4 mg oral capsule: 1 cap(s) orally once a day (at bedtime) (2018 11:52)        FAMILY HISTORY:  FH: type 2 diabetes: mother      SOCIAL HISTORY:    REVIEW OF SYSTEMS:    Constitutional: No fever, weight loss or fatigue  Eyes: No eye pain, visual disturbances, or discharge  ENT:  No difficulty hearing, tinnitus, vertigo; No sinus or throat pain  Neck: No pain or stiffness  Breasts: No pain, masses or nipple discharge  Respiratory: No cough, wheezing, chills or hemoptysis  Cardiovascular: No chest pain, palpitations, shortness of breath, dizziness or leg swelling  Gastrointestinal: No abdominal or epigastric pain. No nausea, vomiting or hematemesis; No diarrhea or constipation. No melena or hematochezia.  Genitourinary: No dysuria, frequency, hematuria or incontinence  Rectal: No pain, hemorrhoids or incontinence  Neurological: No headaches, memory loss, loss of strength, numbness or tremors  Skin: No itching, burning, rashes or lesions   Lymph Nodes: No enlarged glands  Endocrine: No heat or cold intolerance; No hair loss  Musculoskeletal: No joint pain or swelling; No muscle, back or extremity pain  Psychiatric: No depression, anxiety, mood swings or difficulty sleeping  Heme/Lymph: No easy bruising or bleeding gums  Allergy and Immunologic: No hives or eczema    acetaminophen   Tablet .. 650 milliGRAM(s) Oral every 6 hours PRN  ALBUTerol/ipratropium for Nebulization 3 milliLiter(s) Nebulizer every 6 hours PRN  amLODIPine   Tablet 10 milliGRAM(s) Oral daily  aspirin enteric coated 81 milliGRAM(s) Oral daily  atorvastatin 80 milliGRAM(s) Oral at bedtime  buDESOnide 160 MICROgram(s)/formoterol 4.5 MICROgram(s) Inhaler 2 Puff(s) Inhalation two times a day  dextrose 40% Gel 15 Gram(s) Oral once PRN  dextrose 5%. 1000 milliLiter(s) IV Continuous <Continuous>  dextrose 50% Injectable 12.5 Gram(s) IV Push once  dextrose 50% Injectable 25 Gram(s) IV Push once  dextrose 50% Injectable 25 Gram(s) IV Push once  gabapentin 200 milliGRAM(s) Oral two times a day  glucagon  Injectable 1 milliGRAM(s) IntraMuscular once PRN  heparin  Injectable 5000 Unit(s) SubCutaneous every 12 hours  hydrALAZINE 25 milliGRAM(s) Oral two times a day  insulin lispro (HumaLOG) corrective regimen sliding scale   SubCutaneous three times a day before meals  insulin lispro (HumaLOG) corrective regimen sliding scale   SubCutaneous at bedtime  labetalol 200 milliGRAM(s) Oral two times a day  montelukast 10 milliGRAM(s) Oral daily  tamsulosin 0.4 milliGRAM(s) Oral at bedtime      Vital Signs Last 24 Hrs  T(C): 37.8 (31 Mar 2019 11:50), Max: 38.8 (31 Mar 2019 00:09)  T(F): 100 (31 Mar 2019 11:50), Max: 101.8 (31 Mar 2019 00:09)  HR: 79 (31 Mar 2019 11:50) (79 - 111)  BP: 161/71 (31 Mar 2019 11:50) (158/74 - 204/94)  BP(mean): --  RR: 18 (31 Mar 2019 11:50) (17 - 21)  SpO2: 96% (31 Mar 2019 11:50) (96% - 98%)    PHYSICAL EXAM:    Constitutional: NAD, well-groomed, well-developed  HEENT: PERRLA, EOMI, Normal Hearing, MMM  Neck: No LAD, No JVD  Back: Normal spine flexure, No CVA tenderness  Respiratory: CTAB/L   Cardiovascular: S1 and S2, RRR, no M/G/R  Gastrointestinal: BS+, soft, NT/ND  Extremities: No peripheral edema  Vascular: 2+ peripheral pulses  Neurological: lethargic and is poorly arousable.   Skin: No rashes      LABS:                        9.8    15.08 )-----------( 163      ( 31 Mar 2019 07:17 )             31.9     03-31    136  |  101  |  20  ----------------------------<  122<H>  3.7   |  24  |  3.30<H>    Ca    8.0<L>      31 Mar 2019 07:17  Mg     1.8         TPro  7.0  /  Alb  2.6<L>  /  TBili  0.2  /  DBili  x   /  AST  212<H>  /  ALT  58  /  AlkPhos  90      PT/INR - ( 30 Mar 2019 17:22 )   PT: 11.5 sec;   INR: 1.03 ratio         PTT - ( 30 Mar 2019 17:22 )  PTT:34.3 sec  Urinalysis Basic - ( 31 Mar 2019 02:18 )    Color: Yellow / Appearance: Slightly Turbid / S.015 / pH: x  Gluc: x / Ketone: Negative  / Bili: Negative / Urobili: Negative mg/dL   Blood: x / Protein: 500 mg/dL / Nitrite: Negative   Leuk Esterase: Negative / RBC: 3-5 /HPF / WBC 3-5   Sq Epi: x / Non Sq Epi: x / Bacteria: TNTC        MICROBIOLOGY:  RECENT CULTURES:        RADIOLOGY & ADDITIONAL STUDIES:    < from: Xray Chest 1 View AP/PA. (19 @ 17:19) >    EXAM:  XR CHEST AP OR PA 1V                            PROCEDURE DATE:  2019          INTERPRETATION:  cough      A single portable radiograph suggests cardiomegaly, although there may be   magnification from technique..    Vascular markings are mildly prominent    The lungs are clear without evidence for infiltrate or consolidation.     Permacath in situ.    The osseous structures are grossly intact.    IMPRESSION:    Cardiomegaly. Clear lungs.    < end of copied text >

## 2019-04-01 ENCOUNTER — OTHER (OUTPATIENT)
Age: 74
End: 2019-04-01

## 2019-04-01 DIAGNOSIS — R50.9 FEVER, UNSPECIFIED: ICD-10-CM

## 2019-04-01 DIAGNOSIS — R05 COUGH: ICD-10-CM

## 2019-04-01 LAB
ANION GAP SERPL CALC-SCNC: 10 MMOL/L — SIGNIFICANT CHANGE UP (ref 5–17)
BUN SERPL-MCNC: 40 MG/DL — HIGH (ref 7–23)
CALCIUM SERPL-MCNC: 7.3 MG/DL — LOW (ref 8.5–10.1)
CHLORIDE SERPL-SCNC: 100 MMOL/L — SIGNIFICANT CHANGE UP (ref 96–108)
CO2 SERPL-SCNC: 25 MMOL/L — SIGNIFICANT CHANGE UP (ref 22–31)
CREAT SERPL-MCNC: 5.29 MG/DL — HIGH (ref 0.5–1.3)
GLUCOSE BLDC GLUCOMTR-MCNC: 109 MG/DL — HIGH (ref 70–99)
GLUCOSE SERPL-MCNC: 98 MG/DL — SIGNIFICANT CHANGE UP (ref 70–99)
HCT VFR BLD CALC: 27.3 % — LOW (ref 34.5–45)
HGB BLD-MCNC: 8.5 G/DL — LOW (ref 11.5–15.5)
MCHC RBC-ENTMCNC: 23.7 PG — LOW (ref 27–34)
MCHC RBC-ENTMCNC: 31.1 GM/DL — LOW (ref 32–36)
MCV RBC AUTO: 76.3 FL — LOW (ref 80–100)
NRBC # BLD: 0 /100 WBCS — SIGNIFICANT CHANGE UP (ref 0–0)
PLATELET # BLD AUTO: 141 K/UL — LOW (ref 150–400)
POTASSIUM SERPL-MCNC: 3.8 MMOL/L — SIGNIFICANT CHANGE UP (ref 3.5–5.3)
POTASSIUM SERPL-SCNC: 3.8 MMOL/L — SIGNIFICANT CHANGE UP (ref 3.5–5.3)
RBC # BLD: 3.58 M/UL — LOW (ref 3.8–5.2)
RBC # FLD: 16.1 % — HIGH (ref 10.3–14.5)
SODIUM SERPL-SCNC: 135 MMOL/L — SIGNIFICANT CHANGE UP (ref 135–145)
WBC # BLD: 11.97 K/UL — HIGH (ref 3.8–10.5)
WBC # FLD AUTO: 11.97 K/UL — HIGH (ref 3.8–10.5)

## 2019-04-01 PROCEDURE — 99223 1ST HOSP IP/OBS HIGH 75: CPT

## 2019-04-01 PROCEDURE — 99233 SBSQ HOSP IP/OBS HIGH 50: CPT

## 2019-04-01 PROCEDURE — 76775 US EXAM ABDO BACK WALL LIM: CPT | Mod: 26

## 2019-04-01 PROCEDURE — 70551 MRI BRAIN STEM W/O DYE: CPT | Mod: 26

## 2019-04-01 RX ORDER — ERYTHROPOIETIN 10000 [IU]/ML
10000 INJECTION, SOLUTION INTRAVENOUS; SUBCUTANEOUS
Qty: 0 | Refills: 0 | Status: DISCONTINUED | OUTPATIENT
Start: 2019-04-02 | End: 2019-04-08

## 2019-04-01 RX ORDER — CEFTRIAXONE 500 MG/1
1 INJECTION, POWDER, FOR SOLUTION INTRAMUSCULAR; INTRAVENOUS ONCE
Qty: 0 | Refills: 0 | Status: COMPLETED | OUTPATIENT
Start: 2019-04-01 | End: 2019-04-01

## 2019-04-01 RX ORDER — CEFTRIAXONE 500 MG/1
INJECTION, POWDER, FOR SOLUTION INTRAMUSCULAR; INTRAVENOUS
Qty: 0 | Refills: 0 | Status: DISCONTINUED | OUTPATIENT
Start: 2019-04-01 | End: 2019-04-06

## 2019-04-01 RX ORDER — CEFTRIAXONE 500 MG/1
1 INJECTION, POWDER, FOR SOLUTION INTRAMUSCULAR; INTRAVENOUS EVERY 24 HOURS
Qty: 0 | Refills: 0 | Status: DISCONTINUED | OUTPATIENT
Start: 2019-04-02 | End: 2019-04-06

## 2019-04-01 RX ADMIN — Medication 81 MILLIGRAM(S): at 17:51

## 2019-04-01 RX ADMIN — AMLODIPINE BESYLATE 10 MILLIGRAM(S): 2.5 TABLET ORAL at 05:33

## 2019-04-01 RX ADMIN — GABAPENTIN 200 MILLIGRAM(S): 400 CAPSULE ORAL at 05:33

## 2019-04-01 RX ADMIN — TAMSULOSIN HYDROCHLORIDE 0.4 MILLIGRAM(S): 0.4 CAPSULE ORAL at 22:21

## 2019-04-01 RX ADMIN — Medication 200 MILLIGRAM(S): at 05:33

## 2019-04-01 RX ADMIN — HEPARIN SODIUM 5000 UNIT(S): 5000 INJECTION INTRAVENOUS; SUBCUTANEOUS at 17:55

## 2019-04-01 RX ADMIN — Medication 25 MILLIGRAM(S): at 05:33

## 2019-04-01 RX ADMIN — Medication 25 MILLIGRAM(S): at 17:52

## 2019-04-01 RX ADMIN — Medication 200 MILLIGRAM(S): at 17:53

## 2019-04-01 RX ADMIN — ATORVASTATIN CALCIUM 80 MILLIGRAM(S): 80 TABLET, FILM COATED ORAL at 22:22

## 2019-04-01 RX ADMIN — CEFTRIAXONE 100 GRAM(S): 500 INJECTION, POWDER, FOR SOLUTION INTRAMUSCULAR; INTRAVENOUS at 12:41

## 2019-04-01 RX ADMIN — BUDESONIDE AND FORMOTEROL FUMARATE DIHYDRATE 2 PUFF(S): 160; 4.5 AEROSOL RESPIRATORY (INHALATION) at 05:33

## 2019-04-01 RX ADMIN — GABAPENTIN 200 MILLIGRAM(S): 400 CAPSULE ORAL at 17:51

## 2019-04-01 RX ADMIN — BUDESONIDE AND FORMOTEROL FUMARATE DIHYDRATE 2 PUFF(S): 160; 4.5 AEROSOL RESPIRATORY (INHALATION) at 17:50

## 2019-04-01 RX ADMIN — MONTELUKAST 10 MILLIGRAM(S): 4 TABLET, CHEWABLE ORAL at 17:52

## 2019-04-01 RX ADMIN — HEPARIN SODIUM 5000 UNIT(S): 5000 INJECTION INTRAVENOUS; SUBCUTANEOUS at 05:33

## 2019-04-01 NOTE — SWALLOW BEDSIDE ASSESSMENT ADULT - ORAL PHASE
Delayed oral transit time/Lingual stasis Delayed oral transit time Delayed oral transit time/moderate lingual stasis/Lingual stasis mild lingual stasis/Lingual stasis/Delayed oral transit time

## 2019-04-01 NOTE — PHYSICAL THERAPY INITIAL EVALUATION ADULT - MODIFIED CLINICAL TEST OF SENSORY INTEGRATION IN BALANCE TEST
Barthel Index: Feeding Score ___, Bathing Score ___, Grooming Score ___, Dressing Score ___, Bowels Score ___, Bladder Score ___, Toilet Score ___, Transfers Score ___, Mobility Score ___, Stairs Score ___,     Total Score ___

## 2019-04-01 NOTE — SWALLOW BEDSIDE ASSESSMENT ADULT - SWALLOW EVAL: DIAGNOSIS
pt presented with oropharyngeal dysphagia marked by reduced oral grading, delayed oral transit time, mild to moderate lingual stasis and decreased mastication ability 2/2 incomplete dentition, suspect delay in pharyngeal swallow with decreased laryngeal elevation. no signs of aspiration across consistencies trialed.

## 2019-04-01 NOTE — SWALLOW BEDSIDE ASSESSMENT ADULT - COMMENTS
MR HEAD 4/01/2019 IMPRESSION: White matter changes suggesting chronic hypertensive encephalopathy   without acute process.

## 2019-04-01 NOTE — CONSULT NOTE ADULT - SUBJECTIVE AND OBJECTIVE BOX
Infectious Diseases - Attending at Dr. Douglas    HPI:  74yo female, from home with pmh ESRD on dialysis (TTS)-recently started 3/21/19-, Dementia, HTN, asthma, CVA with no significant residual, SLE, T2DM, HTN presents with unresponsive episode.  Per daughter, pt had a fall yesterday.  This morning, pt was more quiet then usual but able to moan and say yes/no. Pt at baseline very verbal. Daughter took her to dialysis and noted at 1230pm that she was keeping food in her mouth and more dazed. When getting pt home and moving her daughter noted that pt was unresponsive and her eyes were" rolling back". (30 Mar 2019 22:01)  Pt found to have fever .also has cough       PAST MEDICAL & SURGICAL HISTORY:  CKD (chronic kidney disease) stage 5, GFR less than 15 ml/min  Dementia  DM (diabetes mellitus)  Asthma  CVA (cerebral vascular accident)  CVA (cerebral vascular accident)  HLD (hyperlipidemia)  Asthma  Neuropathy  SLE (systemic lupus erythematosus)  DM (diabetes mellitus)  HTN (hypertension)  No significant past surgical history  S/P tonsillectomy      Allergies    codeine (Swelling)  penicillin (Anaphylaxis)  penicillins (Swelling)    Intolerances        FAMILY HISTORY:  FH: type 2 diabetes: mother      SOCIAL HISTORY: non smoker    REVIEW OF SYSTEMS:    Constitutional: + fever, weight loss or fatigue  Eyes: No eye pain, visual disturbances, or discharge  ENT:  No difficulty hearing, tinnitus, vertigo; No sinus or throat pain  Neck: No pain or stiffness  Respiratory:+ cough, No wheezing,or hemoptysis  Cardiovascular: No chest pain, palpitations, shortness of breath, dizziness or leg swelling  Gastrointestinal: No abdominal or epigastric pain. No nausea, vomiting or hematemesis; No diarrhea or constipation. No melena or hematochezia.  Genitourinary: No dysuria, frequency, hematuria or incontinence  Neurological: No headaches, memory loss, loss of strength, numbness or tremors  Skin: No itching, burning, rashes or lesions       MEDICATIONS  (STANDING):  amLODIPine   Tablet 10 milliGRAM(s) Oral daily  aspirin enteric coated 81 milliGRAM(s) Oral daily  atorvastatin 80 milliGRAM(s) Oral at bedtime  buDESOnide 160 MICROgram(s)/formoterol 4.5 MICROgram(s) Inhaler 2 Puff(s) Inhalation two times a day  cefTRIAXone   IVPB      dextrose 5%. 1000 milliLiter(s) (50 mL/Hr) IV Continuous <Continuous>  dextrose 50% Injectable 12.5 Gram(s) IV Push once  dextrose 50% Injectable 25 Gram(s) IV Push once  dextrose 50% Injectable 25 Gram(s) IV Push once  gabapentin 200 milliGRAM(s) Oral two times a day  heparin  Injectable 5000 Unit(s) SubCutaneous every 12 hours  hydrALAZINE 25 milliGRAM(s) Oral two times a day  insulin lispro (HumaLOG) corrective regimen sliding scale   SubCutaneous three times a day before meals  insulin lispro (HumaLOG) corrective regimen sliding scale   SubCutaneous at bedtime  labetalol 200 milliGRAM(s) Oral two times a day  montelukast 10 milliGRAM(s) Oral daily  tamsulosin 0.4 milliGRAM(s) Oral at bedtime    MEDICATIONS  (PRN):  acetaminophen   Tablet .. 650 milliGRAM(s) Oral every 6 hours PRN Mild Pain (1 - 3)  ALBUTerol/ipratropium for Nebulization 3 milliLiter(s) Nebulizer every 6 hours PRN Shortness of Breath and/or Wheezing  dextrose 40% Gel 15 Gram(s) Oral once PRN Blood Glucose LESS THAN 70 milliGRAM(s)/deciliter  glucagon  Injectable 1 milliGRAM(s) IntraMuscular once PRN Glucose LESS THAN 70 milligrams/deciliter      Vital Signs Last 24 Hrs  T(C): 36.9 (2019 12:55), Max: 38.3 (31 Mar 2019 17:32)  T(F): 98.4 (2019 12:55), Max: 100.9 (31 Mar 2019 17:32)  HR: 76 (2019 12:55) (74 - 84)  BP: 130/65 (2019 12:55) (130/65 - 159/72)  BP(mean): --  RR: 18 (2019 12:55) (18 - 19)  SpO2: 95% (2019 12:55) (93% - 98%)    PHYSICAL EXAM:    Constitutional: NAD, well-groomed, well-developed  HEENT: PERRLA, EOMI, Normal Hearing, MMM  Neck: No LAD, No JVD  Back: Normal spine flexure, No CVA tenderness  Respiratory: few scattered rhonchi +  Cardiovascular: S1 and S2, RRR, no M/G/R  Gastrointestinal: BS+, soft, NT/ND  Extremities: No peripheral edema  Vascular: 2+ peripheral pulses  Neurological: A/O x 3, no focal deficits  Skin: No rashes      LABS:                        8.5    11.97 )-----------( 141      ( 2019 08:18 )             27.3         135  |  100  |  40<H>  ----------------------------<  98  3.8   |  25  |  5.29<H>    Ca    7.3<L>      2019 08:18  Mg     1.8         TPro  7.0  /  Alb  2.6<L>  /  TBili  0.2  /  DBili  x   /  AST  212<H>  /  ALT  58  /  AlkPhos  90      PT/INR - ( 30 Mar 2019 17:22 )   PT: 11.5 sec;   INR: 1.03 ratio         PTT - ( 30 Mar 2019 17:22 )  PTT:34.3 sec  Urinalysis Basic - ( 31 Mar 2019 02:18 )    Color: Yellow / Appearance: Slightly Turbid / S.015 / pH: x  Gluc: x / Ketone: Negative  / Bili: Negative / Urobili: Negative mg/dL   Blood: x / Protein: 500 mg/dL / Nitrite: Negative   Leuk Esterase: Negative / RBC: 3-5 /HPF / WBC 3-5   Sq Epi: x / Non Sq Epi: x / Bacteria: TNTC        MICROBIOLOGY:  RECENT CULTURES:   .Urine XXXX XXXX   >100,000 CFU/ml Gram Negative Rods     .Blood XXXX XXXX   No growth to date.          RADIOLOGY & ADDITIONAL STUDIES:  INTERPRETATION:  cough      A single portable radiograph suggests cardiomegaly, although there may be   magnification from technique..    Vascular markings are mildly prominent    The lungs are clear without evidence for infiltrate or consolidation.     Permacath in situ.    The osseous structures are grossly intact.    IMPRESSION:    Cardiomegaly. Clear lungs.

## 2019-04-01 NOTE — SWALLOW BEDSIDE ASSESSMENT ADULT - H & P REVIEW
yes/72yo female, from home with pmh ESRD on dialysis (TTS)-recently started 3/21/19-, Dementia, HTN, asthma, CVA with no significant residual, SLE, T2DM, HTN presents with unresponsive episode.  Per daughter, pt had a fall yesterday.  This morning, pt was more quiet then usual but able to moan and say yes/no. Pt at baseline very verbal. Daughter took her to dialysis and noted at 1230pm that she was keeping food in her mouth and more dazed. When getting pt home and moving her daughter noted that pt was unresponsive and her eyes were" rolling back".

## 2019-04-01 NOTE — CONSULT NOTE ADULT - ASSESSMENT
Subjective Complaints:      Consult requested by ER doctor:                  Attending:     History of Present Illness:  Chief Complaint/Reason for Admission:  History of Present Illness:  HPI:  72yo female, from home with pmh ESRD on dialysis (TTS)-recently started 3/21/19-, Dementia, HTN, asthma, CVA with no significant residual, SLE, T2DM, HTN presents with unresponsive episode.  Per daughter, pt had a fall yesterday.  This morning, pt was more quiet then usual but able to moan and say yes/no. Pt at baseline very verbal. Daughter took her to dialysis and noted at 1230pm that she was keeping food in her mouth and more dazed. When getting pt home and moving her daughter noted that pt was unresponsive and her eyes were" rolling back". (30 Mar 2019 22:01)        PAST MEDICAL & SURGICAL HISTORY:  CKD (chronic kidney disease) stage 5, GFR less than 15 ml/min  Dementia  DM (diabetes mellitus)  Asthma  CVA (cerebral vascular accident)  CVA (cerebral vascular accident)  HLD (hyperlipidemia)  Asthma  Neuropathy  SLE (systemic lupus erythematosus)  DM (diabetes mellitus)  HTN (hypertension)  No significant past surgical history  S/P tonsillectomy  73yFemale    MEDICATIONS  (STANDING):  amLODIPine   Tablet 10 milliGRAM(s) Oral daily  aspirin enteric coated 81 milliGRAM(s) Oral daily  atorvastatin 80 milliGRAM(s) Oral at bedtime  buDESOnide 160 MICROgram(s)/formoterol 4.5 MICROgram(s) Inhaler 2 Puff(s) Inhalation two times a day  dextrose 5%. 1000 milliLiter(s) (50 mL/Hr) IV Continuous <Continuous>  dextrose 50% Injectable 12.5 Gram(s) IV Push once  dextrose 50% Injectable 25 Gram(s) IV Push once  dextrose 50% Injectable 25 Gram(s) IV Push once  gabapentin 200 milliGRAM(s) Oral two times a day  heparin  Injectable 5000 Unit(s) SubCutaneous every 12 hours  hydrALAZINE 25 milliGRAM(s) Oral two times a day  insulin lispro (HumaLOG) corrective regimen sliding scale   SubCutaneous three times a day before meals  insulin lispro (HumaLOG) corrective regimen sliding scale   SubCutaneous at bedtime  labetalol 200 milliGRAM(s) Oral two times a day  montelukast 10 milliGRAM(s) Oral daily  tamsulosin 0.4 milliGRAM(s) Oral at bedtime    MEDICATIONS  (PRN):  acetaminophen   Tablet .. 650 milliGRAM(s) Oral every 6 hours PRN Mild Pain (1 - 3)  ALBUTerol/ipratropium for Nebulization 3 milliLiter(s) Nebulizer every 6 hours PRN Shortness of Breath and/or Wheezing  dextrose 40% Gel 15 Gram(s) Oral once PRN Blood Glucose LESS THAN 70 milliGRAM(s)/deciliter  glucagon  Injectable 1 milliGRAM(s) IntraMuscular once PRN Glucose LESS THAN 70 milligrams/deciliter      Allergies    codeine (Swelling)  penicillin (Anaphylaxis)  penicillins (Swelling)    Intolerances      FAMILY HISTORY:  FH: type 2 diabetes: mother      REVIEW OF SYSTEMS:  General:  No wt loss, fevers, chills, night sweats  Eyes:  Good vision, no reported pain  ENT:  No sore throat, pain, runny nose, dysphagia  CV:  No pain, palpitatioins, hypo/hypertension  Resp:  No dyspnea, cough, tachypnea, wheezing  GI:  No pain, nausea, vomiting, diarrhea, constipatiion  :  No pain, bleeding, incontinence, nocturia  Muscle:  No pain, weakness  Breast:  No pain, abscess, mass, discharge  Neuro:  No weakness, tingling, memory problems  Psych:  No fatigue, insomnia, mood problems, depression  Endocrine:  No polyuria, polydypsia, cold/heat intolerance  Heme:  No petechiae, ecchymosis, easy bruisability  Skin:  No rash, tattoos, scars, edema      Vital Signs Last 24 Hrs  T(C): 37.7 (31 Mar 2019 18:00), Max: 38.8 (31 Mar 2019 00:09)  T(F): 99.8 (31 Mar 2019 18:00), Max: 101.8 (31 Mar 2019 00:09)  HR: 84 (31 Mar 2019 17:32) (79 - 86)  BP: 159/72 (31 Mar 2019 17:32) (158/74 - 204/94)  BP(mean): --  RR: 19 (31 Mar 2019 17:32) (18 - 20)  SpO2: 98% (31 Mar 2019 17:32) (96% - 98%)    GENERAL PHYSICAL EXAM:  General:  Appears stated age, well-groomed, well-nourished, no distress  HEENT:  NC/AT, patent nares w/ pink mucosa, OP clear w/o lesions, PERRL, EOMI, conjunctivae clear, no thyromegaly, nodules, adenopathy, no JVD  Chest:  Full & symmetric excursion, no increased effort, breath sounds clear  Cardiovascular:  Regular rhythm, S1, S2, no murmur/rub/S3/S4, no carotid/femoral/abdominal bruit, radial/pedal pulses 2+, no edema  Abdomen:  Soft, non-tender, non-distended, normoactive bowel sounds, no HSM  Extremities:  Gait & station:   Digits:   Nails:   Joints, Bones, Muscles:   ROM:   Stability:  Skin:  No rash/erythema/ecchymoses/petechiae/wounds/abscess/warm/dry  Musculoskeletal:  Full ROM in all joints w/o swelling/tenderness/effusion    NEUROLOGICAL EXAM:  HENT:  Normocephalic head; atraumatic head.  Neck supple.  ENT: normal looking.  Mental State: t.   lettahrgic  arousable treies carlos eduardo open eyes  does not follws commnads     Cranial Nerves:  II-XII:   Pupils round and reactive to light and accommodation.   face equal  neck supple   Motor Functions:  arm  leg moves to paIN FUL STIMULI   Sensory Functions:   Intact to touch and pinprick to face and extremities.    Reflexes:  Deep tendon reflexes  TRACE   Cerebellar Testing:     DEFERD   Neurovascular: Carotid auscultation full without bruits.      LABS:                        9.8    15.08 )-----------( 163      ( 31 Mar 2019 07:17 )             31.9     03-    136  |  101  |  20  ----------------------------<  122<H>  3.7   |  24  |  3.30<H>    Ca    8.0<L>      31 Mar 2019 07:17  Mg     1.8         TPro  7.0  /  Alb  2.6<L>  /  TBili  0.2  /  DBili  x   /  AST  212<H>  /  ALT  58  /  AlkPhos  90  -30    PT/INR - ( 30 Mar 2019 17:22 )   PT: 11.5 sec;   INR: 1.03 ratio         PTT - ( 30 Mar 2019 17:22 )  PTT:34.3 sec    Urinalysis Basic - ( 31 Mar 2019 02:18 )    Color: Yellow / Appearance: Slightly Turbid / S.015 / pH: x  Gluc: x / Ketone: Negative  / Bili: Negative / Urobili: Negative mg/dL   Blood: x / Protein: 500 mg/dL / Nitrite: Negative   Leuk Esterase: Negative / RBC: 3-5 /HPF / WBC 3-5   Sq Epi: x / Non Sq Epi: x / Bacteria: TNTC        RADIOLOGY & ADDITIONAL STUDIES:      Assessment & Opinion: EVENTS NOTED ESRD  LETAHRGIC AROUSABLE TREIES TO OPEN EYES DOES NO TFOLLWS COMMNADS  CT HEAD NO  ACUTE PATH   R/O SEZIURE  RENAL FOLLOW UP  TSH B12 EEG   DISCUSS WITH FAMILY WILL FOLOW METABOLIC ENCEPHALAOTHY     Recommendations:  Brain MRI.  Carotid doppler.  Echocardiogram.  EEG.   DVT prophylaxis as ordered.  Medications:
73 female with a history of HTN, CAD, CHF, DM. PVD, CVA, ESRD on HD recently started on dialysis now admitted with mental status changes.  Had dialysis yesterday. CT head negative as per the readings. Waiting for neuro evaluation.   Spoke to Dr. Duenas's group (nephrology), they have not started to come here yet. will follow up for dialysis on Tuesday.
73 yr old female seen with

## 2019-04-01 NOTE — SWALLOW BEDSIDE ASSESSMENT ADULT - SLP GENERAL OBSERVATIONS
pt seen bedside, alert and oriented x2. pt daughter present for eval. pt answered autobiographical questions, and answered want questions with head shake/nod/ and pointing, pt needed verbal cues from slp to verbalize answers. pt followed one step directions. speech intelligibility good and vocal quality WNL.

## 2019-04-01 NOTE — SWALLOW BEDSIDE ASSESSMENT ADULT - MODE OF PRESENTATION
spoon/fed by clinician fed by clinician/straw straw/fed by clinician cup/fed by clinician self fed fed by clinician/cup

## 2019-04-01 NOTE — SWALLOW BEDSIDE ASSESSMENT ADULT - SWALLOW EVAL: RECOMMENDED FEEDING/EATING TECHNIQUES
maintain upright posture during/after eating for 30 mins/position upright (90 degrees)/check mouth frequently for oral residue/pocketing/small sips/bites

## 2019-04-02 LAB
-  AMIKACIN: SIGNIFICANT CHANGE UP
-  AMPICILLIN/SULBACTAM: SIGNIFICANT CHANGE UP
-  AMPICILLIN: SIGNIFICANT CHANGE UP
-  AZTREONAM: SIGNIFICANT CHANGE UP
-  CEFAZOLIN: SIGNIFICANT CHANGE UP
-  CEFEPIME: SIGNIFICANT CHANGE UP
-  CEFOXITIN: SIGNIFICANT CHANGE UP
-  CEFTRIAXONE: SIGNIFICANT CHANGE UP
-  CIPROFLOXACIN: SIGNIFICANT CHANGE UP
-  ERTAPENEM: SIGNIFICANT CHANGE UP
-  GENTAMICIN: SIGNIFICANT CHANGE UP
-  IMIPENEM: SIGNIFICANT CHANGE UP
-  LEVOFLOXACIN: SIGNIFICANT CHANGE UP
-  MEROPENEM: SIGNIFICANT CHANGE UP
-  NITROFURANTOIN: SIGNIFICANT CHANGE UP
-  PIPERACILLIN/TAZOBACTAM: SIGNIFICANT CHANGE UP
-  TIGECYCLINE: SIGNIFICANT CHANGE UP
-  TOBRAMYCIN: SIGNIFICANT CHANGE UP
-  TRIMETHOPRIM/SULFAMETHOXAZOLE: SIGNIFICANT CHANGE UP
ALBUMIN SERPL ELPH-MCNC: 2.1 G/DL — LOW (ref 3.3–5)
ANION GAP SERPL CALC-SCNC: 14 MMOL/L — SIGNIFICANT CHANGE UP (ref 5–17)
BUN SERPL-MCNC: 56 MG/DL — HIGH (ref 7–23)
CALCIUM SERPL-MCNC: 6.8 MG/DL — LOW (ref 8.5–10.1)
CHLORIDE SERPL-SCNC: 98 MMOL/L — SIGNIFICANT CHANGE UP (ref 96–108)
CO2 SERPL-SCNC: 22 MMOL/L — SIGNIFICANT CHANGE UP (ref 22–31)
CREAT SERPL-MCNC: 7.05 MG/DL — HIGH (ref 0.5–1.3)
CULTURE RESULTS: SIGNIFICANT CHANGE UP
FERRITIN SERPL-MCNC: 98 NG/ML — SIGNIFICANT CHANGE UP (ref 15–150)
FOLATE SERPL-MCNC: >20 NG/ML — SIGNIFICANT CHANGE UP
GLUCOSE SERPL-MCNC: 101 MG/DL — HIGH (ref 70–99)
HBV SURFACE AB SER-ACNC: SIGNIFICANT CHANGE UP
HBV SURFACE AG SER-ACNC: SIGNIFICANT CHANGE UP
HCT VFR BLD CALC: 27 % — LOW (ref 34.5–45)
HCV AB S/CO SERPL IA: 0.1 S/CO — SIGNIFICANT CHANGE UP (ref 0–0.79)
HCV AB SERPL-IMP: SIGNIFICANT CHANGE UP
HGB BLD-MCNC: 8.5 G/DL — LOW (ref 11.5–15.5)
IRON SATN MFR SERPL: 13 UG/DL — LOW (ref 30–160)
IRON SATN MFR SERPL: 6 % — LOW (ref 14–50)
MAGNESIUM SERPL-MCNC: 2.1 MG/DL — SIGNIFICANT CHANGE UP (ref 1.6–2.6)
MCHC RBC-ENTMCNC: 23.5 PG — LOW (ref 27–34)
MCHC RBC-ENTMCNC: 31.5 GM/DL — LOW (ref 32–36)
MCV RBC AUTO: 74.6 FL — LOW (ref 80–100)
METHOD TYPE: SIGNIFICANT CHANGE UP
NRBC # BLD: 0 /100 WBCS — SIGNIFICANT CHANGE UP (ref 0–0)
ORGANISM # SPEC MICROSCOPIC CNT: SIGNIFICANT CHANGE UP
ORGANISM # SPEC MICROSCOPIC CNT: SIGNIFICANT CHANGE UP
PHOSPHATE SERPL-MCNC: 6.4 MG/DL — HIGH (ref 2.5–4.5)
PLATELET # BLD AUTO: 160 K/UL — SIGNIFICANT CHANGE UP (ref 150–400)
POTASSIUM SERPL-MCNC: 3.8 MMOL/L — SIGNIFICANT CHANGE UP (ref 3.5–5.3)
POTASSIUM SERPL-SCNC: 3.8 MMOL/L — SIGNIFICANT CHANGE UP (ref 3.5–5.3)
RBC # BLD: 3.62 M/UL — LOW (ref 3.8–5.2)
RBC # FLD: 15.9 % — HIGH (ref 10.3–14.5)
SODIUM SERPL-SCNC: 134 MMOL/L — LOW (ref 135–145)
SPECIMEN SOURCE: SIGNIFICANT CHANGE UP
TIBC SERPL-MCNC: 211 UG/DL — LOW (ref 220–430)
UIBC SERPL-MCNC: 198 UG/DL — SIGNIFICANT CHANGE UP (ref 110–370)
VIT B12 SERPL-MCNC: 350 PG/ML — SIGNIFICANT CHANGE UP (ref 232–1245)
WBC # BLD: 10.79 K/UL — HIGH (ref 3.8–10.5)
WBC # FLD AUTO: 10.79 K/UL — HIGH (ref 3.8–10.5)

## 2019-04-02 PROCEDURE — 99233 SBSQ HOSP IP/OBS HIGH 50: CPT

## 2019-04-02 RX ADMIN — GABAPENTIN 200 MILLIGRAM(S): 400 CAPSULE ORAL at 05:30

## 2019-04-02 RX ADMIN — HEPARIN SODIUM 5000 UNIT(S): 5000 INJECTION INTRAVENOUS; SUBCUTANEOUS at 17:32

## 2019-04-02 RX ADMIN — AMLODIPINE BESYLATE 10 MILLIGRAM(S): 2.5 TABLET ORAL at 05:31

## 2019-04-02 RX ADMIN — BUDESONIDE AND FORMOTEROL FUMARATE DIHYDRATE 2 PUFF(S): 160; 4.5 AEROSOL RESPIRATORY (INHALATION) at 17:32

## 2019-04-02 RX ADMIN — BUDESONIDE AND FORMOTEROL FUMARATE DIHYDRATE 2 PUFF(S): 160; 4.5 AEROSOL RESPIRATORY (INHALATION) at 05:36

## 2019-04-02 RX ADMIN — TAMSULOSIN HYDROCHLORIDE 0.4 MILLIGRAM(S): 0.4 CAPSULE ORAL at 21:05

## 2019-04-02 RX ADMIN — GABAPENTIN 200 MILLIGRAM(S): 400 CAPSULE ORAL at 17:32

## 2019-04-02 RX ADMIN — Medication 25 MILLIGRAM(S): at 17:32

## 2019-04-02 RX ADMIN — Medication 25 MILLIGRAM(S): at 05:30

## 2019-04-02 RX ADMIN — CEFTRIAXONE 100 GRAM(S): 500 INJECTION, POWDER, FOR SOLUTION INTRAMUSCULAR; INTRAVENOUS at 14:43

## 2019-04-02 RX ADMIN — ATORVASTATIN CALCIUM 80 MILLIGRAM(S): 80 TABLET, FILM COATED ORAL at 21:05

## 2019-04-02 RX ADMIN — Medication 200 MILLIGRAM(S): at 17:32

## 2019-04-02 RX ADMIN — Medication 81 MILLIGRAM(S): at 14:43

## 2019-04-02 RX ADMIN — Medication 200 MILLIGRAM(S): at 05:30

## 2019-04-02 RX ADMIN — HEPARIN SODIUM 5000 UNIT(S): 5000 INJECTION INTRAVENOUS; SUBCUTANEOUS at 05:30

## 2019-04-02 RX ADMIN — ERYTHROPOIETIN 10000 UNIT(S): 10000 INJECTION, SOLUTION INTRAVENOUS; SUBCUTANEOUS at 12:02

## 2019-04-02 RX ADMIN — MONTELUKAST 10 MILLIGRAM(S): 4 TABLET, CHEWABLE ORAL at 14:44

## 2019-04-02 NOTE — DIETITIAN INITIAL EVALUATION ADULT. - SOURCE
pt's daughter, Chart review, pt is forgetful, c hx of dementia as per daughter/family/significant other

## 2019-04-02 NOTE — DIETITIAN INITIAL EVALUATION ADULT. - PERTINENT LABORATORY DATA
04-02   Hgb 8.5 g/dL<L> Hct 27.0 %<L> 04-02 Na134 mmol/L<L> Glu 101 mg/dL<H> K+ 3.8 mmol/L Cr  7.05 mg/dL<H> BUN 56 mg/dL<H> 04-02 Phos 6.4 mg/dL<H> 04-02 Alb 2.1 g/dL<L> 03-31 [id number] 8.5 %<H> 03-31 Chol 150 mg/dL LDL 75 mg/dL HDL 56 mg/dL Trig 94 mg/dL03-30 ALT 58 U/L  U/L<H> Alkaline Phosphatase 90 U/L

## 2019-04-02 NOTE — DIETITIAN INITIAL EVALUATION ADULT. - DIET TYPE
03/30/consistent carbohydrate (no snacks)/renal replacement pts:no protein restr,no conc K & phos, low sodium

## 2019-04-02 NOTE — DIETITIAN INITIAL EVALUATION ADULT. - OTHER INFO
Pt seen due to HbA1/GC=8.5%.  Pt's daughter reports that last HbA1/GC% in March was 9 and was 13% in January.  Pt was on insulin glargine 25 units & Humalog 5 units c meal PTA.  Pt's daughter monitored blood glucose for pt.  Pt's daughter has tried Ensure/ Nurament which  gave her diarrhea.  Pt's daughter does not want to try nutritional supplements for pt due to hx of diarrhea c the ones that she has tried.  Pt is new to HD x 2 weeks.   Daughter stated that she was told to provide 5-6 small meals for pt, however daughter reported financial constraints in providing food for mother.  Pt referred to [facility] program.

## 2019-04-02 NOTE — OCCUPATIONAL THERAPY INITIAL EVALUATION ADULT - ADDITIONAL COMMENTS
As per patients daughter Maria Eugenia, Patient lives in a private house with 6 steps to enter with rails on both sides. Once inside, the patient has 14 steps with (+) rail to reach main floor where bedroom and bathroom is. The patients bathroom has a tub/shower combination with a regular toilet. The patient has a tub bench which she uses for bathing. The patient ambulates with a cane and a rolling walker. The patients daughter reported that over the past month, the patient has deteriorated functionally. Prior to that, the patient was able to perform tasks, but with minimal assistance. The patient has aide services 7 days a week M-F 6 hours and 5 hours on the weekend.

## 2019-04-02 NOTE — DIETITIAN INITIAL EVALUATION ADULT. - ADHERENCE
dialysis diet c 32 ounce fluid restriction, diabetic, daughter did the shopping & cooking, pt lived c daughter/fair

## 2019-04-02 NOTE — OCCUPATIONAL THERAPY INITIAL EVALUATION ADULT - GENERAL OBSERVATIONS, REHAB EVAL
Pt was encountered supine in bed; NAD, IV lock on and intact, confused, cardiac monitor on, AXOX3, sleepy at times, cooperative, followed commands

## 2019-04-02 NOTE — DIETITIAN INITIAL EVALUATION ADULT. - ENERGY NEEDS
Height (cm): 171.45 (04-02)  Weight (kg): 89.8 (04-02)  BMI (kg/m2): 30.5 (04-02)  IBW:  62.5 kg       % IBW: 143%            UBW: 95.2 kg            %UBW: 94%(04/02 wt. of 89.8 kg)  wt. loss of 1.4 kg since adm noted

## 2019-04-02 NOTE — DIETITIAN INITIAL EVALUATION ADULT. - FACTORS AFF FOOD INTAKE
persistent lack of appetite/pt c some upper missing teeth, able to tolerate whole foods as per daughter, 04/01, swallow evaluation recommended Dysphagia 2 Mechanical Soft - Thin Liquids/other (specify)/change in mental status pt c some upper missing teeth, able to tolerate whole foods as per daughter, 04/01, swallow evaluation recommended Dysphagia 2 Mechanical Soft - Thin Liquids, pt appeared to swallow dental soft food for lunch today, RN in agreement for Dysphagia 3 Soft - Thin Liquids @ present/other (specify)/change in mental status/persistent lack of appetite

## 2019-04-02 NOTE — OCCUPATIONAL THERAPY INITIAL EVALUATION ADULT - PERTINENT HX OF CURRENT PROBLEM, REHAB EVAL
72yo female, from home with pmh ESRD on dialysis (TTS), Dementia, HTN, asthma, CVA with no significant residual, SLE, T2DM, HTN presents with unresponsive episode.

## 2019-04-02 NOTE — CHART NOTE - NSCHARTNOTEFT_GEN_A_CORE
Upon Nutritional Assessment by the Registered Dietitian your patient was determined to meet criteria / has evidence of the following diagnosis/diagnoses:          [ ]  Mild Protein Calorie Malnutrition        [x ]  Moderate Protein Calorie Malnutrition        [ ] Severe Protein Calorie Malnutrition        [ ] Unspecified Protein Calorie Malnutrition        [ ] Underweight / BMI <19        [ ] Morbid Obesity / BMI > 40      Findings as based on:  •  Comprehensive nutrition assessment and consultation  •  Calorie counts (nutrient intake analysis)  •  Food acceptance and intake status from observations by staff  •  Follow up  •  Patient education  •  Intervention secondary to interdisciplinary rounds  •   concerns      Treatment:    The following diet has been recommended:  renal for renal replacement , 1000 ml fluid restriction, Dysphagia 2 Mechanical Soft - Thin Liquids (will add HBV protein-energy snacks c meals to encourage oral intake)      PROVIDER Section:     By signing this assessment you are acknowledging and agree with the diagnosis/diagnoses assigned by the Registered Dietitian    Comments: Upon Nutritional Assessment by the Registered Dietitian your patient was determined to meet criteria / has evidence of the following diagnosis/diagnoses:          [ ]  Mild Protein Calorie Malnutrition        [x ]  Moderate Protein Calorie Malnutrition        [ ] Severe Protein Calorie Malnutrition        [ ] Unspecified Protein Calorie Malnutrition        [ ] Underweight / BMI <19        [ ] Morbid Obesity / BMI > 40      Findings as based on:  •  Comprehensive nutrition assessment and consultation  •  Calorie counts (nutrient intake analysis)  •  Food acceptance and intake status from observations by staff  •  Follow up  •  Patient education  •  Intervention secondary to interdisciplinary rounds  •   concerns      Treatment:    The following diet has been recommended:  renal for renal replacement , 1000 ml fluid restriction, Dysphagia 3 Soft - Thin Liquids (will add HBV protein-energy snacks c meals to encourage oral intake)      PROVIDER Section:     By signing this assessment you are acknowledging and agree with the diagnosis/diagnoses assigned by the Registered Dietitian    Comments: Upon Nutritional Assessment by the Registered Dietitian your patient was determined to meet criteria / has evidence of the following diagnosis/diagnoses:          [ ]  Mild Protein Calorie Malnutrition        [x ]  Moderate Protein Calorie Malnutrition        [ ] Severe Protein Calorie Malnutrition        [ ] Unspecified Protein Calorie Malnutrition        [ ] Underweight / BMI <19        [ ] Morbid Obesity / BMI > 40      Findings as based on:  •  Comprehensive nutrition assessment and consultation  •  Calorie counts (nutrient intake analysis)  •  Food acceptance and intake status from observations by staff  •  Follow up  •  Patient education  •  Intervention secondary to interdisciplinary rounds  •   concerns      Treatment:    The following diet has been recommended:  renal for renal replacement , 1000 ml fluid restriction, Dysphagia 3 Soft - Thin Liquids (will add HBV protein-energy snacks c meals to encourage oral intake)  Nephro-Myriam daily     PROVIDER Section:     By signing this assessment you are acknowledging and agree with the diagnosis/diagnoses assigned by the Registered Dietitian    Comments:

## 2019-04-02 NOTE — DIETITIAN INITIAL EVALUATION ADULT. - NS AS NUTRI INTERV MEALS SNACK
Recommend renal for renal replacement , 1000 ml fluid restriction, consistent carbohydrate c evening snack , Dysphagia 2 Mechanical Soft - Thin Liquids, will provide snacks c HBV protein foods on tray to encourage oral intake/Fluid - modified diet/Schedule of food/fluids/Carbohydrate - modified diet/Mineral - modified diet Fluid - modified diet/Carbohydrate - modified diet/Mineral - modified diet/Schedule of food/fluids/Recommend renal for renal replacement , 1000 ml fluid restriction, consistent carbohydrate c evening snack , Dysphagia 3 Soft - Thin Liquids,  will provide snacks c HBV protein foods on tray to encourage oral intake

## 2019-04-02 NOTE — DIETITIAN INITIAL EVALUATION ADULT. - PERTINENT MEDS FT
MEDICATIONS  (STANDING):  amLODIPine   Tablet 10 milliGRAM(s) Oral daily  aspirin enteric coated 81 milliGRAM(s) Oral daily  atorvastatin 80 milliGRAM(s) Oral at bedtime  buDESOnide 160 MICROgram(s)/formoterol 4.5 MICROgram(s) Inhaler 2 Puff(s) Inhalation two times a day  cefTRIAXone   IVPB      cefTRIAXone   IVPB 1 Gram(s) IV Intermittent every 24 hours  dextrose 5%. 1000 milliLiter(s) (50 mL/Hr) IV Continuous <Continuous>  dextrose 50% Injectable 12.5 Gram(s) IV Push once  dextrose 50% Injectable 25 Gram(s) IV Push once  dextrose 50% Injectable 25 Gram(s) IV Push once  epoetin melba Injectable 78630 Unit(s) IV Push <User Schedule>  gabapentin 200 milliGRAM(s) Oral two times a day  heparin  Injectable 5000 Unit(s) SubCutaneous every 12 hours  hydrALAZINE 25 milliGRAM(s) Oral two times a day  insulin lispro (HumaLOG) corrective regimen sliding scale   SubCutaneous three times a day before meals  insulin lispro (HumaLOG) corrective regimen sliding scale   SubCutaneous at bedtime  labetalol 200 milliGRAM(s) Oral two times a day  montelukast 10 milliGRAM(s) Oral daily  tamsulosin 0.4 milliGRAM(s) Oral at bedtime    MEDICATIONS  (PRN):  acetaminophen   Tablet .. 650 milliGRAM(s) Oral every 6 hours PRN Mild Pain (1 - 3)  ALBUTerol/ipratropium for Nebulization 3 milliLiter(s) Nebulizer every 6 hours PRN Shortness of Breath and/or Wheezing  dextrose 40% Gel 15 Gram(s) Oral once PRN Blood Glucose LESS THAN 70 milliGRAM(s)/deciliter  glucagon  Injectable 1 milliGRAM(s) IntraMuscular once PRN Glucose LESS THAN 70 milligrams/deciliter

## 2019-04-03 LAB
ANION GAP SERPL CALC-SCNC: 12 MMOL/L — SIGNIFICANT CHANGE UP (ref 5–17)
BASE EXCESS BLDA CALC-SCNC: 2.2 MMOL/L — HIGH (ref -2–2)
BLOOD GAS COMMENTS: SIGNIFICANT CHANGE UP
BLOOD GAS COMMENTS: SIGNIFICANT CHANGE UP
BLOOD GAS SOURCE: SIGNIFICANT CHANGE UP
BUN SERPL-MCNC: 40 MG/DL — HIGH (ref 7–23)
CALCIUM SERPL-MCNC: 7.3 MG/DL — LOW (ref 8.5–10.1)
CHLORIDE SERPL-SCNC: 100 MMOL/L — SIGNIFICANT CHANGE UP (ref 96–108)
CO2 SERPL-SCNC: 25 MMOL/L — SIGNIFICANT CHANGE UP (ref 22–31)
CREAT SERPL-MCNC: 6 MG/DL — HIGH (ref 0.5–1.3)
GLUCOSE SERPL-MCNC: 94 MG/DL — SIGNIFICANT CHANGE UP (ref 70–99)
HCO3 BLDA-SCNC: 25 MMOL/L — SIGNIFICANT CHANGE UP (ref 21–29)
HCT VFR BLD CALC: 29.6 % — LOW (ref 34.5–45)
HGB BLD-MCNC: 9.1 G/DL — LOW (ref 11.5–15.5)
HOROWITZ INDEX BLDA+IHG-RTO: 0.21 — SIGNIFICANT CHANGE UP
MAGNESIUM SERPL-MCNC: 2.2 MG/DL — SIGNIFICANT CHANGE UP (ref 1.6–2.6)
MCHC RBC-ENTMCNC: 23.3 PG — LOW (ref 27–34)
MCHC RBC-ENTMCNC: 30.7 GM/DL — LOW (ref 32–36)
MCV RBC AUTO: 75.7 FL — LOW (ref 80–100)
NRBC # BLD: 0 /100 WBCS — SIGNIFICANT CHANGE UP (ref 0–0)
PCO2 BLDA: 35 MMHG — SIGNIFICANT CHANGE UP (ref 32–46)
PH BLD: 7.47 — HIGH (ref 7.35–7.45)
PHOSPHATE SERPL-MCNC: 5.1 MG/DL — HIGH (ref 2.5–4.5)
PLATELET # BLD AUTO: 198 K/UL — SIGNIFICANT CHANGE UP (ref 150–400)
PO2 BLDA: 78 MMHG — SIGNIFICANT CHANGE UP (ref 74–108)
POTASSIUM SERPL-MCNC: 4.5 MMOL/L — SIGNIFICANT CHANGE UP (ref 3.5–5.3)
POTASSIUM SERPL-SCNC: 4.5 MMOL/L — SIGNIFICANT CHANGE UP (ref 3.5–5.3)
RBC # BLD: 3.91 M/UL — SIGNIFICANT CHANGE UP (ref 3.8–5.2)
RBC # FLD: 16 % — HIGH (ref 10.3–14.5)
SAO2 % BLDA: 96 % — SIGNIFICANT CHANGE UP (ref 92–96)
SODIUM SERPL-SCNC: 137 MMOL/L — SIGNIFICANT CHANGE UP (ref 135–145)
WBC # BLD: 8.43 K/UL — SIGNIFICANT CHANGE UP (ref 3.8–10.5)
WBC # FLD AUTO: 8.43 K/UL — SIGNIFICANT CHANGE UP (ref 3.8–10.5)

## 2019-04-03 PROCEDURE — 99233 SBSQ HOSP IP/OBS HIGH 50: CPT

## 2019-04-03 PROCEDURE — 70450 CT HEAD/BRAIN W/O DYE: CPT | Mod: 26

## 2019-04-03 RX ORDER — SODIUM CHLORIDE 9 MG/ML
10 INJECTION INTRAMUSCULAR; INTRAVENOUS; SUBCUTANEOUS
Qty: 0 | Refills: 0 | Status: DISCONTINUED | OUTPATIENT
Start: 2019-04-03 | End: 2019-04-08

## 2019-04-03 RX ORDER — CHLORHEXIDINE GLUCONATE 213 G/1000ML
1 SOLUTION TOPICAL
Qty: 0 | Refills: 0 | Status: DISCONTINUED | OUTPATIENT
Start: 2019-04-03 | End: 2019-04-08

## 2019-04-03 RX ORDER — PREGABALIN 225 MG/1
1000 CAPSULE ORAL DAILY
Qty: 0 | Refills: 0 | Status: DISCONTINUED | OUTPATIENT
Start: 2019-04-03 | End: 2019-04-08

## 2019-04-03 RX ADMIN — Medication 2: at 08:22

## 2019-04-03 RX ADMIN — ATORVASTATIN CALCIUM 80 MILLIGRAM(S): 80 TABLET, FILM COATED ORAL at 22:53

## 2019-04-03 RX ADMIN — TAMSULOSIN HYDROCHLORIDE 0.4 MILLIGRAM(S): 0.4 CAPSULE ORAL at 22:53

## 2019-04-03 RX ADMIN — HEPARIN SODIUM 5000 UNIT(S): 5000 INJECTION INTRAVENOUS; SUBCUTANEOUS at 06:01

## 2019-04-03 RX ADMIN — Medication 25 MILLIGRAM(S): at 06:01

## 2019-04-03 RX ADMIN — BUDESONIDE AND FORMOTEROL FUMARATE DIHYDRATE 2 PUFF(S): 160; 4.5 AEROSOL RESPIRATORY (INHALATION) at 06:01

## 2019-04-03 RX ADMIN — GABAPENTIN 200 MILLIGRAM(S): 400 CAPSULE ORAL at 06:01

## 2019-04-03 RX ADMIN — PREGABALIN 1000 MICROGRAM(S): 225 CAPSULE ORAL at 22:53

## 2019-04-03 RX ADMIN — Medication 200 MILLIGRAM(S): at 06:01

## 2019-04-03 RX ADMIN — HEPARIN SODIUM 5000 UNIT(S): 5000 INJECTION INTRAVENOUS; SUBCUTANEOUS at 19:36

## 2019-04-03 RX ADMIN — AMLODIPINE BESYLATE 10 MILLIGRAM(S): 2.5 TABLET ORAL at 06:01

## 2019-04-03 RX ADMIN — CEFTRIAXONE 100 GRAM(S): 500 INJECTION, POWDER, FOR SOLUTION INTRAMUSCULAR; INTRAVENOUS at 12:59

## 2019-04-04 LAB
ANION GAP SERPL CALC-SCNC: 14 MMOL/L — SIGNIFICANT CHANGE UP (ref 5–17)
BUN SERPL-MCNC: 45 MG/DL — HIGH (ref 7–23)
CALCIUM SERPL-MCNC: 7.1 MG/DL — LOW (ref 8.5–10.1)
CHLORIDE SERPL-SCNC: 100 MMOL/L — SIGNIFICANT CHANGE UP (ref 96–108)
CO2 SERPL-SCNC: 23 MMOL/L — SIGNIFICANT CHANGE UP (ref 22–31)
CREAT SERPL-MCNC: 6.64 MG/DL — HIGH (ref 0.5–1.3)
GLUCOSE SERPL-MCNC: 96 MG/DL — SIGNIFICANT CHANGE UP (ref 70–99)
HCT VFR BLD CALC: 27.8 % — LOW (ref 34.5–45)
HGB BLD-MCNC: 8.6 G/DL — LOW (ref 11.5–15.5)
MAGNESIUM SERPL-MCNC: 2.1 MG/DL — SIGNIFICANT CHANGE UP (ref 1.6–2.6)
MCHC RBC-ENTMCNC: 23.4 PG — LOW (ref 27–34)
MCHC RBC-ENTMCNC: 30.9 GM/DL — LOW (ref 32–36)
MCV RBC AUTO: 75.5 FL — LOW (ref 80–100)
NRBC # BLD: 0 /100 WBCS — SIGNIFICANT CHANGE UP (ref 0–0)
PHOSPHATE SERPL-MCNC: 5.1 MG/DL — HIGH (ref 2.5–4.5)
PLATELET # BLD AUTO: 225 K/UL — SIGNIFICANT CHANGE UP (ref 150–400)
POTASSIUM SERPL-MCNC: 3.8 MMOL/L — SIGNIFICANT CHANGE UP (ref 3.5–5.3)
POTASSIUM SERPL-SCNC: 3.8 MMOL/L — SIGNIFICANT CHANGE UP (ref 3.5–5.3)
RBC # BLD: 3.68 M/UL — LOW (ref 3.8–5.2)
RBC # FLD: 15.7 % — HIGH (ref 10.3–14.5)
SODIUM SERPL-SCNC: 137 MMOL/L — SIGNIFICANT CHANGE UP (ref 135–145)
WBC # BLD: 11.33 K/UL — HIGH (ref 3.8–10.5)
WBC # FLD AUTO: 11.33 K/UL — HIGH (ref 3.8–10.5)

## 2019-04-04 PROCEDURE — 99233 SBSQ HOSP IP/OBS HIGH 50: CPT

## 2019-04-04 RX ADMIN — ERYTHROPOIETIN 10000 UNIT(S): 10000 INJECTION, SOLUTION INTRAVENOUS; SUBCUTANEOUS at 11:44

## 2019-04-04 RX ADMIN — Medication 25 MILLIGRAM(S): at 17:36

## 2019-04-04 RX ADMIN — Medication 81 MILLIGRAM(S): at 14:59

## 2019-04-04 RX ADMIN — AMLODIPINE BESYLATE 10 MILLIGRAM(S): 2.5 TABLET ORAL at 05:32

## 2019-04-04 RX ADMIN — Medication 200 MILLIGRAM(S): at 05:32

## 2019-04-04 RX ADMIN — PREGABALIN 1000 MICROGRAM(S): 225 CAPSULE ORAL at 14:59

## 2019-04-04 RX ADMIN — BUDESONIDE AND FORMOTEROL FUMARATE DIHYDRATE 2 PUFF(S): 160; 4.5 AEROSOL RESPIRATORY (INHALATION) at 17:36

## 2019-04-04 RX ADMIN — MONTELUKAST 10 MILLIGRAM(S): 4 TABLET, CHEWABLE ORAL at 15:05

## 2019-04-04 RX ADMIN — Medication 200 MILLIGRAM(S): at 17:36

## 2019-04-04 RX ADMIN — HEPARIN SODIUM 5000 UNIT(S): 5000 INJECTION INTRAVENOUS; SUBCUTANEOUS at 17:36

## 2019-04-04 RX ADMIN — TAMSULOSIN HYDROCHLORIDE 0.4 MILLIGRAM(S): 0.4 CAPSULE ORAL at 21:55

## 2019-04-04 RX ADMIN — Medication 25 MILLIGRAM(S): at 05:32

## 2019-04-04 RX ADMIN — BUDESONIDE AND FORMOTEROL FUMARATE DIHYDRATE 2 PUFF(S): 160; 4.5 AEROSOL RESPIRATORY (INHALATION) at 05:32

## 2019-04-04 RX ADMIN — CEFTRIAXONE 100 GRAM(S): 500 INJECTION, POWDER, FOR SOLUTION INTRAMUSCULAR; INTRAVENOUS at 14:58

## 2019-04-04 RX ADMIN — ATORVASTATIN CALCIUM 80 MILLIGRAM(S): 80 TABLET, FILM COATED ORAL at 21:55

## 2019-04-04 RX ADMIN — HEPARIN SODIUM 5000 UNIT(S): 5000 INJECTION INTRAVENOUS; SUBCUTANEOUS at 05:32

## 2019-04-04 RX ADMIN — CHLORHEXIDINE GLUCONATE 1 APPLICATION(S): 213 SOLUTION TOPICAL at 05:32

## 2019-04-05 LAB
ANION GAP SERPL CALC-SCNC: 11 MMOL/L — SIGNIFICANT CHANGE UP (ref 5–17)
BUN SERPL-MCNC: 31 MG/DL — HIGH (ref 7–23)
CALCIUM SERPL-MCNC: 7.8 MG/DL — LOW (ref 8.5–10.1)
CHLORIDE SERPL-SCNC: 101 MMOL/L — SIGNIFICANT CHANGE UP (ref 96–108)
CO2 SERPL-SCNC: 27 MMOL/L — SIGNIFICANT CHANGE UP (ref 22–31)
CREAT SERPL-MCNC: 5.18 MG/DL — HIGH (ref 0.5–1.3)
CULTURE RESULTS: SIGNIFICANT CHANGE UP
CULTURE RESULTS: SIGNIFICANT CHANGE UP
GLUCOSE SERPL-MCNC: 98 MG/DL — SIGNIFICANT CHANGE UP (ref 70–99)
HCT VFR BLD CALC: 29.7 % — LOW (ref 34.5–45)
HGB BLD-MCNC: 9 G/DL — LOW (ref 11.5–15.5)
MAGNESIUM SERPL-MCNC: 2 MG/DL — SIGNIFICANT CHANGE UP (ref 1.6–2.6)
MCHC RBC-ENTMCNC: 23.1 PG — LOW (ref 27–34)
MCHC RBC-ENTMCNC: 30.3 GM/DL — LOW (ref 32–36)
MCV RBC AUTO: 76.2 FL — LOW (ref 80–100)
NRBC # BLD: 0 /100 WBCS — SIGNIFICANT CHANGE UP (ref 0–0)
PHOSPHATE SERPL-MCNC: 4.7 MG/DL — HIGH (ref 2.5–4.5)
PLATELET # BLD AUTO: 291 K/UL — SIGNIFICANT CHANGE UP (ref 150–400)
POTASSIUM SERPL-MCNC: 3.6 MMOL/L — SIGNIFICANT CHANGE UP (ref 3.5–5.3)
POTASSIUM SERPL-SCNC: 3.6 MMOL/L — SIGNIFICANT CHANGE UP (ref 3.5–5.3)
RBC # BLD: 3.9 M/UL — SIGNIFICANT CHANGE UP (ref 3.8–5.2)
RBC # FLD: 15.9 % — HIGH (ref 10.3–14.5)
SODIUM SERPL-SCNC: 139 MMOL/L — SIGNIFICANT CHANGE UP (ref 135–145)
SPECIMEN SOURCE: SIGNIFICANT CHANGE UP
SPECIMEN SOURCE: SIGNIFICANT CHANGE UP
WBC # BLD: 9.06 K/UL — SIGNIFICANT CHANGE UP (ref 3.8–10.5)
WBC # FLD AUTO: 9.06 K/UL — SIGNIFICANT CHANGE UP (ref 3.8–10.5)

## 2019-04-05 PROCEDURE — 99233 SBSQ HOSP IP/OBS HIGH 50: CPT

## 2019-04-05 RX ORDER — ATORVASTATIN CALCIUM 80 MG/1
10 TABLET, FILM COATED ORAL AT BEDTIME
Qty: 0 | Refills: 0 | Status: DISCONTINUED | OUTPATIENT
Start: 2019-04-05 | End: 2019-04-08

## 2019-04-05 RX ADMIN — Medication 600 MILLIGRAM(S): at 06:42

## 2019-04-05 RX ADMIN — Medication 25 MILLIGRAM(S): at 17:22

## 2019-04-05 RX ADMIN — BUDESONIDE AND FORMOTEROL FUMARATE DIHYDRATE 2 PUFF(S): 160; 4.5 AEROSOL RESPIRATORY (INHALATION) at 17:22

## 2019-04-05 RX ADMIN — PREGABALIN 1000 MICROGRAM(S): 225 CAPSULE ORAL at 11:41

## 2019-04-05 RX ADMIN — ATORVASTATIN CALCIUM 10 MILLIGRAM(S): 80 TABLET, FILM COATED ORAL at 23:28

## 2019-04-05 RX ADMIN — CEFTRIAXONE 100 GRAM(S): 500 INJECTION, POWDER, FOR SOLUTION INTRAMUSCULAR; INTRAVENOUS at 11:42

## 2019-04-05 RX ADMIN — AMLODIPINE BESYLATE 10 MILLIGRAM(S): 2.5 TABLET ORAL at 05:22

## 2019-04-05 RX ADMIN — CHLORHEXIDINE GLUCONATE 1 APPLICATION(S): 213 SOLUTION TOPICAL at 05:22

## 2019-04-05 RX ADMIN — HEPARIN SODIUM 5000 UNIT(S): 5000 INJECTION INTRAVENOUS; SUBCUTANEOUS at 05:21

## 2019-04-05 RX ADMIN — Medication 25 MILLIGRAM(S): at 05:22

## 2019-04-05 RX ADMIN — Medication 200 MILLIGRAM(S): at 17:22

## 2019-04-05 RX ADMIN — HEPARIN SODIUM 5000 UNIT(S): 5000 INJECTION INTRAVENOUS; SUBCUTANEOUS at 17:22

## 2019-04-05 RX ADMIN — Medication 2: at 11:39

## 2019-04-05 RX ADMIN — Medication 200 MILLIGRAM(S): at 05:22

## 2019-04-05 RX ADMIN — Medication 81 MILLIGRAM(S): at 11:40

## 2019-04-05 RX ADMIN — MONTELUKAST 10 MILLIGRAM(S): 4 TABLET, CHEWABLE ORAL at 12:58

## 2019-04-05 RX ADMIN — BUDESONIDE AND FORMOTEROL FUMARATE DIHYDRATE 2 PUFF(S): 160; 4.5 AEROSOL RESPIRATORY (INHALATION) at 05:21

## 2019-04-05 RX ADMIN — TAMSULOSIN HYDROCHLORIDE 0.4 MILLIGRAM(S): 0.4 CAPSULE ORAL at 23:27

## 2019-04-06 LAB
ANION GAP SERPL CALC-SCNC: 11 MMOL/L — SIGNIFICANT CHANGE UP (ref 5–17)
BUN SERPL-MCNC: 37 MG/DL — HIGH (ref 7–23)
CALCIUM SERPL-MCNC: 8 MG/DL — LOW (ref 8.5–10.1)
CHLORIDE SERPL-SCNC: 100 MMOL/L — SIGNIFICANT CHANGE UP (ref 96–108)
CO2 SERPL-SCNC: 28 MMOL/L — SIGNIFICANT CHANGE UP (ref 22–31)
CREAT SERPL-MCNC: 5.96 MG/DL — HIGH (ref 0.5–1.3)
GLUCOSE SERPL-MCNC: 129 MG/DL — HIGH (ref 70–99)
HCT VFR BLD CALC: 28.3 % — LOW (ref 34.5–45)
HGB BLD-MCNC: 8.7 G/DL — LOW (ref 11.5–15.5)
MCHC RBC-ENTMCNC: 23.5 PG — LOW (ref 27–34)
MCHC RBC-ENTMCNC: 30.7 GM/DL — LOW (ref 32–36)
MCV RBC AUTO: 76.3 FL — LOW (ref 80–100)
NRBC # BLD: 0 /100 WBCS — SIGNIFICANT CHANGE UP (ref 0–0)
PLATELET # BLD AUTO: 367 K/UL — SIGNIFICANT CHANGE UP (ref 150–400)
POTASSIUM SERPL-MCNC: 3.5 MMOL/L — SIGNIFICANT CHANGE UP (ref 3.5–5.3)
POTASSIUM SERPL-SCNC: 3.5 MMOL/L — SIGNIFICANT CHANGE UP (ref 3.5–5.3)
RBC # BLD: 3.71 M/UL — LOW (ref 3.8–5.2)
RBC # FLD: 15.9 % — HIGH (ref 10.3–14.5)
SODIUM SERPL-SCNC: 139 MMOL/L — SIGNIFICANT CHANGE UP (ref 135–145)
WBC # BLD: 10.25 K/UL — SIGNIFICANT CHANGE UP (ref 3.8–10.5)
WBC # FLD AUTO: 10.25 K/UL — SIGNIFICANT CHANGE UP (ref 3.8–10.5)

## 2019-04-06 PROCEDURE — 99233 SBSQ HOSP IP/OBS HIGH 50: CPT

## 2019-04-06 RX ORDER — BACITRACIN ZINC 500 UNIT/G
1 OINTMENT IN PACKET (EA) TOPICAL
Qty: 0 | Refills: 0 | Status: DISCONTINUED | OUTPATIENT
Start: 2019-04-06 | End: 2019-04-08

## 2019-04-06 RX ADMIN — CEFTRIAXONE 100 GRAM(S): 500 INJECTION, POWDER, FOR SOLUTION INTRAMUSCULAR; INTRAVENOUS at 15:05

## 2019-04-06 RX ADMIN — Medication 200 MILLIGRAM(S): at 05:46

## 2019-04-06 RX ADMIN — HEPARIN SODIUM 5000 UNIT(S): 5000 INJECTION INTRAVENOUS; SUBCUTANEOUS at 05:47

## 2019-04-06 RX ADMIN — Medication 3 MILLILITER(S): at 21:20

## 2019-04-06 RX ADMIN — PREGABALIN 1000 MICROGRAM(S): 225 CAPSULE ORAL at 15:05

## 2019-04-06 RX ADMIN — ERYTHROPOIETIN 10000 UNIT(S): 10000 INJECTION, SOLUTION INTRAVENOUS; SUBCUTANEOUS at 11:29

## 2019-04-06 RX ADMIN — TAMSULOSIN HYDROCHLORIDE 0.4 MILLIGRAM(S): 0.4 CAPSULE ORAL at 21:47

## 2019-04-06 RX ADMIN — Medication 81 MILLIGRAM(S): at 15:05

## 2019-04-06 RX ADMIN — Medication 25 MILLIGRAM(S): at 05:46

## 2019-04-06 RX ADMIN — BUDESONIDE AND FORMOTEROL FUMARATE DIHYDRATE 2 PUFF(S): 160; 4.5 AEROSOL RESPIRATORY (INHALATION) at 05:47

## 2019-04-06 RX ADMIN — MONTELUKAST 10 MILLIGRAM(S): 4 TABLET, CHEWABLE ORAL at 15:05

## 2019-04-06 RX ADMIN — Medication 200 MILLIGRAM(S): at 17:24

## 2019-04-06 RX ADMIN — AMLODIPINE BESYLATE 10 MILLIGRAM(S): 2.5 TABLET ORAL at 05:46

## 2019-04-06 RX ADMIN — HEPARIN SODIUM 5000 UNIT(S): 5000 INJECTION INTRAVENOUS; SUBCUTANEOUS at 17:24

## 2019-04-06 RX ADMIN — Medication 1 APPLICATION(S): at 18:00

## 2019-04-06 RX ADMIN — Medication 25 MILLIGRAM(S): at 17:24

## 2019-04-06 RX ADMIN — ATORVASTATIN CALCIUM 10 MILLIGRAM(S): 80 TABLET, FILM COATED ORAL at 21:46

## 2019-04-06 RX ADMIN — BUDESONIDE AND FORMOTEROL FUMARATE DIHYDRATE 2 PUFF(S): 160; 4.5 AEROSOL RESPIRATORY (INHALATION) at 17:07

## 2019-04-06 RX ADMIN — CHLORHEXIDINE GLUCONATE 1 APPLICATION(S): 213 SOLUTION TOPICAL at 05:46

## 2019-04-07 LAB
BUN SERPL-MCNC: 21 MG/DL — SIGNIFICANT CHANGE UP (ref 7–23)
CALCIUM SERPL-MCNC: 8 MG/DL — SIGNIFICANT CHANGE UP (ref 8.5–10.1)
CHLORIDE SERPL-SCNC: 101 MMOL/L — SIGNIFICANT CHANGE UP (ref 96–108)
CO2 SERPL-SCNC: 32 MMOL/L — SIGNIFICANT CHANGE UP (ref 22–31)
CREAT SERPL-MCNC: 4.15 MG/DL — SIGNIFICANT CHANGE UP (ref 0.5–1.3)
GLUCOSE SERPL-MCNC: 124 MG/DL — SIGNIFICANT CHANGE UP (ref 70–99)
MAGNESIUM SERPL-MCNC: 2 MG/DL — SIGNIFICANT CHANGE UP (ref 1.6–2.6)
PHOSPHATE SERPL-MCNC: 2.9 MG/DL — SIGNIFICANT CHANGE UP (ref 2.5–4.5)
POTASSIUM SERPL-MCNC: 3.5 MMOL/L — SIGNIFICANT CHANGE UP (ref 3.5–5.3)
POTASSIUM SERPL-SCNC: 3.5 MMOL/L — SIGNIFICANT CHANGE UP (ref 3.5–5.3)
SODIUM SERPL-SCNC: 139 MMOL/L — SIGNIFICANT CHANGE UP (ref 135–145)

## 2019-04-07 PROCEDURE — 99233 SBSQ HOSP IP/OBS HIGH 50: CPT

## 2019-04-07 RX ADMIN — Medication 200 MILLIGRAM(S): at 17:49

## 2019-04-07 RX ADMIN — PREGABALIN 1000 MICROGRAM(S): 225 CAPSULE ORAL at 17:50

## 2019-04-07 RX ADMIN — Medication 3 MILLILITER(S): at 18:31

## 2019-04-07 RX ADMIN — TAMSULOSIN HYDROCHLORIDE 0.4 MILLIGRAM(S): 0.4 CAPSULE ORAL at 22:59

## 2019-04-07 RX ADMIN — Medication 81 MILLIGRAM(S): at 12:15

## 2019-04-07 RX ADMIN — Medication 1 APPLICATION(S): at 17:48

## 2019-04-07 RX ADMIN — HEPARIN SODIUM 5000 UNIT(S): 5000 INJECTION INTRAVENOUS; SUBCUTANEOUS at 17:48

## 2019-04-07 RX ADMIN — MONTELUKAST 10 MILLIGRAM(S): 4 TABLET, CHEWABLE ORAL at 12:14

## 2019-04-07 RX ADMIN — BUDESONIDE AND FORMOTEROL FUMARATE DIHYDRATE 2 PUFF(S): 160; 4.5 AEROSOL RESPIRATORY (INHALATION) at 17:51

## 2019-04-07 RX ADMIN — Medication 25 MILLIGRAM(S): at 17:48

## 2019-04-07 RX ADMIN — ATORVASTATIN CALCIUM 10 MILLIGRAM(S): 80 TABLET, FILM COATED ORAL at 22:59

## 2019-04-07 RX ADMIN — Medication 2: at 08:19

## 2019-04-07 RX ADMIN — Medication 3 MILLILITER(S): at 11:15

## 2019-04-07 NOTE — PROGRESS NOTE ADULT - PROBLEM SELECTOR PROBLEM 7
HTN (hypertension)
Preventive measure
HTN (hypertension)

## 2019-04-07 NOTE — PROGRESS NOTE ADULT - PROBLEM SELECTOR PLAN 7
Continue home medications
heparin SQ-dvt ppx  fall precautions
Continue home medications

## 2019-04-07 NOTE — PROGRESS NOTE ADULT - PROVIDER SPECIALTY LIST ADULT
Hospitalist
Infectious Disease
Nephrology
Nephrology
Neurology
Nephrology
Neurology

## 2019-04-07 NOTE — PROGRESS NOTE ADULT - ASSESSMENT
73 female with a history of HTN, CAD, CHF, DM. PVD, CVA, ESRD on HD recently started on dialysis now admitted with mental status changes. Had dialysis yesterday. Medications and labs reviewed. Neurology following. Spoke to patient's dtr at bed side. will follow.   HD in am.
Subjective Complaints:  Historian:             Vital Signs Last 24 Hrs  T(C): 36.4 (05 Apr 2019 16:37), Max: 36.7 (04 Apr 2019 23:53)  T(F): 97.5 (05 Apr 2019 16:37), Max: 98 (04 Apr 2019 23:53)  HR: 75 (05 Apr 2019 16:37) (72 - 78)  BP: 144/70 (05 Apr 2019 16:37) (140/67 - 160/76)  BP(mean): --  RR: 17 (05 Apr 2019 16:37) (17 - 18)  SpO2: 97% (05 Apr 2019 16:37) (92% - 98%)    GENERAL PHYSICAL EXAM:  General:  Appears stated age, well-groomed, well-nourished, no distress  HEENT:  NC/AT, patent nares w/ pink mucosa, OP clear w/o lesions, PERRL, EOMI, conjunctivae clear, no thyromegaly, nodules, adenopathy, no JVD  Chest:  Full & symmetric excursion, no increased effort, breath sounds clear  Cardiovascular:  Regular rhythm, S1, S2, no murmur/rub/S3/S4, no carotid/femoral/abdominal bruit, radial/pedal pulses 2+, no edema  Abdomen:  Soft, non-tender, non-distended, normoactive bowel sounds, no HSM  Extremities:  Gait & station:   Digits:   Nails:   Joints, Bones, Muscles:   ROM:   Stability:  Skin:  No rash/erythema/ecchymoses/petechiae/wounds/abscess/warm/dry  Musculoskeletal:  Full ROM in all joints w/o swelling/tenderness/effusion        LABS:                        9.0    9.06  )-----------( 291      ( 05 Apr 2019 08:18 )             29.7     04-05    139  |  101  |  31<H>  ----------------------------<  98  3.6   |  27  |  5.18<H>    Ca    7.8<L>      05 Apr 2019 08:18  Phos  4.7     04-05  Mg     2.0     04-05    TPro  6.5  /  Alb  2.1<L>  /  TBili  0.3  /  DBili  .07  /  AST  62<H>  /  ALT  38  /  AlkPhos  77  04-05          RADIOLOGY & ADDITIONAL STUDIES:        Neurology Progress Note:      Mental Status: awaek alert speechb fluent follwsw commands       Cranial Nerves: 2/ 12 intact      Motor:   arm leg 4/5         Sensory: grossly intact      Cerebellar:  deferd       Gait: unsteady       Assesment/Plan: esrd confused metabolic encephalaothy no seziure repoprted  for  pt [facility] will follow
Subjective Complaints:  Historian:             Vital Signs Last 24 Hrs  T(C): 36.5 (03 Apr 2019 17:20), Max: 37.4 (02 Apr 2019 23:54)  T(F): 97.7 (03 Apr 2019 17:20), Max: 99.3 (02 Apr 2019 23:54)  HR: 68 (03 Apr 2019 17:20) (65 - 82)  BP: 134/68 (03 Apr 2019 17:20) (127/60 - 145/64)  BP(mean): --  RR: 18 (03 Apr 2019 17:20) (16 - 18)  SpO2: 96% (03 Apr 2019 17:20) (94% - 98%)    GENERAL PHYSICAL EXAM:  General:  Appears stated age, well-groomed, well-nourished, no distress  HEENT:  NC/AT, patent nares w/ pink mucosa, OP clear w/o lesions, PERRL, EOMI, conjunctivae clear, no thyromegaly, nodules, adenopathy, no JVD  Chest:  Full & symmetric excursion, no increased effort, breath sounds clear  Cardiovascular:  Regular rhythm, S1, S2, no murmur/rub/S3/S4, no carotid/femoral/abdominal bruit, radial/pedal pulses 2+, no edema  Abdomen:  Soft, non-tender, non-distended, normoactive bowel sounds, no HSM  Extremities:  Gait & station:   Digits:   Nails:   Joints, Bones, Muscles:   ROM:   Stability:  Skin:  No rash/erythema/ecchymoses/petechiae/wounds/abscess/warm/dry  Musculoskeletal:  Full ROM in all joints w/o swelling/tenderness/effusion        LABS:                        9.1    8.43  )-----------( 198      ( 03 Apr 2019 17:30 )             29.6     04-03    137  |  100  |  40<H>  ----------------------------<  94  4.5   |  25  |  6.00<H>    Ca    7.3<L>      03 Apr 2019 17:30  Phos  5.1     04-03  Mg     2.2     04-03    TPro  x   /  Alb  2.1<L>  /  TBili  x   /  DBili  x   /  AST  x   /  ALT  x   /  AlkPhos  x   04-02          RADIOLOGY & ADDITIONAL STUDIES:        Neurology Progress Note:      Mental Status: awake alert now   follows commands       Cranial Nerves: 2 / 12 intact      Motor:   arm leg 3/5         Sensory: intact      Cerebellar:  deferd      Gait: unsteady gait       Assesment/Plan: esrd  metaVOLIC ENCEPHALAOTHY  CT HEAD TODAY NO CHANGE  FOR PT SUB ACUTE REHAB NO SEZIURE REPORTED
Subjective Complaints:  Historian:             Vital Signs Last 24 Hrs  T(C): 36.6 (07 Apr 2019 17:00), Max: 37.2 (06 Apr 2019 23:52)  T(F): 97.9 (07 Apr 2019 17:00), Max: 99 (06 Apr 2019 23:52)  HR: 73 (07 Apr 2019 18:31) (70 - 76)  BP: 146/67 (07 Apr 2019 17:00) (130/75 - 146/67)  BP(mean): --  RR: 18 (07 Apr 2019 17:00) (18 - 18)  SpO2: 93% (07 Apr 2019 18:31) (92% - 98%)    GENERAL PHYSICAL EXAM:  General:  Appears stated age, well-groomed, well-nourished, no distress  HEENT:  NC/AT, patent nares w/ pink mucosa, OP clear w/o lesions, PERRL, EOMI, conjunctivae clear, no thyromegaly, nodules, adenopathy, no JVD  Chest:  Full & symmetric excursion, no increased effort, breath sounds clear  Cardiovascular:  Regular rhythm, S1, S2, no murmur/rub/S3/S4, no carotid/femoral/abdominal bruit, radial/pedal pulses 2+, no edema  Abdomen:  Soft, non-tender, non-distended, normoactive bowel sounds, no HSM  Extremities:  Gait & station:   Digits:   Nails:   Joints, Bones, Muscles:   ROM:   Stability:  Skin:  No rash/erythema/ecchymoses/petechiae/wounds/abscess/warm/dry  Musculoskeletal:  Full ROM in all joints w/o swelling/tenderness/effusion        LABS:                        8.7    10.25 )-----------( 367      ( 06 Apr 2019 09:59 )             28.3     04-07    139  |  101  |  21  ----------------------------<  124  3.5   |  32  |  4.15    Ca    8.0      07 Apr 2019 10:36  Phos  2.9     04-07  Mg     2.0     04-07            RADIOLOGY & ADDITIONAL STUDIES:        Neurology Progress Note:      Mental Status: awaek alert speech fluent confused dementia       Cranial Nerves: 2 / 12 intact      Motor:   arm 4/5 ;eg 3/5         Sensory:grossly intact      Cerebellar: deferd       Gait: unsteady gait       Assesment/Plan: esrd confused hx of deemntia mri eitan no acute path no seziure esrd on dilaysis  for [facility] discuss with daughter
Subjective Complaints:  Historian:             Vital Signs Last 24 Hrs  T(C): 36.7 (06 Apr 2019 18:22), Max: 37.3 (05 Apr 2019 23:46)  T(F): 98.1 (06 Apr 2019 18:22), Max: 99.2 (05 Apr 2019 23:46)  HR: 83 (06 Apr 2019 18:22) (72 - 83)  BP: 146/67 (06 Apr 2019 18:22) (125/69 - 154/69)  BP(mean): --  RR: 17 (06 Apr 2019 18:22) (16 - 19)  SpO2: 96% (06 Apr 2019 18:22) (94% - 99%)    GENERAL PHYSICAL EXAM:  General:  Appears stated age, well-groomed, well-nourished, no distress  HEENT:  NC/AT, patent nares w/ pink mucosa, OP clear w/o lesions, PERRL, EOMI, conjunctivae clear, no thyromegaly, nodules, adenopathy, no JVD  Chest:  Full & symmetric excursion, no increased effort, breath sounds clear  Cardiovascular:  Regular rhythm, S1, S2, no murmur/rub/S3/S4, no carotid/femoral/abdominal bruit, radial/pedal pulses 2+, no edema  Abdomen:  Soft, non-tender, non-distended, normoactive bowel sounds, no HSM  Extremities:  Gait & station:   Digits:   Nails:   Joints, Bones, Muscles:   ROM:   Stability:  Skin:  No rash/erythema/ecchymoses/petechiae/wounds/abscess/warm/dry  Musculoskeletal:  Full ROM in all joints w/o swelling/tenderness/effusion        LABS:                        8.7    10.25 )-----------( 367      ( 06 Apr 2019 09:59 )             28.3     04-06    139  |  100  |  37<H>  ----------------------------<  129<H>  3.5   |  28  |  5.96<H>    Ca    8.0<L>      06 Apr 2019 09:59  Phos  4.7     04-05  Mg     2.0     04-05    TPro  6.5  /  Alb  2.1<L>  /  TBili  0.3  /  DBili  .07  /  AST  62<H>  /  ALT  38  /  AlkPhos  77  04-05          RADIOLOGY & ADDITIONAL STUDIES:        Neurology Progress Note:      Mental Status: awaek alert speech fluent follws commands       Cranial Nerves: 2 / 12 intaCT      Motor:   ARM LEG 4/5         Sensory:GROSSLY INTACT      Cerebellar:  DEFERD       Gait: UNSTEADY GAIT       Assesment/Plan: ESRD ON DILAYSIS S/P UTI  UNSTEADY GAIT  METABOLIC ENCEPHALAOTHY NO SEZIURE   DISCUSS WITH  FAMILY AT BED SIDE  FOR SUB ACUTE REHAB
Subjective Complaints:  Historian:             Vital Signs Last 24 Hrs  T(C): 37.1 (04 Apr 2019 17:11), Max: 37.6 (03 Apr 2019 23:19)  T(F): 98.7 (04 Apr 2019 17:11), Max: 99.7 (03 Apr 2019 23:19)  HR: 80 (04 Apr 2019 17:11) (76 - 85)  BP: 152/74 (04 Apr 2019 17:11) (137/66 - 164/52)  BP(mean): --  RR: 18 (04 Apr 2019 17:11) (16 - 18)  SpO2: 96% (04 Apr 2019 17:11) (93% - 97%)    GENERAL PHYSICAL EXAM:  General:  Appears stated age, well-groomed, well-nourished, no distress  HEENT:  NC/AT, patent nares w/ pink mucosa, OP clear w/o lesions, PERRL, EOMI, conjunctivae clear, no thyromegaly, nodules, adenopathy, no JVD  Chest:  Full & symmetric excursion, no increased effort, breath sounds clear  Cardiovascular:  Regular rhythm, S1, S2, no murmur/rub/S3/S4, no carotid/femoral/abdominal bruit, radial/pedal pulses 2+, no edema  Abdomen:  Soft, non-tender, non-distended, normoactive bowel sounds, no HSM  Extremities:  Gait & station:   Digits:   Nails:   Joints, Bones, Muscles:   ROM:   Stability:  Skin:  No rash/erythema/ecchymoses/petechiae/wounds/abscess/warm/dry  Musculoskeletal:  Full ROM in all joints w/o swelling/tenderness/effusion        LABS:                        8.6    11.33 )-----------( 225      ( 04 Apr 2019 07:30 )             27.8     04-04    137  |  100  |  45<H>  ----------------------------<  96  3.8   |  23  |  6.64<H>    Ca    7.1<L>      04 Apr 2019 07:30  Phos  5.1     04-04  Mg     2.1     04-04            RADIOLOGY & ADDITIONAL STUDIES:        Neurology Progress Note:      Mental Status: a waek alert   speech fluent follws commands       Cranial Nerves: 2 / 12 intact      Motor:   arm leg 3/5         Sensory: grossly intact      Cerebellar:  deferd      Gait: unsteady gait       Assesment/Plan: esrd  s/p metabokolic encephalaothy ct ehad repeat no change for sub acute rehab no sezoire
Subjective Complaints:  Historian:             Vital Signs Last 24 Hrs  T(C): 37.3 (02 Apr 2019 16:37), Max: 37.3 (01 Apr 2019 23:32)  T(F): 99.1 (02 Apr 2019 16:37), Max: 99.2 (01 Apr 2019 23:32)  HR: 79 (02 Apr 2019 16:37) (66 - 79)  BP: 156/67 (02 Apr 2019 16:37) (128/65 - 156/67)  BP(mean): --  RR: 16 (02 Apr 2019 16:37) (16 - 18)  SpO2: 97% (02 Apr 2019 16:37) (92% - 98%)    GENERAL PHYSICAL EXAM:  General:  Appears stated age, well-groomed, well-nourished, no distress  HEENT:  NC/AT, patent nares w/ pink mucosa, OP clear w/o lesions, PERRL, EOMI, conjunctivae clear, no thyromegaly, nodules, adenopathy, no JVD  Chest:  Full & symmetric excursion, no increased effort, breath sounds clear  Cardiovascular:  Regular rhythm, S1, S2, no murmur/rub/S3/S4, no carotid/femoral/abdominal bruit, radial/pedal pulses 2+, no edema  Abdomen:  Soft, non-tender, non-distended, normoactive bowel sounds, no HSM  Extremities:  Gait & station:   Digits:   Nails:   Joints, Bones, Muscles:   ROM:   Stability:  Skin:  No rash/erythema/ecchymoses/petechiae/wounds/abscess/warm/dry  Musculoskeletal:  Full ROM in all joints w/o swelling/tenderness/effusion        LABS:                        8.5    10.79 )-----------( 160      ( 02 Apr 2019 08:24 )             27.0     04-02    134<L>  |  98  |  56<H>  ----------------------------<  101<H>  3.8   |  22  |  7.05<H>    Ca    6.8<L>      02 Apr 2019 08:24  Phos  6.4     04-02  Mg     2.1     04-02    TPro  x   /  Alb  2.1<L>  /  TBili  x   /  DBili  x   /  AST  x   /  ALT  x   /  AlkPhos  x   04-02          RADIOLOGY & ADDITIONAL STUDIES:        Neurology Progress Note:      Mental Status: awaek alert speech fouent  follws commnads       Cranial Nerves: 2 / 12intact      Motor:   arm leg 3/5         Sensory: grosslu intact      Cerebellar:  finger to nose intact      Gait:  unsteady       Assesment/Plan: s/p encephalaothy improving esrd on  dialysis for pt rehab will follow  no seziure reported now
Subjective Complaints:  Historian:             Vital Signs Last 24 Hrs  T(C): 37.4 (2019 16:46), Max: 37.4 (2019 05:00)  T(F): 99.3 (2019 16:46), Max: 99.3 (2019 05:00)  HR: 80 (2019 18:15) (73 - 80)  BP: 140/65 (2019 18:15) (130/65 - 142/64)  BP(mean): --  RR: 18 (2019 16:46) (18 - 19)  SpO2: 94% (2019 18:15) (92% - 95%)    GENERAL PHYSICAL EXAM:  General:  Appears stated age, well-groomed, well-nourished, no distress  HEENT:  NC/AT, patent nares w/ pink mucosa, OP clear w/o lesions, PERRL, EOMI, conjunctivae clear, no thyromegaly, nodules, adenopathy, no JVD  Chest:  Full & symmetric excursion, no increased effort, breath sounds clear  Cardiovascular:  Regular rhythm, S1, S2, no murmur/rub/S3/S4, no carotid/femoral/abdominal bruit, radial/pedal pulses 2+, no edema  Abdomen:  Soft, non-tender, non-distended, normoactive bowel sounds, no HSM  Extremities:  Gait & station:   Digits:   Nails:   Joints, Bones, Muscles:   ROM:   Stability:  Skin:  No rash/erythema/ecchymoses/petechiae/wounds/abscess/warm/dry  Musculoskeletal:  Full ROM in all joints w/o swelling/tenderness/effusion        LABS:                        8.5    11.97 )-----------( 141      ( 2019 08:18 )             27.3     04-    135  |  100  |  40<H>  ----------------------------<  98  3.8   |  25  |  5.29<H>    Ca    7.3<L>      2019 08:18        Urinalysis Basic - ( 31 Mar 2019 02:18 )    Color: Yellow / Appearance: Slightly Turbid / S.015 / pH: x  Gluc: x / Ketone: Negative  / Bili: Negative / Urobili: Negative mg/dL   Blood: x / Protein: 500 mg/dL / Nitrite: Negative   Leuk Esterase: Negative / RBC: 3-5 /HPF / WBC 3-5   Sq Epi: x / Non Sq Epi: x / Bacteria: TNTC        RADIOLOGY & ADDITIONAL STUDIES:        Neurology Progress Note:      Mental Status: awawk alert speech fluent follws commnaDS       Cranial Nerves: 2 / 12 INTAct       Motor:  arm leg 4/5          Sensory: grossly intact      Cerebellar:  fimger to nose intact      Gait: unsteady       Assesment/Plan: renal failure s/p metaBOLIC ENCEPHALAOPTHY  R/O SEZIURE FOR DIALYSIS FOR PT  REHAB WILL FOLOW
72yo female, from home with pmh ESRD on dialysis (TTS), Dementia, HTN, asthma, CVA with no significant residual, SLE, T2DM, HTN presents with unresponsive episode.  Per daughter, pt had a fall yesterday.  This morning, pt was more quiet then usual but able to moan and say yes/no. Pt at baseline very verbal. Daughter took her to dialysis and noted at 1230pm that she was keeping food in her mouth and more dazed. When getting pt home and moving her daughter noted that pt was unresponsive and her eyes were" rolling back". CT of head negative for negative for acute CVA. +troponin: cardiology consulted
73 yr old female seen with
74yo female, from home with pmh ESRD on dialysis (TTS), Dementia, HTN, asthma, CVA with no significant residual, SLE, T2DM, HTN presents with unresponsive episode.  Per daughter, pt had a fall yesterday.  This morning, pt was more quiet then usual but able to moan and say yes/no. Pt at baseline very verbal. Daughter took her to dialysis and noted at 1230pm that she was keeping food in her mouth and more dazed. When getting pt home and moving her daughter noted that pt was unresponsive and her eyes were" rolling back". CT of head negative for negative for acute CVA.    I removed stitches that were placed for permacath yesterday. Site is clean, care instructions provided to RN.     Care plan discussed at length with daughter at bedside.
74yo female, from home with pmh ESRD on dialysis (TTS), Dementia, HTN, asthma, CVA with no significant residual, SLE, T2DM, HTN presents with unresponsive episode.  Per daughter, pt had a fall yesterday.  This morning, pt was more quiet then usual but able to moan and say yes/no. Pt at baseline very verbal. Daughter took her to dialysis and noted at 1230pm that she was keeping food in her mouth and more dazed. When getting pt home and moving her daughter noted that pt was unresponsive and her eyes were" rolling back". CT of head negative for negative for acute CVA. +troponin: cardiology consulted
ESRD, recently on HD, anemia, admitted with syncopal episode and AMS.  For dialysis tomorrow, check iron profile and orthostasis.  Neurologic w/u is in progress.
ESRD, recently on HD, anemia, iron deficient, admitted with syncopal episode and AMS.  Stable dialysis session. BP's stable. To give Iron I.V. with dialysis if remains hospitalized by 4/4 or at outpatient dialysis center.  ZOHREH with dialysis.  On Rocephin for bacteriuria.  Monitor for orthostasis.  Neurologic w/u is in progress.

## 2019-04-07 NOTE — PROGRESS NOTE ADULT - SUBJECTIVE AND OBJECTIVE BOX
Subjective: seen on HD. No complaints.       MEDICATIONS  (STANDING):  amLODIPine   Tablet 10 milliGRAM(s) Oral daily  aspirin enteric coated 81 milliGRAM(s) Oral daily  atorvastatin 80 milliGRAM(s) Oral at bedtime  buDESOnide 160 MICROgram(s)/formoterol 4.5 MICROgram(s) Inhaler 2 Puff(s) Inhalation two times a day  cefTRIAXone   IVPB      cefTRIAXone   IVPB 1 Gram(s) IV Intermittent every 24 hours  chlorhexidine 4% Liquid 1 Application(s) Topical <User Schedule>  cyanocobalamin 1000 MICROGram(s) Oral daily  dextrose 5%. 1000 milliLiter(s) (50 mL/Hr) IV Continuous <Continuous>  dextrose 50% Injectable 12.5 Gram(s) IV Push once  dextrose 50% Injectable 25 Gram(s) IV Push once  dextrose 50% Injectable 25 Gram(s) IV Push once  epoetin melba Injectable 57709 Unit(s) IV Push <User Schedule>  heparin  Injectable 5000 Unit(s) SubCutaneous every 12 hours  hydrALAZINE 25 milliGRAM(s) Oral two times a day  insulin lispro (HumaLOG) corrective regimen sliding scale   SubCutaneous three times a day before meals  insulin lispro (HumaLOG) corrective regimen sliding scale   SubCutaneous at bedtime  labetalol 200 milliGRAM(s) Oral two times a day  montelukast 10 milliGRAM(s) Oral daily  tamsulosin 0.4 milliGRAM(s) Oral at bedtime    MEDICATIONS  (PRN):  acetaminophen   Tablet .. 650 milliGRAM(s) Oral every 6 hours PRN Mild Pain (1 - 3)  ALBUTerol/ipratropium for Nebulization 3 milliLiter(s) Nebulizer every 6 hours PRN Shortness of Breath and/or Wheezing  dextrose 40% Gel 15 Gram(s) Oral once PRN Blood Glucose LESS THAN 70 milliGRAM(s)/deciliter  glucagon  Injectable 1 milliGRAM(s) IntraMuscular once PRN Glucose LESS THAN 70 milligrams/deciliter  sodium chloride 0.9% lock flush 10 milliLiter(s) IV Push every 1 hour PRN Pre/post blood products, medications, blood draw, and to maintain line patency          T(C): 37.3 (04-04-19 @ 09:10), Max: 37.6 (04-03-19 @ 23:19)  HR: 78 (04-04-19 @ 09:10) (66 - 85)  BP: 137/66 (04-04-19 @ 09:10) (127/60 - 160/77)  RR: 16 (04-04-19 @ 09:10) (16 - 18)  SpO2: 94% (04-04-19 @ 09:10) (93% - 97%)  Wt(kg): --    ABG - ( 03 Apr 2019 16:09 )  pH, Arterial: x     pH, Blood: 7.47  /  pCO2: 35    /  pO2: 78    / HCO3: 25    / Base Excess: 2.2   /  SaO2: 96                  I&O's Detail           PHYSICAL EXAM:    GENERAL: comfortable  EYES: EOMI, PERRLA, conjunctiva and sclera clear  NECK: Supple, no inc in JVP  CHEST/LUNG: Clear  HEART: S1S2  ABDOMEN: Soft, Nontender, Nondistended; Bowel sounds present  EXTREMITIES:  no edema  NEURO: no asterixis  R chest PC      LABS:  CBC Full  -  ( 04 Apr 2019 07:30 )  WBC Count : 11.33 K/uL  RBC Count : 3.68 M/uL  Hemoglobin : 8.6 g/dL  Hematocrit : 27.8 %  Platelet Count - Automated : 225 K/uL  Mean Cell Volume : 75.5 fl  Mean Cell Hemoglobin : 23.4 pg  Mean Cell Hemoglobin Concentration : 30.9 gm/dL  Auto Neutrophil # : x  Auto Lymphocyte # : x  Auto Monocyte # : x  Auto Eosinophil # : x  Auto Basophil # : x  Auto Neutrophil % : x  Auto Lymphocyte % : x  Auto Monocyte % : x  Auto Eosinophil % : x  Auto Basophil % : x    04-04    137  |  100  |  45<H>  ----------------------------<  96  3.8   |  23  |  6.64<H>    Ca    7.1<L>      04 Apr 2019 07:30  Phos  5.1     04-04  Mg     2.1     04-04            Impression:  * HD dependent ESRD  * Syncope  * UTI, on Rocephin    Recommendations:   Stable HD. UF 1-1.5kg  Next dialysis 4/6
Subjective: seen on HD. Somnolent. No complaints.       MEDICATIONS  (STANDING):  amLODIPine   Tablet 10 milliGRAM(s) Oral daily  aspirin enteric coated 81 milliGRAM(s) Oral daily  atorvastatin 10 milliGRAM(s) Oral at bedtime  buDESOnide 160 MICROgram(s)/formoterol 4.5 MICROgram(s) Inhaler 2 Puff(s) Inhalation two times a day  cefTRIAXone   IVPB      cefTRIAXone   IVPB 1 Gram(s) IV Intermittent every 24 hours  chlorhexidine 4% Liquid 1 Application(s) Topical <User Schedule>  cyanocobalamin 1000 MICROGram(s) Oral daily  dextrose 5%. 1000 milliLiter(s) (50 mL/Hr) IV Continuous <Continuous>  dextrose 50% Injectable 12.5 Gram(s) IV Push once  dextrose 50% Injectable 25 Gram(s) IV Push once  dextrose 50% Injectable 25 Gram(s) IV Push once  epoetin melba Injectable 40451 Unit(s) IV Push <User Schedule>  heparin  Injectable 5000 Unit(s) SubCutaneous every 12 hours  hydrALAZINE 25 milliGRAM(s) Oral two times a day  insulin lispro (HumaLOG) corrective regimen sliding scale   SubCutaneous three times a day before meals  insulin lispro (HumaLOG) corrective regimen sliding scale   SubCutaneous at bedtime  labetalol 200 milliGRAM(s) Oral two times a day  montelukast 10 milliGRAM(s) Oral daily  tamsulosin 0.4 milliGRAM(s) Oral at bedtime    MEDICATIONS  (PRN):  acetaminophen   Tablet .. 650 milliGRAM(s) Oral every 6 hours PRN Mild Pain (1 - 3)  ALBUTerol/ipratropium for Nebulization 3 milliLiter(s) Nebulizer every 6 hours PRN Shortness of Breath and/or Wheezing  dextrose 40% Gel 15 Gram(s) Oral once PRN Blood Glucose LESS THAN 70 milliGRAM(s)/deciliter  glucagon  Injectable 1 milliGRAM(s) IntraMuscular once PRN Glucose LESS THAN 70 milligrams/deciliter  sodium chloride 0.9% lock flush 10 milliLiter(s) IV Push every 1 hour PRN Pre/post blood products, medications, blood draw, and to maintain line patency          T(C): 36.9 (04-06-19 @ 09:15), Max: 37.3 (04-05-19 @ 23:46)  HR: 72 (04-06-19 @ 09:15) (72 - 80)  BP: 125/69 (04-06-19 @ 09:15) (125/69 - 154/69)  RR: 16 (04-06-19 @ 09:15) (16 - 19)  SpO2: 94% (04-06-19 @ 09:15) (94% - 99%)  Wt(kg): --        I&O's Detail    05 Apr 2019 07:01  -  06 Apr 2019 07:00  --------------------------------------------------------  IN:    Oral Fluid: 360 mL  Total IN: 360 mL    OUT:  Total OUT: 0 mL    Total NET: 360 mL               PHYSICAL EXAM:    GENERAL: comfortable  EYES: EOMI, PERRLA, conjunctiva and sclera clear  NECK: Supple, no inc in JVP  CHEST/LUNG: Clear  HEART: S1S2  ABDOMEN: Soft, Nontender, Nondistended; Bowel sounds present  EXTREMITIES:  no edema  NEURO: no asterixis  R chest PC        LABS:  CBC Full  -  ( 06 Apr 2019 09:59 )  WBC Count : 10.25 K/uL  RBC Count : 3.71 M/uL  Hemoglobin : 8.7 g/dL  Hematocrit : 28.3 %  Platelet Count - Automated : 367 K/uL  Mean Cell Volume : 76.3 fl  Mean Cell Hemoglobin : 23.5 pg  Mean Cell Hemoglobin Concentration : 30.7 gm/dL  Auto Neutrophil # : x  Auto Lymphocyte # : x  Auto Monocyte # : x  Auto Eosinophil # : x  Auto Basophil # : x  Auto Neutrophil % : x  Auto Lymphocyte % : x  Auto Monocyte % : x  Auto Eosinophil % : x  Auto Basophil % : x    04-05    139  |  101  |  31<H>  ----------------------------<  98  3.6   |  27  |  5.18<H>    Ca    7.8<L>      05 Apr 2019 08:18  Phos  4.7     04-05  Mg     2.0     04-05    TPro  6.5  /  Alb  2.1<L>  /  TBili  0.3  /  DBili  .07  /  AST  62<H>  /  ALT  38  /  AlkPhos  77  04-05          Impression:  * HD dependent ESRD  * Syncope  * UTI, on Rocephin    Recommendations:   Stable HD. UF 1-1.5kg. 3K bath  Next dialysis 4/9
Patient is a 73y old  Female who presents with a chief complaint of AMS (02 Apr 2019 22:19)      INTERVAL HPI / OVERNIGHT EVENTS: non verbal     MEDICATIONS  (STANDING):  amLODIPine   Tablet 10 milliGRAM(s) Oral daily  aspirin enteric coated 81 milliGRAM(s) Oral daily  atorvastatin 80 milliGRAM(s) Oral at bedtime  buDESOnide 160 MICROgram(s)/formoterol 4.5 MICROgram(s) Inhaler 2 Puff(s) Inhalation two times a day  cefTRIAXone   IVPB      cefTRIAXone   IVPB 1 Gram(s) IV Intermittent every 24 hours  cyanocobalamin 1000 MICROGram(s) Oral daily  dextrose 5%. 1000 milliLiter(s) (50 mL/Hr) IV Continuous <Continuous>  dextrose 50% Injectable 12.5 Gram(s) IV Push once  dextrose 50% Injectable 25 Gram(s) IV Push once  dextrose 50% Injectable 25 Gram(s) IV Push once  epoetin melba Injectable 70816 Unit(s) IV Push <User Schedule>  heparin  Injectable 5000 Unit(s) SubCutaneous every 12 hours  hydrALAZINE 25 milliGRAM(s) Oral two times a day  insulin lispro (HumaLOG) corrective regimen sliding scale   SubCutaneous three times a day before meals  insulin lispro (HumaLOG) corrective regimen sliding scale   SubCutaneous at bedtime  labetalol 200 milliGRAM(s) Oral two times a day  montelukast 10 milliGRAM(s) Oral daily  tamsulosin 0.4 milliGRAM(s) Oral at bedtime    MEDICATIONS  (PRN):  acetaminophen   Tablet .. 650 milliGRAM(s) Oral every 6 hours PRN Mild Pain (1 - 3)  ALBUTerol/ipratropium for Nebulization 3 milliLiter(s) Nebulizer every 6 hours PRN Shortness of Breath and/or Wheezing  dextrose 40% Gel 15 Gram(s) Oral once PRN Blood Glucose LESS THAN 70 milliGRAM(s)/deciliter  glucagon  Injectable 1 milliGRAM(s) IntraMuscular once PRN Glucose LESS THAN 70 milligrams/deciliter      Vital Signs Last 24 Hrs  T(C): 36.4 (03 Apr 2019 11:32), Max: 37.4 (02 Apr 2019 23:54)  T(F): 97.6 (03 Apr 2019 11:32), Max: 99.3 (02 Apr 2019 23:54)  HR: 66 (03 Apr 2019 13:37) (65 - 82)  BP: 127/60 (03 Apr 2019 13:37) (127/60 - 156/67)  BP(mean): --  RR: 16 (03 Apr 2019 13:37) (16 - 16)  SpO2: 96% (03 Apr 2019 13:37) (94% - 98%)    Review of systems:  General : no fever /chills, fatigue  CVS : no chest pain, palpitations  Lungs : no shortness of breath, cough  GI : no abdominal pain, vomiting, diarrhea   : no dysuria, hematuria        PHYSICAL EXAM:  General :NAD  Constitutional:  well-developed  Respiratory: CTAB/L  Cardiovascular: S1 and S2, RRR, no M/G/R  Gastrointestinal: BS+, soft, NT/ND  Extremities: No peripheral edema  Vascular: 2+ peripheral pulses  Skin: No rashes      LABS:                        8.5    10.79 )-----------( 160      ( 02 Apr 2019 08:24 )             27.0     04-02    134<L>  |  98  |  56<H>  ----------------------------<  101<H>  3.8   |  22  |  7.05<H>    Ca    6.8<L>      02 Apr 2019 08:24  Phos  6.4     04-02  Mg     2.1     04-02    TPro  x   /  Alb  2.1<L>  /  TBili  x   /  DBili  x   /  AST  x   /  ALT  x   /  AlkPhos  x   04-02          MICROBIOLOGY:  RECENT CULTURES:  03-31 .Urine Escherichia coli XXXX   >100,000 CFU/ml Escherichia coli    03-31 .Blood XXXX XXXX   No growth to date.          RADIOLOGY & ADDITIONAL STUDIES:
Patient is a 73y old  Female who presents with a chief complaint of AMS (31 Mar 2019 13:19)      OVERNIGHT EVENTS: none     REVIEW OF SYSTEMS: poor historian     MEDICATIONS  (STANDING):  amLODIPine   Tablet 10 milliGRAM(s) Oral daily  aspirin enteric coated 81 milliGRAM(s) Oral daily  atorvastatin 80 milliGRAM(s) Oral at bedtime  buDESOnide 160 MICROgram(s)/formoterol 4.5 MICROgram(s) Inhaler 2 Puff(s) Inhalation two times a day  dextrose 5%. 1000 milliLiter(s) (50 mL/Hr) IV Continuous <Continuous>  dextrose 50% Injectable 12.5 Gram(s) IV Push once  dextrose 50% Injectable 25 Gram(s) IV Push once  dextrose 50% Injectable 25 Gram(s) IV Push once  gabapentin 200 milliGRAM(s) Oral two times a day  heparin  Injectable 5000 Unit(s) SubCutaneous every 12 hours  hydrALAZINE 25 milliGRAM(s) Oral two times a day  insulin lispro (HumaLOG) corrective regimen sliding scale   SubCutaneous three times a day before meals  insulin lispro (HumaLOG) corrective regimen sliding scale   SubCutaneous at bedtime  labetalol 200 milliGRAM(s) Oral two times a day  montelukast 10 milliGRAM(s) Oral daily  tamsulosin 0.4 milliGRAM(s) Oral at bedtime    MEDICATIONS  (PRN):  acetaminophen   Tablet .. 650 milliGRAM(s) Oral every 6 hours PRN Mild Pain (1 - 3)  ALBUTerol/ipratropium for Nebulization 3 milliLiter(s) Nebulizer every 6 hours PRN Shortness of Breath and/or Wheezing  dextrose 40% Gel 15 Gram(s) Oral once PRN Blood Glucose LESS THAN 70 milliGRAM(s)/deciliter  glucagon  Injectable 1 milliGRAM(s) IntraMuscular once PRN Glucose LESS THAN 70 milligrams/deciliter      Allergies    codeine (Swelling)  penicillin (Anaphylaxis)  penicillins (Swelling)    Intolerances        T(F): 100 (19 @ 11:50), Max: 101.8 (19 @ 00:09)  HR: 79 (19 @ 11:50) (79 - 111)  BP: 161/71 (19 @ 11:50) (158/74 - 204/94)  RR: 18 (19 @ 11:50) (17 - 21)  SpO2: 96% (19 @ 11:50) (96% - 98%)  Wt(kg): --    PHYSICAL EXAM:  GENERAL: NAD, well-groomed, well-developed  HEAD:  Atraumatic, Normocephalic  EYES:  PERRLA, conjunctiva and sclera clear  ENMT: Moist mucous membranes  NECK: Supple, No JVD, Normal thyroid  NERVOUS SYSTEM:  sleepy but arousable  CHEST/LUNG: Clear to percussion bilaterally; No rales, rhonchi, wheezing, or rubs BL  HEART: Regular rate and rhythm; No murmurs, rubs, or gallops  ABDOMEN: Soft, Nontender, Nondistended; Bowel sounds present  EXTREMITIES:  2+ Peripheral Pulses, No clubbing, cyanosis, or edema BL LE  LYMPH: No lymphadenopathy noted  SKIN: No rashes or lesions    LABS:                                              8.5    10.79 )-----------( 160      ( 2019 08:24 )             27.0   04-02    134<L>  |  98  |  56<H>  ----------------------------<  101<H>  3.8   |  22  |  7.05<H>    Ca    6.8<L>      2019 08:24  Phos  6.4     04-02  Mg     2.1     04-    TPro  x   /  Alb  2.1<L>  /  TBili  x   /  DBili  x   /  AST  x   /  ALT  x   /  AlkPhos  x   04-        Urinalysis Basic - ( 31 Mar 2019 02:18 )    Color: Yellow / Appearance: Slightly Turbid / S.015 / pH: x  Gluc: x / Ketone: Negative  / Bili: Negative / Urobili: Negative mg/dL   Blood: x / Protein: 500 mg/dL / Nitrite: Negative   Leuk Esterase: Negative / RBC: 3-5 /HPF / WBC 3-5   Sq Epi: x / Non Sq Epi: x / Bacteria: TNTC      Cultures;   CAPILLARY BLOOD GLUCOSE      POCT Blood Glucose.: 128 mg/dL (31 Mar 2019 11:47)  POCT Blood Glucose.: 107 mg/dL (31 Mar 2019 07:44)  POCT Blood Glucose.: 109 mg/dL (30 Mar 2019 16:45)    Lipid panel:   LDL 75  TG 94    CARDIAC MARKERS ( 31 Mar 2019 07:17 )  2.810 ng/mL / x     / x     / x     / x      CARDIAC MARKERS ( 30 Mar 2019 17:22 )  4.570 ng/mL / x     / x     / x     / x            RADIOLOGY & ADDITIONAL TESTS:    Imaging Personally Reviewed:  [ x] YES    < from: CT Angio Neck w/ IV Cont (19 @ 23:32) >  1. No significant ICA stenosis. No dissection or occlusion.   2. Mild atherosclerotic calcification involving proximal ICA with less   than 50%   narrowing/stenosis of the ICA.     < from: CT Head No Cont (19 @ 17:08) >  1)  multifocal chronic ischemic changes with atrophy. No definitive acute   abnormality or hemorrhage.  2)  follow-up MR imaging may be considered for further assessment.    < end of copied text >    Consultant(s) Notes Reviewed:  [x ] YES     Care Discussed with [x ] Consultants [X ] Patient [ ] Family  [x ]    [x ]  Other; RN
Patient is a 73y old  Female who presents with a chief complaint of AMS (31 Mar 2019 13:19)      OVERNIGHT EVENTS: none     REVIEW OF SYSTEMS: poor historian     MEDICATIONS  (STANDING):  amLODIPine   Tablet 10 milliGRAM(s) Oral daily  aspirin enteric coated 81 milliGRAM(s) Oral daily  atorvastatin 80 milliGRAM(s) Oral at bedtime  buDESOnide 160 MICROgram(s)/formoterol 4.5 MICROgram(s) Inhaler 2 Puff(s) Inhalation two times a day  dextrose 5%. 1000 milliLiter(s) (50 mL/Hr) IV Continuous <Continuous>  dextrose 50% Injectable 12.5 Gram(s) IV Push once  dextrose 50% Injectable 25 Gram(s) IV Push once  dextrose 50% Injectable 25 Gram(s) IV Push once  gabapentin 200 milliGRAM(s) Oral two times a day  heparin  Injectable 5000 Unit(s) SubCutaneous every 12 hours  hydrALAZINE 25 milliGRAM(s) Oral two times a day  insulin lispro (HumaLOG) corrective regimen sliding scale   SubCutaneous three times a day before meals  insulin lispro (HumaLOG) corrective regimen sliding scale   SubCutaneous at bedtime  labetalol 200 milliGRAM(s) Oral two times a day  montelukast 10 milliGRAM(s) Oral daily  tamsulosin 0.4 milliGRAM(s) Oral at bedtime    MEDICATIONS  (PRN):  acetaminophen   Tablet .. 650 milliGRAM(s) Oral every 6 hours PRN Mild Pain (1 - 3)  ALBUTerol/ipratropium for Nebulization 3 milliLiter(s) Nebulizer every 6 hours PRN Shortness of Breath and/or Wheezing  dextrose 40% Gel 15 Gram(s) Oral once PRN Blood Glucose LESS THAN 70 milliGRAM(s)/deciliter  glucagon  Injectable 1 milliGRAM(s) IntraMuscular once PRN Glucose LESS THAN 70 milligrams/deciliter      Allergies    codeine (Swelling)  penicillin (Anaphylaxis)  penicillins (Swelling)    Intolerances        T(F): 100 (19 @ 11:50), Max: 101.8 (19 @ 00:09)  HR: 79 (19 @ 11:50) (79 - 111)  BP: 161/71 (19 @ 11:50) (158/74 - 204/94)  RR: 18 (19 @ 11:50) (17 - 21)  SpO2: 96% (19 @ 11:50) (96% - 98%)  Wt(kg): --    PHYSICAL EXAM:  GENERAL: NAD, well-groomed, well-developed  HEAD:  Atraumatic, Normocephalic  EYES:  PERRLA, conjunctiva and sclera clear  ENMT: Moist mucous membranes  NECK: Supple, No JVD, Normal thyroid  NERVOUS SYSTEM:  sleepy but arousable  CHEST/LUNG: Clear to percussion bilaterally; No rales, rhonchi, wheezing, or rubs BL  HEART: Regular rate and rhythm; No murmurs, rubs, or gallops  ABDOMEN: Soft, Nontender, Nondistended; Bowel sounds present  EXTREMITIES:  2+ Peripheral Pulses, No clubbing, cyanosis, or edema BL LE  LYMPH: No lymphadenopathy noted  SKIN: No rashes or lesions    LABS:                                   8.5    11.97 )-----------( 141      ( 2019 08:18 )             27.3   04-    135  |  100  |  40<H>  ----------------------------<  98  3.8   |  25  |  5.29<H>    Ca    7.3<L>      2019 08:18      Urinalysis Basic - ( 31 Mar 2019 02:18 )    Color: Yellow / Appearance: Slightly Turbid / S.015 / pH: x  Gluc: x / Ketone: Negative  / Bili: Negative / Urobili: Negative mg/dL   Blood: x / Protein: 500 mg/dL / Nitrite: Negative   Leuk Esterase: Negative / RBC: 3-5 /HPF / WBC 3-5   Sq Epi: x / Non Sq Epi: x / Bacteria: TNTC      Cultures;   CAPILLARY BLOOD GLUCOSE      POCT Blood Glucose.: 128 mg/dL (31 Mar 2019 11:47)  POCT Blood Glucose.: 107 mg/dL (31 Mar 2019 07:44)  POCT Blood Glucose.: 109 mg/dL (30 Mar 2019 16:45)    Lipid panel:   LDL 75  TG 94    CARDIAC MARKERS ( 31 Mar 2019 07:17 )  2.810 ng/mL / x     / x     / x     / x      CARDIAC MARKERS ( 30 Mar 2019 17:22 )  4.570 ng/mL / x     / x     / x     / x            RADIOLOGY & ADDITIONAL TESTS:    Imaging Personally Reviewed:  [ x] YES    < from: CT Angio Neck w/ IV Cont (19 @ 23:32) >  1. No significant ICA stenosis. No dissection or occlusion.   2. Mild atherosclerotic calcification involving proximal ICA with less   than 50%   narrowing/stenosis of the ICA.     < from: CT Head No Cont (19 @ 17:08) >  1)  multifocal chronic ischemic changes with atrophy. No definitive acute   abnormality or hemorrhage.  2)  follow-up MR imaging may be considered for further assessment.    < end of copied text >    Consultant(s) Notes Reviewed:  [x ] YES     Care Discussed with [x ] Consultants [X ] Patient [ ] Family  [x ]    [x ]  Other; RN
Patient is a 73y old  Female who presents with a chief complaint of AMS (31 Mar 2019 13:19)      OVERNIGHT EVENTS: none     REVIEW OF SYSTEMS: poor historian     MEDICATIONS  (STANDING):  amLODIPine   Tablet 10 milliGRAM(s) Oral daily  aspirin enteric coated 81 milliGRAM(s) Oral daily  atorvastatin 80 milliGRAM(s) Oral at bedtime  buDESOnide 160 MICROgram(s)/formoterol 4.5 MICROgram(s) Inhaler 2 Puff(s) Inhalation two times a day  dextrose 5%. 1000 milliLiter(s) (50 mL/Hr) IV Continuous <Continuous>  dextrose 50% Injectable 12.5 Gram(s) IV Push once  dextrose 50% Injectable 25 Gram(s) IV Push once  dextrose 50% Injectable 25 Gram(s) IV Push once  gabapentin 200 milliGRAM(s) Oral two times a day  heparin  Injectable 5000 Unit(s) SubCutaneous every 12 hours  hydrALAZINE 25 milliGRAM(s) Oral two times a day  insulin lispro (HumaLOG) corrective regimen sliding scale   SubCutaneous three times a day before meals  insulin lispro (HumaLOG) corrective regimen sliding scale   SubCutaneous at bedtime  labetalol 200 milliGRAM(s) Oral two times a day  montelukast 10 milliGRAM(s) Oral daily  tamsulosin 0.4 milliGRAM(s) Oral at bedtime    MEDICATIONS  (PRN):  acetaminophen   Tablet .. 650 milliGRAM(s) Oral every 6 hours PRN Mild Pain (1 - 3)  ALBUTerol/ipratropium for Nebulization 3 milliLiter(s) Nebulizer every 6 hours PRN Shortness of Breath and/or Wheezing  dextrose 40% Gel 15 Gram(s) Oral once PRN Blood Glucose LESS THAN 70 milliGRAM(s)/deciliter  glucagon  Injectable 1 milliGRAM(s) IntraMuscular once PRN Glucose LESS THAN 70 milligrams/deciliter      Allergies    codeine (Swelling)  penicillin (Anaphylaxis)  penicillins (Swelling)    Intolerances        T(F): 100 (19 @ 11:50), Max: 101.8 (19 @ 00:09)  HR: 79 (19 @ 11:50) (79 - 111)  BP: 161/71 (19 @ 11:50) (158/74 - 204/94)  RR: 18 (19 @ 11:50) (17 - 21)  SpO2: 96% (19 @ 11:50) (96% - 98%)  Wt(kg): --    PHYSICAL EXAM:  GENERAL: NAD, well-groomed, well-developed  HEAD:  Atraumatic, Normocephalic  EYES:  PERRLA, conjunctiva and sclera clear  ENMT: Moist mucous membranes  NECK: Supple, No JVD, Normal thyroid  NERVOUS SYSTEM:  sleepy but arousable  CHEST/LUNG: Clear to percussion bilaterally; No rales, rhonchi, wheezing, or rubs BL  HEART: Regular rate and rhythm; No murmurs, rubs, or gallops  ABDOMEN: Soft, Nontender, Nondistended; Bowel sounds present  EXTREMITIES:  2+ Peripheral Pulses, No clubbing, cyanosis, or edema BL LE  LYMPH: No lymphadenopathy noted  SKIN: No rashes or lesions    LABS:                        9.8    15.08 )-----------( 163      ( 31 Mar 2019 07:17 )             31.9         136  |  101  |  20  ----------------------------<  122<H>  3.7   |  24  |  3.30<H>    Ca    8.0<L>      31 Mar 2019 07:17  Mg     1.8         TPro  7.0  /  Alb  2.6<L>  /  TBili  0.2  /  DBili  x   /  AST  212<H>  /  ALT  58  /  AlkPhos  90      PT/INR - ( 30 Mar 2019 17:22 )   PT: 11.5 sec;   INR: 1.03 ratio         PTT - ( 30 Mar 2019 17:22 )  PTT:34.3 sec  Urinalysis Basic - ( 31 Mar 2019 02:18 )    Color: Yellow / Appearance: Slightly Turbid / S.015 / pH: x  Gluc: x / Ketone: Negative  / Bili: Negative / Urobili: Negative mg/dL   Blood: x / Protein: 500 mg/dL / Nitrite: Negative   Leuk Esterase: Negative / RBC: 3-5 /HPF / WBC 3-5   Sq Epi: x / Non Sq Epi: x / Bacteria: TNTC      Cultures;   CAPILLARY BLOOD GLUCOSE      POCT Blood Glucose.: 128 mg/dL (31 Mar 2019 11:47)  POCT Blood Glucose.: 107 mg/dL (31 Mar 2019 07:44)  POCT Blood Glucose.: 109 mg/dL (30 Mar 2019 16:45)    Lipid panel:   LDL 75  TG 94    CARDIAC MARKERS ( 31 Mar 2019 07:17 )  2.810 ng/mL / x     / x     / x     / x      CARDIAC MARKERS ( 30 Mar 2019 17:22 )  4.570 ng/mL / x     / x     / x     / x            RADIOLOGY & ADDITIONAL TESTS:    Imaging Personally Reviewed:  [ x] YES    < from: CT Angio Neck w/ IV Cont (19 @ 23:32) >  1. No significant ICA stenosis. No dissection or occlusion.   2. Mild atherosclerotic calcification involving proximal ICA with less   than 50%   narrowing/stenosis of the ICA.     < from: CT Head No Cont (19 @ 17:08) >  1)  multifocal chronic ischemic changes with atrophy. No definitive acute   abnormality or hemorrhage.  2)  follow-up MR imaging may be considered for further assessment.    < end of copied text >    Consultant(s) Notes Reviewed:  [x ] YES     Care Discussed with [x ] Consultants [X ] Patient [ ] Family  [x ]    [x ]  Other; RN
Patient is a 73y old  Female who presents with a chief complaint of AMS (31 Mar 2019 13:19)      OVERNIGHT EVENTS: none     REVIEW OF SYSTEMS: poor historian     MEDICATIONS  (STANDING):  amLODIPine   Tablet 10 milliGRAM(s) Oral daily  aspirin enteric coated 81 milliGRAM(s) Oral daily  atorvastatin 80 milliGRAM(s) Oral at bedtime  buDESOnide 160 MICROgram(s)/formoterol 4.5 MICROgram(s) Inhaler 2 Puff(s) Inhalation two times a day  dextrose 5%. 1000 milliLiter(s) (50 mL/Hr) IV Continuous <Continuous>  dextrose 50% Injectable 12.5 Gram(s) IV Push once  dextrose 50% Injectable 25 Gram(s) IV Push once  dextrose 50% Injectable 25 Gram(s) IV Push once  gabapentin 200 milliGRAM(s) Oral two times a day  heparin  Injectable 5000 Unit(s) SubCutaneous every 12 hours  hydrALAZINE 25 milliGRAM(s) Oral two times a day  insulin lispro (HumaLOG) corrective regimen sliding scale   SubCutaneous three times a day before meals  insulin lispro (HumaLOG) corrective regimen sliding scale   SubCutaneous at bedtime  labetalol 200 milliGRAM(s) Oral two times a day  montelukast 10 milliGRAM(s) Oral daily  tamsulosin 0.4 milliGRAM(s) Oral at bedtime    MEDICATIONS  (PRN):  acetaminophen   Tablet .. 650 milliGRAM(s) Oral every 6 hours PRN Mild Pain (1 - 3)  ALBUTerol/ipratropium for Nebulization 3 milliLiter(s) Nebulizer every 6 hours PRN Shortness of Breath and/or Wheezing  dextrose 40% Gel 15 Gram(s) Oral once PRN Blood Glucose LESS THAN 70 milliGRAM(s)/deciliter  glucagon  Injectable 1 milliGRAM(s) IntraMuscular once PRN Glucose LESS THAN 70 milligrams/deciliter      Allergies    codeine (Swelling)  penicillin (Anaphylaxis)  penicillins (Swelling)    Intolerances    Vitals noted.     PHYSICAL EXAM:  GENERAL: NAD, well-groomed, well-developed  HEAD:  Atraumatic, Normocephalic  EYES:  PERRLA, conjunctiva and sclera clear  ENMT: Moist mucous membranes  NECK: Supple, No JVD, Normal thyroid  NERVOUS SYSTEM:  Awake, alert, nonverbal   CHEST/LUNG: Clear to percussion bilaterally; No rales, rhonchi, wheezing, or rubs BL  HEART: Regular rate and rhythm; No murmurs, rubs, or gallops  ABDOMEN: Soft, Nontender, Nondistended; Bowel sounds present  EXTREMITIES:  2+ Peripheral Pulses b/l, No clubbing, cyanosis, or edema BL LE    Labs and imaging reviewed.                            8.7    10.25 )-----------( 367      ( 2019 09:59 )             28.3   04-07    139  |  101  |  21  ----------------------------<  124  3.5   |  32  |  4.15    Ca    8.0      2019 10:36  Phos  2.9     04-07  Mg     2.0     04-07           Urinalysis Basic - ( 31 Mar 2019 02:18 )    Color: Yellow / Appearance: Slightly Turbid / S.015 / pH: x  Gluc: x / Ketone: Negative  / Bili: Negative / Urobili: Negative mg/dL   Blood: x / Protein: 500 mg/dL / Nitrite: Negative   Leuk Esterase: Negative / RBC: 3-5 /HPF / WBC 3-5   Sq Epi: x / Non Sq Epi: x / Bacteria: TNTC    Culture - Urine (19 @ 12:31)    -  Tigecycline: S <=2    -  Piperacillin/Tazobactam: S <=16    -  Nitrofurantoin: S <=32 Should not be used to treat pyelonephritis    -  Meropenem: S <=1    -  Imipenem: S <=1    -  Levofloxacin: S <=2    -  Gentamicin: S <=4    -  Ertapenem: S <=1    -  Ciprofloxacin: S <=1    -  Trimethoprim/Sulfamethoxazole: S <=2/38    -  Tobramycin: S <=4    -  Ceftriaxone: S <=1 Enterobacter, Citrobacter, and Serratia may develop resistance during prolonged therapy    -  Cefoxitin: S <=8    -  Cefepime: S <=4    -  Cefazolin: S <=8 For uncomplicated UTI with K. pneumoniae, E. coli, or P. mirablis: RIGOBERTO <=16 is sensitive and RIGOBERTO >=32 is resistant. This also predicts results for oral agents cefaclor, cefdinir, cefpodoxime, cefprozil, cefuroxime axetil, cephalexin and locarbef for uncomplicated UTI. Note that some isolates may be susceptible to these agents while testing resistant to cefazolin.    -  Aztreonam: S <=4    -  Ampicillin: R >16 These ampicillin results predict results for amoxicillin    -  Ampicillin/Sulbactam: I 16/    -  Amikacin: S <=16    Specimen Source: .Urine    Culture Results:   >100,000 CFU/ml Escherichia coli    Organism Identification: Escherichia coli    Organism: Escherichia coli    Method Type: RIGOBERTO          Lipid panel:   LDL 75  TG 94    CARDIAC MARKERS ( 31 Mar 2019 07:17 )  2.810 ng/mL / x     / x     / x     / x      CARDIAC MARKERS ( 30 Mar 2019 17:22 )  4.570 ng/mL / x     / x     / x     / x        < from: CT Angio Neck w/ IV Cont (19 @ 23:32) >  1. No significant ICA stenosis. No dissection or occlusion.   2. Mild atherosclerotic calcification involving proximal ICA with less   than 50%   narrowing/stenosis of the ICA.     < from: CT Head No Cont (19 @ 17:08) >  1)  multifocal chronic ischemic changes with atrophy. No definitive acute   abnormality or hemorrhage.  2)  follow-up MR imaging may be considered for further assessment.    < end of copied text >      RADIOLOGY & ADDITIONAL TESTS:    Imaging Personally Reviewed:  [ x] YES        Consultant(s) Notes Reviewed:  [x ] YES     Care Discussed with [x ] Consultants [X ] Patient [ ] Family  [x ]    [x ]  Other; RN    Care discussed in detail with patient.  All questions and concerns addressed
Patient is a 73y old  Female who presents with a chief complaint of AMS (31 Mar 2019 13:19)      OVERNIGHT EVENTS: none     REVIEW OF SYSTEMS: poor historian     MEDICATIONS  (STANDING):  amLODIPine   Tablet 10 milliGRAM(s) Oral daily  aspirin enteric coated 81 milliGRAM(s) Oral daily  atorvastatin 80 milliGRAM(s) Oral at bedtime  buDESOnide 160 MICROgram(s)/formoterol 4.5 MICROgram(s) Inhaler 2 Puff(s) Inhalation two times a day  dextrose 5%. 1000 milliLiter(s) (50 mL/Hr) IV Continuous <Continuous>  dextrose 50% Injectable 12.5 Gram(s) IV Push once  dextrose 50% Injectable 25 Gram(s) IV Push once  dextrose 50% Injectable 25 Gram(s) IV Push once  gabapentin 200 milliGRAM(s) Oral two times a day  heparin  Injectable 5000 Unit(s) SubCutaneous every 12 hours  hydrALAZINE 25 milliGRAM(s) Oral two times a day  insulin lispro (HumaLOG) corrective regimen sliding scale   SubCutaneous three times a day before meals  insulin lispro (HumaLOG) corrective regimen sliding scale   SubCutaneous at bedtime  labetalol 200 milliGRAM(s) Oral two times a day  montelukast 10 milliGRAM(s) Oral daily  tamsulosin 0.4 milliGRAM(s) Oral at bedtime    MEDICATIONS  (PRN):  acetaminophen   Tablet .. 650 milliGRAM(s) Oral every 6 hours PRN Mild Pain (1 - 3)  ALBUTerol/ipratropium for Nebulization 3 milliLiter(s) Nebulizer every 6 hours PRN Shortness of Breath and/or Wheezing  dextrose 40% Gel 15 Gram(s) Oral once PRN Blood Glucose LESS THAN 70 milliGRAM(s)/deciliter  glucagon  Injectable 1 milliGRAM(s) IntraMuscular once PRN Glucose LESS THAN 70 milligrams/deciliter      Allergies    codeine (Swelling)  penicillin (Anaphylaxis)  penicillins (Swelling)    Intolerances    Vitals noted.     PHYSICAL EXAM:  GENERAL: NAD, well-groomed, well-developed  HEAD:  Atraumatic, Normocephalic  EYES:  PERRLA, conjunctiva and sclera clear  ENMT: Moist mucous membranes  NECK: Supple, No JVD, Normal thyroid  NERVOUS SYSTEM:  sleepy but arousable  CHEST/LUNG: Clear to percussion bilaterally; No rales, rhonchi, wheezing, or rubs BL  HEART: Regular rate and rhythm; No murmurs, rubs, or gallops  ABDOMEN: Soft, Nontender, Nondistended; Bowel sounds present  EXTREMITIES:  2+ Peripheral Pulses, No clubbing, cyanosis, or edema BL LE  LYMPH: No lymphadenopathy noted  SKIN: No rashes or lesions    Labs and imaging reviewed.                                       9.0    9.06  )-----------( 291      ( 2019 08:18 )             29.7   04-05    139  |  101  |  31<H>  ----------------------------<  98  3.6   |  27  |  5.18<H>    Ca    7.8<L>      2019 08:18  Phos  4.7     04-05  Mg     2.0     04-05    TPro  6.5  /  Alb  2.1<L>  /  TBili  0.3  /  DBili  .07  /  AST  62<H>  /  ALT  38  /  AlkPhos  77  04-05                          Urinalysis Basic - ( 31 Mar 2019 02:18 )    Color: Yellow / Appearance: Slightly Turbid / S.015 / pH: x  Gluc: x / Ketone: Negative  / Bili: Negative / Urobili: Negative mg/dL   Blood: x / Protein: 500 mg/dL / Nitrite: Negative   Leuk Esterase: Negative / RBC: 3-5 /HPF / WBC 3-5   Sq Epi: x / Non Sq Epi: x / Bacteria: TNTC      Lipid panel:   LDL 75  TG 94    CARDIAC MARKERS ( 31 Mar 2019 07:17 )  2.810 ng/mL / x     / x     / x     / x      CARDIAC MARKERS ( 30 Mar 2019 17:22 )  4.570 ng/mL / x     / x     / x     / x        < from: CT Angio Neck w/ IV Cont (19 @ 23:32) >  1. No significant ICA stenosis. No dissection or occlusion.   2. Mild atherosclerotic calcification involving proximal ICA with less   than 50%   narrowing/stenosis of the ICA.     < from: CT Head No Cont (19 @ 17:08) >  1)  multifocal chronic ischemic changes with atrophy. No definitive acute   abnormality or hemorrhage.  2)  follow-up MR imaging may be considered for further assessment.    < end of copied text >      RADIOLOGY & ADDITIONAL TESTS:    Imaging Personally Reviewed:  [ x] YES        Consultant(s) Notes Reviewed:  [x ] YES     Care Discussed with [x ] Consultants [X ] Patient [ ] Family  [x ]    [x ]  Other; RN    Care discussed in detail with patient.  All questions and concerns addressed
Patient is a 73y old  Female who presents with a chief complaint of AMS (31 Mar 2019 13:19)      OVERNIGHT EVENTS: none     REVIEW OF SYSTEMS: poor historian     MEDICATIONS  (STANDING):  amLODIPine   Tablet 10 milliGRAM(s) Oral daily  aspirin enteric coated 81 milliGRAM(s) Oral daily  atorvastatin 80 milliGRAM(s) Oral at bedtime  buDESOnide 160 MICROgram(s)/formoterol 4.5 MICROgram(s) Inhaler 2 Puff(s) Inhalation two times a day  dextrose 5%. 1000 milliLiter(s) (50 mL/Hr) IV Continuous <Continuous>  dextrose 50% Injectable 12.5 Gram(s) IV Push once  dextrose 50% Injectable 25 Gram(s) IV Push once  dextrose 50% Injectable 25 Gram(s) IV Push once  gabapentin 200 milliGRAM(s) Oral two times a day  heparin  Injectable 5000 Unit(s) SubCutaneous every 12 hours  hydrALAZINE 25 milliGRAM(s) Oral two times a day  insulin lispro (HumaLOG) corrective regimen sliding scale   SubCutaneous three times a day before meals  insulin lispro (HumaLOG) corrective regimen sliding scale   SubCutaneous at bedtime  labetalol 200 milliGRAM(s) Oral two times a day  montelukast 10 milliGRAM(s) Oral daily  tamsulosin 0.4 milliGRAM(s) Oral at bedtime    MEDICATIONS  (PRN):  acetaminophen   Tablet .. 650 milliGRAM(s) Oral every 6 hours PRN Mild Pain (1 - 3)  ALBUTerol/ipratropium for Nebulization 3 milliLiter(s) Nebulizer every 6 hours PRN Shortness of Breath and/or Wheezing  dextrose 40% Gel 15 Gram(s) Oral once PRN Blood Glucose LESS THAN 70 milliGRAM(s)/deciliter  glucagon  Injectable 1 milliGRAM(s) IntraMuscular once PRN Glucose LESS THAN 70 milligrams/deciliter      Allergies    codeine (Swelling)  penicillin (Anaphylaxis)  penicillins (Swelling)    Intolerances    Vitals noted.     PHYSICAL EXAM:  GENERAL: NAD, well-groomed, well-developed  HEAD:  Atraumatic, Normocephalic  EYES:  PERRLA, conjunctiva and sclera clear  ENMT: Moist mucous membranes  NECK: Supple, No JVD, Normal thyroid  NERVOUS SYSTEM:  sleepy but arousable  CHEST/LUNG: Clear to percussion bilaterally; No rales, rhonchi, wheezing, or rubs BL  HEART: Regular rate and rhythm; No murmurs, rubs, or gallops  ABDOMEN: Soft, Nontender, Nondistended; Bowel sounds present  EXTREMITIES:  2+ Peripheral Pulses, No clubbing, cyanosis, or edema BL LE  LYMPH: No lymphadenopathy noted  SKIN: No rashes or lesions    Labs and imaging reviewed.                                       9.0    9.06  )-----------( 291      ( 2019 08:18 )             29.7   04-05    139  |  101  |  31<H>  ----------------------------<  98  3.6   |  27  |  5.18<H>    Ca    7.8<L>      2019 08:18  Phos  4.7     04-05  Mg     2.0     04-05    TPro  6.5  /  Alb  2.1<L>  /  TBili  0.3  /  DBili  .07  /  AST  62<H>  /  ALT  38  /  AlkPhos  77  04-05                          Urinalysis Basic - ( 31 Mar 2019 02:18 )    Color: Yellow / Appearance: Slightly Turbid / S.015 / pH: x  Gluc: x / Ketone: Negative  / Bili: Negative / Urobili: Negative mg/dL   Blood: x / Protein: 500 mg/dL / Nitrite: Negative   Leuk Esterase: Negative / RBC: 3-5 /HPF / WBC 3-5   Sq Epi: x / Non Sq Epi: x / Bacteria: TNTC      Lipid panel:   LDL 75  TG 94    CARDIAC MARKERS ( 31 Mar 2019 07:17 )  2.810 ng/mL / x     / x     / x     / x      CARDIAC MARKERS ( 30 Mar 2019 17:22 )  4.570 ng/mL / x     / x     / x     / x        < from: CT Angio Neck w/ IV Cont (19 @ 23:32) >  1. No significant ICA stenosis. No dissection or occlusion.   2. Mild atherosclerotic calcification involving proximal ICA with less   than 50%   narrowing/stenosis of the ICA.     < from: CT Head No Cont (19 @ 17:08) >  1)  multifocal chronic ischemic changes with atrophy. No definitive acute   abnormality or hemorrhage.  2)  follow-up MR imaging may be considered for further assessment.    < end of copied text >      RADIOLOGY & ADDITIONAL TESTS:    Imaging Personally Reviewed:  [ x] YES        Consultant(s) Notes Reviewed:  [x ] YES     Care Discussed with [x ] Consultants [X ] Patient [ ] Family  [x ]    [x ]  Other; RN    Care discussed in detail with patient.  All questions and concerns addressed
Patient is a 73y old  Female who presents with a chief complaint of AMS (31 Mar 2019 13:19)      OVERNIGHT EVENTS: none     REVIEW OF SYSTEMS: poor historian     MEDICATIONS  (STANDING):  amLODIPine   Tablet 10 milliGRAM(s) Oral daily  aspirin enteric coated 81 milliGRAM(s) Oral daily  atorvastatin 80 milliGRAM(s) Oral at bedtime  buDESOnide 160 MICROgram(s)/formoterol 4.5 MICROgram(s) Inhaler 2 Puff(s) Inhalation two times a day  dextrose 5%. 1000 milliLiter(s) (50 mL/Hr) IV Continuous <Continuous>  dextrose 50% Injectable 12.5 Gram(s) IV Push once  dextrose 50% Injectable 25 Gram(s) IV Push once  dextrose 50% Injectable 25 Gram(s) IV Push once  gabapentin 200 milliGRAM(s) Oral two times a day  heparin  Injectable 5000 Unit(s) SubCutaneous every 12 hours  hydrALAZINE 25 milliGRAM(s) Oral two times a day  insulin lispro (HumaLOG) corrective regimen sliding scale   SubCutaneous three times a day before meals  insulin lispro (HumaLOG) corrective regimen sliding scale   SubCutaneous at bedtime  labetalol 200 milliGRAM(s) Oral two times a day  montelukast 10 milliGRAM(s) Oral daily  tamsulosin 0.4 milliGRAM(s) Oral at bedtime    MEDICATIONS  (PRN):  acetaminophen   Tablet .. 650 milliGRAM(s) Oral every 6 hours PRN Mild Pain (1 - 3)  ALBUTerol/ipratropium for Nebulization 3 milliLiter(s) Nebulizer every 6 hours PRN Shortness of Breath and/or Wheezing  dextrose 40% Gel 15 Gram(s) Oral once PRN Blood Glucose LESS THAN 70 milliGRAM(s)/deciliter  glucagon  Injectable 1 milliGRAM(s) IntraMuscular once PRN Glucose LESS THAN 70 milligrams/deciliter      Allergies    codeine (Swelling)  penicillin (Anaphylaxis)  penicillins (Swelling)    Intolerances    Vitals noted.     PHYSICAL EXAM:  GENERAL: NAD, well-groomed, well-developed  HEAD:  Atraumatic, Normocephalic  EYES:  PERRLA, conjunctiva and sclera clear  ENMT: Moist mucous membranes  NECK: Supple, No JVD, Normal thyroid  NERVOUS SYSTEM:  sleepy but arousable  CHEST/LUNG: Clear to percussion bilaterally; No rales, rhonchi, wheezing, or rubs BL  HEART: Regular rate and rhythm; No murmurs, rubs, or gallops  ABDOMEN: Soft, Nontender, Nondistended; Bowel sounds present  EXTREMITIES:  2+ Peripheral Pulses, No clubbing, cyanosis, or edema BL LE  LYMPH: No lymphadenopathy noted  SKIN: No rashes or lesions    Labs and imaging reviewed.                                    8.6    11.33 )-----------( 225      ( 2019 07:30 )             27.8   04-04    137  |  100  |  45<H>  ----------------------------<  96  3.8   |  23  |  6.64<H>    Ca    7.1<L>      2019 07:30  Phos  5.1     04-04  Mg     2.1     04-04                     Urinalysis Basic - ( 31 Mar 2019 02:18 )    Color: Yellow / Appearance: Slightly Turbid / S.015 / pH: x  Gluc: x / Ketone: Negative  / Bili: Negative / Urobili: Negative mg/dL   Blood: x / Protein: 500 mg/dL / Nitrite: Negative   Leuk Esterase: Negative / RBC: 3-5 /HPF / WBC 3-5   Sq Epi: x / Non Sq Epi: x / Bacteria: TNTC      Lipid panel:   LDL 75  TG 94    CARDIAC MARKERS ( 31 Mar 2019 07:17 )  2.810 ng/mL / x     / x     / x     / x      CARDIAC MARKERS ( 30 Mar 2019 17:22 )  4.570 ng/mL / x     / x     / x     / x        < from: CT Angio Neck w/ IV Cont (19 @ 23:32) >  1. No significant ICA stenosis. No dissection or occlusion.   2. Mild atherosclerotic calcification involving proximal ICA with less   than 50%   narrowing/stenosis of the ICA.     < from: CT Head No Cont (19 @ 17:08) >  1)  multifocal chronic ischemic changes with atrophy. No definitive acute   abnormality or hemorrhage.  2)  follow-up MR imaging may be considered for further assessment.    < end of copied text >      RADIOLOGY & ADDITIONAL TESTS:    Imaging Personally Reviewed:  [ x] YES        Consultant(s) Notes Reviewed:  [x ] YES     Care Discussed with [x ] Consultants [X ] Patient [ ] Family  [x ]    [x ]  Other; RN    Care discussed in detail with patient.  All questions and concerns addressed
Patient is a 73y old  Female who presents with a chief complaint of AMS (31 Mar 2019 13:19)      OVERNIGHT EVENTS: none     REVIEW OF SYSTEMS: poor historian     MEDICATIONS  (STANDING):  amLODIPine   Tablet 10 milliGRAM(s) Oral daily  aspirin enteric coated 81 milliGRAM(s) Oral daily  atorvastatin 80 milliGRAM(s) Oral at bedtime  buDESOnide 160 MICROgram(s)/formoterol 4.5 MICROgram(s) Inhaler 2 Puff(s) Inhalation two times a day  dextrose 5%. 1000 milliLiter(s) (50 mL/Hr) IV Continuous <Continuous>  dextrose 50% Injectable 12.5 Gram(s) IV Push once  dextrose 50% Injectable 25 Gram(s) IV Push once  dextrose 50% Injectable 25 Gram(s) IV Push once  gabapentin 200 milliGRAM(s) Oral two times a day  heparin  Injectable 5000 Unit(s) SubCutaneous every 12 hours  hydrALAZINE 25 milliGRAM(s) Oral two times a day  insulin lispro (HumaLOG) corrective regimen sliding scale   SubCutaneous three times a day before meals  insulin lispro (HumaLOG) corrective regimen sliding scale   SubCutaneous at bedtime  labetalol 200 milliGRAM(s) Oral two times a day  montelukast 10 milliGRAM(s) Oral daily  tamsulosin 0.4 milliGRAM(s) Oral at bedtime    MEDICATIONS  (PRN):  acetaminophen   Tablet .. 650 milliGRAM(s) Oral every 6 hours PRN Mild Pain (1 - 3)  ALBUTerol/ipratropium for Nebulization 3 milliLiter(s) Nebulizer every 6 hours PRN Shortness of Breath and/or Wheezing  dextrose 40% Gel 15 Gram(s) Oral once PRN Blood Glucose LESS THAN 70 milliGRAM(s)/deciliter  glucagon  Injectable 1 milliGRAM(s) IntraMuscular once PRN Glucose LESS THAN 70 milligrams/deciliter      Allergies    codeine (Swelling)  penicillin (Anaphylaxis)  penicillins (Swelling)    Intolerances    Vitals noted.     PHYSICAL EXAM:  GENERAL: NAD, well-groomed, well-developed  HEAD:  Atraumatic, Normocephalic  EYES:  PERRLA, conjunctiva and sclera clear  ENMT: Moist mucous membranes  NECK: Supple, No JVD, Normal thyroid  NERVOUS SYSTEM:  sleepy but arousable  CHEST/LUNG: Clear to percussion bilaterally; No rales, rhonchi, wheezing, or rubs BL  HEART: Regular rate and rhythm; No murmurs, rubs, or gallops  ABDOMEN: Soft, Nontender, Nondistended; Bowel sounds present  EXTREMITIES:  2+ Peripheral Pulses, No clubbing, cyanosis, or edema BL LE  LYMPH: No lymphadenopathy noted  SKIN: No rashes or lesions    Labs and imaging reviewed.                         9.1    8.43  )-----------( 198      ( 2019 17:30 )             29.6   04-03    137  |  100  |  40<H>  ----------------------------<  94  4.5   |  25  |  6.00<H>    Ca    7.3<L>      2019 17:30  Phos  5.1     04-03  Mg     2.2     04-03    TPro  x   /  Alb  2.1<L>  /  TBili  x   /  DBili  x   /  AST  x   /  ALT  x   /  AlkPhos  x   04-02                                         Urinalysis Basic - ( 31 Mar 2019 02:18 )    Color: Yellow / Appearance: Slightly Turbid / S.015 / pH: x  Gluc: x / Ketone: Negative  / Bili: Negative / Urobili: Negative mg/dL   Blood: x / Protein: 500 mg/dL / Nitrite: Negative   Leuk Esterase: Negative / RBC: 3-5 /HPF / WBC 3-5   Sq Epi: x / Non Sq Epi: x / Bacteria: TNTC      Cultures;   CAPILLARY BLOOD GLUCOSE      POCT Blood Glucose.: 128 mg/dL (31 Mar 2019 11:47)  POCT Blood Glucose.: 107 mg/dL (31 Mar 2019 07:44)  POCT Blood Glucose.: 109 mg/dL (30 Mar 2019 16:45)    Lipid panel:   LDL 75  TG 94    CARDIAC MARKERS ( 31 Mar 2019 07:17 )  2.810 ng/mL / x     / x     / x     / x      CARDIAC MARKERS ( 30 Mar 2019 17:22 )  4.570 ng/mL / x     / x     / x     / x            RADIOLOGY & ADDITIONAL TESTS:    Imaging Personally Reviewed:  [ x] YES    < from: CT Angio Neck w/ IV Cont (19 @ 23:32) >  1. No significant ICA stenosis. No dissection or occlusion.   2. Mild atherosclerotic calcification involving proximal ICA with less   than 50%   narrowing/stenosis of the ICA.     < from: CT Head No Cont (19 @ 17:08) >  1)  multifocal chronic ischemic changes with atrophy. No definitive acute   abnormality or hemorrhage.  2)  follow-up MR imaging may be considered for further assessment.    < end of copied text >    Consultant(s) Notes Reviewed:  [x ] YES     Care Discussed with [x ] Consultants [X ] Patient [ ] Family  [x ]    [x ]  Other; RN
Patient seen in follow up for ESRD. Seen at dialysis, alert and conversant. No c/o, Permacath is functional.    PMH:  ESRD  Dementia  DM (diabetes mellitus)  Asthma  CVA (cerebral vascular accident)  No pertinent past medical history  CVA (cerebral vascular accident)  HLD (hyperlipidemia)  Asthma  Neuropathy  SLE (systemic lupus erythematosus)  DM (diabetes mellitus)  HTN (hypertension)    MEDICATIONS  (STANDING):  amLODIPine   Tablet 10 milliGRAM(s) Oral daily  aspirin enteric coated 81 milliGRAM(s) Oral daily  atorvastatin 80 milliGRAM(s) Oral at bedtime  buDESOnide 160 MICROgram(s)/formoterol 4.5 MICROgram(s) Inhaler 2 Puff(s) Inhalation two times a day  cefTRIAXone   IVPB      cefTRIAXone   IVPB 1 Gram(s) IV Intermittent every 24 hours  dextrose 5%. 1000 milliLiter(s) (50 mL/Hr) IV Continuous <Continuous>  dextrose 50% Injectable 12.5 Gram(s) IV Push once  dextrose 50% Injectable 25 Gram(s) IV Push once  dextrose 50% Injectable 25 Gram(s) IV Push once  epoetin melba Injectable 02354 Unit(s) IV Push <User Schedule>  gabapentin 200 milliGRAM(s) Oral two times a day  heparin  Injectable 5000 Unit(s) SubCutaneous every 12 hours  hydrALAZINE 25 milliGRAM(s) Oral two times a day  insulin lispro (HumaLOG) corrective regimen sliding scale   SubCutaneous three times a day before meals  insulin lispro (HumaLOG) corrective regimen sliding scale   SubCutaneous at bedtime  labetalol 200 milliGRAM(s) Oral two times a day  montelukast 10 milliGRAM(s) Oral daily  tamsulosin 0.4 milliGRAM(s) Oral at bedtime    MEDICATIONS  (PRN):  acetaminophen   Tablet .. 650 milliGRAM(s) Oral every 6 hours PRN Mild Pain (1 - 3)  ALBUTerol/ipratropium for Nebulization 3 milliLiter(s) Nebulizer every 6 hours PRN Shortness of Breath and/or Wheezing  dextrose 40% Gel 15 Gram(s) Oral once PRN Blood Glucose LESS THAN 70 milliGRAM(s)/deciliter  glucagon  Injectable 1 milliGRAM(s) IntraMuscular once PRN Glucose LESS THAN 70 milligrams/deciliter    T(C): 37 (04-02-19 @ 10:05), Max: 37.4 (04-01-19 @ 05:00)  HR: 67 (04-02-19 @ 10:05) (66 - 80)  BP: 128/65 (04-02-19 @ 10:05) (128/65 - 147/68)  RR: 17 (04-02-19 @ 10:05) (17 - 19)  SpO2: 98% (04-02-19 @ 10:05) (92% - 98%)  Wt(kg): --  I&O's Detail      PHYSICAL EXAM:  General: NAD, conversant  Right I.J. Permacath  Respiratory: b/l air entry, clear  Cardiovascular: S1 S2 reg  Gastrointestinal: soft, BS present  Extremities: no edema  Moving all limbs well.     CBC Full  -  ( 02 Apr 2019 08:24 )  WBC Count : 10.79 K/uL  RBC Count : 3.62 M/uL  Hemoglobin : 8.5 g/dL  Hematocrit : 27.0 %  Platelet Count - Automated : 160 K/uL  Mean Cell Volume : 74.6 fl  Mean Cell Hemoglobin : 23.5 pg  Mean Cell Hemoglobin Concentration : 31.5 gm/dL  Auto Neutrophil # : x  Auto Lymphocyte # : x  Auto Monocyte # : x  Auto Eosinophil # : x  Auto Basophil # : x  Auto Neutrophil % : x  Auto Lymphocyte % : x  Auto Monocyte % : x  Auto Eosinophil % : x  Auto Basophil % : x    04-02    134<L>  |  98  |  56<H>  ----------------------------<  101<H>  3.8   |  22  |  7.05<H>    Ca    6.8<L>      02 Apr 2019 08:24  Phos  6.4     04-02  Mg     2.1     04-02    TPro  x   /  Alb  2.1<L>  /  TBili  x   /  DBili  x   /  AST  x   /  ALT  x   /  AlkPhos  x   04-02        Sodium, Serum: 134 (04-02 @ 08:24)  Sodium, Serum: 135 (04-01 @ 08:18)  Sodium, Serum: 136 (03-31 @ 07:17)  Sodium, Serum: 137 (03-30 @ 17:22)    Creatinine, Serum: 7.05 (04-02 @ 08:24)  Creatinine, Serum: 5.29 (04-01 @ 08:18)  Creatinine, Serum: 3.30 (03-31 @ 07:17)  Creatinine, Serum: 2.35 (03-30 @ 17:22)    Potassium, Serum: 3.8 (04-02 @ 08:24)  Potassium, Serum: 3.8 (04-01 @ 08:18)  Potassium, Serum: 3.7 (03-31 @ 07:17)  Potassium, Serum: 3.1 (03-30 @ 17:22)    Hemoglobin: 8.5 (04-02 @ 08:24)  Hemoglobin: 8.5 (04-01 @ 08:18)  Hemoglobin: 9.8 (03-31 @ 07:17)  Hemoglobin: 9.5 (03-30 @ 17:22)
Patient seen in follow up for ESRD. She is awake and conversant, answers simple questions. Disoriented to time and place. Denies pain or SOB.    ESRD  Dementia  DM (diabetes mellitus)  Asthma  CVA (cerebral vascular accident)  No pertinent past medical history  CVA (cerebral vascular accident)  HLD (hyperlipidemia)  Asthma  Neuropathy  SLE (systemic lupus erythematosus)  DM (diabetes mellitus)  HTN (hypertension)    MEDICATIONS  (STANDING):  amLODIPine   Tablet 10 milliGRAM(s) Oral daily  aspirin enteric coated 81 milliGRAM(s) Oral daily  atorvastatin 80 milliGRAM(s) Oral at bedtime  buDESOnide 160 MICROgram(s)/formoterol 4.5 MICROgram(s) Inhaler 2 Puff(s) Inhalation two times a day  cefTRIAXone   IVPB      dextrose 5%. 1000 milliLiter(s) (50 mL/Hr) IV Continuous <Continuous>  dextrose 50% Injectable 12.5 Gram(s) IV Push once  dextrose 50% Injectable 25 Gram(s) IV Push once  dextrose 50% Injectable 25 Gram(s) IV Push once  gabapentin 200 milliGRAM(s) Oral two times a day  heparin  Injectable 5000 Unit(s) SubCutaneous every 12 hours  hydrALAZINE 25 milliGRAM(s) Oral two times a day  insulin lispro (HumaLOG) corrective regimen sliding scale   SubCutaneous three times a day before meals  insulin lispro (HumaLOG) corrective regimen sliding scale   SubCutaneous at bedtime  labetalol 200 milliGRAM(s) Oral two times a day  montelukast 10 milliGRAM(s) Oral daily  tamsulosin 0.4 milliGRAM(s) Oral at bedtime    MEDICATIONS  (PRN):  acetaminophen   Tablet .. 650 milliGRAM(s) Oral every 6 hours PRN Mild Pain (1 - 3)  ALBUTerol/ipratropium for Nebulization 3 milliLiter(s) Nebulizer every 6 hours PRN Shortness of Breath and/or Wheezing  dextrose 40% Gel 15 Gram(s) Oral once PRN Blood Glucose LESS THAN 70 milliGRAM(s)/deciliter  glucagon  Injectable 1 milliGRAM(s) IntraMuscular once PRN Glucose LESS THAN 70 milligrams/deciliter    T(C): 36.9 (04-01-19 @ 12:55), Max: 38.3 (03-31-19 @ 17:32)  HR: 76 (04-01-19 @ 12:55) (74 - 84)  BP: 130/65 (04-01-19 @ 12:55) (130/65 - 164/82)  RR: 18 (04-01-19 @ 12:55) (18 - 19)  SpO2: 95% (04-01-19 @ 12:55) (93% - 98%)  Wt(kg): --  I&O's Detail      PHYSICAL EXAM:  General: NAD, conversant  Right I.J. Permacath  Respiratory: b/l air entry, clear  Cardiovascular: S1 S2 reg  Gastrointestinal: soft, BS present  Extremities: no edema  Moving all limbs well.    CBC Full  -  ( 01 Apr 2019 08:18 )  WBC Count : 11.97 K/uL  RBC Count : 3.58 M/uL  Hemoglobin : 8.5 g/dL  Hematocrit : 27.3 %  Platelet Count - Automated : 141 K/uL  Mean Cell Volume : 76.3 fl  Mean Cell Hemoglobin : 23.7 pg  Mean Cell Hemoglobin Concentration : 31.1 gm/dL  Auto Neutrophil # : x  Auto Lymphocyte # : x  Auto Monocyte # : x  Auto Eosinophil # : x  Auto Basophil # : x  Auto Neutrophil % : x  Auto Lymphocyte % : x  Auto Monocyte % : x  Auto Eosinophil % : x  Auto Basophil % : x    04-01    135  |  100  |  40<H>  ----------------------------<  98  3.8   |  25  |  5.29<H>    Ca    7.3<L>      01 Apr 2019 08:18  Mg     1.8     03-30    TPro  7.0  /  Alb  2.6<L>  /  TBili  0.2  /  DBili  x   /  AST  212<H>  /  ALT  58  /  AlkPhos  90  03-30    ABG - ( 30 Mar 2019 18:37 )  pH, Arterial: x     pH, Blood: 7.44  /  pCO2: 41    /  pO2: 86    / HCO3: 27    / Base Excess: 3.1   /  SaO2: 97                  Sodium, Serum: 135 (04-01 @ 08:18)  Sodium, Serum: 136 (03-31 @ 07:17)  Sodium, Serum: 137 (03-30 @ 17:22)    Creatinine, Serum: 5.29 (04-01 @ 08:18)  Creatinine, Serum: 3.30 (03-31 @ 07:17)  Creatinine, Serum: 2.35 (03-30 @ 17:22)    Potassium, Serum: 3.8 (04-01 @ 08:18)  Potassium, Serum: 3.7 (03-31 @ 07:17)  Potassium, Serum: 3.1 (03-30 @ 17:22)    Hemoglobin: 8.5 (04-01 @ 08:18)  Hemoglobin: 9.8 (03-31 @ 07:17)  Hemoglobin: 9.5 (03-30 @ 17:22)
DILLON DELANEY  73y  Female    Patient is a 73y old  Female who presents with a chief complaint of AMS (04 Apr 2019 20:17)      seen at bed side.   family in attendance.   able to make conversation with simple sentences.      PAST MEDICAL & SURGICAL HISTORY:  CKD (chronic kidney disease) stage 5, GFR less than 15 ml/min  Dementia  DM (diabetes mellitus)  Asthma  CVA (cerebral vascular accident)  CVA (cerebral vascular accident)  HLD (hyperlipidemia)  Asthma  Neuropathy  SLE (systemic lupus erythematosus)  DM (diabetes mellitus)  HTN (hypertension)  No significant past surgical history  S/P tonsillectomy    PHYSICAL EXAM:    T(C): 36.2 (04-05-19 @ 11:21), Max: 37.1 (04-04-19 @ 17:11)  HR: 72 (04-05-19 @ 11:21) (72 - 80)  BP: 140/67 (04-05-19 @ 11:21) (140/67 - 160/76)  RR: 18 (04-05-19 @ 11:21) (18 - 18)  SpO2: 94% (04-05-19 @ 11:21) (92% - 98%)  Wt(kg): --    I&O's Detail    04 Apr 2019 07:01  -  05 Apr 2019 07:00  --------------------------------------------------------  IN:    Oral Fluid: 240 mL  Total IN: 240 mL    OUT:    Other: 1500 mL  Total OUT: 1500 mL    Total NET: -1260 mL      05 Apr 2019 07:01  -  05 Apr 2019 15:09  --------------------------------------------------------  IN:    Oral Fluid: 120 mL  Total IN: 120 mL    OUT:  Total OUT: 0 mL    Total NET: 120 mL    Respiratory: clear anteriorly, decreased BS at bases  Cardiovascular: S1 S2  Gastrointestinal: soft NT ND +BS  Extremities: edema   Neuro: Awake and alert    MEDICATIONS  (STANDING):  amLODIPine   Tablet 10 milliGRAM(s) Oral daily  aspirin enteric coated 81 milliGRAM(s) Oral daily  atorvastatin 80 milliGRAM(s) Oral at bedtime  buDESOnide 160 MICROgram(s)/formoterol 4.5 MICROgram(s) Inhaler 2 Puff(s) Inhalation two times a day  cefTRIAXone   IVPB      cefTRIAXone   IVPB 1 Gram(s) IV Intermittent every 24 hours  chlorhexidine 4% Liquid 1 Application(s) Topical <User Schedule>  cyanocobalamin 1000 MICROGram(s) Oral daily  dextrose 5%. 1000 milliLiter(s) (50 mL/Hr) IV Continuous <Continuous>  dextrose 50% Injectable 12.5 Gram(s) IV Push once  dextrose 50% Injectable 25 Gram(s) IV Push once  dextrose 50% Injectable 25 Gram(s) IV Push once  epoetin melba Injectable 33649 Unit(s) IV Push <User Schedule>  heparin  Injectable 5000 Unit(s) SubCutaneous every 12 hours  hydrALAZINE 25 milliGRAM(s) Oral two times a day  insulin lispro (HumaLOG) corrective regimen sliding scale   SubCutaneous three times a day before meals  insulin lispro (HumaLOG) corrective regimen sliding scale   SubCutaneous at bedtime  labetalol 200 milliGRAM(s) Oral two times a day  montelukast 10 milliGRAM(s) Oral daily  tamsulosin 0.4 milliGRAM(s) Oral at bedtime    MEDICATIONS  (PRN):  acetaminophen   Tablet .. 650 milliGRAM(s) Oral every 6 hours PRN Mild Pain (1 - 3)  ALBUTerol/ipratropium for Nebulization 3 milliLiter(s) Nebulizer every 6 hours PRN Shortness of Breath and/or Wheezing  dextrose 40% Gel 15 Gram(s) Oral once PRN Blood Glucose LESS THAN 70 milliGRAM(s)/deciliter  glucagon  Injectable 1 milliGRAM(s) IntraMuscular once PRN Glucose LESS THAN 70 milligrams/deciliter  sodium chloride 0.9% lock flush 10 milliLiter(s) IV Push every 1 hour PRN Pre/post blood products, medications, blood draw, and to maintain line patency                            9.0    9.06  )-----------( 291      ( 05 Apr 2019 08:18 )             29.7       04-05    139  |  101  |  31<H>  ----------------------------<  98  3.6   |  27  |  5.18<H>    Ca    7.8<L>      05 Apr 2019 08:18  Phos  4.7     04-05  Mg     2.0     04-05    TPro  6.5  /  Alb  2.1<L>  /  TBili  0.3  /  DBili  .07  /  AST  62<H>  /  ALT  38  /  AlkPhos  77  04-05

## 2019-04-07 NOTE — PROGRESS NOTE ADULT - PROBLEM SELECTOR PLAN 6
hypoactive delirium   Avoid use of benzos, opioids,  anticholinergics, or other deliriogenic agents when possible.  Maintain sleep wake cycle.  Provide frequent reorientation and redirection.  Family member at bedside if possible. . Move patient to window bed, OOB to chair.

## 2019-04-08 ENCOUNTER — TRANSCRIPTION ENCOUNTER (OUTPATIENT)
Age: 74
End: 2019-04-08

## 2019-04-08 ENCOUNTER — OTHER (OUTPATIENT)
Age: 74
End: 2019-04-08

## 2019-04-08 VITALS — DIASTOLIC BLOOD PRESSURE: 63 MMHG | SYSTOLIC BLOOD PRESSURE: 123 MMHG | OXYGEN SATURATION: 92 % | HEART RATE: 70 BPM

## 2019-04-08 DIAGNOSIS — G93.41 METABOLIC ENCEPHALOPATHY: ICD-10-CM

## 2019-04-08 DIAGNOSIS — R41.0 DISORIENTATION, UNSPECIFIED: ICD-10-CM

## 2019-04-08 LAB
ANION GAP SERPL CALC-SCNC: 10 MMOL/L — SIGNIFICANT CHANGE UP (ref 5–17)
BUN SERPL-MCNC: 29 MG/DL — HIGH (ref 7–23)
CALCIUM SERPL-MCNC: 8.5 MG/DL — SIGNIFICANT CHANGE UP (ref 8.5–10.1)
CHLORIDE SERPL-SCNC: 98 MMOL/L — SIGNIFICANT CHANGE UP (ref 96–108)
CO2 SERPL-SCNC: 29 MMOL/L — SIGNIFICANT CHANGE UP (ref 22–31)
CREAT SERPL-MCNC: 4.99 MG/DL — HIGH (ref 0.5–1.3)
GLUCOSE SERPL-MCNC: 127 MG/DL — HIGH (ref 70–99)
HCT VFR BLD CALC: 30.3 % — LOW (ref 34.5–45)
HGB BLD-MCNC: 9.3 G/DL — LOW (ref 11.5–15.5)
MAGNESIUM SERPL-MCNC: 2.1 MG/DL — SIGNIFICANT CHANGE UP (ref 1.6–2.6)
MCHC RBC-ENTMCNC: 23.4 PG — LOW (ref 27–34)
MCHC RBC-ENTMCNC: 30.7 GM/DL — LOW (ref 32–36)
MCV RBC AUTO: 76.3 FL — LOW (ref 80–100)
NRBC # BLD: 0 /100 WBCS — SIGNIFICANT CHANGE UP (ref 0–0)
PHOSPHATE SERPL-MCNC: 4.1 MG/DL — SIGNIFICANT CHANGE UP (ref 2.5–4.5)
PLATELET # BLD AUTO: 433 K/UL — HIGH (ref 150–400)
POTASSIUM SERPL-MCNC: 3.6 MMOL/L — SIGNIFICANT CHANGE UP (ref 3.5–5.3)
POTASSIUM SERPL-SCNC: 3.6 MMOL/L — SIGNIFICANT CHANGE UP (ref 3.5–5.3)
RBC # BLD: 3.97 M/UL — SIGNIFICANT CHANGE UP (ref 3.8–5.2)
RBC # FLD: 16.1 % — HIGH (ref 10.3–14.5)
SODIUM SERPL-SCNC: 137 MMOL/L — SIGNIFICANT CHANGE UP (ref 135–145)
WBC # BLD: 11.36 K/UL — HIGH (ref 3.8–10.5)
WBC # FLD AUTO: 11.36 K/UL — HIGH (ref 3.8–10.5)

## 2019-04-08 PROCEDURE — 99239 HOSP IP/OBS DSCHRG MGMT >30: CPT

## 2019-04-08 RX ORDER — PREGABALIN 225 MG/1
1 CAPSULE ORAL
Qty: 0 | Refills: 0 | COMMUNITY
Start: 2019-04-08

## 2019-04-08 RX ADMIN — CHLORHEXIDINE GLUCONATE 1 APPLICATION(S): 213 SOLUTION TOPICAL at 05:14

## 2019-04-08 RX ADMIN — Medication 200 MILLIGRAM(S): at 05:13

## 2019-04-08 RX ADMIN — Medication 3 MILLILITER(S): at 05:25

## 2019-04-08 RX ADMIN — Medication 1 APPLICATION(S): at 05:11

## 2019-04-08 RX ADMIN — Medication 81 MILLIGRAM(S): at 11:21

## 2019-04-08 RX ADMIN — PREGABALIN 1000 MICROGRAM(S): 225 CAPSULE ORAL at 11:22

## 2019-04-08 RX ADMIN — AMLODIPINE BESYLATE 10 MILLIGRAM(S): 2.5 TABLET ORAL at 05:13

## 2019-04-08 RX ADMIN — BUDESONIDE AND FORMOTEROL FUMARATE DIHYDRATE 2 PUFF(S): 160; 4.5 AEROSOL RESPIRATORY (INHALATION) at 05:12

## 2019-04-08 RX ADMIN — Medication 2: at 11:20

## 2019-04-08 RX ADMIN — Medication 25 MILLIGRAM(S): at 05:15

## 2019-04-08 RX ADMIN — MONTELUKAST 10 MILLIGRAM(S): 4 TABLET, CHEWABLE ORAL at 11:21

## 2019-04-08 RX ADMIN — HEPARIN SODIUM 5000 UNIT(S): 5000 INJECTION INTRAVENOUS; SUBCUTANEOUS at 05:12

## 2019-04-08 NOTE — DISCHARGE NOTE PROVIDER - NSDCCPCAREPLAN_GEN_ALL_CORE_FT
PRINCIPAL DISCHARGE DIAGNOSIS  Diagnosis: Infectious encephalopathy  Assessment and Plan of Treatment: sepsis POA  s/p treatment for UTI      SECONDARY DISCHARGE DIAGNOSES  Diagnosis: Esophageal dysphagia  Assessment and Plan of Treatment: Dysphagia diet - Soft thin liquids    Diagnosis: Moderate protein-calorie malnutrition  Assessment and Plan of Treatment: Nephrovite daily, HBV protein energy.   1 L fluid retention    Diagnosis: Functional quadriplegia  Assessment and Plan of Treatment: SAVAGE on d/c    Diagnosis: Type 2 diabetes mellitus with hyperglycemia, unspecified whether long term insulin use  Assessment and Plan of Treatment: c/w ISS    Diagnosis: ESRD (end stage renal disease) on dialysis  Assessment and Plan of Treatment: c/w dialysis

## 2019-04-08 NOTE — DISCHARGE NOTE NURSING/CASE MANAGEMENT/SOCIAL WORK - NSDCDPATPORTLINK_GEN_ALL_CORE
You can access the [facility] Patient Portal, offered by [facility], by registering with the following website: [email or web address]

## 2019-04-08 NOTE — DISCHARGE NOTE PROVIDER - HOSPITAL COURSE
Patient is a 73y old  Female who presents with a chief complaint of AMS (07 Apr 2019 21:32)        HPI:72yo female, from home with pmh ESRD on dialysis (TTS)-recently started 3/21/19-, Dementia, HTN, asthma, CVA with no significant residual, SLE, T2DM, HTN presents with unresponsive episode.  Per daughter, pt had a fall yesterday.  This morning, pt was more quiet then usual but able to moan and say yes/no. Pt at baseline very verbal. Daughter took her to dialysis and noted at 1230pm that she was keeping food in her mouth and more dazed. When getting pt home and moving her daughter noted that pt was unresponsive and her eyes were" rolling back". (30 Mar 2019 22:01)SUBJECTIVE & OBJECTIVE: Pt seen and examined at bedside. She was screened for acute CVA for which she ruled out.  Patient also had EEG screening which was negative for epileptiform processes.        PHYSICAL EXAM:    T(C): 36.3 (04-08-19 @ 05:06), Max: 36.7 (04-08-19 @ 00:07)    HR: 70 (04-08-19 @ 11:49) (70 - 74)    BP: 123/63 (04-08-19 @ 11:49) (123/63 - 154/76)    RR: 18 (04-08-19 @ 05:06) (18 - 18)    SpO2: 92% (04-08-19 @ 11:49) (92% - 95%)    Wt(kg):     Total NET: 100 mL    GENERAL: NAD, sleepy    HEAD:  Atraumatic, Normocephalic    EYES: EOMI, PERRLA, conjunctiva and sclera clear    ENMT: Moist mucous membranes    NECK: Supple, No JVD    NERVOUS SYSTEM:  Alert & Oriented X3, Motor Strength 3/5 B/L upper and lower extremities; DTRs 2+ intact and symmetric    CHEST/LUNG: Clear to auscultation bilaterally; No rales, rhonchi, wheezing, or rubs    HEART: Regular rate and rhythm; No murmurs, rubs, or gallops    ABDOMEN: Soft, Nontender, Nondistended; Bowel sounds present    EXTREMITIES:  2+ Peripheral Pulses, No clubbing, cyanosis, or edema        MEDICATIONS  (STANDING):    amLODIPine   Tablet 10 milliGRAM(s) Oral daily    aspirin enteric coated 81 milliGRAM(s) Oral daily    atorvastatin 10 milliGRAM(s) Oral at bedtime    BACItracin   Ointment 1 Application(s) Topical two times a day    buDESOnide 160 MICROgram(s)/formoterol 4.5 MICROgram(s) Inhaler 2 Puff(s) Inhalation two times a day    chlorhexidine 4% Liquid 1 Application(s) Topical <User Schedule>    cyanocobalamin 1000 MICROGram(s) Oral daily    heparin  Injectable 5000 Unit(s) SubCutaneous every 12 hours    hydrALAZINE 25 milliGRAM(s) Oral two times a day    labetalol 200 milliGRAM(s) Oral two times a day    montelukast 10 milliGRAM(s) Oral daily    tamsulosin 0.4 milliGRAM(s) Oral at bedtime        LABS:                            9.3      11.36 )-----------( 433      ( 08 Apr 2019 07:58 )               30.3         04-08        137  |  98  |  29<H>    ----------------------------<  127<H>    3.6   |  29  |  4.99<H>        Ca    8.5      08 Apr 2019 07:58    Phos  4.1     04-08    Mg     2.1     04-08    CAPILLARY BLOOD GLUCOSE    POCT Blood Glucose.: 196 mg/dL (08 Apr 2019 11:20)POCT Blood Glucose.: 140 mg/dL (08 Apr 2019 08:20)POCT Blood Glucose.: 127 mg/dL (07 Apr 2019 22:57)POCT Blood Glucose.: 125 mg/dL (07 Apr 2019 17:45)    MR Head: White matter changes suggesting chronic hypertensive encephalopathy without acute process.  < end of copied text >    < from: CT Head No Cont (04.03.19 @ 12:57) >No acute intracranial hemorrhage or acute territorial infarct.< end of copied text >    < from: TTE Echo Doppler w/o Cont (04.01.19 @ 09:40) > 1. Left ventricular ejection fraction, by visual estimation, is 55 to 60%. 2. Normal global left ventricular systolic function.< end of copied text >    < from: EEG Awake or Drowsy (03.31.19 @ 13:50) >Impression abnormal record because of the diffuse slowing of the background activity secondary to by cerebral dysfunction there is no     focal seizure or epileptiform activity noticed.< end of copied text >

## 2019-04-10 DIAGNOSIS — Z99.2 DEPENDENCE ON RENAL DIALYSIS: ICD-10-CM

## 2019-04-10 DIAGNOSIS — E11.22 TYPE 2 DIABETES MELLITUS WITH DIABETIC CHRONIC KIDNEY DISEASE: ICD-10-CM

## 2019-04-10 DIAGNOSIS — I50.9 HEART FAILURE, UNSPECIFIED: ICD-10-CM

## 2019-04-10 DIAGNOSIS — E11.65 TYPE 2 DIABETES MELLITUS WITH HYPERGLYCEMIA: ICD-10-CM

## 2019-04-10 DIAGNOSIS — F03.90 UNSPECIFIED DEMENTIA WITHOUT BEHAVIORAL DISTURBANCE: ICD-10-CM

## 2019-04-10 DIAGNOSIS — G93.41 METABOLIC ENCEPHALOPATHY: ICD-10-CM

## 2019-04-10 DIAGNOSIS — N17.9 ACUTE KIDNEY FAILURE, UNSPECIFIED: ICD-10-CM

## 2019-04-10 DIAGNOSIS — I13.2 HYPERTENSIVE HEART AND CHRONIC KIDNEY DISEASE WITH HEART FAILURE AND WITH STAGE 5 CHRONIC KIDNEY DISEASE, OR END STAGE RENAL DISEASE: ICD-10-CM

## 2019-04-10 DIAGNOSIS — E44.0 MODERATE PROTEIN-CALORIE MALNUTRITION: ICD-10-CM

## 2019-04-10 DIAGNOSIS — E87.6 HYPOKALEMIA: ICD-10-CM

## 2019-04-10 DIAGNOSIS — M32.9 SYSTEMIC LUPUS ERYTHEMATOSUS, UNSPECIFIED: ICD-10-CM

## 2019-04-10 DIAGNOSIS — E11.51 TYPE 2 DIABETES MELLITUS WITH DIABETIC PERIPHERAL ANGIOPATHY WITHOUT GANGRENE: ICD-10-CM

## 2019-04-10 DIAGNOSIS — A41.9 SEPSIS, UNSPECIFIED ORGANISM: ICD-10-CM

## 2019-04-10 DIAGNOSIS — I63.9 CEREBRAL INFARCTION, UNSPECIFIED: ICD-10-CM

## 2019-04-10 DIAGNOSIS — R13.10 DYSPHAGIA, UNSPECIFIED: ICD-10-CM

## 2019-04-10 DIAGNOSIS — J45.909 UNSPECIFIED ASTHMA, UNCOMPLICATED: ICD-10-CM

## 2019-04-10 DIAGNOSIS — R53.2 FUNCTIONAL QUADRIPLEGIA: ICD-10-CM

## 2019-04-10 DIAGNOSIS — N39.0 URINARY TRACT INFECTION, SITE NOT SPECIFIED: ICD-10-CM

## 2019-04-10 DIAGNOSIS — N18.6 END STAGE RENAL DISEASE: ICD-10-CM

## 2019-04-10 DIAGNOSIS — R41.0 DISORIENTATION, UNSPECIFIED: ICD-10-CM

## 2019-04-10 DIAGNOSIS — Z86.73 PERSONAL HISTORY OF TRANSIENT ISCHEMIC ATTACK (TIA), AND CEREBRAL INFARCTION WITHOUT RESIDUAL DEFICITS: ICD-10-CM

## 2019-04-10 DIAGNOSIS — I25.10 ATHEROSCLEROTIC HEART DISEASE OF NATIVE CORONARY ARTERY WITHOUT ANGINA PECTORIS: ICD-10-CM

## 2019-06-12 ENCOUNTER — OTHER (OUTPATIENT)
Age: 74
End: 2019-06-12

## 2019-10-21 NOTE — PROGRESS NOTE ADULT - PROBLEM/PLAN-2
1  Karen Ascencio  Female, 52 year old, 1967  Gender Pronouns:   Not Documented  Phone:   [phone] (M)  Last Weight:   77 kg  PCP:   Irene Stark MD  Allergies:   No Known Allergies  Primary Ins:   None  MRN: [id number]  Patient Portal:   Declined on 11/6/2018  Next Appt:   With Radiology  01/27/2020 at 8:30 AM  Last Appt With Me:   RE: [employer] for botox?   Received: Today   Message Contents   Maeve Gurrola     I already spoke with Rena, but I also wanted to let you know that per my lead Clarissa.  The service will not be covered.     Maeve Palacios Messages        ----- Message -----   From: Izabela Humphrey   Sent: 10/21/2019   8:09 AM CDT   To: Maeve Zambrano Plastics Surg Schedule Pool Em   Subject: RE: [employer] for botox?                     Good morning Maeve!     Thanks for your help with this!     I will wait to hear back from you.     I appreciate all your help!     -Izabela   ----- Message -----   From: Maeve Zambrano   Sent: 10/21/2019   8:02 AM CDT   To: Izabela Humphrey   Subject: RE: [employer] for botox?                     Good Morning Harmony,     Yes, I have helped [place]annah with renewing the [employer] program.  I can take a look and let you know if the botox treatment is covered by [employer].     I can tell you that if the treatment is medically necessary, then [employer] would cover it.     Let me talk to my lead and I will let you know what she says.     Maeve   ----- Message -----   From: Izabela Humphrey   Sent: 10/18/2019  10:41 AM CDT   To: Maeve Zambrano, Plastics Surg Schedule Pool Em   Subject: [employer] for botox?                         Libra Mcfarlane!     I am the OR  for Plastic Surgery at the [facility] ([facility], and [facility]).       I spoke to our reception staff who verifies insurance, because I was asked to enter a pre-authorization request for Karen for botox treatment, and I noticed there was no  insurance listed for Karen.     The  stated the patient has no insurance, but does have [employer] and to contact you in regards to the patient because your name was listed on the patients account as having helped the patient set up the [employer]?     I want to see if the botox treamtent is something that the [employer] would consider covering for her.     If you are not the one to contact, please let me know who to contact and I will send them a message.     Thanks Much!    Izabela / Surgical Coordinator   Plastic and Reconstructive Surgery                  DISPLAY PLAN FREE TEXT

## 2019-10-31 ENCOUNTER — OTHER (OUTPATIENT)
Age: 74
End: 2019-10-31

## 2019-12-05 ENCOUNTER — OTHER (OUTPATIENT)
Age: 74
End: 2019-12-05

## 2020-01-01 ENCOUNTER — OTHER (OUTPATIENT)
Age: 75
End: 2020-01-01

## 2020-01-20 NOTE — PHYSICAL EXAM
LUQ abdominal pain x 2 weeks. Hx of chronic GERD and constipation. Plan to check CMP, amylase and lipase today. Will also obtain KUB to rule out constipation. Will also start on trial of PPI for suspected GERD. Return to clinic if no improvement and will discuss further workup, including further imaging if needed.   [Well Nourished] : well nourished [Well Developed] : well developed [Normal Sclera/Conjunctiva] : normal sclera/conjunctiva [Normal Outer Ear/Nose] : the ears and nose were normal in appearance [No JVD] : no jugular venous distention [No Respiratory Distress] : no respiratory distress [Clear to Auscultation] : lungs were clear to auscultation bilaterally [No Accessory Muscle Use] : no accessory muscle use [Normal Rate] : heart rate was normal  [Regular Rhythm] : with a regular rhythm [Normal S1, S2] : normal S1 and S2 [Normal Bowel Sounds] : normal bowel sounds [Non Tender] : non-tender [Soft] : abdomen soft [No Masses] : no abdominal mass palpated [No CVA Tenderness] : no ~M costovertebral angle tenderness [No Rash] : no rash [No Skin Lesions] : no skin lesions [Cranial Nerves Intact] : cranial nerves 2-12 were intact [de-identified] : forgetful and anxious [de-identified] : unsteady gait [de-identified] : A&Ox2 confused to date

## 2020-01-21 ENCOUNTER — OTHER (OUTPATIENT)
Age: 75
End: 2020-01-21

## 2020-03-05 DIAGNOSIS — Z78.9 OTHER SPECIFIED HEALTH STATUS: ICD-10-CM

## 2020-10-13 NOTE — PROGRESS NOTE ADULT - PROBLEM SELECTOR PROBLEM 3
HPI     Follow-up      Additional comments: E/R F/U              Comments     68 YO male presents today for an ER F/U for fungal infection           Last edited by Ashley Crowder, PCT on 9/22/2020  2:40 PM. (History)            Assessment /Plan     For exam results, see Encounter Report.    Candida parapsilosis infection  -     Ambulatory referral/consult to Ophthalmology      Normal DFE  No signs of candida endophthalmitis                  Type 2 diabetes mellitus with hyperglycemia, unspecified whether long term insulin use

## 2020-10-15 NOTE — OCCUPATIONAL THERAPY INITIAL EVALUATION ADULT - SITTING BALANCE: DYNAMIC
Chart reviewed by  for potential needs at discharge. Triggered by observation status. Pt in observation on A1. Being followed by internal medicine and GI teams. Pt has PCP, GI specialist and insurance. No immediate CM needs identified. Please consult CM team if needs arise.      Tyesha Elise RN poor balance

## 2020-10-22 NOTE — ED PROVIDER NOTE - NSCAREINITIATED _GEN_ER
Quality 226: Preventive Care And Screening: Tobacco Use: Screening And Cessation Intervention: Patient screened for tobacco use and is an ex/non-smoker
Detail Level: Detailed
Wilma Glass(Attending)
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
## 2021-06-23 NOTE — DIETITIAN INITIAL EVALUATION ADULT. - NS AS NUTRI INTERV MEALS SNACK
----- Message from Graciela Sapp RN sent at 2/6/2019 10:05 AM CST -----  Contact: Patient  This is a Dr Duncan pt for now.  Thanks La!    ----- Message -----  From: Bonnie Abraham  Sent: 2/6/2019   9:38 AM  To: Graciela Sapp RN    Caller: Santa    Primary cardiologist: Dr. Sheridan    Detailed reason for call: Santa states Mauricio is post PCI and he has leg pain but he won't take tylenol. Santa has questions regarding his leg edema and said Mauricio put on compression socks yesterday but it appears his abdomen/stomach area is larger today, and it's tender. Santa said he has no other symptoms, but she'd like to discuss this with the RN or Dr. Sheridan or Dr. Duncan (surgeon). Please return call.      New or active symptoms? See above    Best phone number:   [phone]     Best time to contact: today if possible    Ok to leave a detailed message? Yes    Device? No         Continue CHO no snack diet/Carbohydrate - modified diet

## 2021-10-04 NOTE — DISCHARGE NOTE PROVIDER - CARE PROVIDER_API CALL
Is This A New Presentation, Or A Follow-Up?: Skin Lesion
Сергей Poole)  Neurology  [address] [place] [id number]  Phone: [phone]  Fax: [phone]  Follow Up Time:

## 2021-12-08 NOTE — DIETITIAN INITIAL EVALUATION ADULT. - PROBLEM/PLAN-1
Pt cleared for discharge to [facility]. Pt going to room #109. Family is going to transport.      Rn will call [phone] DISPLAY PLAN FREE TEXT

## 2022-02-14 NOTE — DIETITIAN INITIAL EVALUATION ADULT. - EST PROTEIN NEEDS5
[Dear  ___] : Dear  [unfilled], [Consult Letter:] : I had the pleasure of evaluating your patient, [unfilled]. [Please see my note below.] : Please see my note below. [Consult Closing:] : Thank you very much for allowing me to participate in the care of this patient.  If you have any questions, please do not hesitate to contact me. [Sincerely,] : Sincerely, [FreeTextEntry2] : Matt Louis MD \par  [FreeTextEntry3] : Umesh Quan MD\par Surgical Oncology\par [facility]\par Office: [phone]\par Cell: [phone]\par  61.4

## 2022-07-06 NOTE — PROGRESS NOTE ADULT - SUBJECTIVE AND OBJECTIVE BOX
Is This A New Presentation, Or A Follow-Up?: Moles INTERVAL HPI/OVERNIGHT EVENTS:  Subjective Complaints: No complaints. Appears in no acute distress.  Not too verbally communicative.  No focal neuro deficits.  Both carotid and echocardiogram results are within normal.  LVEF of 60-65 percent.    NEUROLOGICAL EXAM (Pertinent):  Vital Signs Last 24 Hrs  T(C): 37.1 (2017 05:57), Max: 37.2 (2017 16:30)  T(F): 98.8 (2017 05:57), Max: 99 (2017 16:30)  HR: 84 (2017 05:57) (84 - 129)  BP: 179/85 (2017 05:57) (150/70 - 179/85)  BP(mean): --  RR: 17 (2017 05:57) (17 - 18)  SpO2: 97% (2017 05:57) (96% - 97%)    MEDICATIONS  (STANDING):  enoxaparin Injectable 40 milliGRAM(s) SubCutaneous daily  atorvastatin 10 milliGRAM(s) Oral at bedtime  amLODIPine   Tablet 10 milliGRAM(s) Oral daily  montelukast 10 milliGRAM(s) Oral daily  hydrALAZINE 25 milliGRAM(s) Oral two times a day  insulin lispro (HumaLOG) corrective regimen sliding scale   SubCutaneous three times a day before meals  insulin lispro (HumaLOG) corrective regimen sliding scale   SubCutaneous at bedtime  dextrose 5%. 1000 milliLiter(s) (50 mL/Hr) IV Continuous <Continuous>  dextrose 50% Injectable 12.5 Gram(s) IV Push once  dextrose 50% Injectable 25 Gram(s) IV Push once  dextrose 50% Injectable 25 Gram(s) IV Push once  aspirin 325 milliGRAM(s) Oral daily  insulin glargine Injectable (LANTUS) 20 Unit(s) SubCutaneous at bedtime    MEDICATIONS  (PRN):  dextrose Gel 1 Dose(s) Oral once PRN Blood Glucose LESS THAN 70 milliGRAM(s)/deciliter  glucagon  Injectable 1 milliGRAM(s) IntraMuscular once PRN Glucose LESS THAN 70 milligrams/deciliter    LABS:      Urinalysis Basic - ( 2017 10:27 )    Color: Yellow / Appearance: Clear / S.010 / pH: x  Gluc: x / Ketone: Negative  / Bili: Negative / Urobili: Negative mg/dL   Blood: x / Protein: 500 mg/dL / Nitrite: Negative   Leuk Esterase: Negative / RBC: 11-25 /HPF / WBC x   Sq Epi: x / Non Sq Epi: Moderate / Bacteria: x    ASSESSMENT & OPINION:   By history, status post Altered Mental State.  Possible Metabolic Encephalopathy.  CErebral Ischemia.  Non-focal neuro examination.  RECOMMENDATIONS:  Await EEG.

## 2022-10-23 NOTE — PHYSICAL THERAPY INITIAL EVALUATION ADULT - WEIGHT-BEARING RESTRICTIONS: SIT/STAND, REHAB EVAL
Sister Jeri called to clarify surgery time. Chart reviewed and advised to be there at 7:40 am. Verbalized understanding. Encounter routed to provider.   Reason for Disposition   Question about upcoming scheduled test, no triage required and triager able to answer question    Protocols used: Information Only Call - No Triage-A-     full weight-bearing

## 2022-11-22 NOTE — PHYSICAL THERAPY INITIAL EVALUATION ADULT - STANDING BALANCE: DYNAMIC, REHAB EVAL
[facility]    Physical Therapy  Cancellation/No-show Note  Patient Name:  Erasmo Gary  :  1931   Date:  2022    Cancelled visits to date: 0  No-shows to date: 1    For today's appointment patient:  []  Cancelled  []  Rescheduled appointment  [x]  No-show     Reason given by patient:  []  Patient ill  []  Conflicting appointment  []  No transportation    []  Conflict with work  []  No reason given  []  Other:     Comments:      Phone call information:   [x]  Phone call made today to patient. []  Patient answered, conversation as follows:    [x]  Patient did not answer. []  Phone call not needed - pt contacted us to cancel and provided reason for cancellation.      Electronically signed by:  Jose C Barnett PT,
fair balance

## 2022-11-25 NOTE — ED ADULT TRIAGE NOTE - BSA (M2)
Called and informed urine culture showed mixed microorganisms seen, meaning its associated with contamination dn consider recollection per urine culture report. Patient stated she cant make it in today to our clinic on [address] and if I can make appt for the lab on [address]. Informed patient I am not able to access that schedule. Advised patient can call their clinic and schedule lab appt. Patient seemed to be confused that I cant schedule. Writer ask patient if she knows is the lab on [address] does walk ins , patient stated they do. Advised then she can walkin whenever and urine order will be in chart for patient to recheck.    2.01

## 2022-12-22 NOTE — ED ADULT TRIAGE NOTE - NS ED TRIAGE AVPU SCALE
Alert-The patient is alert, awake and responds to voice. The patient is oriented to time, place, and person. The triage nurse is able to obtain subjective information. Xenograft Text: The defect edges were debeveled with a #15 scalpel blade.  Given the location of the defect, shape of the defect and the proximity to free margins a xenograft was deemed most appropriate.  The graft was then trimmed to fit the size of the defect.  The graft was then placed in the primary defect and oriented appropriately.

## 2023-06-27 NOTE — DIETITIAN INITIAL EVALUATION ADULT. - ETIOLOGY
ED Attending Physician Note    Patient : Amaris Todd Age: 85 year old Sex: female   MRN: [id number] Encounter Date: 6/27/2023    Subjective     CC: Chest Pain      HPI:   History provided by (including independent historians): patient, family members  Patient is able to provide only limited history at bedside    Patient presents ER with chest pain, also endorsing some shortness of breath.  EMS performed an EKG which appeared normal, was given aspirin and nitro prior to arrival.  Patient has a history of breast cancer, recently admitted for a subdural hematoma and her warfarin was stopped due to this.  She is also on Keppra.     Patient was admitted here and May for traumatic subdural hematoma.  Admitted to the ICU and discharged in stable condition.  She has outpatient neurosurgery's follow-up with Dr. Jada grady.    PCP: Usman Ba,      History     PMSH:   Past Medical History:   Diagnosis Date   • Arthritis    • Atrial fibrillation (CMD)    • Breast cancer (CMD)     left breast   • Chronic kidney disease    • Congestive cardiac failure (CMD)    • Coronary artery disease    • Diabetes mellitus (CMD)    • Essential (primary) hypertension    • Falls frequently    • Fracture    • High cholesterol    • Myocardial infarction (CMD)    • Uncomplicated senile dementia (CMD)    • Urinary incontinence    • Urinary tract infection      Past Surgical History:   Procedure Laterality Date   • BREAST SURGERY     • CYST REMOVAL      In her heart stated thinks one more growing    • EYE SURGERY     • HAND SURGERY     • JOINT REPLACEMENT     • KNEE SURGERY Left    • WRIST SURGERY         Fam Hx:   Family History   Problem Relation Age of Onset   • Diabetes Brother      Soc Hx:   Social History     Tobacco Use   • Smoking status: Never   • Smokeless tobacco: Never   Vaping Use   • Vaping Use: never used   Substance Use Topics   • Alcohol use: Not Currently   • Drug use: Never       Current Outpatient Medications    Medication Instructions   • acetaminophen (TYLENOL) 1,000 mg, Oral, 2 TIMES DAILY PRN   • AMIODarone (PACERONE) 200 mg, Oral, DAILY   • amLODIPine (NORVASC) 10 mg, Oral, DAILY   • atorvastatin (LIPITOR) 20 mg, Oral, DAILY   • Carboxymethylcellulose Sodium (ARTIFICIAL TEARS OP) 1 drop, Both Eyes, DAILY PRN   • glipiZIDE (GLUCOTROL XL) 2.5 mg, Oral, DAILY   • isosorbide mononitrate (IMDUR) 30 mg, Oral, DAILY   • levothyroxine 25 mcg, Oral, DAILY   • metFORMIN (GLUCOPHAGE) 500 mg, Oral, 2 TIMES DAILY WITH MEALS   • mirtazapine (REMERON) 15 mg, Oral, NIGHTLY   • ondansetron (ZOFRAN ODT) 4 mg, Translingual, EVERY 6 HOURS PRN   • potassium chloride (KLOR-CON M) 10 MEQ domenic ER tablet 10 mEq, Oral, 2 TIMES DAILY   • tamoxifen (NOLVADEX) 20 mg, Oral, DAILY   • torsemide (DEMADEX) 20 mg, Oral, DAILY      Allergies   Allergen Reactions   • Penicillins Other (See Comments) and RASH     Verified with daughter in law   • Sulfa Antibiotics Other (See Comments) and Nausea & Vomiting       History/medications/allergies reviewed by me.    Physical Exam     Visit Vitals  BP 92/62   Pulse 66   Temp 97.9 °F (36.6 °C) (Oral)   Resp 18   Ht 5' (1.524 m)   Wt 71.2 kg (156 lb 15.5 oz)   SpO2 97%   BMI 30.66 kg/m²       Pulse Ox: >96%%, on room air; normal oxygenation    General: cooperative, non-toxic, no apparent distress  Head: normocephalic, atraumatic, no scalp contusion  Neck: supple/no rigidity, grossly atraumatic, ROM intact, no midline ttp, no step-off  Eyes: non-icteric, EOMI, PERRL  ENT: normal nares, moist mucous membranes, pharynx normal, no erythema/exudate  Pulmonary: no resp distress, no stridor , no accessory muscle use, lungs clear bilaterally w/o rales/rhonchi or wheezing  Cardiovascular: regular rate, regular rhythm, normal S1/S2, no murmur, appears well perfused, distal pulses equal and intact, no extremity edema  Gastrointestinal: soft, non-distended, non-tender, no rebound, no guarding  Genitourinary:  deferred  Musculoskeletal: extremities grossly atraumatic, w/o obvious deformity, no erythema/swelling  Dermatologic: warm, dry, intact, no obvious rash  Neurologic: alert, fully oriented, grossly moves all 4 extremities, no gross focal sensory/motor deficits, EOMI, 5/5 strength in all extremities, no pronator drift, no sensory deficit, no ataxia/dysmetria  Psych: normal mood, normal affect, no pressured speech    EKG Results       My independent interpretation of the rhythm strip/EKG is:   normal Rate: 64 bpm  normal Rhythm: normal sinus rhythm  No Ectopy    EKG: normal sinus rhythm, normal rate, narrow, RBBB, no ST elevations, nonspecific T wave changes, unchanged from last available EKG    Lab Results     Results for orders placed or performed during the hospital encounter of 06/27/23   Comprehensive Metabolic Panel   Result Value Ref Range    Fasting Status      Sodium 136 135 - 145 mmol/L    Potassium 4.1 3.4 - 5.1 mmol/L    Chloride 102 97 - 110 mmol/L    Carbon Dioxide 27 21 - 32 mmol/L    Anion Gap 11 7 - 19 mmol/L    Glucose 169 (H) 70 - 99 mg/dL    BUN 18 6 - 20 mg/dL    Creatinine 1.38 (H) 0.51 - 0.95 mg/dL    Glomerular Filtration Rate 38 (L) >=60    BUN/Cr 13 7 - 25    Calcium 8.8 8.4 - 10.2 mg/dL    Bilirubin, Total 0.3 0.2 - 1.0 mg/dL    GOT/AST 28 <=37 Units/L    GPT/ALT 22 <64 Units/L    Alkaline Phosphatase 52 45 - 117 Units/L    Albumin 3.0 (L) 3.6 - 5.1 g/dL    Protein, Total 7.3 6.4 - 8.2 g/dL    Globulin 4.3 (H) 2.0 - 4.0 g/dL    A/G Ratio 0.7 (L) 1.0 - 2.4   TROPONIN I, HIGH SENSITIVITY   Result Value Ref Range    Troponin I, High Sensitivity 8 <52 ng/L   CBC with Automated Differential (performable only)   Result Value Ref Range    WBC 7.2 4.2 - 11.0 K/mcL    RBC 3.78 (L) 4.00 - 5.20 mil/mcL    HGB 11.5 (L) 12.0 - 15.5 g/dL    HCT 34.4 (L) 36.0 - 46.5 %    MCV 91.0 78.0 - 100.0 fl    MCH 30.4 26.0 - 34.0 pg    MCHC 33.4 32.0 - 36.5 g/dL    RDW-CV 14.2 11.0 - 15.0 %    RDW-SD 47.1 39.0 - 50.0  fL     140 - 450 K/mcL    NRBC 0 <=0 /100 WBC    Neutrophil, Percent 63 %    Lymphocytes, Percent 24 %    Mono, Percent 10 %    Eosinophils, Percent 3 %    Basophils, Percent 0 %    Immature Granulocytes 0 %    Absolute Neutrophils 4.6 1.8 - 7.7 K/mcL    Absolute Lymphocytes 1.7 1.0 - 4.0 K/mcL    Absolute Monocytes 0.7 0.3 - 0.9 K/mcL    Absolute Eosinophils  0.2 0.0 - 0.5 K/mcL    Absolute Basophils 0.0 0.0 - 0.3 K/mcL    Absolute Immature Granulocytes 0.0 0.0 - 0.2 K/mcL   TROPONIN I, HIGH SENSITIVITY   Result Value Ref Range    Troponin I, High Sensitivity 7 <52 ng/L   NT proBNP   Result Value Ref Range    NT-proBNP 748 (H) <=450 pg/mL       Laboratory results above were independently viewed and interpreted by me.    Radiology Results     Imaging Results          CTA CHEST PULMONARY EMBOLISM (Final result)  Result time 06/27/23 11:37:16    Final result                 Impression:      1. No intraluminal filling defects identified to suggest pulmonary  embolism.   2. Subtle interstitial and groundglass densities at the lung bases,  possibly on the basis of mild pulmonary vascular congestion.  3. Mild cardiomegaly with coronary artery calcifications.  4. Small left breast mass which is decreased in size since prior exams.    Electronically Signed by: ALEXANDRO KELLER M.D.   Signed on: 6/27/2023 11:37 AM   Workstation ID: [id number]             Narrative:    EXAM: CTA CHEST PULMONARY EMBOLISM    CLINICAL INDICATION: Chest pain, shortness of breath, history of breast  cancer    COMPARISON: CTA chest from 7/14/2021 and 7/12/2020    TECHNIQUE: Sequential 2 mm thick axial CT images from the thoracic inlet to  the dome of the liver were acquired after IV contrast infusion following PE  protocol with additional coronal and sagittal reconstructions. MIP  reconstructions were performed on an independent workstation and sent PACS  for further interpretation.     A combination of at least one of these dose reduction  techniques were used.   Automated exposure control for dose reduction, adjustment of the mA and/or  kV according to patient size, or use of iterative reconstruction technique  for dose reduction.    CONTRAST:    Name: Omnipaque  Concentration: 350  Volume: 65 mL   Administration: Intravenously    FINDINGS:    No intraluminal filling defects identified to suggest pulmonary embolism.   The heart is mildly enlarged.  There is no pericardial effusion.  The  thoracic aorta is normal in caliber.  Coronary calcifications are present.   There is dense annular calcification of the mitral valve.    The subtle interstitial and groundglass densities are present at the lung  bases.  There is no consolidative airspace disease.  There is no pleural  effusion or pneumothorax.      No pulmonary mass or suspicious nodules identified. No bulky mediastinal or  hilar lymphadenopathy.     There is a small left breast mass measuring 1.5 cm, decreased in size since  the most recent prior exam when it measured 2.0 cm.    Large renal cysts are partially visualized at the upper poles of the  kidneys.  Visualized portions of the upper abdomen appear otherwise  unremarkable.    There are moderate degenerative changes of the midthoracic spine.  No  suspicious osseous lesions are identified.                               XR CHEST PA OR AP 1 VIEW (Final result)  Result time 06/27/23 09:35:28    Final result                 Impression:      Stable radiograph of the chest without evidence of an acute cardiopulmonary  process.      Electronically Signed by: ALEXANDRO KELLER M.D.   Signed on: 6/27/2023 9:35 AM   Workstation ID: [id number]             Narrative:    EXAM: XR CHEST AP OR PA    CLINICAL INDICATION: Chest pain, shortness of breath    COMPARISON: Chest radiographs from 05/22/2023    TECHNIQUE: Single portable, upright frontal projection of the chest    FINDINGS:     Interstitial prominence of the lung fields is redemonstrated,  likely  chronic.  There is no new focal airspace consolidation.  The  cardiomediastinal silhouette is mildly enlarged, stable.  Annular  calcification of the mitral valve is again noted.  There is no overt  pulmonary vascular congestion, sizable pleural effusion, or pneumothorax.    There are severe degenerative changes of the bilateral shoulders.                                Radiology results above were independently viewed and otherwise interpreted by me as documented in MDM.    Procedures     Procedures       MDM / ED Course / Plan   Amaris Todd is a 85 year old female, with history, as above, who presents to ED with chief complaint of Chest Pain      After obtaining the patient's history, performing the physical exam, multiple initial diagnoses were considered based on the presenting problem.   Nursing notes reviewed and agreed with except as noted    Impressions/plan as follows:    -Patient arrived hemodynamically stable, afebrile, and chronically ill-appearing, in no distress, non-toxic  --Patient's history/exam suggest atypical chest pain  -EKG shows no acute ischemic changes; no STEMI, no arrhythmia  -Cardiac etiology is suspected;   -ACS risk factors? history of coronary artery disease, hypertension  and elevated cholesterol, history of congestive heart failure  -HEART Score Total : 6    -ASA/NTG was indicated/given by EMS PTA  -Chest XR as interpreted by me: 1 view; w/o obvious focal infiltrates, no free air, no pneumothorax, no pleural effusion    -Infectious etiology is not suspected  -MSK cause of chest pain is not suspected; pain is not focal/reproducible  -GERD/PUD/esophagitis contributing to chest pain is not suspected  -Patient has + VTE risk factors, PE is somewhat suspected given patient has active breast cancer and recently stopped her warfarin due to intracranial hemorrhage.  Will check CTA chest      ED Course as of 06/27/23 1453   Tue Jun 27, 2023   0955 Troponin I, High Sensitivity: 8  [SB]   0955 WBC: 7.2 [SB]   1017 Cr/renal function baseline w/o acute change, no YAYO   [SB]   1017 Hgb baseline for this pt w/o acute change   [SB]   1017 Chest XR as interpreted by me: 1 view; w/o obvious focal infiltrates, no free air, no pneumothorax, no pleural effusion   [SB]   1022 Discussed with the patient's sons on wire via telephone.  He was with the patient this morning stated that she was complaining of left-sided chest pain and shortness of breath no fever chills or cough.  Patient is still holding her warfarin until repeat CT head with neurosurgery.  She is currently on tamoxifen but no other therapies for her breast cancer.  At this time I am concerned for the possibility of pulmonary embolism given her medical history and complaints.  Son is agreeable to CT chest imaging. [SB]   1208 Troponin I, High Sensitivity: 7 [SB]   1208 CTA chest shows no acute pulmonary embolism, there is some interstitial groundglass density in the lung bases with likely mild pulmonary vascular congestion [SB]   1321 BNP is below patient's prior baseline.  No evidence of acute congestive heart failure. [SB]   1410 Updated the patient's son on plan of care, will place in observation, [facility] cardiology consulted.  Will give additional dose of the patient's home torsemide. [SB]   1451 Discussed with cardiology Dr. Dominguez, will evaluate patient. [SB]      ED Course User Index  [SB] Dawood Padilla, DO           Limitations to history/exam/care: none  Co-morbidities impacting care: multiple; as documented above/HPI  Social factors impacting care: none known   External records reviewed: previous labs, previous imaging, previous EKG, DC summary from most recent hospitalization , patient Rx list/bottles  Tests/interventions considered but not performed: N/A      Medications Given in the ED:  ED Medication Orders (From admission, onward)    Ordered Start     Status Ordering Provider    06/27/23 1404 06/27/23 1405  torsemide  (DEMADEX) tablet 20 mg  ONCE         Last MAR action: Given DAWOOD POWELL    06/27/23 1022 06/27/23 1023  sodium chloride (NORMAL SALINE) 0.9 % bolus 1,000 mL  ONCE         Last MAR action: Completed DAWOOD POWELL          Clinical Impression     ED Diagnosis   1. Acute on chronic congestive heart failure, unspecified heart failure type (CMD)        2. Chest pain at rest        3. Dyspnea, unspecified type            Disposition        Admit 6/27/2023  2:10 PM  Telemetry Bed?: Yes  Admitting Physician: SHIKHA BLUM [[id number]]  Is this a telephone or verbal order?: This is a telephone order from the admitting physician     Follow up: No follow-up provider specified.   Discharge Rx:   New Prescriptions    No medications on file        Dawood Powell, DO  06/27/23 [id number]     Physiological cause (DM) requiring modified CHO intake

## 2023-09-05 NOTE — ED ADULT TRIAGE NOTE - HEART RATE (BEATS/MIN)
83 Pain Refusal Text: I offered to prescribe pain medication but the patient refused to take this medication.

## 2024-04-25 NOTE — PROGRESS NOTE ADULT - PROBLEM SELECTOR PROBLEM 4
Rx approved PAPDMP reviewed and refilled   Type 2 diabetes mellitus with hyperglycemia, unspecified whether long term insulin use DOLORES (acute kidney injury)

## 2024-06-14 ENCOUNTER — RX ONLY (RX ONLY)
Age: 79
End: 2024-06-14

## 2024-06-14 ENCOUNTER — OFFICE (OUTPATIENT)
Facility: LOCATION | Age: 79
Setting detail: OPHTHALMOLOGY
End: 2024-06-14
Payer: MEDICARE

## 2024-06-14 DIAGNOSIS — H25.13: ICD-10-CM

## 2024-06-14 DIAGNOSIS — E11.3293: ICD-10-CM

## 2024-06-14 PROCEDURE — 92004 COMPRE OPH EXAM NEW PT 1/>: CPT | Performed by: OPHTHALMOLOGY

## 2024-06-14 ASSESSMENT — CONFRONTATIONAL VISUAL FIELD TEST (CVF)
OD_FINDINGS: FULL
OS_FINDINGS: FULL
